# Patient Record
Sex: FEMALE
[De-identification: names, ages, dates, MRNs, and addresses within clinical notes are randomized per-mention and may not be internally consistent; named-entity substitution may affect disease eponyms.]

---

## 2023-06-23 ENCOUNTER — NON-APPOINTMENT (OUTPATIENT)
Age: 58
End: 2023-06-23

## 2023-06-24 PROBLEM — Z00.00 ENCOUNTER FOR PREVENTIVE HEALTH EXAMINATION: Status: ACTIVE | Noted: 2023-06-24

## 2023-06-26 ENCOUNTER — APPOINTMENT (OUTPATIENT)
Dept: ORTHOPEDIC SURGERY | Facility: CLINIC | Age: 58
End: 2023-06-26

## 2023-07-25 ENCOUNTER — APPOINTMENT (OUTPATIENT)
Dept: ORTHOPEDIC SURGERY | Facility: CLINIC | Age: 58
End: 2023-07-25
Payer: COMMERCIAL

## 2023-07-25 VITALS
DIASTOLIC BLOOD PRESSURE: 88 MMHG | HEART RATE: 111 BPM | BODY MASS INDEX: 26.5 KG/M2 | HEIGHT: 60 IN | SYSTOLIC BLOOD PRESSURE: 176 MMHG | WEIGHT: 135 LBS

## 2023-07-25 DIAGNOSIS — Z72.3 LACK OF PHYSICAL EXERCISE: ICD-10-CM

## 2023-07-25 DIAGNOSIS — M47.816 SPONDYLOSIS W/OUT MYELOPATHY OR RADICULOPATHY, LUMBAR REGION: ICD-10-CM

## 2023-07-25 DIAGNOSIS — Z78.9 OTHER SPECIFIED HEALTH STATUS: ICD-10-CM

## 2023-07-25 DIAGNOSIS — M67.952 UNSPECIFIED DISORDER OF SYNOVIUM AND TENDON, LEFT THIGH: ICD-10-CM

## 2023-07-25 DIAGNOSIS — Z86.39 PERSONAL HISTORY OF OTHER ENDOCRINE, NUTRITIONAL AND METABOLIC DISEASE: ICD-10-CM

## 2023-07-25 PROCEDURE — 99203 OFFICE O/P NEW LOW 30 MIN: CPT

## 2023-07-25 RX ORDER — NAPROXEN 500 MG/1
500 TABLET ORAL
Qty: 60 | Refills: 1 | Status: ACTIVE | COMMUNITY
Start: 2023-07-25 | End: 1900-01-01

## 2023-07-25 RX ORDER — LEVOTHYROXINE SODIUM 25 UG/1
25 TABLET ORAL
Refills: 0 | Status: ACTIVE | COMMUNITY

## 2023-07-25 RX ORDER — TIZANIDINE 4 MG/1
4 TABLET ORAL EVERY 8 HOURS
Qty: 20 | Refills: 0 | Status: ACTIVE | COMMUNITY
Start: 2023-07-25 | End: 1900-01-01

## 2023-07-25 NOTE — HISTORY OF PRESENT ILLNESS
[de-identified] : Very pleasant woman referred here for left gluteal pain.  Patient's been suffering pain for 1 month she had rib dysfunction the therapist is now working on her low back/left gluteal region states it is aggravated with direct palpation I think can be consistent with gluteal tendinopathy no radicular signs and symptoms pain is reproducible with direct palpation and touch.  Aggravating factor may be gardening moving back a topsoil approximately 1 month ago [Stable] : stable [5] : a current pain level of 5/10 [10] : a maximum pain level of 10/10 [Intermit.] : ~He/She~ states the symptoms seem to be intermittent [Bending] : worsened by bending [Lifting] : worsened by lifting [Exercise Regimen] : relieved by exercise regimen [Ataxia] : no ataxia [Incontinence] : no incontinence [Loss of Dexterity] : good dexterity [Urinary Ret.] : no urinary retention [de-identified] : Direct palpation [de-identified] : Physical activity helps

## 2023-07-25 NOTE — PHYSICAL EXAM
[de-identified] : CONSTITUTIONAL: The patient is a very pleasant individual who is well-nourished and who appears stated age.\par PSYCHIATRIC: The patient is alert and oriented X 3 and in no apparent distress, and participates with orthopedic evaluation well.\par HEAD: Atraumatic and is nonsyndromic in appearance.\par EENT: No visible thyromegaly, EOMI.\par RESPIRATORY: Respiratory rate is regular, not dyspneic on examination.\par LYMPHATICS: There is no inguinal lymphadenopathy\par INTEGUMENTARY: Skin is clean, dry, and intact about the bilateral lower extremities and lumbar spine.\par VASCULAR: There is brisk capillary refill about the bilateral lower extremities.\par NEUROLOGIC: There are no pathologic reflexes. There is no decrease in sensation of the bilateral lower extremities on Wartenberg pinwheel examination. Deep tendon reflexes are well maintained at 2+/4 of the bilateral lower extremities and are symmetric..\par MUSCULOSKELETAL: There is no visible muscular atrophy. Manual motor strength is well maintained in the bilateral lower extremities. Range of motion of lumbar spine is well maintained. The patient ambulates in a non-myelopathic manner. Negative tension sign and straight leg raise bilaterally. Quad extension, ankle dorsiflexion, EHL, plantar flexion, and ankle eversion are well preserved. Normal secondary orthopaedic exam of bilateral hips, greater trochanteric area, knees and ankles, left superior gluteal tendinopathy [de-identified] : X-rays been reviewed of the lumbar spine demonstrates overall well-maintained dosis.  Disc bases are maintained apparent no acute compression fractures are noted 6 views of the lumbar spine reviewed from [facility].

## 2023-07-25 NOTE — DISCUSSION/SUMMARY
[de-identified] : Very pleasant woman who has been suffering from left superior gluteal pain gluteal tendinopathy for approximately 1 month.  She has been in physical therapy but she states it is aggravating because of direct palpation is irritating which I believe is consistent with my physical exam of left gluteal tendinopathy we have discussed trigger point injections which she wishes to hold off on continued stretching aerobic conditioning light walking anti-inflammatories topical modalities patient will follow-up in 1 month or sooner if left gluteal tendinopathy is still persistent we will consider trigger point injection versus additional imaging.  Patient was prescribed higher dose anti-inflammatory.  As well as tizanidine

## 2024-11-02 ENCOUNTER — INPATIENT (INPATIENT)
Facility: HOSPITAL | Age: 59
LOS: 8 days | Discharge: HOME HEALTH | DRG: 293 | End: 2024-11-11
Attending: HOSPITALIST | Admitting: STUDENT IN AN ORGANIZED HEALTH CARE EDUCATION/TRAINING PROGRAM
Payer: COMMERCIAL

## 2024-11-02 ENCOUNTER — RESULT REVIEW (OUTPATIENT)
Age: 59
End: 2024-11-02

## 2024-11-02 VITALS
OXYGEN SATURATION: 98 % | DIASTOLIC BLOOD PRESSURE: 75 MMHG | SYSTOLIC BLOOD PRESSURE: 166 MMHG | WEIGHT: 160.06 LBS | HEART RATE: 110 BPM | RESPIRATION RATE: 18 BRPM | HEIGHT: 65 IN | TEMPERATURE: 98 F

## 2024-11-02 DIAGNOSIS — I50.9 HEART FAILURE, UNSPECIFIED: ICD-10-CM

## 2024-11-02 LAB
ALBUMIN SERPL ELPH-MCNC: 2.8 G/DL — LOW (ref 3.3–5.2)
ALP SERPL-CCNC: 104 U/L — SIGNIFICANT CHANGE UP (ref 40–120)
ALT FLD-CCNC: 7 U/L — SIGNIFICANT CHANGE UP
ANION GAP SERPL CALC-SCNC: 14 MMOL/L — SIGNIFICANT CHANGE UP (ref 5–17)
APPEARANCE UR: CLEAR — SIGNIFICANT CHANGE UP
AST SERPL-CCNC: 11 U/L — SIGNIFICANT CHANGE UP
BACTERIA # UR AUTO: NEGATIVE /HPF — SIGNIFICANT CHANGE UP
BASOPHILS # BLD AUTO: 0.02 K/UL — SIGNIFICANT CHANGE UP (ref 0–0.2)
BASOPHILS NFR BLD AUTO: 0.3 % — SIGNIFICANT CHANGE UP (ref 0–2)
BILIRUB SERPL-MCNC: 0.3 MG/DL — LOW (ref 0.4–2)
BILIRUB UR-MCNC: NEGATIVE — SIGNIFICANT CHANGE UP
BUN SERPL-MCNC: 25.9 MG/DL — HIGH (ref 8–20)
CALCIUM SERPL-MCNC: 8.3 MG/DL — LOW (ref 8.4–10.5)
CAST: 4 /LPF — SIGNIFICANT CHANGE UP (ref 0–4)
CHLORIDE SERPL-SCNC: 98 MMOL/L — SIGNIFICANT CHANGE UP (ref 96–108)
CO2 SERPL-SCNC: 22 MMOL/L — SIGNIFICANT CHANGE UP (ref 22–29)
COLOR SPEC: YELLOW — SIGNIFICANT CHANGE UP
CREAT SERPL-MCNC: 2.16 MG/DL — HIGH (ref 0.5–1.3)
DIFF PNL FLD: ABNORMAL
EGFR: 26 ML/MIN/1.73M2 — LOW
EOSINOPHIL # BLD AUTO: 0.13 K/UL — SIGNIFICANT CHANGE UP (ref 0–0.5)
EOSINOPHIL NFR BLD AUTO: 1.8 % — SIGNIFICANT CHANGE UP (ref 0–6)
GLUCOSE SERPL-MCNC: 332 MG/DL — HIGH (ref 70–99)
GLUCOSE UR QL: 100 MG/DL
HCT VFR BLD CALC: 37.5 % — SIGNIFICANT CHANGE UP (ref 34.5–45)
HGB BLD-MCNC: 12.5 G/DL — SIGNIFICANT CHANGE UP (ref 11.5–15.5)
IMM GRANULOCYTES NFR BLD AUTO: 0.3 % — SIGNIFICANT CHANGE UP (ref 0–0.9)
KETONES UR-MCNC: NEGATIVE MG/DL — SIGNIFICANT CHANGE UP
LEUKOCYTE ESTERASE UR-ACNC: NEGATIVE — SIGNIFICANT CHANGE UP
LYMPHOCYTES # BLD AUTO: 1.2 K/UL — SIGNIFICANT CHANGE UP (ref 1–3.3)
LYMPHOCYTES # BLD AUTO: 16.9 % — SIGNIFICANT CHANGE UP (ref 13–44)
MAGNESIUM SERPL-MCNC: 1.8 MG/DL — SIGNIFICANT CHANGE UP (ref 1.8–2.6)
MCHC RBC-ENTMCNC: 26.6 PG — LOW (ref 27–34)
MCHC RBC-ENTMCNC: 33.3 G/DL — SIGNIFICANT CHANGE UP (ref 32–36)
MCV RBC AUTO: 79.8 FL — LOW (ref 80–100)
MONOCYTES # BLD AUTO: 0.39 K/UL — SIGNIFICANT CHANGE UP (ref 0–0.9)
MONOCYTES NFR BLD AUTO: 5.5 % — SIGNIFICANT CHANGE UP (ref 2–14)
NEUTROPHILS # BLD AUTO: 5.33 K/UL — SIGNIFICANT CHANGE UP (ref 1.8–7.4)
NEUTROPHILS NFR BLD AUTO: 75.2 % — SIGNIFICANT CHANGE UP (ref 43–77)
NITRITE UR-MCNC: NEGATIVE — SIGNIFICANT CHANGE UP
NT-PROBNP SERPL-SCNC: 2503 PG/ML — HIGH (ref 0–300)
PH UR: 6.5 — SIGNIFICANT CHANGE UP (ref 5–8)
PHOSPHATE SERPL-MCNC: 4 MG/DL — SIGNIFICANT CHANGE UP (ref 2.4–4.7)
PLATELET # BLD AUTO: 321 K/UL — SIGNIFICANT CHANGE UP (ref 150–400)
POTASSIUM SERPL-MCNC: 4.4 MMOL/L — SIGNIFICANT CHANGE UP (ref 3.5–5.3)
POTASSIUM SERPL-SCNC: 4.4 MMOL/L — SIGNIFICANT CHANGE UP (ref 3.5–5.3)
PROT SERPL-MCNC: 6.3 G/DL — LOW (ref 6.6–8.7)
PROT UR-MCNC: 100 MG/DL
RBC # BLD: 4.7 M/UL — SIGNIFICANT CHANGE UP (ref 3.8–5.2)
RBC # FLD: 12.4 % — SIGNIFICANT CHANGE UP (ref 10.3–14.5)
RBC CASTS # UR COMP ASSIST: 148 /HPF — HIGH (ref 0–4)
SODIUM SERPL-SCNC: 134 MMOL/L — LOW (ref 135–145)
SP GR SPEC: 1.01 — SIGNIFICANT CHANGE UP (ref 1–1.03)
SQUAMOUS # UR AUTO: 1 /HPF — SIGNIFICANT CHANGE UP (ref 0–5)
TROPONIN T, HIGH SENSITIVITY RESULT: 258 NG/L — HIGH (ref 0–51)
TROPONIN T, HIGH SENSITIVITY RESULT: 272 NG/L — HIGH (ref 0–51)
UROBILINOGEN FLD QL: 0.2 MG/DL — SIGNIFICANT CHANGE UP (ref 0.2–1)
WBC # BLD: 7.09 K/UL — SIGNIFICANT CHANGE UP (ref 3.8–10.5)
WBC # FLD AUTO: 7.09 K/UL — SIGNIFICANT CHANGE UP (ref 3.8–10.5)
WBC UR QL: 10 /HPF — HIGH (ref 0–5)

## 2024-11-02 PROCEDURE — 99223 1ST HOSP IP/OBS HIGH 75: CPT

## 2024-11-02 PROCEDURE — 99285 EMERGENCY DEPT VISIT HI MDM: CPT

## 2024-11-02 PROCEDURE — 93010 ELECTROCARDIOGRAM REPORT: CPT

## 2024-11-02 PROCEDURE — 93306 TTE W/DOPPLER COMPLETE: CPT | Mod: 26

## 2024-11-02 PROCEDURE — 71045 X-RAY EXAM CHEST 1 VIEW: CPT | Mod: 26

## 2024-11-02 PROCEDURE — 76775 US EXAM ABDO BACK WALL LIM: CPT | Mod: 26

## 2024-11-02 RX ORDER — ACETAMINOPHEN 500 MG
650 TABLET ORAL EVERY 6 HOURS
Refills: 0 | Status: DISCONTINUED | OUTPATIENT
Start: 2024-11-02 | End: 2024-11-11

## 2024-11-02 RX ORDER — AMLODIPINE BESYLATE 10 MG
5 TABLET ORAL DAILY
Refills: 0 | Status: DISCONTINUED | OUTPATIENT
Start: 2024-11-02 | End: 2024-11-11

## 2024-11-02 RX ORDER — FUROSEMIDE 40 MG
40 TABLET ORAL
Refills: 0 | Status: DISCONTINUED | OUTPATIENT
Start: 2024-11-02 | End: 2024-11-03

## 2024-11-02 RX ORDER — AMLODIPINE BESYLATE 10 MG
1 TABLET ORAL
Refills: 0 | DISCHARGE

## 2024-11-02 RX ORDER — GABAPENTIN 300 MG/1
300 CAPSULE ORAL
Refills: 0 | Status: DISCONTINUED | OUTPATIENT
Start: 2024-11-02 | End: 2024-11-11

## 2024-11-02 RX ORDER — FUROSEMIDE 40 MG
1 TABLET ORAL
Refills: 0 | DISCHARGE

## 2024-11-02 RX ORDER — AMLODIPINE BESYLATE 10 MG
10 TABLET ORAL DAILY
Refills: 0 | Status: DISCONTINUED | OUTPATIENT
Start: 2024-11-02 | End: 2024-11-02

## 2024-11-02 RX ORDER — FUROSEMIDE 40 MG
60 TABLET ORAL ONCE
Refills: 0 | Status: COMPLETED | OUTPATIENT
Start: 2024-11-02 | End: 2024-11-02

## 2024-11-02 RX ORDER — LEVOTHYROXINE SODIUM 88 MCG
125 TABLET ORAL DAILY
Refills: 0 | Status: DISCONTINUED | OUTPATIENT
Start: 2024-11-02 | End: 2024-11-04

## 2024-11-02 RX ORDER — INSULIN LISPRO 100/ML
VIAL (ML) SUBCUTANEOUS
Refills: 0 | Status: DISCONTINUED | OUTPATIENT
Start: 2024-11-02 | End: 2024-11-02

## 2024-11-02 RX ORDER — ONDANSETRON HYDROCHLORIDE 2 MG/ML
4 INJECTION, SOLUTION INTRAMUSCULAR; INTRAVENOUS EVERY 8 HOURS
Refills: 0 | Status: DISCONTINUED | OUTPATIENT
Start: 2024-11-02 | End: 2024-11-11

## 2024-11-02 RX ORDER — MELATONIN 5 MG
3 TABLET ORAL AT BEDTIME
Refills: 0 | Status: DISCONTINUED | OUTPATIENT
Start: 2024-11-02 | End: 2024-11-11

## 2024-11-02 RX ORDER — GABAPENTIN 300 MG/1
1 CAPSULE ORAL
Refills: 0 | DISCHARGE

## 2024-11-02 RX ORDER — GLUCAGON INJECTION, SOLUTION 1 MG/.2ML
1 INJECTION, SOLUTION SUBCUTANEOUS ONCE
Refills: 0 | Status: DISCONTINUED | OUTPATIENT
Start: 2024-11-02 | End: 2024-11-11

## 2024-11-02 RX ORDER — MAGNESIUM, ALUMINUM HYDROXIDE 200-200 MG
30 TABLET,CHEWABLE ORAL EVERY 4 HOURS
Refills: 0 | Status: DISCONTINUED | OUTPATIENT
Start: 2024-11-02 | End: 2024-11-11

## 2024-11-02 RX ORDER — HEPARIN SODIUM 10000 [USP'U]/ML
5000 INJECTION INTRAVENOUS; SUBCUTANEOUS EVERY 8 HOURS
Refills: 0 | Status: DISCONTINUED | OUTPATIENT
Start: 2024-11-02 | End: 2024-11-11

## 2024-11-02 RX ADMIN — Medication 60 MILLIGRAM(S): at 14:46

## 2024-11-02 RX ADMIN — Medication 40 MILLIGRAM(S): at 22:03

## 2024-11-02 RX ADMIN — HEPARIN SODIUM 5000 UNIT(S): 10000 INJECTION INTRAVENOUS; SUBCUTANEOUS at 23:08

## 2024-11-02 NOTE — H&P ADULT - HISTORY OF PRESENT ILLNESS
57 y/o F w/ PMH HTN, IDDM type I (has medtronic insulin pump), hypothyroid, neuropathies presented to ED c/o 2 weeks of NIXON w/ associated worsening LE edema, nonproductive cough and orthopnea.  Pt also reports a L-chest burning pain that occurs only when she lays on her left side but does not radiate and is not associated w/ palpitations, diaphoresis abd pain, N/V.    Pt was started on lasix a few days ago by outpt doctor w/ minimal improvement and recommended she come to hospital for IV diuresis.  She was also sent for CXR and CTA chest PE study yesterday outpt at [facility] that were only significant for pulm vascular congestion and b/l pleural effusions but negative PE.  CT chest also showed multiple lymphnodes, some new and some old seen on CT chest from 2023 and some were somewhat larger than prior image but pt states she was never told she had an abnormal CT chest last year and never had any work up for lymphnodes.  Pt also has blood work with her from 09/2024 and 10/31/24.  In september pt was at baseline Cr of 1.09 and 10/31 was 1.93.  Pt denies fevers, chills, sick contacts, recent travel, diarrhea.

## 2024-11-02 NOTE — ED ADULT NURSE REASSESSMENT NOTE - NS ED NURSE REASSESS COMMENT FT1
Assumed care of patient at 1915, report received from off-going RN. CM in Sinus tachycardia and  at 91-92% on RA , patient currently denies chest pain and shortness of breath.  Resting in ED stretcher, NAD. Wheels locked, bed in lowest position, Pending bed change due to admission.

## 2024-11-02 NOTE — ED ADULT TRIAGE NOTE - NS ED TRIAGE AVPU SCALE
Beny Nieto)  Orthopaedic Sports Medicine; Orthopaedic Surgery  [address] [place] [address]  Phone: [phone]  Fax: [phone]  Follow Up Time:    Alert-The patient is alert, awake and responds to voice. The patient is oriented to time, place, and person. The triage nurse is able to obtain subjective information.

## 2024-11-02 NOTE — ED ADULT NURSE NOTE - NSFALLUNIVINTERV_ED_ALL_ED
Bed/Stretcher in lowest position, wheels locked, appropriate side rails in place/Call bell, personal items and telephone in reach/Instruct patient to call for assistance before getting out of bed/chair/stretcher/Non-slip footwear applied when patient is off stretcher/[place] to call system/Physically safe environment - no spills, clutter or unnecessary equipment/Purposeful proactive rounding/Room/bathroom lighting operational, light cord in reach

## 2024-11-02 NOTE — ED ADULT NURSE NOTE - ED STAT RN HANDOFF DETAILS
Report given to CDU RN. Report given to CDU RN. Pt moved to CDU ________ . Pt placed on telebox. Patient awake and alert, respirations even and unlabored, in no apparent distress.  Plan, abnormal labs, history of present illness, pending labs/tests explained, opportunity to answer questions provided.

## 2024-11-02 NOTE — ED ADULT NURSE NOTE - OBJECTIVE STATEMENT
Assumed care of pt at 1439. Pt A&Ox4 c/o CP/SOB, pt on cardiac monitor in sinus tach, pt resting comfortably showing no signs of respiratory distress or pain, pt is calm and cooperative

## 2024-11-02 NOTE — ED PROVIDER NOTE - NS ED ROS FT
Const: Denies fever, chills  HEENT: Denies blurry vision, sore throat  Neck: Denies neck pain/stiffness  Resp: + coughing, + SOB  Cardiovascular: + CP, + LE edema. Denies CP, palpitations  GI: + nausea. Denies vomiting, abdominal pain, diarrhea, constipation, blood in stool  : Denies urinary frequency/urgency/dysuria, hematuria  MSK: Denies back pain  Neuro: Denies HA, dizziness, numbness, weakness  Skin: Denies rashes.

## 2024-11-02 NOTE — H&P ADULT - ASSESSMENT
59 y/o F w/ PMH HTN, IDDM type I (has medtronic insulin pump), hypothyroid, neuropathies presented to ED c/o 2 weeks of NIXON w/ associated worsening LE edema, nonproductive cough and orthopnea and occasional L-chest pain that is like a burning sensation and only occurs when she lays on her left side.  VS notable for sinus tachy in low 100's, tachypnea and O2 sats 92% ORA while in ED.  Clinically hypervolemic w/ scattered rales and b/l LE pretibial and pedal pitting edema. Initial w/u significant for trop 272-->258, BNP 2503, Cr 2.16. CXR w/ pulm vascular congestion and small b/l pleural effusions.  s/p 60mg IV lasix.  Admit for CHF exacerbation.       New acute decompensated HFpEF  - Clinically hypervolemic   - BNP 2503, Trops elevated but down trending   - CXR w/ pulm vascular congestion and small b/l pleural effusions appreciated  - CTA PE study 11/01 significant for pulm vascular congestion and b/l pleural effusions but negative PE   - TTE 11/02 w/ LVEF 55-60% no WMA reported   - Will order TSH, lipid panel and A1c  - Will start on IV diuresis   - Daily weight, strict I/O's and fluid restrict   - Maintain K>4 and Mg>2  - Monitor on telemetry   - Cardiology consulted ([place])      Type II MI, likely demand 2/2 above vs poor renal clearance  - Trops down trending (272-->258)  - Will order EKG to assess for ischemic changes   - TTE 11/02 w/ LVEF 55-60% no WMA   - Will check A1c and lipid panel   - Monitor on telemetry   - Cardiology consulted       SHARA suspect multifactorial 2/2 cardiorenal, prerenal azotemia and ALBERT   - Baseline Cr 1.09 in 09/2024 but 10/31 showed worsening Cr of 1.93 which was likely cardiorenal from acute CHF and newly started on lasix causing prerenal azotemia  - Today Cr 2.16 likely related to contrast pt received yesterday for CTA chest   - Anticipate degree of azotemia w/ initial IV diuresis   - Renal US shows non obstructing b/l calculi, no hydronephrosis and b/l increased renal echotexture compatible with medical renal disease  - Will order UA and bladder scan   - Monitor I/O's, renal function and lytes   - Avoid nephrotic meds or renally dose if needed  - Nephrology consulted       Incidental TTE finding   - TTE 11/02 reporting Echogenic mass 1.8 cm x 2.0 cm within the lateral wall of the RA  - CTA chest 11/01 report from [facility] w/ no mention of mass  - Cardiology consulted and suspect pt will need KAROLINA      Incidental CTA chest findings   - Reported to have multiple enlarged lymph nodes, some new and some seen on CT chest from 2023 and are only slight larger in size at this time  - Explained to daughter and pt that she should have a repeat CT chest in 1-3 months to reassess lymph nodes and should f/u with heme/onc outpt       IDDM Type I w/ neuropathies  - f/u A1c  - Has medtronic insulin pump and advised pt to continue using as usual while inpt   - Hypoglycemic protocol in place  - c/w gabapentin   - Will consult endocrinology for pump management       HTN  - c/w amlodipine   - On IV lasix but transition to po once euvolemic      Hypothyroid  - c/w synthroid  - f/u TSH       VTE ppx: heparin sq    Dispo: acute.  Anticipate d/c home w/ no needs once medically stable.

## 2024-11-02 NOTE — H&P ADULT - NSHPPHYSICALEXAM_GEN_ALL_CORE
GENERAL: pt examined bedside, laying comfortably in bed in NAD  HEENT: NC/AT, moist oral mucosa, clear conjunctiva, sclera nonicteric  RESPIRATORY: Normal respiratory effort; CTA b/l, no wheezing, rhonchi, rales  CARDIOVASCULAR: RRR, normal S1 and S2, no murmur/rub/gallop  ABDOMEN: soft, NT/ND, normoactive bowel sounds, no rebound/guarding  MSK: No joint deformities, edema, erythema  EXTREMITIES: No cynaosis, no clubbing, no lower extremity edema; Peripheral pulses are 2+ bilaterally  PSYCH: affect appropriate and cooperative  NEUROLOGY: A+O to person, place, and time, no focal neurologic deficits appreciated  SKIN: No rashes or no palpable lesions GENERAL: pt examined bedside, laying comfortably in bed in NAD  HEENT: NC/AT, moist oral mucosa, clear conjunctiva, sclera nonicteric  RESPIRATORY: Normal respiratory effort, scattered rales, no wheezing or rhonchi  CARDIOVASCULAR: RRR, normal S1 and S2, no murmur/rub/gallop  ABDOMEN: soft, NT/ND, +bowel sounds, no rebound/guarding  MSK: No joint deformities, edema, erythema  EXTREMITIES: No cynaosis, no clubbing, pretibial and pedal 1+pitting edema   PSYCH: affect appropriate and cooperative  NEUROLOGY: A+O to person, place, and time, no focal neurologic deficits appreciated  SKIN: No rashes or no palpable lesions

## 2024-11-02 NOTE — H&P ADULT - NSICDXFAMILYHX_GEN_ALL_CORE_FT
FAMILY HISTORY:  Mother  Still living? Unknown  FH: non-Hodgkin's lymphoma, Age at diagnosis: Age Unknown

## 2024-11-02 NOTE — ED PROVIDER NOTE - CARE PLAN
Principal Discharge DX:	New onset of congestive heart failure  Secondary Diagnosis:	Acute renal failure   1

## 2024-11-02 NOTE — H&P ADULT - NSHPLABSRESULTS_GEN_ALL_CORE
Case discussed in care rounds; Rn states that Psych offered outpatient resources.   12.5   7.09  )-----------( 321      ( 02 Nov 2024 14:24 )             37.5         11-02    134[L]  |  98  |  25.9[H]  ----------------------------<  332[H]  4.4   |  22.0  |  2.16[H]    Ca    8.3[L]      02 Nov 2024 14:24  Phos  4.0     11-02  Mg     1.8     11-02    TPro  6.3[L]  /  Alb  2.8[L]  /  TBili  0.3[L]  /  DBili  x   /  AST  11  /  ALT  7   /  AlkPhos  104  11-02        Pro-Brain Natriuretic Peptide (11.02.24 @ 14:24)    Pro-Brain Natriuretic Peptide: 2503 pg/mL        Troponin T, High Sensitivity (11.02.24 @ 14:24)    Troponin T, High Sensitivity Result: 272: *      Troponin T, High Sensitivity (11.02.24 @ 16:36)    Troponin T, High Sensitivity Result: 258: *      < from: TTE W or WO Ultrasound Enhancing Agent (11.02.24 @ 15:09) >    CONCLUSIONS:      1. Technically difficult image quality.   2. Left ventricular systolic function is normal with an ejection fraction of 58 % by Maria's method of disks with an ejection fraction visually estimated at 55 to 60 %.   3. The leftventricular diastolic function is indeterminate.   4. Normal right ventricular cavity size and normal right ventricular systolic function.   5. Pulmonary artery systolic pressure could not be estimated.   6. Echogenic mass 1.8 cm x 2.0 cm noted within the lateral wall of the right atrium visualized on the subcostal view, recommend further imaging correlation with cardiac or CT chest study or KAROLINA.   7. No pericardial effusion seen.   8. Small right and moderate left pleural effusion noted.   9. Noprior echocardiogram is available for comparison.    < end of copied text >        < from: US Renal (11.02.24 @ 17:43) >    IMPRESSION:  Bilateral nonobstructing intrarenal calculi.    Slight fullness of right intrarenal collecting system without overt   hydronephrosis. No left hydronephrosis.    Bilateral increased renal echotexture compatible with medical renal   disease.    < end of copied text > 12.5   7.09  )-----------( 321      ( 02 Nov 2024 14:24 )             37.5         11-02    134[L]  |  98  |  25.9[H]  ----------------------------<  332[H]  4.4   |  22.0  |  2.16[H]    Ca    8.3[L]      02 Nov 2024 14:24  Phos  4.0     11-02  Mg     1.8     11-02    TPro  6.3[L]  /  Alb  2.8[L]  /  TBili  0.3[L]  /  DBili  x   /  AST  11  /  ALT  7   /  AlkPhos  104  11-02        Pro-Brain Natriuretic Peptide (11.02.24 @ 14:24)    Pro-Brain Natriuretic Peptide: 2503 pg/mL        Troponin T, High Sensitivity (11.02.24 @ 14:24)    Troponin T, High Sensitivity Result: 272: *      Troponin T, High Sensitivity (11.02.24 @ 16:36)    Troponin T, High Sensitivity Result: 258: *       CTA PE study 11/01/24 that was significant for pulm vascular congestion and b/l pleural effusions but negative PE.  CT chest also showed multiple enlarged lymph nodes, some new and some seen on CT chest from 2023 and are only slight larger in size at this time      < from: TTE W or WO Ultrasound Enhancing Agent (11.02.24 @ 15:09) >    CONCLUSIONS:      1. Technically difficult image quality.   2. Left ventricular systolic function is normal with an ejection fraction of 58 % by Maria's method of disks with an ejection fraction visually estimated at 55 to 60 %.   3. The leftventricular diastolic function is indeterminate.   4. Normal right ventricular cavity size and normal right ventricular systolic function.   5. Pulmonary artery systolic pressure could not be estimated.   6. Echogenic mass 1.8 cm x 2.0 cm noted within the lateral wall of the right atrium visualized on the subcostal view, recommend further imaging correlation with cardiac or CT chest study or KAROLINA.   7. No pericardial effusion seen.   8. Small right and moderate left pleural effusion noted.   9. Noprior echocardiogram is available for comparison.    < end of copied text >        < from: US Renal (11.02.24 @ 17:43) >    IMPRESSION:  Bilateral nonobstructing intrarenal calculi.    Slight fullness of right intrarenal collecting system without overt   hydronephrosis. No left hydronephrosis.    Bilateral increased renal echotexture compatible with medical renal   disease.    < end of copied text >

## 2024-11-02 NOTE — ED PROVIDER NOTE - PHYSICAL EXAMINATION
Const: Awake, alert and oriented. In no acute distress. Well appearing.  HEENT: NC/AT. Moist mucous membranes.  Eyes: No scleral icterus. EOMI.  Neck:. Soft and supple. Full ROM without pain.  Cardiac: Tachycardic rate and regular rhythm. +S1/S2. No murmurs. Peripheral pulses 2+ and symmetric. 2+ b/l LE edema.  Resp: Speaking in full sentences. No evidence of respiratory distress. Diminished breath sounds bilateral bases, diffuse rales.  Abd: Soft, non-tender, non-distended. Normal bowel sounds in all 4 quadrants. No guarding or rebound.  Back: Spine midline and non-tender. No CVAT.  Skin: No rashes, abrasions or lacerations.  Neuro: Awake, alert & oriented x 3. Moves all extremities symmetrically.

## 2024-11-02 NOTE — ED PROVIDER NOTE - OBJECTIVE STATEMENT
57 y/o F with PMH HTN, DM presents for 2 weeks of LE edema, NIXON, shortness of breath and coughing. She denies chest pain with exertion, but notes left sided chest pain when lying on her left side. She was started on lasix 3 days ago without improvement of symptoms. She had an outpatient CXR and CTA PE study yesterday which showed pulmonary vascular congestion with bilateral pleural effusions, no PE, but multiple enlarged lymph nodes that are only mildly more enlarged than in 2023. She notes nausea without vomiting. She had blood work on 10/31 which showed worsening BUN/cre (baseline cre 1.09 from Sept, now 1.93). She has an appointment with [facility] next week, but has not yet seen them. She denies smoking or EtOH use.

## 2024-11-02 NOTE — ED PROVIDER NOTE - CLINICAL SUMMARY MEDICAL DECISION MAKING FREE TEXT BOX
58-year-old female with past medical history hypertension, diabetes presents sent in by her primary care doctor for new onset heart failure and renal failure.  Patient describes 2 weeks of dyspnea on exertion, bilateral lower extremity edema, and cough that is not productive of sputum.  She denies fevers.  She had outpatient blood work last week which showed rising creatinine, and outpatient chest x-ray that showed pulmonary vascular congestion with bilateral pleural effusions, and a CTA PE study yesterday which showed no PE.  Patient is tachycardic, diffuse Rales, satting 93% on room air, no respiratory distress.  She does have 2+ pitting edema bilateral lower extremities.  Will check labs, start IV diuresis, cardiology and nephrology consulted.  I discussed with patient and daughter at bedside plan for admission.

## 2024-11-03 LAB
A1C WITH ESTIMATED AVERAGE GLUCOSE RESULT: 10.6 % — HIGH (ref 4–5.6)
ALBUMIN SERPL ELPH-MCNC: 2.7 G/DL — LOW (ref 3.3–5.2)
ALP SERPL-CCNC: 99 U/L — SIGNIFICANT CHANGE UP (ref 40–120)
ALT FLD-CCNC: 5 U/L — SIGNIFICANT CHANGE UP
ANION GAP SERPL CALC-SCNC: 13 MMOL/L — SIGNIFICANT CHANGE UP (ref 5–17)
AST SERPL-CCNC: 10 U/L — SIGNIFICANT CHANGE UP
BASOPHILS # BLD AUTO: 0.02 K/UL — SIGNIFICANT CHANGE UP (ref 0–0.2)
BASOPHILS NFR BLD AUTO: 0.3 % — SIGNIFICANT CHANGE UP (ref 0–2)
BILIRUB SERPL-MCNC: 0.3 MG/DL — LOW (ref 0.4–2)
BUN SERPL-MCNC: 31.7 MG/DL — HIGH (ref 8–20)
CALCIUM SERPL-MCNC: 8.1 MG/DL — LOW (ref 8.4–10.5)
CHLORIDE SERPL-SCNC: 99 MMOL/L — SIGNIFICANT CHANGE UP (ref 96–108)
CHOLEST SERPL-MCNC: 148 MG/DL — SIGNIFICANT CHANGE UP
CO2 SERPL-SCNC: 23 MMOL/L — SIGNIFICANT CHANGE UP (ref 22–29)
CREAT SERPL-MCNC: 2.82 MG/DL — HIGH (ref 0.5–1.3)
EGFR: 19 ML/MIN/1.73M2 — LOW
EOSINOPHIL # BLD AUTO: 0.16 K/UL — SIGNIFICANT CHANGE UP (ref 0–0.5)
EOSINOPHIL NFR BLD AUTO: 2.6 % — SIGNIFICANT CHANGE UP (ref 0–6)
ESTIMATED AVERAGE GLUCOSE: 258 MG/DL — HIGH (ref 68–114)
GLUCOSE BLDC GLUCOMTR-MCNC: 116 MG/DL — HIGH (ref 70–99)
GLUCOSE BLDC GLUCOMTR-MCNC: 226 MG/DL — HIGH (ref 70–99)
GLUCOSE BLDC GLUCOMTR-MCNC: 261 MG/DL — HIGH (ref 70–99)
GLUCOSE BLDC GLUCOMTR-MCNC: 266 MG/DL — HIGH (ref 70–99)
GLUCOSE SERPL-MCNC: 82 MG/DL — SIGNIFICANT CHANGE UP (ref 70–99)
HCT VFR BLD CALC: 38.8 % — SIGNIFICANT CHANGE UP (ref 34.5–45)
HDLC SERPL-MCNC: 39 MG/DL — LOW
HGB BLD-MCNC: 13 G/DL — SIGNIFICANT CHANGE UP (ref 11.5–15.5)
IMM GRANULOCYTES NFR BLD AUTO: 0.5 % — SIGNIFICANT CHANGE UP (ref 0–0.9)
LIPID PNL WITH DIRECT LDL SERPL: 84 MG/DL — SIGNIFICANT CHANGE UP
LYMPHOCYTES # BLD AUTO: 1.06 K/UL — SIGNIFICANT CHANGE UP (ref 1–3.3)
LYMPHOCYTES # BLD AUTO: 17.3 % — SIGNIFICANT CHANGE UP (ref 13–44)
MAGNESIUM SERPL-MCNC: 1.8 MG/DL — SIGNIFICANT CHANGE UP (ref 1.6–2.6)
MCHC RBC-ENTMCNC: 26.7 PG — LOW (ref 27–34)
MCHC RBC-ENTMCNC: 33.5 G/DL — SIGNIFICANT CHANGE UP (ref 32–36)
MCV RBC AUTO: 79.7 FL — LOW (ref 80–100)
MONOCYTES # BLD AUTO: 0.49 K/UL — SIGNIFICANT CHANGE UP (ref 0–0.9)
MONOCYTES NFR BLD AUTO: 8 % — SIGNIFICANT CHANGE UP (ref 2–14)
NEUTROPHILS # BLD AUTO: 4.36 K/UL — SIGNIFICANT CHANGE UP (ref 1.8–7.4)
NEUTROPHILS NFR BLD AUTO: 71.3 % — SIGNIFICANT CHANGE UP (ref 43–77)
NON HDL CHOLESTEROL: 109 MG/DL — SIGNIFICANT CHANGE UP
PHOSPHATE SERPL-MCNC: 4.2 MG/DL — SIGNIFICANT CHANGE UP (ref 2.4–4.7)
PLATELET # BLD AUTO: 338 K/UL — SIGNIFICANT CHANGE UP (ref 150–400)
POTASSIUM SERPL-MCNC: 3.9 MMOL/L — SIGNIFICANT CHANGE UP (ref 3.5–5.3)
POTASSIUM SERPL-SCNC: 3.9 MMOL/L — SIGNIFICANT CHANGE UP (ref 3.5–5.3)
PROT SERPL-MCNC: 6 G/DL — LOW (ref 6.6–8.7)
RBC # BLD: 4.87 M/UL — SIGNIFICANT CHANGE UP (ref 3.8–5.2)
RBC # FLD: 12.6 % — SIGNIFICANT CHANGE UP (ref 10.3–14.5)
SODIUM SERPL-SCNC: 135 MMOL/L — SIGNIFICANT CHANGE UP (ref 135–145)
TRIGL SERPL-MCNC: 125 MG/DL — SIGNIFICANT CHANGE UP
TSH SERPL-MCNC: 7.12 UIU/ML — HIGH (ref 0.27–4.2)
WBC # BLD: 6.12 K/UL — SIGNIFICANT CHANGE UP (ref 3.8–10.5)
WBC # FLD AUTO: 6.12 K/UL — SIGNIFICANT CHANGE UP (ref 3.8–10.5)

## 2024-11-03 PROCEDURE — 99223 1ST HOSP IP/OBS HIGH 75: CPT

## 2024-11-03 PROCEDURE — 99233 SBSQ HOSP IP/OBS HIGH 50: CPT

## 2024-11-03 RX ORDER — INSULIN LISPRO 100/ML
VIAL (ML) SUBCUTANEOUS AT BEDTIME
Refills: 0 | Status: DISCONTINUED | OUTPATIENT
Start: 2024-11-03 | End: 2024-11-11

## 2024-11-03 RX ORDER — INSULIN LISPRO 100/ML
VIAL (ML) SUBCUTANEOUS
Refills: 0 | Status: DISCONTINUED | OUTPATIENT
Start: 2024-11-03 | End: 2024-11-05

## 2024-11-03 RX ORDER — FUROSEMIDE 40 MG
60 TABLET ORAL
Refills: 0 | Status: DISCONTINUED | OUTPATIENT
Start: 2024-11-03 | End: 2024-11-04

## 2024-11-03 RX ORDER — INSULIN GLARGINE,HUM.REC.ANLOG 100/ML
16 VIAL (ML) SUBCUTANEOUS ONCE
Refills: 0 | Status: COMPLETED | OUTPATIENT
Start: 2024-11-03 | End: 2024-11-03

## 2024-11-03 RX ORDER — TRAMADOL HYDROCHLORIDE 50 MG/1
25 TABLET, COATED ORAL ONCE
Refills: 0 | Status: DISCONTINUED | OUTPATIENT
Start: 2024-11-03 | End: 2024-11-03

## 2024-11-03 RX ORDER — INFLUENZ VIR VAC TV P-SURF2003 15MCG/.5ML
0.5 SYRINGE (ML) INTRAMUSCULAR ONCE
Refills: 0 | Status: DISCONTINUED | OUTPATIENT
Start: 2024-11-03 | End: 2024-11-11

## 2024-11-03 RX ADMIN — Medication 60 MILLIGRAM(S): at 14:10

## 2024-11-03 RX ADMIN — Medication 650 MILLIGRAM(S): at 15:59

## 2024-11-03 RX ADMIN — GABAPENTIN 300 MILLIGRAM(S): 300 CAPSULE ORAL at 06:15

## 2024-11-03 RX ADMIN — TRAMADOL HYDROCHLORIDE 25 MILLIGRAM(S): 50 TABLET, COATED ORAL at 18:53

## 2024-11-03 RX ADMIN — Medication 5 MILLIGRAM(S): at 06:14

## 2024-11-03 RX ADMIN — Medication 3: at 18:12

## 2024-11-03 RX ADMIN — GABAPENTIN 300 MILLIGRAM(S): 300 CAPSULE ORAL at 18:10

## 2024-11-03 RX ADMIN — Medication 16 UNIT(S): at 18:09

## 2024-11-03 RX ADMIN — HEPARIN SODIUM 5000 UNIT(S): 10000 INJECTION INTRAVENOUS; SUBCUTANEOUS at 22:07

## 2024-11-03 RX ADMIN — Medication 125 MICROGRAM(S): at 06:14

## 2024-11-03 RX ADMIN — HEPARIN SODIUM 5000 UNIT(S): 10000 INJECTION INTRAVENOUS; SUBCUTANEOUS at 06:17

## 2024-11-03 RX ADMIN — Medication 650 MILLIGRAM(S): at 14:16

## 2024-11-03 RX ADMIN — Medication 40 MILLIGRAM(S): at 06:20

## 2024-11-03 RX ADMIN — Medication 1: at 22:08

## 2024-11-03 RX ADMIN — HEPARIN SODIUM 5000 UNIT(S): 10000 INJECTION INTRAVENOUS; SUBCUTANEOUS at 14:09

## 2024-11-03 NOTE — PROGRESS NOTE ADULT - SUBJECTIVE AND OBJECTIVE BOX
[facility] Division of Hospital Medicine    Chief Complaint:      SUBJECTIVE / OVERNIGHT EVENTS:    Patient seen and examined at bedside, admitted overnight with new onset HFpEF, cardiology eval pending, patient on IV diuresis with noted SHARA as well, nephrology eval pending. Patient also has insulin pump for which Endocrine was consulted this AM, form provided to patient to self fill out as able for insulin management, Endocrine to assist. Patient continues to feel some SOB, and swelling in her legs but feels she is improving.     MEDICATIONS  (STANDING):  amLODIPine   Tablet 5 milliGRAM(s) Oral daily  dextrose 5%. 1000 milliLiter(s) (100 mL/Hr) IV Continuous <Continuous>  dextrose 5%. 1000 milliLiter(s) (50 mL/Hr) IV Continuous <Continuous>  dextrose 50% Injectable 25 Gram(s) IV Push once  dextrose 50% Injectable 12.5 Gram(s) IV Push once  dextrose 50% Injectable 25 Gram(s) IV Push once  furosemide   Injectable 40 milliGRAM(s) IV Push two times a day  gabapentin 300 milliGRAM(s) Oral two times a day  glucagon  Injectable 1 milliGRAM(s) IntraMuscular once  heparin   Injectable 5000 Unit(s) SubCutaneous every 8 hours  influenza   Vaccine 0.5 milliLiter(s) IntraMuscular once  levothyroxine 125 MICROGram(s) Oral daily    MEDICATIONS  (PRN):  acetaminophen     Tablet .. 650 milliGRAM(s) Oral every 6 hours PRN Temp greater or equal to 38C (100.4F), Mild Pain (1 - 3)  aluminum hydroxide/magnesium hydroxide/simethicone Suspension 30 milliLiter(s) Oral every 4 hours PRN Dyspepsia  dextrose Oral Gel 15 Gram(s) Oral once PRN Blood Glucose LESS THAN 70 milliGRAM(s)/deciliter  melatonin 3 milliGRAM(s) Oral at bedtime PRN Insomnia  ondansetron Injectable 4 milliGRAM(s) IV Push every 8 hours PRN Nausea and/or Vomiting        I&O's Summary      PHYSICAL EXAM:  Vital Signs Last 24 Hrs  T(C): 36.9 (03 Nov 2024 07:52), Max: 36.9 (02 Nov 2024 22:28)  T(F): 98.4 (03 Nov 2024 07:52), Max: 98.5 (03 Nov 2024 05:48)  HR: 100 (03 Nov 2024 07:52) (100 - 113)  BP: 148/80 (03 Nov 2024 07:52) (148/80 - 172/81)  BP(mean): 99 (02 Nov 2024 14:40) (99 - 99)  RR: 18 (03 Nov 2024 07:52) (18 - 21)  SpO2: 92% (03 Nov 2024 07:52) (92% - 98%)    Parameters below as of 03 Nov 2024 07:52  Patient On (Oxygen Delivery Method): room air      GENERAL: pt examined bedside, laying comfortably in bed in NAD  HEENT: NC/AT, moist oral mucosa, clear conjunctiva, sclera nonicteric  RESPIRATORY: decreased breath sounds in B/L bases, otherwise minimal crackles noted   CARDIOVASCULAR: RRR, normal S1 and S2, no murmur/rub/gallop  ABDOMEN: soft, NT/ND, +bowel sounds, no rebound/guarding  MSK: No joint deformities, edema, erythema  EXTREMITIES: No cynaosis, no clubbing, pretibial and pedal 1+pitting edema   PSYCH: affect appropriate and cooperative  NEUROLOGY: A+O to person, place, and time, no focal neurologic deficits appreciated  SKIN: No rashes or no palpable lesions    LABS:                        13.0   6.12  )-----------( 338      ( 03 Nov 2024 07:21 )             38.8     11-03    135  |  99  |  31.7[H]  ----------------------------<  82  3.9   |  23.0  |  2.82[H]    Ca    8.1[L]      03 Nov 2024 07:21  Phos  4.2     11-03  Mg     1.8     11-03    TPro  6.0[L]  /  Alb  2.7[L]  /  TBili  0.3[L]  /  DBili  x   /  AST  10  /  ALT  5   /  AlkPhos  99  11-03          Urinalysis Basic - ( 03 Nov 2024 07:21 )    Color: x / Appearance: x / SG: x / pH: x  Gluc: 82 mg/dL / Ketone: x  / Bili: x / Urobili: x   Blood: x / Protein: x / Nitrite: x   Leuk Esterase: x / RBC: x / WBC x   Sq Epi: x / Non Sq Epi: x / Bacteria: x        CAPILLARY BLOOD GLUCOSE      POCT Blood Glucose.: 116 mg/dL (03 Nov 2024 08:15)        RADIOLOGY & ADDITIONAL TESTS:  Results Reviewed:   Imaging Personally Reviewed:  Electrocardiogram Personally Reviewed:

## 2024-11-03 NOTE — CONSULT NOTE ADULT - SUBJECTIVE AND OBJECTIVE BOX
HPI:  57 y/o F w/ PMH HTN, IDDM type I (has medtronic insulin pump), hypothyroid, neuropathies presented to ED c/o 2 weeks of NIXON w/ associated worsening LE edema, nonproductive cough and orthopnea.  Pt also reports a L-chest burning pain that occurs only when she lays on her left side but does not radiate and is not associated w/ palpitations, diaphoresis abd pain, N/V.    Pt was started on lasix a few days ago by outpt doctor w/ minimal improvement and recommended she come to hospital for IV diuresis.  She was also sent for CXR and CTA chest PE study yesterday outpt at [facility] that were only significant for pulm vascular congestion and b/l pleural effusions but negative PE.  CT chest also showed multiple lymphnodes, some new and some old seen on CT chest from 2023 and some were somewhat larger than prior image but pt states she was never told she had an abnormal CT chest last year and never had any work up for lymphnodes.  Pt also has blood work with her from 09/2024 and 10/31/24.  In september pt was at baseline Cr of 1.09 and 10/31 was 1.93.  Pt denies fevers, chills, sick contacts, recent travel, diarrhea.  (02 Nov 2024 20:01)    Endocrine consulted for T1DM on Medtronic insulin pump   T1DM since 1993, outpt endo: Dr Rondon  - she does not manage her pump settings, stated that setting have not been changes in a while and glucoses in control. she does not use a sensor  complications - peripheral neuropathy (on outpt gabapentin), no recent eye exam, denies kidney issues  - ?adherence with diabetic diet, she drinks juice daily    Hypothyroidism on Synthroid 125 mcg daily, dose increased 2 months ago and is adherent with daily dosing but takes with juice    PAST MEDICAL & SURGICAL HISTORY:  Diabetes  Hypertension  No significant past surgical history    FAMILY HISTORY:  FH: non-Hodgkin's lymphoma (Mother)    SOCIAL HISTORY: denies tobacco, illicit drugs or EtOh use    MEDICATIONS  (STANDING):  amLODIPine   Tablet 5 milliGRAM(s) Oral daily  dextrose 5%. 1000 milliLiter(s) (100 mL/Hr) IV Continuous <Continuous>  dextrose 5%. 1000 milliLiter(s) (50 mL/Hr) IV Continuous <Continuous>  dextrose 50% Injectable 25 Gram(s) IV Push once  dextrose 50% Injectable 12.5 Gram(s) IV Push once  dextrose 50% Injectable 25 Gram(s) IV Push once  furosemide   Injectable 60 milliGRAM(s) IV Push two times a day  gabapentin 300 milliGRAM(s) Oral two times a day  glucagon  Injectable 1 milliGRAM(s) IntraMuscular once  heparin   Injectable 5000 Unit(s) SubCutaneous every 8 hours  influenza   Vaccine 0.5 milliLiter(s) IntraMuscular once  levothyroxine 125 MICROGram(s) Oral daily    MEDICATIONS  (PRN):  acetaminophen     Tablet .. 650 milliGRAM(s) Oral every 6 hours PRN Temp greater or equal to 38C (100.4F), Mild Pain (1 - 3)  aluminum hydroxide/magnesium hydroxide/simethicone Suspension 30 milliLiter(s) Oral every 4 hours PRN Dyspepsia  dextrose Oral Gel 15 Gram(s) Oral once PRN Blood Glucose LESS THAN 70 milliGRAM(s)/deciliter  melatonin 3 milliGRAM(s) Oral at bedtime PRN Insomnia  ondansetron Injectable 4 milliGRAM(s) IV Push every 8 hours PRN Nausea and/or Vomiting    ALLERGIES: No Known Allergies      Vital Signs Last 24 Hrs  T(C): 36.8 (03 Nov 2024 15:49), Max: 36.9 (02 Nov 2024 22:28)  T(F): 98.3 (03 Nov 2024 15:49), Max: 98.5 (03 Nov 2024 05:48)  HR: 97 (03 Nov 2024 15:49) (97 - 113)  BP: 147/83 (03 Nov 2024 15:49) (143/85 - 172/81)  BP(mean): --  RR: 18 (03 Nov 2024 15:49) (18 - 21)  SpO2: 93% (03 Nov 2024 15:49) (92% - 98%)    Parameters below as of 03 Nov 2024 11:10  Patient On (Oxygen Delivery Method): nasal cannula    Physical Exam:  General appearance: NAD  Eyes: EOMI  Neck: No palpable thyroid nodules  Lungs: Normal respiratory excursion. Lungs clear no w/r/r  CV: Normal S1S2, regular. No m/r/g.  Pedal pulses intact.  Abdomen: Soft, nontender, nondistended, (+) BS  Musculoskeletal: No cyanosis, clubbing, or edema.  Skin: Warm and moist. No acanthosis. Feet - no ulcers  Neuro: Cranial nerves intact.  Psych: Normal affect, good judgement/insight      LABS:                        13.0   6.12  )-----------( 338      ( 03 Nov 2024 07:21 )             38.8     11-03    135  |  99  |  31.7[H]  ----------------------------<  82  3.9   |  23.0  |  2.82[H]    Ca    8.1[L]      03 Nov 2024 07:21  Phos  4.2     11-03  Mg     1.8     11-03    TPro  6.0[L]  /  Alb  2.7[L]  /  TBili  0.3[L]  /  DBili  x   /  AST  10  /  ALT  5   /  AlkPhos  99  11-03    LIVER FUNCTIONS - ( 03 Nov 2024 07:21 )  Alb: 2.7 g/dL / Pro: 6.0 g/dL / ALK PHOS: 99 U/L / ALT: 5 U/L / AST: 10 U/L / GGT: x             A1C with Estimated Average Glucose Result: 10.6 % (11-03-24 @ 07:21)      CAPILLARY BLOOD GLUCOSE  POCT Blood Glucose.: 226 mg/dL (03 Nov 2024 11:31)  POCT Blood Glucose.: 116 mg/dL (03 Nov 2024 08:15)      Thyroid Stimulating Hormone, Serum: 7.12 uIU/mL [0.27 - 4.20] (11-03-24)

## 2024-11-03 NOTE — CONSULT NOTE ADULT - SUBJECTIVE AND OBJECTIVE BOX
[facility]                                   [address] [place] [address]                                                      PHONE: [phone]                                                         FAX: [phone]  [email or web address]  ---------------------------------------------------------------------------------------------------------------------------------    Reason for Consult: SOB    HPI:  GILMAR KING is an 58y Female with DM1 on insulin pump, HTN, hypothyroid presented with two weeks of worsening SOB, leg swelling, and cough. She denies recent illness, fevers, or chills. She denies chest pain or palpitations.     PAST MEDICAL & SURGICAL HISTORY:  Diabetes      Hypertension      No significant past surgical history          No Known Allergies      MEDICATIONS  (STANDING):  amLODIPine   Tablet 5 milliGRAM(s) Oral daily  dextrose 5%. 1000 milliLiter(s) (100 mL/Hr) IV Continuous <Continuous>  dextrose 5%. 1000 milliLiter(s) (50 mL/Hr) IV Continuous <Continuous>  dextrose 50% Injectable 25 Gram(s) IV Push once  dextrose 50% Injectable 12.5 Gram(s) IV Push once  dextrose 50% Injectable 25 Gram(s) IV Push once  furosemide   Injectable 40 milliGRAM(s) IV Push two times a day  gabapentin 300 milliGRAM(s) Oral two times a day  glucagon  Injectable 1 milliGRAM(s) IntraMuscular once  heparin   Injectable 5000 Unit(s) SubCutaneous every 8 hours  influenza   Vaccine 0.5 milliLiter(s) IntraMuscular once  levothyroxine 125 MICROGram(s) Oral daily    MEDICATIONS  (PRN):  acetaminophen     Tablet .. 650 milliGRAM(s) Oral every 6 hours PRN Temp greater or equal to 38C (100.4F), Mild Pain (1 - 3)  aluminum hydroxide/magnesium hydroxide/simethicone Suspension 30 milliLiter(s) Oral every 4 hours PRN Dyspepsia  dextrose Oral Gel 15 Gram(s) Oral once PRN Blood Glucose LESS THAN 70 milliGRAM(s)/deciliter  melatonin 3 milliGRAM(s) Oral at bedtime PRN Insomnia  ondansetron Injectable 4 milliGRAM(s) IV Push every 8 hours PRN Nausea and/or Vomiting      Social History:  Cigarettes:  none    Family History:  denies CAD, MI, CVA, or sudden death.    ROS: Negative other than as mentioned in HPI.    Vital Signs Last 24 Hrs  T(C): 36.9 (03 Nov 2024 11:10), Max: 36.9 (02 Nov 2024 22:28)  T(F): 98.4 (03 Nov 2024 11:10), Max: 98.5 (03 Nov 2024 05:48)  HR: 102 (03 Nov 2024 11:10) (100 - 113)  BP: 143/85 (03 Nov 2024 11:10) (143/85 - 172/81)  BP(mean): 99 (02 Nov 2024 14:40) (99 - 99)  RR: 18 (03 Nov 2024 11:10) (18 - 21)  SpO2: 94% (03 Nov 2024 11:10) (92% - 98%)    Parameters below as of 03 Nov 2024 11:10  Patient On (Oxygen Delivery Method): nasal cannula      ICU Vital Signs Last 24 Hrs  GILMAR KING  I&O's Detail    I&O's Summary    Drug Dosing Weight  GILMAR COSTA      PHYSICAL EXAM:  General: NAD  HEENT: Head; normocephalic, atraumatic.  Eyes: Pupils reactive, cornea wnl.  Neck: Supple, no nodes adenopathy, no NVD or carotid bruit or thyromegaly.  CARDIOVASCULAR: Normal S1 and S2, No murmur, rub, gallop or lift.   LUNGS: reduced breath sounds b/l base  ABDOMEN: Soft, nontender without mass or organomegaly. bowel sounds normoactive.  EXTREMITIES: 2+ edema both legs  SKIN: warm and dry with normal turgor.  NEURO: Alert/oriented x 3  PSYCH: normal affect.        LABS:                        13.0   6.12  )-----------( 338      ( 03 Nov 2024 07:21 )             38.8     11-03    135  |  99  |  31.7[H]  ----------------------------<  82  3.9   |  23.0  |  2.82[H]    Ca    8.1[L]      03 Nov 2024 07:21  Phos  4.2     11-03  Mg     1.8     11-03    TPro  6.0[L]  /  Alb  2.7[L]  /  TBili  0.3[L]  /  DBili  x   /  AST  10  /  ALT  5   /  AlkPhos  99  11-03    GILMAR COSTA        Urinalysis Basic - ( 03 Nov 2024 07:21 )    Color: x / Appearance: x / SG: x / pH: x  Gluc: 82 mg/dL / Ketone: x  / Bili: x / Urobili: x   Blood: x / Protein: x / Nitrite: x   Leuk Esterase: x / RBC: x / WBC x   Sq Epi: x / Non Sq Epi: x / Bacteria: x        CXR  Small bilateral pleural effusions with adjacent atelectasis.  Right lower lung patchy opacity may represent combination of pulmonary   edema and/or atelectasis. Pneumonia is not excluded.    INTERPRETATION OF TELEMETRY (personally reviewed): sinus rhythm     ECHO 11-2-24   1. Technically difficult image quality.   2. Left ventricular systolic function is normal with an ejection fraction of 58 % by Maria's method of disks with an ejection fraction visually estimated at 55 to 60 %.   3. The left ventricular diastolic function is indeterminate.   4. Normal right ventricular cavity size and normal right ventricular systolic function.   5. Pulmonary artery systolic pressure could not be estimated.   6. Echogenic mass 1.8 cm x 2.0 cm noted within the lateral wall of the right atrium visualized on the subcostal view, recommend further imaging correlation with cardiac or CT chest study or KAROLINA.   7. No pericardial effusion seen.   8. Small right and moderate left pleural effusion noted.      Assessment and Plan:  In summary, GILMAR KING is an 58y Female with past medical history significant for DM1 on insulin pump, HTN, hypothyroid presented with two weeks of worsening SOB, leg swelling, and cough. She denies recent illness, fevers, or chills. She denies chest pain or palpitations.     - patient presents with hypoxia, leg edema, and vascular congestion on CXR. Labs pertinent for SHARA (unknown baseline). TTE with normal LVEF and possible right atrial mass.  - increase Lasix to 60 mg IVP bid  - daily BMP to monitor renal function while on IV diuretics  - nephrology follow up  - oxygen supplementation for hypoxia  - will plan for eventual KAROLINA to further evaluate the right atrial mass seen on TTE    Will follow closely with you.

## 2024-11-03 NOTE — CHART NOTE - NSCHARTNOTEFT_GEN_A_CORE
Pt has been on O2  Order needed  Family at bedside requesting to Continue O2 via NC  NAD  R 18 PO 93%  order entered  Continue to monitor

## 2024-11-03 NOTE — CONSULT NOTE ADULT - ASSESSMENT
Sravan on CKD  CHFpEF exacerbation  HTN  DM       Sravan on CKD  CHFpEF exacerbation  HTN  DM      Baseline SCr ~ 1.1 in Sept 2024--> 1.9 on most recent outpt labs on 10/31  SCr 2.1 on presentation- worsened today to 2.8  pt sp IV contrast on administration- Sravan initially from renal venous congestion now worsened in setting of ALBERT/ potentiated by diuresis  UA with 100 protein, large blood, 148 rbcs and 10 wbcs  Renal US shows non obstructing b/l calculi, no hydronephrosis and b/l increased renal echotexture compatible with medical renal disease  repeat UA, obtain TP/cr ratio    Pt with hypoxia, leg edema, and vascular congestion on CXR.  TTE with  55% EF, echogenic mass 1.8 by 2 cm RA.  CT chest done as outpat showed mod sized bilat plerual effusion, enlarged mediastional, hilar, axillary and subpectoral lymph nodes (11/1/24)  Continue diuresis- will allow for permissive azotemia at this time   plan for eventual KAROLINA for characterization of Rt atrial mass    BP stable  Continue amlodipine      will follow

## 2024-11-03 NOTE — CONSULT NOTE ADULT - SUBJECTIVE AND OBJECTIVE BOX
[facility] DIVISION OF KIDNEY DISEASES AND HYPERTENSION -- INITIAL CONSULT NOTE  --------------------------------------------------------------------------------  HPI:  57 y/o F w/ PMH HTN, IDDM type I (has medtronic insulin pump), hypothyroid, neuropathies presented to ED c/o 2 weeks of NIXON w/ associated worsening LE edema, nonproductive cough and orthopnea.  Pt also reports a L-chest burning pain that occurs only when she lays on her left side but does not radiate and is not associated w/ palpitations, diaphoresis abd pain, N/V.    Pt was started on lasix a few days ago by outpt doctor w/ minimal improvement and recommended she come to hospital for IV diuresis.  She was also sent for CXR and CTA chest PE study yesterday outpt at [facility] that were only significant for pulm vascular congestion and b/l pleural effusions but negative PE.  CT chest also showed multiple lymphnodes, some new and some old seen on CT chest from 2023 and some were somewhat larger than prior image but pt states she was never told she had an abnormal CT chest last year and never had any work up for lymphnodes.  Pt also has blood work with her from 09/2024 and 10/31/24.  In september pt was at baseline Cr of 1.09 and 10/31 was 1.93.  Pt denies fevers, chills, sick contacts, recent travel, diarrhea.   Above appreciated.  Pt was admitted for CHD exacerbation  nephrology consulted for Sravan on CKD.            PAST HISTORY  --------------------------------------------------------------------------------  PAST MEDICAL & SURGICAL HISTORY:  Diabetes      Hypertension      No significant past surgical history        FAMILY HISTORY:  FH: non-Hodgkin's lymphoma (Mother)      PAST SOCIAL HISTORY:  lives at home  ALLERGIES & MEDICATIONS  --------------------------------------------------------------------------------  Allergies    No Known Allergies    Intolerances      Standing Inpatient Medications  amLODIPine   Tablet 5 milliGRAM(s) Oral daily  dextrose 5%. 1000 milliLiter(s) IV Continuous <Continuous>  dextrose 5%. 1000 milliLiter(s) IV Continuous <Continuous>  dextrose 50% Injectable 25 Gram(s) IV Push once  dextrose 50% Injectable 12.5 Gram(s) IV Push once  dextrose 50% Injectable 25 Gram(s) IV Push once  furosemide   Injectable 60 milliGRAM(s) IV Push two times a day  gabapentin 300 milliGRAM(s) Oral two times a day  glucagon  Injectable 1 milliGRAM(s) IntraMuscular once  heparin   Injectable 5000 Unit(s) SubCutaneous every 8 hours  influenza   Vaccine 0.5 milliLiter(s) IntraMuscular once  levothyroxine 125 MICROGram(s) Oral daily    PRN Inpatient Medications  acetaminophen     Tablet .. 650 milliGRAM(s) Oral every 6 hours PRN  aluminum hydroxide/magnesium hydroxide/simethicone Suspension 30 milliLiter(s) Oral every 4 hours PRN  dextrose Oral Gel 15 Gram(s) Oral once PRN  melatonin 3 milliGRAM(s) Oral at bedtime PRN  ondansetron Injectable 4 milliGRAM(s) IV Push every 8 hours PRN      REVIEW OF SYSTEMS  --------------------------------------------------------------------------------  Gen: No weight changes, fatigue, fevers/chills, weakness  Skin: No rashes  Head/Eyes/Ears/Mouth: No headache; Normal hearing; Normal vision w/o blurriness; No sinus pain/discomfort, sore throat  Respiratory: No dyspnea, cough, wheezing, hemoptysis  CV: No chest pain, PND, orthopnea  GI: No abdominal pain, diarrhea, constipation, nausea, vomiting, melena, hematochezia  : No increased frequency, dysuria, hematuria, nocturia  MSK: No joint pain/swelling; no back pain; no edema  Neuro: No dizziness/lightheadedness, weakness, seizures, numbness, tingling  Heme: No easy bruising or bleeding  Endo: No heat/cold intolerance  Psych: No significant nervousness, anxiety, stress, depression    All other systems were reviewed and are negative, except as noted.    VITALS/PHYSICAL EXAM  --------------------------------------------------------------------------------  T(C): 36.9 (11-03-24 @ 11:10), Max: 36.9 (11-02-24 @ 22:28)  HR: 102 (11-03-24 @ 11:10) (100 - 113)  BP: 143/85 (11-03-24 @ 11:10) (143/85 - 172/81)  RR: 18 (11-03-24 @ 11:10) (18 - 21)  SpO2: 94% (11-03-24 @ 11:10) (92% - 98%)  Wt(kg): --  Height (cm): 165.1 (11-02-24 @ 13:05)  Weight (kg): 72.6 (11-02-24 @ 13:05)  BMI (kg/m2): 26.6 (11-02-24 @ 13:05)  BSA (m2): 1.8 (11-02-24 @ 13:05)      Physical Exam:  	Gen: NAD, well-appearing  	HEENT: PERRL, supple neck, clear oropharynx  	Pulm: CTA B/L  	CV: RRR, S1S2; no rub  	Back: No spinal or CVA tenderness; no sacral edema  	Abd: +BS, soft, nontender/nondistended  	: No suprapubic tenderness  	UE: Warm, FROM, no clubbing, intact strength; no edema; no asterixis  	LE: Warm, FROM, no clubbing, intact strength; no edema  	Neuro: No focal deficits, intact gait  	Psych: Normal affect and mood  	Skin: Warm, without rashes  	Vascular access:    LABS/STUDIES  --------------------------------------------------------------------------------              13.0   6.12  >-----------<  338      [11-03-24 @ 07:21]              38.8     135  |  99  |  31.7  ----------------------------<  82      [11-03-24 @ 07:21]  3.9   |  23.0  |  2.82        Ca     8.1     [11-03-24 @ 07:21]      Mg     1.8     [11-03-24 @ 07:21]      Phos  4.2     [11-03-24 @ 07:21]    TPro  6.0  /  Alb  2.7  /  TBili  0.3  /  DBili  x   /  AST  10  /  ALT  5   /  AlkPhos  99  [11-03-24 @ 07:21]          Creatinine Trend:  SCr 2.82 [11-03 @ 07:21]  SCr 2.16 [11-02 @ 14:24]    Urinalysis - [11-03-24 @ 07:21]      Color  / Appearance  / SG  / pH       Gluc 82 / Ketone   / Bili  / Urobili        Blood  / Protein  / Leuk Est  / Nitrite       RBC  / WBC  / Hyaline  / Gran  / Sq Epi  / Non Sq Epi  / Bacteria       TSH 7.12      [11-03-24 @ 07:21]  Lipid: chol 148, , HDL 39, LDL --      [11-03-24 @ 07:21]       [facility] DIVISION OF KIDNEY DISEASES AND HYPERTENSION -- INITIAL CONSULT NOTE  --------------------------------------------------------------------------------  HPI:  57 y/o F w/ PMH HTN, IDDM type I (has medtronic insulin pump), hypothyroid, neuropathies presented to ED c/o 2 weeks of NIXON w/ associated worsening LE edema, nonproductive cough and orthopnea.  Pt also reports a L-chest burning pain that occurs only when she lays on her left side but does not radiate and is not associated w/ palpitations, diaphoresis abd pain, N/V.    Pt was started on lasix a few days ago by outpt doctor w/ minimal improvement and recommended she come to hospital for IV diuresis.  She was also sent for CXR and CTA chest PE study yesterday outpt at [facility] that were only significant for pulm vascular congestion and b/l pleural effusions but negative PE.  CT chest also showed multiple lymphnodes, some new and some old seen on CT chest from 2023 and some were somewhat larger than prior image but pt states she was never told she had an abnormal CT chest last year and never had any work up for lymph nodes.  Pt also has blood work with her from 09/2024 and 10/31/24.  In september pt was at baseline Cr of 1.09 and 10/31 was 1.93.  Pt denies fevers, chills, sick contacts, recent travel, diarrhea.   Above appreciated.  Pt was admitted for CHD exacerbation  nephrology consulted for Sravan on CKD.  Pt seen and examined; daughter and niece at bedside.  pt reports she was diagnosed with DM 30 years ago- sugars are uncontrolled. endorses neuropathy.  reports HTN was diagnosed 2 years ago. reports Bp is well controlled on amlodipine monotherapy.  denies hematuria, foamy urine.  endorses leg edema+          PAST HISTORY  --------------------------------------------------------------------------------  PAST MEDICAL & SURGICAL HISTORY:  Diabetes      Hypertension      No significant past surgical history        FAMILY HISTORY:  FH: non-Hodgkin's lymphoma (Mother)  no FH of kidney disease      PAST SOCIAL HISTORY:  lives at home  ALLERGIES & MEDICATIONS  --------------------------------------------------------------------------------  Allergies    No Known Allergies      Standing Inpatient Medications  amLODIPine   Tablet 5 milliGRAM(s) Oral daily  dextrose 5%. 1000 milliLiter(s) IV Continuous <Continuous>  dextrose 5%. 1000 milliLiter(s) IV Continuous <Continuous>  dextrose 50% Injectable 25 Gram(s) IV Push once  dextrose 50% Injectable 12.5 Gram(s) IV Push once  dextrose 50% Injectable 25 Gram(s) IV Push once  furosemide   Injectable 60 milliGRAM(s) IV Push two times a day  gabapentin 300 milliGRAM(s) Oral two times a day  glucagon  Injectable 1 milliGRAM(s) IntraMuscular once  heparin   Injectable 5000 Unit(s) SubCutaneous every 8 hours  influenza   Vaccine 0.5 milliLiter(s) IntraMuscular once  levothyroxine 125 MICROGram(s) Oral daily    PRN Inpatient Medications  acetaminophen     Tablet .. 650 milliGRAM(s) Oral every 6 hours PRN  aluminum hydroxide/magnesium hydroxide/simethicone Suspension 30 milliLiter(s) Oral every 4 hours PRN  dextrose Oral Gel 15 Gram(s) Oral once PRN  melatonin 3 milliGRAM(s) Oral at bedtime PRN  ondansetron Injectable 4 milliGRAM(s) IV Push every 8 hours PRN      REVIEW OF SYSTEMS  --------------------------------------------------------------------------------  Gen: No weight changes, fatigue, fevers/chills, weakness  Skin: No rashes  Head/Eyes/Ears/Mouth: No headache; Normal hearing; Normal vision w/o blurriness; No sinus pain/discomfort, sore throat  Respiratory: No dyspnea, cough, wheezing, hemoptysis  CV: No chest pain, PND, orthopnea  GI: No abdominal pain, diarrhea, constipation, nausea, vomiting, melena, hematochezia  : No increased frequency, dysuria, hematuria, nocturia  MSK: No joint pain/swelling; no back pain; leg edema+  Neuro: No dizziness/lightheadedness, weakness, seizures, numbness, tingling  Heme: No easy bruising or bleeding  Endo: No heat/cold intolerance  Psych: No significant nervousness, anxiety, stress, depression    All other systems were reviewed and are negative, except as noted.    VITALS/PHYSICAL EXAM  --------------------------------------------------------------------------------  T(C): 36.9 (11-03-24 @ 11:10), Max: 36.9 (11-02-24 @ 22:28)  HR: 102 (11-03-24 @ 11:10) (100 - 113)  BP: 143/85 (11-03-24 @ 11:10) (143/85 - 172/81)  RR: 18 (11-03-24 @ 11:10) (18 - 21)  SpO2: 94% (11-03-24 @ 11:10) (92% - 98%)  Wt(kg): --  Height (cm): 165.1 (11-02-24 @ 13:05)  Weight (kg): 72.6 (11-02-24 @ 13:05)  BMI (kg/m2): 26.6 (11-02-24 @ 13:05)  BSA (m2): 1.8 (11-02-24 @ 13:05)      Physical Exam:  	Gen: NAD, well-appearing  	HEENT: supple neck, clear oropharynx  	Pulm: CTA B/L  	CV: RRR, S1S2; no rub  	Back: No spinal or CVA tenderness; no sacral edema  	Abd: +BS, soft, nontender/nondistended  	: No suprapubic tenderness  	UE: Warm, no edema;  	LE: Warm,  edema  	Neuro: No focal deficit  	Psych: Normal affect and mood  	Skin: Warm    LABS/STUDIES  --------------------------------------------------------------------------------              13.0   6.12  >-----------<  338      [11-03-24 @ 07:21]              38.8     135  |  99  |  31.7  ----------------------------<  82      [11-03-24 @ 07:21]  3.9   |  23.0  |  2.82        Ca     8.1     [11-03-24 @ 07:21]      Mg     1.8     [11-03-24 @ 07:21]      Phos  4.2     [11-03-24 @ 07:21]    TPro  6.0  /  Alb  2.7  /  TBili  0.3  /  DBili  x   /  AST  10  /  ALT  5   /  AlkPhos  99  [11-03-24 @ 07:21]          Creatinine Trend:  SCr 2.82 [11-03 @ 07:21]  SCr 2.16 [11-02 @ 14:24]    Urinalysis - [11-03-24 @ 07:21]      Color  / Appearance  / SG  / pH       Gluc 82 / Ketone   / Bili  / Urobili        Blood  / Protein  / Leuk Est  / Nitrite       RBC  / WBC  / Hyaline  / Gran  / Sq Epi  / Non Sq Epi  / Bacteria       TSH 7.12      [11-03-24 @ 07:21]  Lipid: chol 148, , HDL 39, LDL --      [11-03-24 @ 07:21]

## 2024-11-03 NOTE — PROGRESS NOTE ADULT - ASSESSMENT
59 y/o F w/ PMH HTN, IDDM type I (has medtronic insulin pump), hypothyroid, neuropathies presented to ED c/o 2 weeks of NIXON w/ associated worsening LE edema, nonproductive cough and orthopnea and occasional L-chest pain that is like a burning sensation and only occurs when she lays on her left side.  Clinically hypervolemic w/ scattered rales and b/l LE pretibial and pedal pitting edema. Initial w/u significant for trop 272-->258, BNP 2503, Cr 2.16. CXR w/ pulm vascular congestion and small b/l pleural effusions. Admitted for CHF exacerbation, continues on IV diuresis, ECHO with notable mass, may need further evaluation.      #New acute decompensated HFpEF  #Abnormal TTE finding  - Clinically hypervolemic   - BNP 2503, Trops elevated but down trending   - CXR w/ pulm vascular congestion and small b/l pleural effusions appreciated  - CTA PE study 11/01 significant for pulm vascular congestion and b/l pleural effusions but negative PE   - TTE 11/02 w/ LVEF 55-60% no WMA reported   Echogenic mass 1.8 cm x 2.0 cm within the lateral wall of the RA  potential need for KAROLINA to further characterize mass  - TSH 7.12, A1c 10.6, LDL 84   - continue on IV diuresis with Lasix 40 mg BID   - Daily weight, strict I/O's and fluid restrict   - Maintain K>4 and Mg>2  - Monitor on telemetry   - Cardiology consulted ([place])    #Type II MI, likely demand 2/2 above vs poor renal clearance  - Trops down trending (272-->258)  - continue on telemetry   - TTE 11/02 w/ LVEF 55-60% no WMA, indicental findings as above   - Cardiology consulted     #SHARA suspect multifactorial 2/2 cardiorenal, prerenal azotemia and ALBERT   - Baseline Cr 1.09 in 09/2024 but 10/31 showed worsening Cr of 1.93 which was likely cardiorenal from acute CHF and newly started on lasix causing prerenal azotemia  - Today Cr 2.82, worsening likely in setting of continued aggressive diuresis / possibly complicated by contrast used for CT chest    - Anticipate degree of azotemia w/ IV diuresis   - Renal US shows non obstructing b/l calculi, no hydronephrosis and b/l increased renal echotexture compatible with medical renal disease  - UA reviewed   - Monitor I/O's, renal function and lytes   - Avoid nephrotic meds or renally dose if needed  - Nephrology consulted     #Incidental CTA chest findings   - Reported to have multiple enlarged lymph nodes, some new and some seen on CT chest from 2023 and are only slight larger in size at this time  - Explained to daughter and pt that she should have a repeat CT chest in 1-3 months to reassess lymph nodes and should f/u with heme/onc outpt     #IDDM Type I w/ neuropathies  - A1c elevated 10.6   - Has medtronic insulin pump and advised pt to continue using as usual while inpt   Endocrine consulted, insulin pump form provided, endocrine team to assist with pump management   - Hypoglycemic protocol in place  - c/w gabapentin     HTN  - c/w amlodipine   - On IV lasix but transition to po once euvolemic    Hypothyroid  - c/w synthroid  - TSH 7.12     VTE ppx: heparin sq    Dispo: acute.  Anticipate d/c home w/ no needs once medically stable, likely 2-3 days

## 2024-11-03 NOTE — CONSULT NOTE ADULT - ASSESSMENT
Imp:  57 yo female with DM, HTN admitted with hypoxia, leg edema, treated by Cardiology.  Found to have RA mass requiring addnl characterization.  Has CKD  Has Adenopathy documented on out patient CT scan-asked to address this by Attending MD  Rec:  Will eventually need CT Abd/pelvis and tissue bx  At pt request, can work this up as outpt; for now, would check LDH and Ig's

## 2024-11-03 NOTE — CONSULT NOTE ADULT - SUBJECTIVE AND OBJECTIVE BOX
HPI: Patient is a 58y Female seen on consultation for the evaluation and management of Adenopathy.  Dr. Thurston called me yesterday afternoon to see this patient while admitted for CHF exacerbation.  Pt has hx of DM, on insulin pump, HTN, Hypothyroidism; admitted with worsening dyspnea, leg swelling, cough.  Found to have hypoxia, leg edema, vasc congestion.  Seen by Cardiology.  Echo shows 55% EF, echogenic mass 1.8 by 2 cm RA.  CT chest done as outpat showed mod sized bilat plerual effusion, enlarged mediastional, hilar, axillary and subpectoral lymph nodes (11/1/24)  Pt feels less dyspneic after treatmen.  Reports poor appetite.  Denies chest pain or palpitations fevers, chills or sweats,  abd pain or vomiting.      PAST MEDICAL & SURGICAL HISTORY:  Diabetes      Hypertension      No significant past surgical history          REVIEW OF SYSTEMS  Reports dyspnea, legs swelling, cough  Denies headaches or dizziness  Reports nausea, no vomiting  Denies rectal bleeding or melena, hematemesis or hemoptysis dysuria or hematruia      MEDICATIONS  (STANDING):  amLODIPine   Tablet 5 milliGRAM(s) Oral daily  dextrose 5%. 1000 milliLiter(s) (100 mL/Hr) IV Continuous <Continuous>  dextrose 5%. 1000 milliLiter(s) (50 mL/Hr) IV Continuous <Continuous>  dextrose 50% Injectable 25 Gram(s) IV Push once  dextrose 50% Injectable 12.5 Gram(s) IV Push once  dextrose 50% Injectable 25 Gram(s) IV Push once  furosemide   Injectable 60 milliGRAM(s) IV Push two times a day  gabapentin 300 milliGRAM(s) Oral two times a day  glucagon  Injectable 1 milliGRAM(s) IntraMuscular once  heparin   Injectable 5000 Unit(s) SubCutaneous every 8 hours  influenza   Vaccine 0.5 milliLiter(s) IntraMuscular once  levothyroxine 125 MICROGram(s) Oral daily    MEDICATIONS  (PRN):  acetaminophen     Tablet .. 650 milliGRAM(s) Oral every 6 hours PRN Temp greater or equal to 38C (100.4F), Mild Pain (1 - 3)  aluminum hydroxide/magnesium hydroxide/simethicone Suspension 30 milliLiter(s) Oral every 4 hours PRN Dyspepsia  dextrose Oral Gel 15 Gram(s) Oral once PRN Blood Glucose LESS THAN 70 milliGRAM(s)/deciliter  melatonin 3 milliGRAM(s) Oral at bedtime PRN Insomnia  ondansetron Injectable 4 milliGRAM(s) IV Push every 8 hours PRN Nausea and/or Vomiting      Allergies    No Known Allergies    Intolerances        SOCIAL HISTORY:    Smoking Status:Denied  Alcohol:Denied  Marital Status:Divorded  Occupation: [employer]    FAMILY HISTORY:  FH: non-Hodgkin's lymphoma (Mother)              	      Vital Signs Last 24 Hrs  T(C): 36.9 (03 Nov 2024 11:10), Max: 36.9 (02 Nov 2024 22:28)  T(F): 98.4 (03 Nov 2024 11:10), Max: 98.5 (03 Nov 2024 05:48)  HR: 102 (03 Nov 2024 11:10) (100 - 113)  BP: 143/85 (03 Nov 2024 11:10) (143/85 - 172/81)  BP(mean): --  RR: 18 (03 Nov 2024 11:10) (18 - 21)  SpO2: 94% (03 Nov 2024 11:10) (92% - 98%)    Parameters below as of 03 Nov 2024 11:10  Patient On (Oxygen Delivery Method): nasal cannula        PHYSICAL EXAM:      Constitutional:WD over weight, NAD  No adenopathy cervical or axillary appreciated  Anicteric  Lungs with decreased BS at bases  Cor RRR normal S1S2  Abd:  Soft, N-T normoactive BS  Extrem with trace bilateral edema              LABS:                        13.0   6.12  )-----------( 338      ( 03 Nov 2024 07:21 )             38.8     11-03    135  |  99  |  31.7[H]  ----------------------------<  82  3.9   |  23.0  |  2.82[H]    Ca    8.1[L]      03 Nov 2024 07:21  Phos  4.2     11-03  Mg     1.8     11-03    TPro  6.0[L]  /  Alb  2.7[L]  /  TBili  0.3[L]  /  DBili  x   /  AST  10  /  ALT  5   /  AlkPhos  99  11-03      Urinalysis Basic - ( 03 Nov 2024 07:21 )    Color: x / Appearance: x / SG: x / pH: x  Gluc: 82 mg/dL / Ketone: x  / Bili: x / Urobili: x   Blood: x / Protein: x / Nitrite: x   Leuk Esterase: x / RBC: x / WBC x   Sq Epi: x / Non Sq Epi: x / Bacteria: x        RADIOLOGY & ADDITIONAL STUDIES:

## 2024-11-04 ENCOUNTER — TRANSCRIPTION ENCOUNTER (OUTPATIENT)
Age: 59
End: 2024-11-04

## 2024-11-04 LAB
ANION GAP SERPL CALC-SCNC: 16 MMOL/L — SIGNIFICANT CHANGE UP (ref 5–17)
APPEARANCE UR: CLEAR — SIGNIFICANT CHANGE UP
BACTERIA # UR AUTO: NEGATIVE /HPF — SIGNIFICANT CHANGE UP
BASOPHILS # BLD AUTO: 0.03 K/UL — SIGNIFICANT CHANGE UP (ref 0–0.2)
BASOPHILS NFR BLD AUTO: 0.5 % — SIGNIFICANT CHANGE UP (ref 0–2)
BILIRUB UR-MCNC: NEGATIVE — SIGNIFICANT CHANGE UP
BUN SERPL-MCNC: 39.9 MG/DL — HIGH (ref 8–20)
CALCIUM SERPL-MCNC: 8 MG/DL — LOW (ref 8.4–10.5)
CAST: 4 /LPF — SIGNIFICANT CHANGE UP (ref 0–4)
CHLORIDE SERPL-SCNC: 100 MMOL/L — SIGNIFICANT CHANGE UP (ref 96–108)
CO2 SERPL-SCNC: 20 MMOL/L — LOW (ref 22–29)
COLOR SPEC: YELLOW — SIGNIFICANT CHANGE UP
CREAT ?TM UR-MCNC: 67 MG/DL — SIGNIFICANT CHANGE UP
CREAT SERPL-MCNC: 3.27 MG/DL — HIGH (ref 0.5–1.3)
DIFF PNL FLD: ABNORMAL
EGFR: 16 ML/MIN/1.73M2 — LOW
EOSINOPHIL # BLD AUTO: 0.14 K/UL — SIGNIFICANT CHANGE UP (ref 0–0.5)
EOSINOPHIL NFR BLD AUTO: 2.5 % — SIGNIFICANT CHANGE UP (ref 0–6)
GLUCOSE BLDC GLUCOMTR-MCNC: 189 MG/DL — HIGH (ref 70–99)
GLUCOSE BLDC GLUCOMTR-MCNC: 245 MG/DL — HIGH (ref 70–99)
GLUCOSE BLDC GLUCOMTR-MCNC: 296 MG/DL — HIGH (ref 70–99)
GLUCOSE BLDC GLUCOMTR-MCNC: 335 MG/DL — HIGH (ref 70–99)
GLUCOSE BLDC GLUCOMTR-MCNC: 364 MG/DL — HIGH (ref 70–99)
GLUCOSE BLDC GLUCOMTR-MCNC: 398 MG/DL — HIGH (ref 70–99)
GLUCOSE SERPL-MCNC: 192 MG/DL — HIGH (ref 70–99)
GLUCOSE UR QL: NEGATIVE MG/DL — SIGNIFICANT CHANGE UP
HCT VFR BLD CALC: 37.2 % — SIGNIFICANT CHANGE UP (ref 34.5–45)
HGB BLD-MCNC: 12.1 G/DL — SIGNIFICANT CHANGE UP (ref 11.5–15.5)
IGA FLD-MCNC: 397 MG/DL — SIGNIFICANT CHANGE UP (ref 84–499)
IGG FLD-MCNC: 1190 MG/DL — SIGNIFICANT CHANGE UP (ref 610–1660)
IGM SERPL-MCNC: 154 MG/DL — SIGNIFICANT CHANGE UP (ref 35–242)
IMM GRANULOCYTES NFR BLD AUTO: 0.4 % — SIGNIFICANT CHANGE UP (ref 0–0.9)
KETONES UR-MCNC: NEGATIVE MG/DL — SIGNIFICANT CHANGE UP
LDH SERPL L TO P-CCNC: 184 U/L — SIGNIFICANT CHANGE UP (ref 98–192)
LEUKOCYTE ESTERASE UR-ACNC: ABNORMAL
LYMPHOCYTES # BLD AUTO: 1.05 K/UL — SIGNIFICANT CHANGE UP (ref 1–3.3)
LYMPHOCYTES # BLD AUTO: 18.8 % — SIGNIFICANT CHANGE UP (ref 13–44)
MCHC RBC-ENTMCNC: 26.4 PG — LOW (ref 27–34)
MCHC RBC-ENTMCNC: 32.5 G/DL — SIGNIFICANT CHANGE UP (ref 32–36)
MCV RBC AUTO: 81.2 FL — SIGNIFICANT CHANGE UP (ref 80–100)
MONOCYTES # BLD AUTO: 0.42 K/UL — SIGNIFICANT CHANGE UP (ref 0–0.9)
MONOCYTES NFR BLD AUTO: 7.5 % — SIGNIFICANT CHANGE UP (ref 2–14)
NEUTROPHILS # BLD AUTO: 3.93 K/UL — SIGNIFICANT CHANGE UP (ref 1.8–7.4)
NEUTROPHILS NFR BLD AUTO: 70.3 % — SIGNIFICANT CHANGE UP (ref 43–77)
NITRITE UR-MCNC: NEGATIVE — SIGNIFICANT CHANGE UP
PH UR: 6.5 — SIGNIFICANT CHANGE UP (ref 5–8)
PLATELET # BLD AUTO: 309 K/UL — SIGNIFICANT CHANGE UP (ref 150–400)
POTASSIUM SERPL-MCNC: 4.5 MMOL/L — SIGNIFICANT CHANGE UP (ref 3.5–5.3)
POTASSIUM SERPL-SCNC: 4.5 MMOL/L — SIGNIFICANT CHANGE UP (ref 3.5–5.3)
PROT ?TM UR-MCNC: 169 MG/DL — HIGH (ref 0–12)
PROT UR-MCNC: 300 MG/DL
PROT/CREAT UR-RTO: 2.5 RATIO — HIGH
RBC # BLD: 4.58 M/UL — SIGNIFICANT CHANGE UP (ref 3.8–5.2)
RBC # FLD: 12.6 % — SIGNIFICANT CHANGE UP (ref 10.3–14.5)
RBC CASTS # UR COMP ASSIST: 190 /HPF — HIGH (ref 0–4)
SODIUM SERPL-SCNC: 136 MMOL/L — SIGNIFICANT CHANGE UP (ref 135–145)
SP GR SPEC: 1.01 — SIGNIFICANT CHANGE UP (ref 1–1.03)
SQUAMOUS # UR AUTO: 3 /HPF — SIGNIFICANT CHANGE UP (ref 0–5)
UROBILINOGEN FLD QL: 1 MG/DL — SIGNIFICANT CHANGE UP (ref 0.2–1)
WBC # BLD: 5.59 K/UL — SIGNIFICANT CHANGE UP (ref 3.8–10.5)
WBC # FLD AUTO: 5.59 K/UL — SIGNIFICANT CHANGE UP (ref 3.8–10.5)
WBC UR QL: 19 /HPF — HIGH (ref 0–5)

## 2024-11-04 PROCEDURE — 99233 SBSQ HOSP IP/OBS HIGH 50: CPT

## 2024-11-04 PROCEDURE — 93970 EXTREMITY STUDY: CPT | Mod: 26

## 2024-11-04 PROCEDURE — 99232 SBSQ HOSP IP/OBS MODERATE 35: CPT

## 2024-11-04 RX ORDER — INSULIN GLARGINE,HUM.REC.ANLOG 100/ML
16 VIAL (ML) SUBCUTANEOUS AT BEDTIME
Refills: 0 | Status: DISCONTINUED | OUTPATIENT
Start: 2024-11-04 | End: 2024-11-06

## 2024-11-04 RX ORDER — FUROSEMIDE 40 MG
40 TABLET ORAL DAILY
Refills: 0 | Status: DISCONTINUED | OUTPATIENT
Start: 2024-11-04 | End: 2024-11-08

## 2024-11-04 RX ORDER — INSULIN LISPRO 100/ML
4 VIAL (ML) SUBCUTANEOUS
Refills: 0 | Status: DISCONTINUED | OUTPATIENT
Start: 2024-11-04 | End: 2024-11-05

## 2024-11-04 RX ORDER — INSULIN GLARGINE,HUM.REC.ANLOG 100/ML
16 VIAL (ML) SUBCUTANEOUS EVERY MORNING
Refills: 0 | Status: DISCONTINUED | OUTPATIENT
Start: 2024-11-04 | End: 2024-11-04

## 2024-11-04 RX ORDER — LEVOTHYROXINE SODIUM 88 MCG
137 TABLET ORAL DAILY
Refills: 0 | Status: DISCONTINUED | OUTPATIENT
Start: 2024-11-05 | End: 2024-11-11

## 2024-11-04 RX ADMIN — Medication 60 MILLIGRAM(S): at 13:44

## 2024-11-04 RX ADMIN — HEPARIN SODIUM 5000 UNIT(S): 10000 INJECTION INTRAVENOUS; SUBCUTANEOUS at 05:58

## 2024-11-04 RX ADMIN — Medication 125 MICROGRAM(S): at 05:58

## 2024-11-04 RX ADMIN — GABAPENTIN 300 MILLIGRAM(S): 300 CAPSULE ORAL at 05:58

## 2024-11-04 RX ADMIN — Medication 650 MILLIGRAM(S): at 15:21

## 2024-11-04 RX ADMIN — Medication 16 UNIT(S): at 21:59

## 2024-11-04 RX ADMIN — Medication 4 UNIT(S): at 18:19

## 2024-11-04 RX ADMIN — GABAPENTIN 300 MILLIGRAM(S): 300 CAPSULE ORAL at 18:12

## 2024-11-04 RX ADMIN — Medication 60 MILLIGRAM(S): at 05:58

## 2024-11-04 RX ADMIN — Medication 650 MILLIGRAM(S): at 16:30

## 2024-11-04 RX ADMIN — Medication 1: at 09:29

## 2024-11-04 RX ADMIN — Medication 5 MILLIGRAM(S): at 05:58

## 2024-11-04 RX ADMIN — HEPARIN SODIUM 5000 UNIT(S): 10000 INJECTION INTRAVENOUS; SUBCUTANEOUS at 13:10

## 2024-11-04 RX ADMIN — Medication 5: at 19:14

## 2024-11-04 RX ADMIN — Medication 3: at 11:35

## 2024-11-04 RX ADMIN — HEPARIN SODIUM 5000 UNIT(S): 10000 INJECTION INTRAVENOUS; SUBCUTANEOUS at 21:59

## 2024-11-04 NOTE — PROGRESS NOTE ADULT - SUBJECTIVE AND OBJECTIVE BOX
INTERVAL EVENTS:  Follow up diabetes management. Post prandial hyperglycemia over last 24 hrs. Pt denies acute complaints at time of exam.     MEDICATIONS  (STANDING):  amLODIPine   Tablet 5 milliGRAM(s) Oral daily  dextrose 5%. 1000 milliLiter(s) (50 mL/Hr) IV Continuous <Continuous>  dextrose 5%. 1000 milliLiter(s) (100 mL/Hr) IV Continuous <Continuous>  dextrose 50% Injectable 25 Gram(s) IV Push once  dextrose 50% Injectable 12.5 Gram(s) IV Push once  dextrose 50% Injectable 25 Gram(s) IV Push once  furosemide   Injectable 60 milliGRAM(s) IV Push two times a day  gabapentin 300 milliGRAM(s) Oral two times a day  glucagon  Injectable 1 milliGRAM(s) IntraMuscular once  heparin   Injectable 5000 Unit(s) SubCutaneous every 8 hours  influenza   Vaccine 0.5 milliLiter(s) IntraMuscular once  insulin glargine Injectable (LANTUS) 16 Unit(s) SubCutaneous at bedtime  insulin lispro (ADMELOG) corrective regimen sliding scale   SubCutaneous three times a day before meals  insulin lispro (ADMELOG) corrective regimen sliding scale   SubCutaneous at bedtime  levothyroxine 137 MICROGram(s) Oral daily    MEDICATIONS  (PRN):  acetaminophen     Tablet .. 650 milliGRAM(s) Oral every 6 hours PRN Temp greater or equal to 38C (100.4F), Mild Pain (1 - 3)  aluminum hydroxide/magnesium hydroxide/simethicone Suspension 30 milliLiter(s) Oral every 4 hours PRN Dyspepsia  dextrose Oral Gel 15 Gram(s) Oral once PRN Blood Glucose LESS THAN 70 milliGRAM(s)/deciliter  melatonin 3 milliGRAM(s) Oral at bedtime PRN Insomnia  ondansetron Injectable 4 milliGRAM(s) IV Push every 8 hours PRN Nausea and/or Vomiting    Allergies  No Known Allergies    Vital Signs Last 24 Hrs  T(C): 36.9 (04 Nov 2024 10:59), Max: 36.9 (04 Nov 2024 00:42)  T(F): 98.5 (04 Nov 2024 10:59), Max: 98.5 (04 Nov 2024 10:59)  HR: 101 (04 Nov 2024 10:59) (89 - 101)  BP: 127/76 (04 Nov 2024 10:59) (117/73 - 147/83)  BP(mean): --  RR: 18 (04 Nov 2024 10:59) (18 - 18)  SpO2: 96% (04 Nov 2024 10:59) (93% - 96%)    Parameters below as of 04 Nov 2024 10:59  Patient On (Oxygen Delivery Method): room air    PHYSICAL EXAM:  General: No apparent distre  Respiratory: Lungs clear bilaterally  Cardiac: +S1, S2, no m/r/g  GI: +BS, soft, non tender, non distended  Extremities: No peripheral edema  Neuro: A+O X3    LABS:                        12.1   5.59  )-----------( 309      ( 04 Nov 2024 05:49 )             37.2     11-04    136  |  100  |  39.9[H]  ----------------------------<  192[H]  4.5   |  20.0[L]  |  3.27[H]    Ca    8.0[L]      04 Nov 2024 05:49  Phos  4.2     11-03  Mg     1.8     11-03    TPro  6.0[L]  /  Alb  2.7[L]  /  TBili  0.3[L]  /  DBili  x   /  AST  10  /  ALT  5   /  AlkPhos  99  11-03    Urinalysis Basic - ( 04 Nov 2024 05:49 )    Color: x / Appearance: x / SG: x / pH: x  Gluc: 192 mg/dL / Ketone: x  / Bili: x / Urobili: x   Blood: x / Protein: x / Nitrite: x   Leuk Esterase: x / RBC: x / WBC x   Sq Epi: x / Non Sq Epi: x / Bacteria: x    POCT Blood Glucose.: 296 mg/dL (11-04-24 @ 11:30)  POCT Blood Glucose.: 189 mg/dL (11-04-24 @ 09:07)  POCT Blood Glucose.: 261 mg/dL (11-03-24 @ 21:35)  POCT Blood Glucose.: 266 mg/dL (11-03-24 @ 17:47)    Thyroid Stimulating Hormone, Serum: 7.12 uIU/mL (11-03-24 @ 07:21)

## 2024-11-04 NOTE — DIETITIAN INITIAL EVALUATION ADULT - DIET TYPE
DASH/TLC (sodium and cholesterol restricted diet)/1500ml/consistent carbohydrate renal with evening snacks

## 2024-11-04 NOTE — PROGRESS NOTE ADULT - SUBJECTIVE AND OBJECTIVE BOX
[facility] - [facility]                                   [address] [place] [address]                                                      PHONE: [phone]                                                         FAX: [phone]  [email or web address]  ---------------------------------------------------------------------------------------------------------------------------------    Overnight events/patient complaints:      NAD feeling better today     No Known Allergies    MEDICATIONS  (STANDING):  amLODIPine   Tablet 5 milliGRAM(s) Oral daily  dextrose 5%. 1000 milliLiter(s) (50 mL/Hr) IV Continuous <Continuous>  dextrose 5%. 1000 milliLiter(s) (100 mL/Hr) IV Continuous <Continuous>  dextrose 50% Injectable 25 Gram(s) IV Push once  dextrose 50% Injectable 12.5 Gram(s) IV Push once  dextrose 50% Injectable 25 Gram(s) IV Push once  furosemide   Injectable 60 milliGRAM(s) IV Push two times a day  gabapentin 300 milliGRAM(s) Oral two times a day  glucagon  Injectable 1 milliGRAM(s) IntraMuscular once  heparin   Injectable 5000 Unit(s) SubCutaneous every 8 hours  influenza   Vaccine 0.5 milliLiter(s) IntraMuscular once  insulin glargine Injectable (LANTUS) 16 Unit(s) SubCutaneous at bedtime  insulin lispro (ADMELOG) corrective regimen sliding scale   SubCutaneous three times a day before meals  insulin lispro (ADMELOG) corrective regimen sliding scale   SubCutaneous at bedtime  levothyroxine 137 MICROGram(s) Oral daily    MEDICATIONS  (PRN):  acetaminophen     Tablet .. 650 milliGRAM(s) Oral every 6 hours PRN Temp greater or equal to 38C (100.4F), Mild Pain (1 - 3)  aluminum hydroxide/magnesium hydroxide/simethicone Suspension 30 milliLiter(s) Oral every 4 hours PRN Dyspepsia  dextrose Oral Gel 15 Gram(s) Oral once PRN Blood Glucose LESS THAN 70 milliGRAM(s)/deciliter  melatonin 3 milliGRAM(s) Oral at bedtime PRN Insomnia  ondansetron Injectable 4 milliGRAM(s) IV Push every 8 hours PRN Nausea and/or Vomiting      Vital Signs Last 24 Hrs  T(C): 36.7 (04 Nov 2024 07:24), Max: 36.9 (03 Nov 2024 11:10)  T(F): 98 (04 Nov 2024 07:24), Max: 98.4 (03 Nov 2024 11:10)  HR: 97 (04 Nov 2024 07:24) (89 - 102)  BP: 119/76 (04 Nov 2024 07:24) (117/73 - 147/83)  BP(mean): --  RR: 18 (04 Nov 2024 07:24) (18 - 18)  SpO2: 96% (04 Nov 2024 07:24) (93% - 96%)    Parameters below as of 04 Nov 2024 07:24  Patient On (Oxygen Delivery Method): nasal cannula      ICU Vital Signs Last 24 Hrs  GILMAR IKNG  I&O's Detail    03 Nov 2024 07:01  -  04 Nov 2024 07:00  --------------------------------------------------------  IN:  Total IN: 0 mL    OUT:    Voided (mL): 200 mL  Total OUT: 200 mL    Total NET: -200 mL        I&O's Summary    03 Nov 2024 07:01  -  04 Nov 2024 07:00  --------------------------------------------------------  IN: 0 mL / OUT: 200 mL / NET: -200 mL      Drug Dosing Weight  GILMAR COSTA      PHYSICAL EXAM:  General: Appears well developed, alert and cooperative.  HEENT: Head; normocephalic, atraumatic.  Eyes: Pupils reactive, cornea wnl.  Neck: Supple, no nodes adenopathy, no NVD or carotid bruit or thyromegaly.  CARDIOVASCULAR: Normal S1 and S2, 1/6 murmur, rub, gallop or lift.   LUNGS: Decrease BS at the lower bases   ABDOMEN: Soft, nontender without mass or organomegaly. bowel sounds normoactive.  EXTREMITIES: No clubbing, cyanosis or edema. Distal pulses wnl.   SKIN: warm and dry with normal turgor.  NEURO: Alert/oriented x 3/normal motor exam. No pathologic reflexes.    PSYCH: normal affect.        LABS:                        12.1   5.59  )-----------( 309      ( 04 Nov 2024 05:49 )             37.2     11-04    136  |  100  |  39.9[H]  ----------------------------<  192[H]  4.5   |  20.0[L]  |  3.27[H]    Ca    8.0[L]      04 Nov 2024 05:49  Phos  4.2     11-03  Mg     1.8     11-03    TPro  6.0[L]  /  Alb  2.7[L]  /  TBili  0.3[L]  /  DBili  x   /  AST  10  /  ALT  5   /  AlkPhos  99  11-03    GILMAR COSTA        Urinalysis Basic - ( 04 Nov 2024 05:49 )    Color: x / Appearance: x / SG: x / pH: x  Gluc: 192 mg/dL / Ketone: x  / Bili: x / Urobili: x   Blood: x / Protein: x / Nitrite: x   Leuk Esterase: x / RBC: x / WBC x   Sq Epi: x / Non Sq Epi: x / Bacteria: x        RADIOLOGY & ADDITIONAL STUDIES:    INTERPRETATION OF TELEMETRY (personally reviewed):    ECG:    < from: 12 Lead ECG (11.02.24 @ 13:01) >  Diagnosis Line Sinus tachycardia  Septal infarct , age undetermined  Abnormal ECG  No previous ECGs available  Confirmed by ISAIAH COX ([id number]) on 11/3/2024 7:43:54 PM    < end of copied text >      ECHO:  < from: TTE W or WO Ultrasound Enhancing Agent (11.02.24 @ 15:09) >  CONCLUSIONS:      1. Technically difficult image quality.   2. Left ventricular systolic function is normal with an ejection fraction of 58 % by Maria's method of disks with an ejection fraction visually estimated at 55 to 60 %.   3. The leftventricular diastolic function is indeterminate.   4. Normal right ventricular cavity size and normal right ventricular systolic function.   5. Pulmonary artery systolic pressure could not be estimated.   6. Echogenic mass 1.8 cm x 2.0 cm noted within the lateral wall of the right atrium visualized on the subcostal view, recommend further imaging correlation with cardiac or CT chest study or KAROLINA.   7. No pericardial effusion seen.   8. Small right and moderate left pleural effusion noted.   9. Noprior echocardiogram is available for comparison.    < end of copied text >      Assessment and Plan:  In summary, GILMAR KING is an 58y Female with past medical history significant for DM1 on insulin pump, HTN, hypothyroid presented with two weeks of worsening SOB, leg swelling, and cough. She denies recent illness, fevers, or chills. She denies chest pain or palpitations.     + trop due to renal clearness and not C/W with ACS     - Patient presents with hypoxia, leg edema, and vascular congestion on CXR. Labs pertinent for SHARA ? ALBERT normal Cr 9/9/2024 recent CTA negative for PE but B/L effusion. TTE with normal LVEF and possible right atrial mass see above   - IV diuretics as per Renal   - Nephrology follow up  - oxygen supplementation for hypoxia  - will plan for eventual KAROLINA to further evaluate the right atrial mass seen on TTE once stable

## 2024-11-04 NOTE — DIETITIAN INITIAL EVALUATION ADULT - REASON FOR ADMISSION
"    Caller: Santi Souza Sr. \"Alonso Ellis\"    Relationship to patient: Self    Best call back number: [phone]    Patient is needing: PATIENT CONCERNING OF HIS RT SHOULDER WONDERING IF POSSIBLE TO SEE DR. GARCIA AT [place] OFFICE PRIOR TO 08/15 CURRENT SCHD APPT. PLEASE ADVISE   " Heart failure

## 2024-11-04 NOTE — DIETITIAN INITIAL EVALUATION ADULT - PERTINENT LABORATORY DATA
11-04    136  |  100  |  39.9[H]  ----------------------------<  192[H]  4.5   |  20.0[L]  |  3.27[H]    Ca    8.0[L]      04 Nov 2024 05:49  Phos  4.2     11-03  Mg     1.8     11-03    TPro  6.0[L]  /  Alb  2.7[L]  /  TBili  0.3[L]  /  DBili  x   /  AST  10  /  ALT  5   /  AlkPhos  99  11-03  POCT Blood Glucose.: 296 mg/dL (11-04-24 @ 11:30)  A1C with Estimated Average Glucose Result: 10.6 % (11-03-24 @ 07:21)

## 2024-11-04 NOTE — PROGRESS NOTE ADULT - ASSESSMENT
59 y/o F w/ PMH HTN, IDDM type I (has medtronic insulin pump), hypothyroid, neuropathies presented to ED c/o 2 weeks of NIXON w/ associated worsening LE edema, nonproductive cough and orthopnea and occasional L-chest pain that is like a burning sensation and only occurs when she lays on her left side.  Clinically hypervolemic w/ scattered rales and b/l LE pretibial and pedal pitting edema. Initial w/u significant for trop 272-->258, BNP 2503, Cr 2.16. CXR w/ pulm vascular congestion and small b/l pleural effusions. Admitted for CHF exacerbation, continues on IV diuresis, ECHO with notable mass, may need further evaluation.      #New acute decompensated HFpEF  #Abnormal TTE finding  - Clinically hypervolemic   - BNP 2503, Trops elevated but down trending   - CXR w/ pulm vascular congestion and small b/l pleural effusions appreciated  - CTA PE study 11/01 significant for pulm vascular congestion and b/l pleural effusions but negative PE   - TTE 11/02 w/ LVEF 55-60% no WMA reported; Echogenic mass 1.8 cm x 2.0 cm within the lateral wall of the RA potential need for KAROLINA to further characterize mass  - TSH 7.12, A1c 10.6, LDL 84   - continue on IV diuresis with Lasix 60 mg BID   - Daily weight, strict I/O's and fluid restrict   - Maintain K>4 and Mg>2  - Monitor on telemetry   - will plan for eventual KAROLINA to further evaluate the right atrial mass seen on TTE once stable     #Type II MI, likely demand 2/2 above vs poor renal clearance  - Trops down trending (272-->258)  - continue on telemetry   - TTE 11/02 w/ LVEF 55-60% no WMA, incidental findings as above   - Cardiology following    #SHARA suspect multifactorial 2/2 cardiorenal, prerenal azotemia and ALBERT   - Baseline Cr 1.09 in 09/2024 but 10/31 showed worsening Cr of 1.93 which was likely cardiorenal from acute CHF and newly started on lasix causing prerenal azotemia  - Today Cr 3.27, worsening likely in setting of continued aggressive diuresis / possibly complicated by contrast used for CT chest    - Anticipate degree of azotemia w/ IV diuresis   - Renal US shows non obstructing b/l calculi, no hydronephrosis and b/l increased renal echotexture compatible with medical renal disease  - UA reviewed   - Monitor I/O's, renal function and lytes   - Avoid nephrotic meds or renally dose if needed  - Nephrology following    #Incidental CTA chest findings   - Reported to have multiple enlarged lymph nodes, some new and some seen on CT chest from 2023 and are only slight larger in size at this time  - Explained to daughter and pt that she should have a repeat CT chest in 1-3 months to reassess lymph nodes and should f/u with heme/onc outpt     #IDDM Type I w/ neuropathies  - A1c elevaed 10.6   - Has medtronic insulin pump and advised pt to continue using as usual while inpt   - Endocrine following, insulin pump form provided, endocrine team to assist with pump management   - Hypoglycemic protocol in place  - c/w gabapentin     HTN  - c/w amlodipine   - On IV lasix but transition to po once euvolemic    Hypothyroid  - c/w synthroid  - TSH 7.12       VTE ppx: heparin sq  Dispo: acute.  Anticipate d/c home w/ no needs once medically stable, likely 2-3 days

## 2024-11-04 NOTE — PROGRESS NOTE ADULT - ASSESSMENT
58F with PMH HTN, IDDM type I (has medtronic insulin pump), hypothyroid, neuropathies presented to ED c/o 2 weeks of NIXON w/ associated worsening LE edema, nonproductive cough and orthopnea and occasional L-chest pain that is like a burning sensation and only occurs when she lays on her left side.  Clinically hypervolemic w/ scattered rales and b/l LE pretibial and pedal pitting edema. Initial w/u significant for trop 272-->258, BNP 2503, Cr 2.16. CXR w/ pulm vascular congestion and small b/l pleural effusions. Admitted for CHF exacerbation, continues on IV diuresis, ECHO with notable mass.    1. Poorly controlled DM1, a1c 10.6%  - Could not demonstrate insulin pump competency and proficiency, pump removed  - Post prandial hyperglycemia, start admelog 4 units TID  - Continue lantus 16 units qhs    2. Hypothyroid  - TSH elevated 7.12  - LT4 increased to 137 mcg daily     58F with PMH HTN, IDDM type I (has medtronic insulin pump), hypothyroid, neuropathies presented to ED c/o 2 weeks of NIXON w/ associated worsening LE edema, nonproductive cough and orthopnea and occasional L-chest pain that is like a burning sensation and only occurs when she lays on her left side.  Clinically hypervolemic w/ scattered rales and b/l LE pretibial and pedal pitting edema. Initial w/u significant for trop 272-->258, BNP 2503, Cr 2.16. CXR w/ pulm vascular congestion and small b/l pleural effusions. Admitted for CHF exacerbation, continues on IV diuresis, ECHO with notable mass.    Consulted for diabetes management  Home regimen: medtronic pump (basal dosing 19.6 units daily, ICR 1:15 and ISF 1:65-75)  Current a1c: 10.6%  Outpatient Endocrinologist: Dr Rivera    1. Poorly controlled DM1  - Could not demonstrate insulin pump competency and proficiency, pump removed  - Post prandial hyperglycemia, start admelog 4 units TID  - Continue lantus 16 units qhs  - Monitor FS AC&HS    2. Hypothyroid  - TSH elevated 7.12  - LT4 increased to 137 mcg daily

## 2024-11-04 NOTE — PROGRESS NOTE ADULT - SUBJECTIVE AND OBJECTIVE BOX
Reason for visit: SHARA    Subjective: Patient reports feeling okay, she denied chest pain, shortness of breath, urinary complaints.  Reports having good urinary output.  She reported improvement in swelling of her legs.  Review of systems: All systems were reviewed in detail, pertinent positive and negative have been mentioned above, otherwise negative.    Physical Exam:  Gen: no acute distress  MS: alert, conversing normally  Eyes: EOMI, no icterus  HENT: NCAT, MMM  CV: rhythm reg reg, rate normal, no m/g/r, 1+ LE edema  Chest: CTAB, no w/r/r,  Abd: soft, NT, ND  Skin: dry, warm, no rash or jaundice    Vital Signs Last 24 Hrs  T(C): 37.1 (04 Nov 2024 15:12), Max: 37.1 (04 Nov 2024 15:12)  T(F): 98.8 (04 Nov 2024 15:12), Max: 98.8 (04 Nov 2024 15:12)  HR: 103 (04 Nov 2024 15:12) (89 - 103)  BP: 139/79 (04 Nov 2024 15:12) (117/73 - 139/80)  BP(mean): --  RR: 18 (04 Nov 2024 15:12) (18 - 18)  SpO2: 95% (04 Nov 2024 15:12) (93% - 96%)    Parameters below as of 04 Nov 2024 15:12  Patient On (Oxygen Delivery Method): room air      I&O's Summary    03 Nov 2024 07:01  -  04 Nov 2024 07:00  --------------------------------------------------------  IN: 0 mL / OUT: 200 mL / NET: -200 mL      Current Antibiotics:    Other medications:  amLODIPine   Tablet 5 milliGRAM(s) Oral daily  dextrose 5%. 1000 milliLiter(s) IV Continuous <Continuous>  dextrose 5%. 1000 milliLiter(s) IV Continuous <Continuous>  dextrose 50% Injectable 25 Gram(s) IV Push once  dextrose 50% Injectable 12.5 Gram(s) IV Push once  dextrose 50% Injectable 25 Gram(s) IV Push once  furosemide   Injectable 40 milliGRAM(s) IV Push daily  gabapentin 300 milliGRAM(s) Oral two times a day  glucagon  Injectable 1 milliGRAM(s) IntraMuscular once  heparin   Injectable 5000 Unit(s) SubCutaneous every 8 hours  influenza   Vaccine 0.5 milliLiter(s) IntraMuscular once  insulin glargine Injectable (LANTUS) 16 Unit(s) SubCutaneous at bedtime  insulin lispro (ADMELOG) corrective regimen sliding scale   SubCutaneous three times a day before meals  insulin lispro (ADMELOG) corrective regimen sliding scale   SubCutaneous at bedtime  insulin lispro Injectable (ADMELOG) 4 Unit(s) SubCutaneous three times a day before meals  levothyroxine 137 MICROGram(s) Oral daily    11-04    136  |  100  |  39.9[H]  ----------------------------<  192[H]  4.5   |  20.0[L]  |  3.27[H]    Ca    8.0[L]      04 Nov 2024 05:49  Phos  4.2     11-03  Mg     1.8     11-03    TPro  6.0[L]  /  Alb  2.7[L]  /  TBili  0.3[L]  /  DBili  x   /  AST  10  /  ALT  5   /  AlkPhos  99  11-03    Creatinine: 3.27 mg/dL (11-04-24 @ 05:49)  Creatinine: 2.82 mg/dL (11-03-24 @ 07:21)  Creatinine: 2.16 mg/dL (11-02-24 @ 14:24)

## 2024-11-04 NOTE — DIETITIAN INITIAL EVALUATION ADULT - PERTINENT MEDS FT
MEDICATIONS  (STANDING):  dextrose 5%. 1000 milliLiter(s) (100 mL/Hr) IV Continuous <Continuous>  furosemide   Injectable 60 milliGRAM(s) IV Push two times a day  insulin lispro (ADMELOG) corrective regimen sliding scale   SubCutaneous three times a day before meals  levothyroxine 137 MICROGram(s) Oral daily

## 2024-11-04 NOTE — DIETITIAN INITIAL EVALUATION ADULT - ETIOLOGY
Problem: Occupational Therapy Goal  Goal: Occupational Therapy Goal  Goals to be met by: 5/10/2019     Patient will increase functional independence with ADLs by performing:    UE Dressing with Supervision.  LE Dressing with Supervision.  Grooming while standing at sink with Supervision.  Toileting from toilet with Supervision for hygiene and clothing management.   Toilet transfer to toilet with Supervision.    Outcome: Ongoing (interventions implemented as appropriate)  OT eval complete. Pt tolerated session well. Pt's functional outcomes established today based on his presenting functional level.    Comments: Cont OT POC   related to lack of diet compliance for management of diabetes and hypertension

## 2024-11-04 NOTE — PROGRESS NOTE ADULT - SUBJECTIVE AND OBJECTIVE BOX
[facility] Division of Hospital Medicine    SUBJECTIVE / OVERNIGHT EVENTS:  Patient seen and examined at bedside. She is in no acute distress and denies any acute symptoms such as chest pain, shortness of breath, nausea/vomiting, or abdominal pain. She continues to be diuresed with IV Lasix with both Cardiology and Nephrology on board and following the case. Patient is now on subcutaneous Lantus/Lispro as per Endocrinology recommendations and her insulin is off. She states that the swelling in her legs has improved.    MEDICATIONS  (STANDING):  amLODIPine   Tablet 5 milliGRAM(s) Oral daily  dextrose 5%. 1000 milliLiter(s) (100 mL/Hr) IV Continuous <Continuous>  dextrose 5%. 1000 milliLiter(s) (50 mL/Hr) IV Continuous <Continuous>  dextrose 50% Injectable 25 Gram(s) IV Push once  dextrose 50% Injectable 12.5 Gram(s) IV Push once  dextrose 50% Injectable 25 Gram(s) IV Push once  furosemide   Injectable 40 milliGRAM(s) IV Push two times a day  gabapentin 300 milliGRAM(s) Oral two times a day  glucagon  Injectable 1 milliGRAM(s) IntraMuscular once  heparin   Injectable 5000 Unit(s) SubCutaneous every 8 hours  influenza   Vaccine 0.5 milliLiter(s) IntraMuscular once  levothyroxine 125 MICROGram(s) Oral daily    MEDICATIONS  (PRN):  acetaminophen     Tablet .. 650 milliGRAM(s) Oral every 6 hours PRN Temp greater or equal to 38C (100.4F), Mild Pain (1 - 3)  aluminum hydroxide/magnesium hydroxide/simethicone Suspension 30 milliLiter(s) Oral every 4 hours PRN Dyspepsia  dextrose Oral Gel 15 Gram(s) Oral once PRN Blood Glucose LESS THAN 70 milliGRAM(s)/deciliter  melatonin 3 milliGRAM(s) Oral at bedtime PRN Insomnia  ondansetron Injectable 4 milliGRAM(s) IV Push every 8 hours PRN Nausea and/or Vomiting        I&O's Summary      PHYSICAL EXAM:  Vital Signs Last 24 Hrs  T(C): 36.9 (03 Nov 2024 07:52), Max: 36.9 (02 Nov 2024 22:28)  T(F): 98.4 (03 Nov 2024 07:52), Max: 98.5 (03 Nov 2024 05:48)  HR: 100 (03 Nov 2024 07:52) (100 - 113)  BP: 148/80 (03 Nov 2024 07:52) (148/80 - 172/81)  BP(mean): 99 (02 Nov 2024 14:40) (99 - 99)  RR: 18 (03 Nov 2024 07:52) (18 - 21)  SpO2: 92% (03 Nov 2024 07:52) (92% - 98%)    Parameters below as of 03 Nov 2024 07:52  Patient On (Oxygen Delivery Method): room air      GENERAL: pt examined bedside, laying comfortably in bed in NAD  HEENT: NC/AT, moist oral mucosa, clear conjunctiva, sclera nonicteric  RESPIRATORY: decreased breath sounds in B/L bases, otherwise minimal crackles noted   CARDIOVASCULAR: RRR, normal S1 and S2, no murmur/rub/gallop  ABDOMEN: soft, NT/ND, +bowel sounds, no rebound/guarding  MSK: No joint deformities, edema, erythema  EXTREMITIES: No cynaosis, no clubbing, pretibial and pedal 1+pitting edema   PSYCH: affect appropriate and cooperative  NEUROLOGY: A+O to person, place, and time, no focal neurologic deficits appreciated  SKIN: No rashes or no palpable lesions    LABS:                        13.0   6.12  )-----------( 338      ( 03 Nov 2024 07:21 )             38.8     11-03    135  |  99  |  31.7[H]  ----------------------------<  82  3.9   |  23.0  |  2.82[H]    Ca    8.1[L]      03 Nov 2024 07:21  Phos  4.2     11-03  Mg     1.8     11-03    TPro  6.0[L]  /  Alb  2.7[L]  /  TBili  0.3[L]  /  DBili  x   /  AST  10  /  ALT  5   /  AlkPhos  99  11-03          Urinalysis Basic - ( 03 Nov 2024 07:21 )    Color: x / Appearance: x / SG: x / pH: x  Gluc: 82 mg/dL / Ketone: x  / Bili: x / Urobili: x   Blood: x / Protein: x / Nitrite: x   Leuk Esterase: x / RBC: x / WBC x   Sq Epi: x / Non Sq Epi: x / Bacteria: x        CAPILLARY BLOOD GLUCOSE      POCT Blood Glucose.: 116 mg/dL (03 Nov 2024 08:15)        RADIOLOGY & ADDITIONAL TESTS:  Results Reviewed:   Imaging Personally Reviewed:  Electrocardiogram Personally Reviewed:

## 2024-11-04 NOTE — PROGRESS NOTE ADULT - ASSESSMENT
57 yo F seen for the evaluation and management of Adenopathy.  Dr. Thurston called me yesterday afternoon to see this patient while admitted for CHF exacerbation.  Pt has hx of DM, on insulin pump, HTN, Hypothyroidism; admitted with worsening dyspnea, leg swelling, cough.  Found to have hypoxia, leg edema, vasc congestion.  Seen by Cardiology.  Echo shows 55% EF, echogenic mass 1.8 by 2 cm RA.  CT chest done as outpat showed mod sized bilat plerual effusion, enlarged mediastional, hilar, axillary and subpectoral lymph nodes (11/1/24)  Pt feels less dyspneic after treatmen.  Reports poor appetite.        CHF  - Admitted with hypoxia, leg edema  -Cardiology following  - Echogenic mass 1.8 cm x 2.0 cm noted within the lateral wall of the right atrium visualized on the subcostal view, recommend further imaging correlation with cardiac or CT chest study or KAROLINA.  - plan for eventual KAROLINA to further evaluate the right atrial mass seen on TTE once stable     Adenopathy  - documented on out patient CT scan-asked to address this by Attending MD  - Will eventually need CT Abd/pelvis and tissue bx  -   - At pt request, can work this up as outpt; for now,     Will follow

## 2024-11-04 NOTE — DIETITIAN INITIAL EVALUATION ADULT - OTHER INFO
57 y/o F w/ PMH HTN, IDDM type I (has medtronic insulin pump), hypothyroid, CXR w/ pulm vascular congestion and small b/l pleural effusions. Admitted for CHF exacerbation  # poorly controlled T1DM complicated by neuropathy (A1c 10.6 and now with SHARA)  # New acute decompensated HFpEF

## 2024-11-04 NOTE — DIETITIAN INITIAL EVALUATION ADULT - ORAL INTAKE PTA/DIET HISTORY
nutrition consult completed. spoke with pt and daughter at bedside. Reported fair po intake in house, good po intake PTA. UBW: 135#. no known recent weight change reported. no BM documented. a1c noted 10.6%. Pt with hx of diabetes. follows low sugar at home for management of diabetes. diet/nutrition education provided: printed from nutrition care manual- . Will add chocolate pudding to meals- pt does not like glucerna oral supplement. NKFA. RD to remain available.  nutrition consult completed. spoke with pt and daughter at bedside. Reported fair po intake in house, good po intake PTA. UBW: 135#. no known recent weight change reported. no BM documented. a1c noted 10.6%. Pt with hx of diabetes. tries to avoid sugar for management of diabetes. diet/nutrition education provided: printed from nutrition care manual- Heart-Healthy Consistent Carbohydrate Nutrition Therapy, Fluid-Restricted Nutrition Therapy. Will add chocolate pudding to meals- pt does not like glucerna oral supplement. NKFA. RD to remain available.

## 2024-11-04 NOTE — DIETITIAN INITIAL EVALUATION ADULT - NS FNS DIET ORDER
Diet, Consistent Carbohydrate Renal w/Evening Snack:   1500mL Fluid Restriction ([id number]) (11-02-24 @ 19:50)

## 2024-11-04 NOTE — DIETITIAN INITIAL EVALUATION ADULT - ADD RECOMMEND
1. Monitor weights daily for trend/accuracy  2. Continue diet as tolerated.  3. Rx MVI daily, vit C 500mg

## 2024-11-04 NOTE — PROGRESS NOTE ADULT - ASSESSMENT
58 YOF HTN, DM presented with gradually progressive NIXON and LE edema.  Nephrology consulted for acute kidney injury.  Chest x-ray with pulmonary vascular congestion and small bilateral pleural effusion.    Acute kidney injury on chronic kidney disease:   ·	Baseline likely 1.1 (per records in September 24)  ·	Patient is s/p CT with contrast to rule out PE 1 day prior to admission  ·	Serum creatinine on a rise 1.9> 2.1> 2.8> 3.27  ·	UA with 100 protein, large blood, pyuria.  UPCR 2.5  ·	Renal ultrasound showing nonobstructive bilateral calculi, no hydronephrosis  ·	Acute kidney injury possibly from contrast-induced nephropathy, cannot rule out AGN though less likely  ·	For now we will trend serum creatinine and avoid further nephrotoxins    ?Acute CHFpEF: Echo with EF 55-60%, possible right atrial mass.  Awaiting KAROLINA.  No DVT on bilateral lower extremity Doppler.  Continue with Lasix but decrease rate to 40 mg IV daily (patient reporting improvement in leg swelling and NIXON)    Diabetes mellitus, uncontrolled: A1c 10.6%.

## 2024-11-05 LAB
ALBUMIN SERPL ELPH-MCNC: 2.7 G/DL — LOW (ref 3.3–5.2)
ALP SERPL-CCNC: 94 U/L — SIGNIFICANT CHANGE UP (ref 40–120)
ALT FLD-CCNC: 6 U/L — SIGNIFICANT CHANGE UP
ANION GAP SERPL CALC-SCNC: 15 MMOL/L — SIGNIFICANT CHANGE UP (ref 5–17)
AST SERPL-CCNC: 11 U/L — SIGNIFICANT CHANGE UP
BASOPHILS # BLD AUTO: 0.03 K/UL — SIGNIFICANT CHANGE UP (ref 0–0.2)
BASOPHILS NFR BLD AUTO: 0.6 % — SIGNIFICANT CHANGE UP (ref 0–2)
BILIRUB SERPL-MCNC: 0.3 MG/DL — LOW (ref 0.4–2)
BUN SERPL-MCNC: 52.1 MG/DL — HIGH (ref 8–20)
CALCIUM SERPL-MCNC: 8.6 MG/DL — SIGNIFICANT CHANGE UP (ref 8.4–10.5)
CHLORIDE SERPL-SCNC: 97 MMOL/L — SIGNIFICANT CHANGE UP (ref 96–108)
CO2 SERPL-SCNC: 23 MMOL/L — SIGNIFICANT CHANGE UP (ref 22–29)
CREAT SERPL-MCNC: 3.28 MG/DL — HIGH (ref 0.5–1.3)
EGFR: 16 ML/MIN/1.73M2 — LOW
EOSINOPHIL # BLD AUTO: 0.13 K/UL — SIGNIFICANT CHANGE UP (ref 0–0.5)
EOSINOPHIL NFR BLD AUTO: 2.5 % — SIGNIFICANT CHANGE UP (ref 0–6)
GLUCOSE BLDC GLUCOMTR-MCNC: 100 MG/DL — HIGH (ref 70–99)
GLUCOSE BLDC GLUCOMTR-MCNC: 135 MG/DL — HIGH (ref 70–99)
GLUCOSE BLDC GLUCOMTR-MCNC: 287 MG/DL — HIGH (ref 70–99)
GLUCOSE BLDC GLUCOMTR-MCNC: 361 MG/DL — HIGH (ref 70–99)
GLUCOSE SERPL-MCNC: 145 MG/DL — HIGH (ref 70–99)
HCT VFR BLD CALC: 35.5 % — SIGNIFICANT CHANGE UP (ref 34.5–45)
HGB BLD-MCNC: 11.8 G/DL — SIGNIFICANT CHANGE UP (ref 11.5–15.5)
IMM GRANULOCYTES NFR BLD AUTO: 0.2 % — SIGNIFICANT CHANGE UP (ref 0–0.9)
LYMPHOCYTES # BLD AUTO: 1.24 K/UL — SIGNIFICANT CHANGE UP (ref 1–3.3)
LYMPHOCYTES # BLD AUTO: 24.2 % — SIGNIFICANT CHANGE UP (ref 13–44)
MAGNESIUM SERPL-MCNC: 2.1 MG/DL — SIGNIFICANT CHANGE UP (ref 1.8–2.6)
MCHC RBC-ENTMCNC: 26.5 PG — LOW (ref 27–34)
MCHC RBC-ENTMCNC: 33.2 G/DL — SIGNIFICANT CHANGE UP (ref 32–36)
MCV RBC AUTO: 79.6 FL — LOW (ref 80–100)
MONOCYTES # BLD AUTO: 0.37 K/UL — SIGNIFICANT CHANGE UP (ref 0–0.9)
MONOCYTES NFR BLD AUTO: 7.2 % — SIGNIFICANT CHANGE UP (ref 2–14)
NEUTROPHILS # BLD AUTO: 3.35 K/UL — SIGNIFICANT CHANGE UP (ref 1.8–7.4)
NEUTROPHILS NFR BLD AUTO: 65.3 % — SIGNIFICANT CHANGE UP (ref 43–77)
PHOSPHATE SERPL-MCNC: 5.1 MG/DL — HIGH (ref 2.4–4.7)
PLATELET # BLD AUTO: 360 K/UL — SIGNIFICANT CHANGE UP (ref 150–400)
POTASSIUM SERPL-MCNC: 4.7 MMOL/L — SIGNIFICANT CHANGE UP (ref 3.5–5.3)
POTASSIUM SERPL-SCNC: 4.7 MMOL/L — SIGNIFICANT CHANGE UP (ref 3.5–5.3)
PROT SERPL-MCNC: 6.1 G/DL — LOW (ref 6.6–8.7)
RBC # BLD: 4.46 M/UL — SIGNIFICANT CHANGE UP (ref 3.8–5.2)
RBC # FLD: 12.4 % — SIGNIFICANT CHANGE UP (ref 10.3–14.5)
SODIUM SERPL-SCNC: 135 MMOL/L — SIGNIFICANT CHANGE UP (ref 135–145)
URATE SERPL-MCNC: 10.5 MG/DL — HIGH (ref 2.4–5.7)
WBC # BLD: 5.13 K/UL — SIGNIFICANT CHANGE UP (ref 3.8–10.5)
WBC # FLD AUTO: 5.13 K/UL — SIGNIFICANT CHANGE UP (ref 3.8–10.5)

## 2024-11-05 PROCEDURE — 99233 SBSQ HOSP IP/OBS HIGH 50: CPT

## 2024-11-05 PROCEDURE — 99232 SBSQ HOSP IP/OBS MODERATE 35: CPT

## 2024-11-05 PROCEDURE — 99231 SBSQ HOSP IP/OBS SF/LOW 25: CPT

## 2024-11-05 RX ORDER — POLYETHYLENE GLYCOL 3350 17 G/17G
17 POWDER, FOR SOLUTION ORAL ONCE
Refills: 0 | Status: COMPLETED | OUTPATIENT
Start: 2024-11-05 | End: 2024-11-05

## 2024-11-05 RX ORDER — INSULIN LISPRO 100/ML
6 VIAL (ML) SUBCUTANEOUS
Refills: 0 | Status: DISCONTINUED | OUTPATIENT
Start: 2024-11-05 | End: 2024-11-06

## 2024-11-05 RX ORDER — INSULIN LISPRO 100/ML
VIAL (ML) SUBCUTANEOUS
Refills: 0 | Status: DISCONTINUED | OUTPATIENT
Start: 2024-11-05 | End: 2024-11-11

## 2024-11-05 RX ADMIN — Medication 650 MILLIGRAM(S): at 12:21

## 2024-11-05 RX ADMIN — Medication 16 UNIT(S): at 21:45

## 2024-11-05 RX ADMIN — Medication 5 MILLIGRAM(S): at 05:13

## 2024-11-05 RX ADMIN — HEPARIN SODIUM 5000 UNIT(S): 10000 INJECTION INTRAVENOUS; SUBCUTANEOUS at 21:45

## 2024-11-05 RX ADMIN — GABAPENTIN 300 MILLIGRAM(S): 300 CAPSULE ORAL at 17:20

## 2024-11-05 RX ADMIN — Medication 3: at 12:19

## 2024-11-05 RX ADMIN — HEPARIN SODIUM 5000 UNIT(S): 10000 INJECTION INTRAVENOUS; SUBCUTANEOUS at 15:37

## 2024-11-05 RX ADMIN — Medication 6 UNIT(S): at 17:20

## 2024-11-05 RX ADMIN — Medication 137 MICROGRAM(S): at 05:13

## 2024-11-05 RX ADMIN — GABAPENTIN 300 MILLIGRAM(S): 300 CAPSULE ORAL at 05:13

## 2024-11-05 RX ADMIN — Medication 40 MILLIGRAM(S): at 05:13

## 2024-11-05 RX ADMIN — HEPARIN SODIUM 5000 UNIT(S): 10000 INJECTION INTRAVENOUS; SUBCUTANEOUS at 05:13

## 2024-11-05 RX ADMIN — Medication 650 MILLIGRAM(S): at 22:20

## 2024-11-05 RX ADMIN — Medication 4 UNIT(S): at 08:57

## 2024-11-05 RX ADMIN — Medication 650 MILLIGRAM(S): at 13:43

## 2024-11-05 RX ADMIN — Medication 4 UNIT(S): at 12:19

## 2024-11-05 RX ADMIN — Medication 10: at 17:20

## 2024-11-05 RX ADMIN — Medication 650 MILLIGRAM(S): at 21:45

## 2024-11-05 NOTE — PROGRESS NOTE ADULT - SUBJECTIVE AND OBJECTIVE BOX
Patient is a 58y Female seen on consultation for the evaluation and management of Adenopathy.  Dr. Thurston called me yesterday afternoon to see this patient while admitted for CHF exacerbation.  Pt has hx of DM, on insulin pump, HTN, Hypothyroidism; admitted with worsening dyspnea, leg swelling, cough.  Found to have hypoxia, leg edema, vasc congestion.  Seen by Cardiology.  Echo shows 55% EF, echogenic mass 1.8 by 2 cm RA.  CT chest done as outpat showed mod sized bilat plerual effusion, enlarged mediastional, hilar, axillary and subpectoral lymph nodes (11/1/24)  Pt feels less dyspneic after treatmen.  Reports poor appetite.  Denies chest pain or palpitations fevers, chills or sweats,  abd pain or vomiting.      PAST MEDICAL & SURGICAL HISTORY:  Diabetes  Hypertension  No significant past surgical history    Allergies  No Known Allergies    REVIEW OF SYSTEMS  Reports dyspnea, legs swelling, cough  Denies headaches or dizziness  Reports nausea, no vomiting  Denies rectal bleeding or melena, hematemesis or hemoptysis dysuria or hematruia      MEDICATIONS  (STANDING):  amLODIPine   Tablet 5 milliGRAM(s) Oral daily  dextrose 5%. 1000 milliLiter(s) (100 mL/Hr) IV Continuous <Continuous>  dextrose 5%. 1000 milliLiter(s) (50 mL/Hr) IV Continuous <Continuous>  dextrose 50% Injectable 25 Gram(s) IV Push once  dextrose 50% Injectable 12.5 Gram(s) IV Push once  dextrose 50% Injectable 25 Gram(s) IV Push once  furosemide   Injectable 60 milliGRAM(s) IV Push two times a day  gabapentin 300 milliGRAM(s) Oral two times a day  glucagon  Injectable 1 milliGRAM(s) IntraMuscular once  heparin   Injectable 5000 Unit(s) SubCutaneous every 8 hours  influenza   Vaccine 0.5 milliLiter(s) IntraMuscular once  levothyroxine 125 MICROGram(s) Oral daily    MEDICATIONS  (PRN):  acetaminophen     Tablet .. 650 milliGRAM(s) Oral every 6 hours PRN Temp greater or equal to 38C (100.4F), Mild Pain (1 - 3)  aluminum hydroxide/magnesium hydroxide/simethicone Suspension 30 milliLiter(s) Oral every 4 hours PRN Dyspepsia  dextrose Oral Gel 15 Gram(s) Oral once PRN Blood Glucose LESS THAN 70 milliGRAM(s)/deciliter  melatonin 3 milliGRAM(s) Oral at bedtime PRN Insomnia  ondansetron Injectable 4 milliGRAM(s) IV Push every 8 hours PRN Nausea and/or Vomiting    Vital Signs Last 24 Hrs  T(C): 36.9 (05 Nov 2024 09:24), Max: 37.1 (04 Nov 2024 15:12)  T(F): 98.4 (05 Nov 2024 09:24), Max: 98.8 (04 Nov 2024 15:12)  HR: 100 (05 Nov 2024 09:24) (92 - 103)  BP: 142/82 (05 Nov 2024 09:24) (127/76 - 150/83)  BP(mean): 93 (05 Nov 2024 05:19) (93 - 105)  RR: 16 (05 Nov 2024 09:24) (16 - 18)  SpO2: 98% (05 Nov 2024 09:24) (95% - 98%)    Parameters below as of 05 Nov 2024 09:24  Patient On (Oxygen Delivery Method): room air      PHYSICAL EXAM:  Constitutional:WD over weight, NAD  No adenopathy cervical or axillary appreciated  Anicteric  Lungs with decreased BS at bases  Cor RRR normal S1S2  Abd:  Soft, N-T normoactive BS  Extrem with trace bilateral edema        CBC                          11.8   5.13  )-----------( 360      ( 05 Nov 2024 05:20 )             35.5                             12.1   5.59  )-----------( 309      ( 04 Nov 2024 05:49 )             37.2                           13.0   6.12  )-----------( 338      ( 03 Nov 2024 07:21 )             38.8     CHEM    11-05    135  |  97  |  52.1[H]  ----------------------------<  145[H]  4.7   |  23.0  |  3.28[H]    Ca    8.6      05 Nov 2024 05:20  Phos  5.1     11-05  Mg     2.1     11-05    TPro  6.1[L]  /  Alb  2.7[L]  /  TBili  0.3[L]  /  DBili  x   /  AST  11  /  ALT  6   /  AlkPhos  94  11-05 11-04    136  |  100  |  39.9[H]  ----------------------------<  192[H]  4.5   |  20.0[L]  |  3.27[H]    Ca    8.0[L]      04 Nov 2024 05:49  Phos  4.2     11-03  Mg     1.8     11-03    TPro  6.0[L]  /  Alb  2.7[L]  /  TBili  0.3[L]  /  DBili  x   /  AST  10  /  ALT  5   /  AlkPhos  99  11-03 11-03    135  |  99  |  31.7[H]  ----------------------------<  82  3.9   |  23.0  |  2.82[H]    Ca    8.1[L]      03 Nov 2024 07:21  Phos  4.2     11-03  Mg     1.8     11-03    TPro  6.0[L]  /  Alb  2.7[L]  /  TBili  0.3[L]  /  DBili  x   /  AST  10  /  ALT  5   /  AlkPhos  99  11-03      Urinalysis Basic - ( 03 Nov 2024 07:21 )    Color: x / Appearance: x / SG: x / pH: x  Gluc: 82 mg/dL / Ketone: x  / Bili: x / Urobili: x   Blood: x / Protein: x / Nitrite: x   Leuk Esterase: x / RBC: x / WBC x   Sq Epi: x / Non Sq Epi: x / Bacteria: x        RADIOLOGY & ADDITIONAL STUDIES:

## 2024-11-05 NOTE — PROGRESS NOTE ADULT - SUBJECTIVE AND OBJECTIVE BOX
[facility], [facility]                              [address] [place] [address]                                                 PHONE: [phone]                                                 FAX: [phone]  -----------------------------------------------------------------------------------------------  Pt seen and examined. FU for  shortness of breath      Overnight events/Complaints: Pt without complains. Sitting up in bed. Tolerating po diet.    RELEVANT PHYSICAL EXAM:  Neck: No obvious JVD  Cardiovascular: regular S1, S2  Respiratory: Lungs clear to auscultation; no crepitations, no wheeze  Musculoskeletal: Trace edema    Vital Signs Last 24 Hrs  T(C): 36.9 (05 Nov 2024 09:24), Max: 37.1 (04 Nov 2024 15:12)  T(F): 98.4 (05 Nov 2024 09:24), Max: 98.8 (04 Nov 2024 15:12)  HR: 100 (05 Nov 2024 09:24) (92 - 103)  BP: 142/82 (05 Nov 2024 09:24) (127/76 - 150/83)  BP(mean): 93 (05 Nov 2024 05:19) (93 - 105)  RR: 16 (05 Nov 2024 09:24) (16 - 18)  SpO2: 98% (05 Nov 2024 09:24) (95% - 98%)    Parameters below as of 05 Nov 2024 09:24  Patient On (Oxygen Delivery Method): room air      I&O's Summary          LABS:                        11.8   5.13  )-----------( 360      ( 05 Nov 2024 05:20 )             35.5     11-05    135  |  97  |  52.1[H]  ----------------------------<  145[H]  4.7   |  23.0  |  3.28[H]    Ca    8.6      05 Nov 2024 05:20  Phos  5.1     11-05  Mg     2.1     11-05    TPro  6.1[L]  /  Alb  2.7[L]  /  TBili  0.3[L]  /  DBili  x   /  AST  11  /  ALT  6   /  AlkPhos  94  11-05      RADIOLOGY & ADDITIONAL STUDIES: (reviewed)  CXR was independently visualized/reviewed  and demonstrated: Small bilateral pleural effusions with adjacent atelectasis.  Right lower lung patchy opacity may represent combination of pulmonary   edema and/or atelectasis. Pneumonia is not excluded.    CARDIOLOGY TESTING:(reviewed)     TELEMETRY independently visualized/reviewed and demonstrated : NSR    12 lead EKG independently visualized/reviewed  and demonstrated Sinus tachycardia  Septal infarct , age undetermined  Abnormal ECG  No previous ECGs available    ECHO:  CONCLUSIONS:      1. Technically difficult image quality.   2. Left ventricular systolic function is normal with an ejection fraction of 58 % by Maria's method of disks with an ejection fraction visually estimated at 55 to 60 %.   3. The leftventricular diastolic function is indeterminate.   4. Normal right ventricular cavity size and normal right ventricular systolic function.   5. Pulmonary artery systolic pressure could not be estimated.   6. Echogenic mass 1.8 cm x 2.0 cm noted within the lateral wall of the right atrium visualized on the subcostal view, recommend further imaging correlation with cardiac or CT chest study or KAROLINA.   7. No pericardial effusion seen.   8. Small right and moderate left pleural effusion noted.   9. Noprior echocardiogram is available for comparison.    MEDICATIONS:(reviewed)  MEDICATIONS  (STANDING):  amLODIPine   Tablet 5 milliGRAM(s) Oral daily  dextrose 5%. 1000 milliLiter(s) (100 mL/Hr) IV Continuous <Continuous>  dextrose 5%. 1000 milliLiter(s) (50 mL/Hr) IV Continuous <Continuous>  dextrose 50% Injectable 25 Gram(s) IV Push once  dextrose 50% Injectable 12.5 Gram(s) IV Push once  dextrose 50% Injectable 25 Gram(s) IV Push once  furosemide   Injectable 40 milliGRAM(s) IV Push daily  gabapentin 300 milliGRAM(s) Oral two times a day  glucagon  Injectable 1 milliGRAM(s) IntraMuscular once  heparin   Injectable 5000 Unit(s) SubCutaneous every 8 hours  influenza   Vaccine 0.5 milliLiter(s) IntraMuscular once  insulin glargine Injectable (LANTUS) 16 Unit(s) SubCutaneous at bedtime  insulin lispro (ADMELOG) corrective regimen sliding scale   SubCutaneous three times a day before meals  insulin lispro (ADMELOG) corrective regimen sliding scale   SubCutaneous at bedtime  insulin lispro Injectable (ADMELOG) 4 Unit(s) SubCutaneous three times a day before meals  levothyroxine 137 MICROGram(s) Oral daily      ASSESSMENT AND PLAN:    58y Female with past medical history significant for DM1 on insulin pump, HTN, hypothyroid presented with two weeks of worsening SOB, leg swelling, and cough. She denies recent illness, fevers, or chills. She denies chest pain or palpitations.     + trop due to renal clearness and not C/W with ACS     - Patient presents with hypoxia, leg edema, and vascular congestion on CXR. Labs pertinent for SHARA ? ALBERT normal Cr 9/9/2024 recent CTA negative for PE but B/L effusion. TTE with normal LVEF and possible right atrial mass see above   - IV diuretics as per Renal - now decreased to 40 mg iv daily  - Nephrology follow up  - oxygen supplementation for hypoxia  - will plan for eventual KAROLINA to further evaluate the right atrial mass seen on TTE once stable -possibly on Thursday pending improvement in renal function    Additional history obtained from family [independent historian] and plan of care discussed in detail

## 2024-11-05 NOTE — PROGRESS NOTE ADULT - SUBJECTIVE AND OBJECTIVE BOX
Reason for visit: SHARA    Subjective: Patient reports feeling okay, she denied chest pain, shortness of breath, urinary complaints.  Reports having good urinary output.  No acute overnight event.  Review of systems: All systems were reviewed in detail, pertinent positive and negative have been mentioned above, otherwise negative.    Physical Exam:  Gen: no acute distress  MS: alert, conversing normally  Eyes: EOMI, no icterus  HENT: NCAT, MMM  CV: rhythm reg reg, rate normal, no m/g/r, 1+ LE edema (improving)  Chest: CTAB, no w/r/r,  Abd: soft, NT, ND  Skin: dry, warm, no rash or jaundice      Vital Signs Last 24 Hrs  T(C): 36.7 (05 Nov 2024 05:19), Max: 37.1 (04 Nov 2024 15:12)  T(F): 98.1 (05 Nov 2024 05:19), Max: 98.8 (04 Nov 2024 15:12)  HR: 92 (05 Nov 2024 05:19) (92 - 103)  BP: 133/74 (05 Nov 2024 05:19) (127/76 - 150/83)  BP(mean): 93 (05 Nov 2024 05:19) (93 - 105)  RR: 17 (05 Nov 2024 05:19) (16 - 18)  SpO2: 95% (05 Nov 2024 05:19) (95% - 96%)    Parameters below as of 05 Nov 2024 05:19  Patient On (Oxygen Delivery Method): nasal cannula  O2 Flow (L/min): 2    I&O's Summary    Current Antibiotics:    Other medications:  amLODIPine   Tablet 5 milliGRAM(s) Oral daily  dextrose 5%. 1000 milliLiter(s) IV Continuous <Continuous>  dextrose 5%. 1000 milliLiter(s) IV Continuous <Continuous>  dextrose 50% Injectable 25 Gram(s) IV Push once  dextrose 50% Injectable 12.5 Gram(s) IV Push once  dextrose 50% Injectable 25 Gram(s) IV Push once  furosemide   Injectable 40 milliGRAM(s) IV Push daily  gabapentin 300 milliGRAM(s) Oral two times a day  glucagon  Injectable 1 milliGRAM(s) IntraMuscular once  heparin   Injectable 5000 Unit(s) SubCutaneous every 8 hours  influenza   Vaccine 0.5 milliLiter(s) IntraMuscular once  insulin glargine Injectable (LANTUS) 16 Unit(s) SubCutaneous at bedtime  insulin lispro (ADMELOG) corrective regimen sliding scale   SubCutaneous three times a day before meals  insulin lispro (ADMELOG) corrective regimen sliding scale   SubCutaneous at bedtime  insulin lispro Injectable (ADMELOG) 4 Unit(s) SubCutaneous three times a day before meals  levothyroxine 137 MICROGram(s) Oral daily    11-05    135  |  97  |  52.1[H]  ----------------------------<  145[H]  4.7   |  23.0  |  3.28[H]    Ca    8.6      05 Nov 2024 05:20  Phos  5.1     11-05  Mg     2.1     11-05    TPro  6.1[L]  /  Alb  2.7[L]  /  TBili  0.3[L]  /  DBili  x   /  AST  11  /  ALT  6   /  AlkPhos  94  11-05    Creatinine: 3.28 mg/dL (11-05-24 @ 05:20)  Creatinine: 3.27 mg/dL (11-04-24 @ 05:49)  Creatinine: 2.82 mg/dL (11-03-24 @ 07:21)  Creatinine: 2.16 mg/dL (11-02-24 @ 14:24)

## 2024-11-05 NOTE — PROGRESS NOTE ADULT - ASSESSMENT
58 YOF HTN, DM presented with gradually progressive NIXON and LE edema.  Nephrology consulted for acute kidney injury.  Chest x-ray with pulmonary vascular congestion and small bilateral pleural effusion.    Acute kidney injury on chronic kidney disease:   ·	Baseline likely 1.1 (per records in September 24)  ·	Patient is s/p CT with contrast to rule out PE 1 day prior to admission  ·	Serum creatinine was on a rise 1.9> 2.1> 2.8> 3.27; showed first signs of plateauing today 3.28 mg/dl  ·	UA with 100 protein, large blood, pyuria.  UPCR 2.5  ·	Renal ultrasound showing nonobstructive bilateral calculi, no hydronephrosis  ·	Acute kidney injury possibly from contrast-induced nephropathy, cannot rule out AGN though less likely  ·	For now we will trend serum creatinine and avoid further nephrotoxins    ?Acute CHFpEF vs fluid retention in setting of SHARA: Echo with EF 55-60%, possible right atrial mass.  Awaiting KAROLINA.  No DVT on bilateral lower extremity Doppler. Continue with Lasix but decreased rate to 40 mg IV daily from today (patient reporting improvement in leg swelling and NIXON). Will titrate further as needed.    Diabetes mellitus, uncontrolled: A1c 10.6%.

## 2024-11-05 NOTE — PROGRESS NOTE ADULT - ASSESSMENT
57 yo F seen for the evaluation and management of Adenopathy.  Dr. Thurston called me yesterday afternoon to see this patient while admitted for CHF exacerbation.  Pt has hx of DM, on insulin pump, HTN, Hypothyroidism; admitted with worsening dyspnea, leg swelling, cough.  Found to have hypoxia, leg edema, vasc congestion.  Seen by Cardiology.  Echo shows 55% EF, echogenic mass 1.8 by 2 cm RA.  CT chest done as outpat showed mod sized bilat plerual effusion, enlarged mediastional, hilar, axillary and subpectoral lymph nodes (11/1/24)  Pt feels less dyspneic after treatmen.  Reports poor appetite.        CHF  - Admitted with hypoxia, leg edema  -Cardiology following  - Echogenic mass 1.8 cm x 2.0 cm noted within the lateral wall of the right atrium visualized on the subcostal view, recommend further imaging correlation with cardiac or CT chest study or KAROLINA.  -- plan for eventual KAROLINA to further evaluate the right atrial mass seen on TTE once stable -possibly on Thursday pending improvement in renal function    Adenopathy  - documented on out patient CT scan-asked to address this by Attending MD  - Will eventually need CT Abd/pelvis and tissue bx  -   - At pt request, can work this up as outpt; for now,     Will follow

## 2024-11-05 NOTE — CHART NOTE - NSCHARTNOTEFT_GEN_A_CORE
Nutrition Note: Aware nutrition consult ordered for diabetes management. RD previously provided education on consistent carbohydrate guidelines on 11/4. Will continue to monitor and follow up as needed. RD remains available.

## 2024-11-05 NOTE — PROGRESS NOTE ADULT - ASSESSMENT
59 y/o F w/ PMH HTN, IDDM type I (has medtronic insulin pump), hypothyroid, neuropathies presented to ED c/o 2 weeks of NIXON w/ associated worsening LE edema, nonproductive cough and orthopnea and occasional L-chest pain that is like a burning sensation and only occurs when she lays on her left side.  Clinically hypervolemic w/ scattered rales and b/l LE pretibial and pedal pitting edema. Initial w/u significant for trop 272-->258, BNP 2503, Cr 2.16. CXR w/ pulm vascular congestion and small b/l pleural effusions. Admitted for CHF exacerbation, continues on IV diuresis, ECHO with notable mass, may need further evaluation.      #New acute decompensated HFpEF  #Abnormal TTE finding  - Clinically hypervolemic   - BNP 2503, Trops elevated but down trending   - CXR w/ pulm vascular congestion and small b/l pleural effusions appreciated  - CTA PE study 11/01 significant for pulm vascular congestion and b/l pleural effusions but negative PE   - TTE 11/02 w/ LVEF 55-60% no WMA reported; Echogenic mass 1.8 cm x 2.0 cm within the lateral wall of the RA potential need for KAROLINA to further characterize mass  - TSH 7.12, A1c 10.6, LDL 84   - continue on IV diuresis with Lasix 40 mg daily  - Daily weight, strict I/O's and fluid restrict   - Maintain K>4 and Mg>2  - Monitor on telemetry   - Tentative plan for KAROLINA on Thursday pending renal function improvement    #Type II MI, likely demand 2/2 above vs poor renal clearance  - Trops down trending (272-->258)  - continue on telemetry   - TTE 11/02 w/ LVEF 55-60% no WMA, incidental findings as above   - Cardiology following    #SHARA suspect multifactorial 2/2 cardiorenal, prerenal azotemia and ALBERT   - Baseline Cr 1.09 in 09/2024 but 10/31 showed worsening Cr of 1.93 which was likely cardiorenal from acute CHF and newly started on lasix causing prerenal azotemia  - Today Cr is stable compared to yesterday's chemistry  - Renal US shows non obstructing b/l calculi, no hydronephrosis and b/l increased renal echotexture compatible with medical renal disease  - Monitor I/O's, renal function and lytes   - Avoid nephrotic meds or renally dose if needed  - Nephrology following    #Incidental CTA chest findings   - Reported to have multiple enlarged lymph nodes, some new and some seen on CT chest from 2023 and are only slight larger in size at this time  - Explained to daughter and pt that she should have a repeat CT chest in 1-3 months to reassess lymph nodes and should f/u with heme/onc outpt     #IDDM Type I w/ neuropathies  - A1c elevaed 10.6   - Has medtronic insulin pump and advised pt to continue using as usual while inpt   - Endocrine following, insulin pump form provided, endocrine team to assist with pump management   - Hypoglycemic protocol in place  - c/w gabapentin     HTN  - c/w amlodipine   - On IV lasix but transition to po once euvolemic    Hypothyroid  - c/w synthroid  - TSH 7.12       VTE ppx: heparin sq  Dispo: acute.  Anticipate d/c home w/ no needs once medically stable, likely 2-3 days

## 2024-11-05 NOTE — PROGRESS NOTE ADULT - SUBJECTIVE AND OBJECTIVE BOX
[facility] Division of Hospital Medicine    SUBJECTIVE / OVERNIGHT EVENTS:  Patient seen and examined at bedside. She is in no acute distress and denies any acute symptoms such as chest pain, shortness of breath, nausea/vomiting, or abdominal pain. She continues to be diuresed but dose of Lasix was decreased to 40mg daily. Her renal function has stabilized today and patient was very happy to hear that. She says that her leg swelling has improved as well. She is being planned for KAROLINA on Thursday pending improvement of her renal function.    MEDICATIONS  (STANDING):  amLODIPine   Tablet 5 milliGRAM(s) Oral daily  dextrose 5%. 1000 milliLiter(s) (100 mL/Hr) IV Continuous <Continuous>  dextrose 5%. 1000 milliLiter(s) (50 mL/Hr) IV Continuous <Continuous>  dextrose 50% Injectable 25 Gram(s) IV Push once  dextrose 50% Injectable 12.5 Gram(s) IV Push once  dextrose 50% Injectable 25 Gram(s) IV Push once  furosemide   Injectable 40 milliGRAM(s) IV Push two times a day  gabapentin 300 milliGRAM(s) Oral two times a day  glucagon  Injectable 1 milliGRAM(s) IntraMuscular once  heparin   Injectable 5000 Unit(s) SubCutaneous every 8 hours  influenza   Vaccine 0.5 milliLiter(s) IntraMuscular once  levothyroxine 125 MICROGram(s) Oral daily    MEDICATIONS  (PRN):  acetaminophen     Tablet .. 650 milliGRAM(s) Oral every 6 hours PRN Temp greater or equal to 38C (100.4F), Mild Pain (1 - 3)  aluminum hydroxide/magnesium hydroxide/simethicone Suspension 30 milliLiter(s) Oral every 4 hours PRN Dyspepsia  dextrose Oral Gel 15 Gram(s) Oral once PRN Blood Glucose LESS THAN 70 milliGRAM(s)/deciliter  melatonin 3 milliGRAM(s) Oral at bedtime PRN Insomnia  ondansetron Injectable 4 milliGRAM(s) IV Push every 8 hours PRN Nausea and/or Vomiting        I&O's Summary      PHYSICAL EXAM:  Vital Signs Last 24 Hrs  T(C): 36.9 (03 Nov 2024 07:52), Max: 36.9 (02 Nov 2024 22:28)  T(F): 98.4 (03 Nov 2024 07:52), Max: 98.5 (03 Nov 2024 05:48)  HR: 100 (03 Nov 2024 07:52) (100 - 113)  BP: 148/80 (03 Nov 2024 07:52) (148/80 - 172/81)  BP(mean): 99 (02 Nov 2024 14:40) (99 - 99)  RR: 18 (03 Nov 2024 07:52) (18 - 21)  SpO2: 92% (03 Nov 2024 07:52) (92% - 98%)    Parameters below as of 03 Nov 2024 07:52  Patient On (Oxygen Delivery Method): room air      GENERAL: pt examined bedside, laying comfortably in bed in NAD  HEENT: NC/AT, moist oral mucosa, clear conjunctiva, sclera nonicteric  RESPIRATORY: decreased breath sounds in B/L bases, otherwise minimal crackles noted   CARDIOVASCULAR: RRR, normal S1 and S2, no murmur/rub/gallop  ABDOMEN: soft, NT/ND, +bowel sounds, no rebound/guarding  MSK: No joint deformities, edema, erythema  EXTREMITIES: No cynaosis, no clubbing, less swelling today  PSYCH: affect appropriate and cooperative  NEUROLOGY: A+O to person, place, and time, no focal neurologic deficits appreciated  SKIN: No rashes or no palpable lesions    LABS:                        13.0   6.12  )-----------( 338      ( 03 Nov 2024 07:21 )             38.8     11-03    135  |  99  |  31.7[H]  ----------------------------<  82  3.9   |  23.0  |  2.82[H]    Ca    8.1[L]      03 Nov 2024 07:21  Phos  4.2     11-03  Mg     1.8     11-03    TPro  6.0[L]  /  Alb  2.7[L]  /  TBili  0.3[L]  /  DBili  x   /  AST  10  /  ALT  5   /  AlkPhos  99  11-03          Urinalysis Basic - ( 03 Nov 2024 07:21 )    Color: x / Appearance: x / SG: x / pH: x  Gluc: 82 mg/dL / Ketone: x  / Bili: x / Urobili: x   Blood: x / Protein: x / Nitrite: x   Leuk Esterase: x / RBC: x / WBC x   Sq Epi: x / Non Sq Epi: x / Bacteria: x        CAPILLARY BLOOD GLUCOSE      POCT Blood Glucose.: 116 mg/dL (03 Nov 2024 08:15)        RADIOLOGY & ADDITIONAL TESTS:  Results Reviewed:   Imaging Personally Reviewed:  Electrocardiogram Personally Reviewed:

## 2024-11-05 NOTE — PROGRESS NOTE ADULT - ASSESSMENT
58F with PMH HTN, IDDM type I (has medtronic insulin pump), hypothyroid, neuropathies presented to ED c/o 2 weeks of NIXON w/ associated worsening LE edema, nonproductive cough and orthopnea and occasional L-chest pain that is like a burning sensation and only occurs when she lays on her left side.  Clinically hypervolemic w/ scattered rales and b/l LE pretibial and pedal pitting edema. Initial w/u significant for trop 272-->258, BNP 2503, Cr 2.16. CXR w/ pulm vascular congestion and small b/l pleural effusions. Admitted for CHF exacerbation, continues on IV diuresis, ECHO with notable mass.    Consulted for diabetes management  Home regimen: medtronic pump (basal dosing 19.6 units daily, ICR 1:15 and ISF 1:65-75)  Current a1c: 10.6%  Outpatient Endocrinologist: Dr Rivera    1. Poorly controlled DM1  - Could not demonstrate insulin pump competency and proficiency, pump removed  - Continue admelog 4 units TID (if hyperglycemic at lunch, will increase dose)  - Continue lantus 16 units qhs  - Monitor FS AC&HS    2. Hypothyroid  - TSH elevated 7.12  - LT4 increased to 137 mcg daily    3. SHARA/CKD  - Nephrology following  - Trend serum creatinine and avoid nephrotoxins   58F with PMH HTN, IDDM type I (has medtronic insulin pump), hypothyroid, neuropathies presented to ED c/o 2 weeks of NIXON w/ associated worsening LE edema, nonproductive cough and orthopnea and occasional L-chest pain that is like a burning sensation and only occurs when she lays on her left side.  Clinically hypervolemic w/ scattered rales and b/l LE pretibial and pedal pitting edema. Initial w/u significant for trop 272-->258, BNP 2503, Cr 2.16. CXR w/ pulm vascular congestion and small b/l pleural effusions. Admitted for CHF exacerbation, continues on IV diuresis, ECHO with notable mass.    Consulted for diabetes management  Home regimen: medtronic pump (basal dosing 19.6 units daily, ICR 1:15 and ISF 1:65-75)  Current a1c: 10.6%  Outpatient Endocrinologist: Dr Rivera    1. Poorly controlled DM1  - Could not demonstrate insulin pump competency and proficiency, pump removed  - Post prandial hyperglycemia, increase admelog to 6 units TID  - Increase correction scale to moderate coverage  - Continue lantus 16 units qhs  - Monitor FS AC&HS    2. Hypothyroid  - TSH elevated 7.12  - LT4 increased to 137 mcg daily    3. SHARA/CKD  - Nephrology following  - Trend serum creatinine and avoid nephrotoxins

## 2024-11-05 NOTE — PROGRESS NOTE ADULT - SUBJECTIVE AND OBJECTIVE BOX
INTERVAL EVENTS:  Follow up diabetes management. Prandial insulin started yesterday with dinner, glucose improved this AM. Pt reports good appetite, but does not like food here. Eating a buchanan egg and cheese sandwich.     MEDICATIONS  (STANDING):  amLODIPine   Tablet 5 milliGRAM(s) Oral daily  dextrose 5%. 1000 milliLiter(s) (100 mL/Hr) IV Continuous <Continuous>  dextrose 5%. 1000 milliLiter(s) (50 mL/Hr) IV Continuous <Continuous>  dextrose 50% Injectable 25 Gram(s) IV Push once  dextrose 50% Injectable 12.5 Gram(s) IV Push once  dextrose 50% Injectable 25 Gram(s) IV Push once  furosemide   Injectable 40 milliGRAM(s) IV Push daily  gabapentin 300 milliGRAM(s) Oral two times a day  glucagon  Injectable 1 milliGRAM(s) IntraMuscular once  heparin   Injectable 5000 Unit(s) SubCutaneous every 8 hours  influenza   Vaccine 0.5 milliLiter(s) IntraMuscular once  insulin glargine Injectable (LANTUS) 16 Unit(s) SubCutaneous at bedtime  insulin lispro (ADMELOG) corrective regimen sliding scale   SubCutaneous three times a day before meals  insulin lispro (ADMELOG) corrective regimen sliding scale   SubCutaneous at bedtime  insulin lispro Injectable (ADMELOG) 4 Unit(s) SubCutaneous three times a day before meals  levothyroxine 137 MICROGram(s) Oral daily    MEDICATIONS  (PRN):  acetaminophen     Tablet .. 650 milliGRAM(s) Oral every 6 hours PRN Temp greater or equal to 38C (100.4F), Mild Pain (1 - 3)  aluminum hydroxide/magnesium hydroxide/simethicone Suspension 30 milliLiter(s) Oral every 4 hours PRN Dyspepsia  dextrose Oral Gel 15 Gram(s) Oral once PRN Blood Glucose LESS THAN 70 milliGRAM(s)/deciliter  melatonin 3 milliGRAM(s) Oral at bedtime PRN Insomnia  ondansetron Injectable 4 milliGRAM(s) IV Push every 8 hours PRN Nausea and/or Vomiting    Allergies  No Known Allergies    Vital Signs Last 24 Hrs  T(C): 36.9 (05 Nov 2024 09:24), Max: 37.1 (04 Nov 2024 15:12)  T(F): 98.4 (05 Nov 2024 09:24), Max: 98.8 (04 Nov 2024 15:12)  HR: 100 (05 Nov 2024 09:24) (92 - 103)  BP: 142/82 (05 Nov 2024 09:24) (133/74 - 150/83)  BP(mean): 93 (05 Nov 2024 05:19) (93 - 105)  RR: 16 (05 Nov 2024 09:24) (16 - 18)  SpO2: 98% (05 Nov 2024 09:24) (95% - 98%)    Parameters below as of 05 Nov 2024 09:24  Patient On (Oxygen Delivery Method): room air    PHYSICAL EXAM:  General: No apparent distress  Neck: Supple, trachea midline, no thyromegaly  Respiratory: Lungs clear bilaterally, normal rate, effort  Cardiac: +S1, S2, no m/r/g  GI: +BS, soft, non tender, non distended  Extremities: No peripheral edema, no pedal lesions  Neuro: A+O X3    LABS:                        11.8   5.13  )-----------( 360      ( 05 Nov 2024 05:20 )             35.5     11-05    135  |  97  |  52.1[H]  ----------------------------<  145[H]  4.7   |  23.0  |  3.28[H]    Ca    8.6      05 Nov 2024 05:20  Phos  5.1     11-05  Mg     2.1     11-05    TPro  6.1[L]  /  Alb  2.7[L]  /  TBili  0.3[L]  /  DBili  x   /  AST  11  /  ALT  6   /  AlkPhos  94  11-05    Urinalysis Basic - ( 05 Nov 2024 05:20 )    Color: x / Appearance: x / SG: x / pH: x  Gluc: 145 mg/dL / Ketone: x  / Bili: x / Urobili: x   Blood: x / Protein: x / Nitrite: x   Leuk Esterase: x / RBC: x / WBC x   Sq Epi: x / Non Sq Epi: x / Bacteria: x    POCT Blood Glucose.: 135 mg/dL (11-05-24 @ 08:40)  POCT Blood Glucose.: 245 mg/dL (11-04-24 @ 21:47)  POCT Blood Glucose.: 398 mg/dL (11-04-24 @ 19:07)  POCT Blood Glucose.: 364 mg/dL (11-04-24 @ 18:06)  POCT Blood Glucose.: 335 mg/dL (11-04-24 @ 16:57)  POCT Blood Glucose.: 296 mg/dL (11-04-24 @ 11:30)    Thyroid Stimulating Hormone, Serum: 7.12 uIU/mL (11-03-24 @ 07:21)

## 2024-11-06 LAB
ALBUMIN SERPL ELPH-MCNC: 3 G/DL — LOW (ref 3.3–5.2)
ALP SERPL-CCNC: 132 U/L — HIGH (ref 40–120)
ALT FLD-CCNC: 25 U/L — SIGNIFICANT CHANGE UP
ANION GAP SERPL CALC-SCNC: 17 MMOL/L — SIGNIFICANT CHANGE UP (ref 5–17)
AST SERPL-CCNC: 54 U/L — HIGH
BILIRUB SERPL-MCNC: 0.2 MG/DL — LOW (ref 0.4–2)
BUN SERPL-MCNC: 56.3 MG/DL — HIGH (ref 8–20)
CALCIUM SERPL-MCNC: 8.5 MG/DL — SIGNIFICANT CHANGE UP (ref 8.4–10.5)
CHLORIDE SERPL-SCNC: 95 MMOL/L — LOW (ref 96–108)
CO2 SERPL-SCNC: 24 MMOL/L — SIGNIFICANT CHANGE UP (ref 22–29)
CREAT SERPL-MCNC: 2.86 MG/DL — HIGH (ref 0.5–1.3)
EGFR: 18 ML/MIN/1.73M2 — LOW
GLUCOSE BLDC GLUCOMTR-MCNC: 285 MG/DL — HIGH (ref 70–99)
GLUCOSE BLDC GLUCOMTR-MCNC: 352 MG/DL — HIGH (ref 70–99)
GLUCOSE BLDC GLUCOMTR-MCNC: 386 MG/DL — HIGH (ref 70–99)
GLUCOSE BLDC GLUCOMTR-MCNC: 73 MG/DL — SIGNIFICANT CHANGE UP (ref 70–99)
GLUCOSE BLDC GLUCOMTR-MCNC: 86 MG/DL — SIGNIFICANT CHANGE UP (ref 70–99)
GLUCOSE SERPL-MCNC: 216 MG/DL — HIGH (ref 70–99)
HCT VFR BLD CALC: 39.9 % — SIGNIFICANT CHANGE UP (ref 34.5–45)
HGB BLD-MCNC: 13.5 G/DL — SIGNIFICANT CHANGE UP (ref 11.5–15.5)
MAGNESIUM SERPL-MCNC: 2.1 MG/DL — SIGNIFICANT CHANGE UP (ref 1.6–2.6)
MCHC RBC-ENTMCNC: 26.6 PG — LOW (ref 27–34)
MCHC RBC-ENTMCNC: 33.8 G/DL — SIGNIFICANT CHANGE UP (ref 32–36)
MCV RBC AUTO: 78.7 FL — LOW (ref 80–100)
PHOSPHATE SERPL-MCNC: 4.2 MG/DL — SIGNIFICANT CHANGE UP (ref 2.4–4.7)
PLATELET # BLD AUTO: 413 K/UL — HIGH (ref 150–400)
POTASSIUM SERPL-MCNC: 4.4 MMOL/L — SIGNIFICANT CHANGE UP (ref 3.5–5.3)
POTASSIUM SERPL-SCNC: 4.4 MMOL/L — SIGNIFICANT CHANGE UP (ref 3.5–5.3)
PROT SERPL-MCNC: 6.7 G/DL — SIGNIFICANT CHANGE UP (ref 6.6–8.7)
RBC # BLD: 5.07 M/UL — SIGNIFICANT CHANGE UP (ref 3.8–5.2)
RBC # FLD: 12.6 % — SIGNIFICANT CHANGE UP (ref 10.3–14.5)
SODIUM SERPL-SCNC: 136 MMOL/L — SIGNIFICANT CHANGE UP (ref 135–145)
URATE SERPL-MCNC: 10.5 MG/DL — HIGH (ref 2.4–5.7)
WBC # BLD: 7.2 K/UL — SIGNIFICANT CHANGE UP (ref 3.8–10.5)
WBC # FLD AUTO: 7.2 K/UL — SIGNIFICANT CHANGE UP (ref 3.8–10.5)

## 2024-11-06 PROCEDURE — 99233 SBSQ HOSP IP/OBS HIGH 50: CPT

## 2024-11-06 PROCEDURE — 99231 SBSQ HOSP IP/OBS SF/LOW 25: CPT

## 2024-11-06 PROCEDURE — 99232 SBSQ HOSP IP/OBS MODERATE 35: CPT

## 2024-11-06 RX ORDER — SENNA 187 MG
2 TABLET ORAL AT BEDTIME
Refills: 0 | Status: DISCONTINUED | OUTPATIENT
Start: 2024-11-06 | End: 2024-11-11

## 2024-11-06 RX ORDER — INSULIN LISPRO 100/ML
8 VIAL (ML) SUBCUTANEOUS
Refills: 0 | Status: DISCONTINUED | OUTPATIENT
Start: 2024-11-06 | End: 2024-11-08

## 2024-11-06 RX ORDER — INSULIN GLARGINE,HUM.REC.ANLOG 100/ML
14 VIAL (ML) SUBCUTANEOUS AT BEDTIME
Refills: 0 | Status: DISCONTINUED | OUTPATIENT
Start: 2024-11-06 | End: 2024-11-11

## 2024-11-06 RX ORDER — ALLOPURINOL 100 MG
100 TABLET ORAL DAILY
Refills: 0 | Status: DISCONTINUED | OUTPATIENT
Start: 2024-11-06 | End: 2024-11-11

## 2024-11-06 RX ORDER — POLYETHYLENE GLYCOL 3350 17 G/17G
17 POWDER, FOR SOLUTION ORAL ONCE
Refills: 0 | Status: COMPLETED | OUTPATIENT
Start: 2024-11-06 | End: 2024-11-06

## 2024-11-06 RX ADMIN — Medication 650 MILLIGRAM(S): at 19:32

## 2024-11-06 RX ADMIN — GABAPENTIN 300 MILLIGRAM(S): 300 CAPSULE ORAL at 17:21

## 2024-11-06 RX ADMIN — Medication 100 MILLIGRAM(S): at 17:21

## 2024-11-06 RX ADMIN — Medication 40 MILLIGRAM(S): at 05:39

## 2024-11-06 RX ADMIN — Medication 6 UNIT(S): at 09:52

## 2024-11-06 RX ADMIN — GABAPENTIN 300 MILLIGRAM(S): 300 CAPSULE ORAL at 05:39

## 2024-11-06 RX ADMIN — HEPARIN SODIUM 5000 UNIT(S): 10000 INJECTION INTRAVENOUS; SUBCUTANEOUS at 13:10

## 2024-11-06 RX ADMIN — HEPARIN SODIUM 5000 UNIT(S): 10000 INJECTION INTRAVENOUS; SUBCUTANEOUS at 05:39

## 2024-11-06 RX ADMIN — Medication 6: at 12:29

## 2024-11-06 RX ADMIN — Medication 8 UNIT(S): at 12:30

## 2024-11-06 RX ADMIN — Medication 2 TABLET(S): at 22:49

## 2024-11-06 RX ADMIN — Medication 14 UNIT(S): at 23:20

## 2024-11-06 RX ADMIN — Medication 3: at 23:22

## 2024-11-06 RX ADMIN — Medication 137 MICROGRAM(S): at 07:16

## 2024-11-06 RX ADMIN — HEPARIN SODIUM 5000 UNIT(S): 10000 INJECTION INTRAVENOUS; SUBCUTANEOUS at 23:20

## 2024-11-06 RX ADMIN — Medication 5 MILLIGRAM(S): at 05:39

## 2024-11-06 NOTE — PROGRESS NOTE ADULT - SUBJECTIVE AND OBJECTIVE BOX
INTERVAL EVENTS:  Follow up diabetes management. Glucoses ranging from  over last 24 hrs.     MEDICATIONS  (STANDING):  amLODIPine   Tablet 5 milliGRAM(s) Oral daily  dextrose 5%. 1000 milliLiter(s) (100 mL/Hr) IV Continuous <Continuous>  dextrose 5%. 1000 milliLiter(s) (50 mL/Hr) IV Continuous <Continuous>  dextrose 50% Injectable 25 Gram(s) IV Push once  dextrose 50% Injectable 12.5 Gram(s) IV Push once  dextrose 50% Injectable 25 Gram(s) IV Push once  furosemide   Injectable 40 milliGRAM(s) IV Push daily  gabapentin 300 milliGRAM(s) Oral two times a day  glucagon  Injectable 1 milliGRAM(s) IntraMuscular once  heparin   Injectable 5000 Unit(s) SubCutaneous every 8 hours  influenza   Vaccine 0.5 milliLiter(s) IntraMuscular once  insulin glargine Injectable (LANTUS) 14 Unit(s) SubCutaneous at bedtime  insulin lispro (ADMELOG) corrective regimen sliding scale   SubCutaneous three times a day before meals  insulin lispro (ADMELOG) corrective regimen sliding scale   SubCutaneous at bedtime  insulin lispro Injectable (ADMELOG) 8 Unit(s) SubCutaneous three times a day before meals  levothyroxine 137 MICROGram(s) Oral daily    MEDICATIONS  (PRN):  acetaminophen     Tablet .. 650 milliGRAM(s) Oral every 6 hours PRN Temp greater or equal to 38C (100.4F), Mild Pain (1 - 3)  aluminum hydroxide/magnesium hydroxide/simethicone Suspension 30 milliLiter(s) Oral every 4 hours PRN Dyspepsia  dextrose Oral Gel 15 Gram(s) Oral once PRN Blood Glucose LESS THAN 70 milliGRAM(s)/deciliter  melatonin 3 milliGRAM(s) Oral at bedtime PRN Insomnia  ondansetron Injectable 4 milliGRAM(s) IV Push every 8 hours PRN Nausea and/or Vomiting    Allergies  No Known Allergies    Vital Signs Last 24 Hrs  T(C): 36.5 (06 Nov 2024 07:34), Max: 36.9 (05 Nov 2024 15:30)  T(F): 97.7 (06 Nov 2024 07:34), Max: 98.5 (05 Nov 2024 15:30)  HR: 90 (06 Nov 2024 07:34) (78 - 93)  BP: 132/73 (06 Nov 2024 07:34) (124/72 - 146/82)  BP(mean): --  RR: 18 (06 Nov 2024 07:34) (17 - 18)  SpO2: 94% (06 Nov 2024 07:34) (94% - 95%)    Parameters below as of 06 Nov 2024 07:34  Patient On (Oxygen Delivery Method): nasal cannula  O2 Flow (L/min): 2    PHYSICAL EXAM:  General: No apparent distress  Respiratory: Lungs clear bilaterally  Cardiac: +S1, S2, no m/r/g  GI: +BS, soft, non tender, non distended  Extremities: No peripheral edema  Neuro: A+O X3    LABS:                        13.5   7.20  )-----------( 413      ( 06 Nov 2024 10:35 )             39.9     11-06    136  |  95[L]  |  56.3[H]  ----------------------------<  216[H]  4.4   |  24.0  |  2.86[H]    Ca    8.5      06 Nov 2024 10:35  Phos  4.2     11-06  Mg     2.1     11-06    TPro  6.7  /  Alb  3.0[L]  /  TBili  0.2[L]  /  DBili  x   /  AST  54[H]  /  ALT  25  /  AlkPhos  132[H]  11-06    Urinalysis Basic - ( 06 Nov 2024 10:35 )    Color: x / Appearance: x / SG: x / pH: x  Gluc: 216 mg/dL / Ketone: x  / Bili: x / Urobili: x   Blood: x / Protein: x / Nitrite: x   Leuk Esterase: x / RBC: x / WBC x   Sq Epi: x / Non Sq Epi: x / Bacteria: x    POCT Blood Glucose.: 285 mg/dL (11-06-24 @ 12:07)  POCT Blood Glucose.: 73 mg/dL (11-06-24 @ 08:31)  POCT Blood Glucose.: 100 mg/dL (11-05-24 @ 21:35)  POCT Blood Glucose.: 361 mg/dL (11-05-24 @ 17:03)    Thyroid Stimulating Hormone, Serum: 7.12 uIU/mL (11-03-24 @ 07:21)

## 2024-11-06 NOTE — PROGRESS NOTE ADULT - SUBJECTIVE AND OBJECTIVE BOX
Patient is a 58y Female seen on consultation for the evaluation and management of Adenopathy.  Dr. Thurston called me yesterday afternoon to see this patient while admitted for CHF exacerbation.  Pt has hx of DM, on insulin pump, HTN, Hypothyroidism; admitted with worsening dyspnea, leg swelling, cough.  Found to have hypoxia, leg edema, vasc congestion.  Seen by Cardiology.  Echo shows 55% EF, echogenic mass 1.8 by 2 cm RA.  CT chest done as outpat showed mod sized bilat plerual effusion, enlarged mediastional, hilar, axillary and subpectoral lymph nodes (11/1/24)  Pt feels less dyspneic after treatmen.  Reports poor appetite.  Denies chest pain or palpitations fevers, chills or sweats,  abd pain or vomiting.      PAST MEDICAL & SURGICAL HISTORY:  Diabetes  Hypertension  No significant past surgical history    Allergies  No Known Allergies    REVIEW OF SYSTEMS  Reports dyspnea, legs swelling, cough  Denies headaches or dizziness  Reports nausea, no vomiting  Denies rectal bleeding or melena, hematemesis or hemoptysis dysuria or hematruia      MEDICATIONS  (STANDING):  amLODIPine   Tablet 5 milliGRAM(s) Oral daily  dextrose 5%. 1000 milliLiter(s) (100 mL/Hr) IV Continuous <Continuous>  dextrose 5%. 1000 milliLiter(s) (50 mL/Hr) IV Continuous <Continuous>  dextrose 50% Injectable 25 Gram(s) IV Push once  dextrose 50% Injectable 12.5 Gram(s) IV Push once  dextrose 50% Injectable 25 Gram(s) IV Push once  furosemide   Injectable 60 milliGRAM(s) IV Push two times a day  gabapentin 300 milliGRAM(s) Oral two times a day  glucagon  Injectable 1 milliGRAM(s) IntraMuscular once  heparin   Injectable 5000 Unit(s) SubCutaneous every 8 hours  influenza   Vaccine 0.5 milliLiter(s) IntraMuscular once  levothyroxine 125 MICROGram(s) Oral daily    MEDICATIONS  (PRN):  acetaminophen     Tablet .. 650 milliGRAM(s) Oral every 6 hours PRN Temp greater or equal to 38C (100.4F), Mild Pain (1 - 3)  aluminum hydroxide/magnesium hydroxide/simethicone Suspension 30 milliLiter(s) Oral every 4 hours PRN Dyspepsia  dextrose Oral Gel 15 Gram(s) Oral once PRN Blood Glucose LESS THAN 70 milliGRAM(s)/deciliter  melatonin 3 milliGRAM(s) Oral at bedtime PRN Insomnia  ondansetron Injectable 4 milliGRAM(s) IV Push every 8 hours PRN Nausea and/or Vomiting    Vital Signs Last 24 Hrs  T(C): 36.5 (06 Nov 2024 07:34), Max: 36.9 (05 Nov 2024 15:30)  T(F): 97.7 (06 Nov 2024 07:34), Max: 98.5 (05 Nov 2024 15:30)  HR: 90 (06 Nov 2024 07:34) (78 - 93)  BP: 132/73 (06 Nov 2024 07:34) (124/72 - 146/82)  BP(mean): --  RR: 18 (06 Nov 2024 07:34) (17 - 18)  SpO2: 94% (06 Nov 2024 07:34) (94% - 95%)    Parameters below as of 06 Nov 2024 07:34  Patient On (Oxygen Delivery Method): nasal cannula  O2 Flow (L/min): 2    PHYSICAL EXAM:  Constitutional:WD over weight, NAD  No adenopathy cervical or axillary appreciated  Anicteric  Lungs with decreased BS at bases  Cor RRR normal S1S2  Abd:  Soft, N-T normoactive BS  Extrem with trace bilateral edema        CBC    No new labs                          11.8   5.13  )-----------( 360      ( 05 Nov 2024 05:20 )             35.5                             12.1   5.59  )-----------( 309      ( 04 Nov 2024 05:49 )             37.2                           13.0   6.12  )-----------( 338      ( 03 Nov 2024 07:21 )             38.8     CHEM      11-05    135  |  97  |  52.1[H]  ----------------------------<  145[H]  4.7   |  23.0  |  3.28[H]    Ca    8.6      05 Nov 2024 05:20  Phos  5.1     11-05  Mg     2.1     11-05    TPro  6.1[L]  /  Alb  2.7[L]  /  TBili  0.3[L]  /  DBili  x   /  AST  11  /  ALT  6   /  AlkPhos  94  11-05 11-04    136  |  100  |  39.9[H]  ----------------------------<  192[H]  4.5   |  20.0[L]  |  3.27[H]    Ca    8.0[L]      04 Nov 2024 05:49  Phos  4.2     11-03  Mg     1.8     11-03    TPro  6.0[L]  /  Alb  2.7[L]  /  TBili  0.3[L]  /  DBili  x   /  AST  10  /  ALT  5   /  AlkPhos  99  11-03 11-03    135  |  99  |  31.7[H]  ----------------------------<  82  3.9   |  23.0  |  2.82[H]    Ca    8.1[L]      03 Nov 2024 07:21  Phos  4.2     11-03  Mg     1.8     11-03    TPro  6.0[L]  /  Alb  2.7[L]  /  TBili  0.3[L]  /  DBili  x   /  AST  10  /  ALT  5   /  AlkPhos  99  11-03      Urinalysis Basic - ( 03 Nov 2024 07:21 )    Color: x / Appearance: x / SG: x / pH: x  Gluc: 82 mg/dL / Ketone: x  / Bili: x / Urobili: x   Blood: x / Protein: x / Nitrite: x   Leuk Esterase: x / RBC: x / WBC x   Sq Epi: x / Non Sq Epi: x / Bacteria: x        RADIOLOGY & ADDITIONAL STUDIES:

## 2024-11-06 NOTE — PROGRESS NOTE ADULT - SUBJECTIVE AND OBJECTIVE BOX
[place] [facility]                                   [address] [place] [address]                                                      PHONE: [phone]                                                         FAX: [phone]  [email or web address]  ---------------------------------------------------------------------------------------------------------------------------------    Overnight events/patient complaints: patient seen at bedside. She is feeling better today.       No Known Allergies    MEDICATIONS  (STANDING):  amLODIPine   Tablet 5 milliGRAM(s) Oral daily  dextrose 5%. 1000 milliLiter(s) (100 mL/Hr) IV Continuous <Continuous>  dextrose 5%. 1000 milliLiter(s) (50 mL/Hr) IV Continuous <Continuous>  dextrose 50% Injectable 25 Gram(s) IV Push once  dextrose 50% Injectable 12.5 Gram(s) IV Push once  dextrose 50% Injectable 25 Gram(s) IV Push once  furosemide   Injectable 40 milliGRAM(s) IV Push daily  gabapentin 300 milliGRAM(s) Oral two times a day  glucagon  Injectable 1 milliGRAM(s) IntraMuscular once  heparin   Injectable 5000 Unit(s) SubCutaneous every 8 hours  influenza   Vaccine 0.5 milliLiter(s) IntraMuscular once  insulin glargine Injectable (LANTUS) 16 Unit(s) SubCutaneous at bedtime  insulin lispro (ADMELOG) corrective regimen sliding scale   SubCutaneous at bedtime  insulin lispro (ADMELOG) corrective regimen sliding scale   SubCutaneous three times a day before meals  insulin lispro Injectable (ADMELOG) 6 Unit(s) SubCutaneous three times a day before meals  levothyroxine 137 MICROGram(s) Oral daily    MEDICATIONS  (PRN):  acetaminophen     Tablet .. 650 milliGRAM(s) Oral every 6 hours PRN Temp greater or equal to 38C (100.4F), Mild Pain (1 - 3)  aluminum hydroxide/magnesium hydroxide/simethicone Suspension 30 milliLiter(s) Oral every 4 hours PRN Dyspepsia  dextrose Oral Gel 15 Gram(s) Oral once PRN Blood Glucose LESS THAN 70 milliGRAM(s)/deciliter  melatonin 3 milliGRAM(s) Oral at bedtime PRN Insomnia  ondansetron Injectable 4 milliGRAM(s) IV Push every 8 hours PRN Nausea and/or Vomiting      Vital Signs Last 24 Hrs  T(C): 36.5 (06 Nov 2024 07:34), Max: 36.9 (05 Nov 2024 15:30)  T(F): 97.7 (06 Nov 2024 07:34), Max: 98.5 (05 Nov 2024 15:30)  HR: 90 (06 Nov 2024 07:34) (78 - 93)  BP: 132/73 (06 Nov 2024 07:34) (124/72 - 146/82)  BP(mean): --  RR: 18 (06 Nov 2024 07:34) (17 - 18)  SpO2: 94% (06 Nov 2024 07:34) (94% - 95%)    Parameters below as of 06 Nov 2024 07:34  Patient On (Oxygen Delivery Method): nasal cannula  O2 Flow (L/min): 2    ICU Vital Signs Last 24 Hrs  GILMAR KING  I&O's Detail    05 Nov 2024 07:01  -  06 Nov 2024 07:00  --------------------------------------------------------  IN:    Oral Fluid: 600 mL  Total IN: 600 mL    OUT:    Voided (mL): 800 mL  Total OUT: 800 mL    Total NET: -200 mL        Drug Dosing Weight  GILMAR COSTA      PHYSICAL EXAM:  General: NAD  HEENT: Head; normocephalic, atraumatic.  Eyes: Pupils reactive, cornea wnl.  Neck: Supple, no nodes adenopathy, no NVD or carotid bruit or thyromegaly.  CARDIOVASCULAR: Normal S1 and S2, No murmur, rub, gallop or lift.   LUNGS: No rales, rhonchi or wheeze. Normal breath sounds bilaterally.  ABDOMEN: Soft, nontender without mass or organomegaly. bowel sounds normoactive.  EXTREMITIES: No clubbing, cyanosis or edema. Distal pulses wnl.   SKIN: warm and dry with normal turgor.  NEURO: Alert/oriented x 3  PSYCH: normal affect.        LABS:                        11.8   5.13  )-----------( 360      ( 05 Nov 2024 05:20 )             35.5     11-05    135  |  97  |  52.1[H]  ----------------------------<  145[H]  4.7   |  23.0  |  3.28[H]    Ca    8.6      05 Nov 2024 05:20  Phos  5.1     11-05  Mg     2.1     11-05    TPro  6.1[L]  /  Alb  2.7[L]  /  TBili  0.3[L]  /  DBili  x   /  AST  11  /  ALT  6   /  AlkPhos  94  11-05    GILMAR COSTA        Urinalysis Basic - ( 05 Nov 2024 05:20 )    Color: x / Appearance: x / SG: x / pH: x  Gluc: 145 mg/dL / Ketone: x  / Bili: x / Urobili: x   Blood: x / Protein: x / Nitrite: x   Leuk Esterase: x / RBC: x / WBC x   Sq Epi: x / Non Sq Epi: x / Bacteria: x        RADIOLOGY & ADDITIONAL STUDIES:    INTERPRETATION OF TELEMETRY (personally reviewed): sinus rhythm       ASSESSMENT AND PLAN:  58y Female with past medical history significant for DM1 on insulin pump, HTN, hypothyroid presented with two weeks of worsening SOB, leg swelling, and cough. She denies recent illness, fevers, or chills. She denies chest pain or palpitations.     - clinically improved with IV diuresis -- currently on Lasix 40 mg IVP daily per nephrology recommendations  - daily BMP to monitor renal function  - plan for KAROLINA tomorrow to evaluate the right atrial mass seen on TTE -- NPO after midnight    Will follow closely with you.

## 2024-11-06 NOTE — PROGRESS NOTE ADULT - ASSESSMENT
57 yo F seen for the evaluation and management of Adenopathy.  Dr. Thurston called me yesterday afternoon to see this patient while admitted for CHF exacerbation.  Pt has hx of DM, on insulin pump, HTN, Hypothyroidism; admitted with worsening dyspnea, leg swelling, cough.  Found to have hypoxia, leg edema, vasc congestion.  Seen by Cardiology.  Echo shows 55% EF, echogenic mass 1.8 by 2 cm RA.  CT chest done as outpat showed mod sized bilat plerual effusion, enlarged mediastional, hilar, axillary and subpectoral lymph nodes (11/1/24)  Pt feels less dyspneic after treatment  Reports poor appetite.        CHF  - Admitted with hypoxia, leg edema  -Cardiology following  - Echogenic mass 1.8 cm x 2.0 cm noted within the lateral wall of the right atrium visualized on the subcostal view, recommend further imaging correlation with cardiac or CT chest study or KAROLINA.  - plan for eventual KAROLINA to further evaluate the right atrial mass seen on TTE once stable    -possibly on Thursday pending improvement in renal function    Adenopathy  - documented on out patient CT scan-asked to address this by Attending MD  - Will eventually need CT Abd/pelvis and tissue bx  -   - At pt request, can work this up as outpt     Will follow

## 2024-11-06 NOTE — PROGRESS NOTE ADULT - SUBJECTIVE AND OBJECTIVE BOX
Reason for visit: SHARA    Subjective: Patient reports feeling better, she denied chest pain, shortness of breath, urinary complaints.  Reports having good urinary output.  No acute overnight event. going for KAROLINA tmrw.  Review of systems: All systems were reviewed in detail, pertinent positive and negative have been mentioned above, otherwise negative.    Physical Exam:  Gen: no acute distress  MS: alert, conversing normally  Eyes: EOMI, no icterus  HENT: NCAT, MMM  CV: rhythm reg reg, rate normal, no m/g/r, trace LE edema (improving)  Chest: CTAB, no w/r/r,  Abd: soft, NT, ND  Skin: dry, warm, no rash or jaundice    Vital Signs Last 24 Hrs  T(C): 36.5 (06 Nov 2024 07:34), Max: 36.5 (06 Nov 2024 07:34)  T(F): 97.7 (06 Nov 2024 07:34), Max: 97.7 (06 Nov 2024 07:34)  HR: 90 (06 Nov 2024 07:34) (78 - 90)  BP: 132/73 (06 Nov 2024 07:34) (132/73 - 146/82)  BP(mean): --  RR: 18 (06 Nov 2024 07:34) (17 - 18)  SpO2: 94% (06 Nov 2024 07:34) (94% - 95%)    Parameters below as of 06 Nov 2024 07:34  Patient On (Oxygen Delivery Method): nasal cannula  O2 Flow (L/min): 2    I&O's Summary    05 Nov 2024 07:01  -  06 Nov 2024 07:00  --------------------------------------------------------  IN: 600 mL / OUT: 800 mL / NET: -200 mL    06 Nov 2024 07:01  -  06 Nov 2024 16:11  --------------------------------------------------------  IN: 250 mL / OUT: 0 mL / NET: 250 mL      Current Antibiotics:    Other medications:  allopurinol 100 milliGRAM(s) Oral daily  amLODIPine   Tablet 5 milliGRAM(s) Oral daily  dextrose 5%. 1000 milliLiter(s) IV Continuous <Continuous>  dextrose 5%. 1000 milliLiter(s) IV Continuous <Continuous>  dextrose 50% Injectable 25 Gram(s) IV Push once  dextrose 50% Injectable 12.5 Gram(s) IV Push once  dextrose 50% Injectable 25 Gram(s) IV Push once  furosemide   Injectable 40 milliGRAM(s) IV Push daily  gabapentin 300 milliGRAM(s) Oral two times a day  glucagon  Injectable 1 milliGRAM(s) IntraMuscular once  heparin   Injectable 5000 Unit(s) SubCutaneous every 8 hours  influenza   Vaccine 0.5 milliLiter(s) IntraMuscular once  insulin glargine Injectable (LANTUS) 14 Unit(s) SubCutaneous at bedtime  insulin lispro (ADMELOG) corrective regimen sliding scale   SubCutaneous at bedtime  insulin lispro (ADMELOG) corrective regimen sliding scale   SubCutaneous three times a day before meals  insulin lispro Injectable (ADMELOG) 8 Unit(s) SubCutaneous three times a day before meals  levothyroxine 137 MICROGram(s) Oral daily    11-06    136  |  95[L]  |  56.3[H]  ----------------------------<  216[H]  4.4   |  24.0  |  2.86[H]    Ca    8.5      06 Nov 2024 10:35  Phos  4.2     11-06  Mg     2.1     11-06    TPro  6.7  /  Alb  3.0[L]  /  TBili  0.2[L]  /  DBili  x   /  AST  54[H]  /  ALT  25  /  AlkPhos  132[H]  11-06    Creatinine: 2.86 mg/dL (11-06-24 @ 10:35)  Creatinine: 3.28 mg/dL (11-05-24 @ 05:20)  Creatinine: 3.27 mg/dL (11-04-24 @ 05:49)  Creatinine: 2.82 mg/dL (11-03-24 @ 07:21)  Creatinine: 2.16 mg/dL (11-02-24 @ 14:24)

## 2024-11-06 NOTE — PROGRESS NOTE ADULT - ASSESSMENT
58 YOF HTN, DM presented with gradually progressive NIXON and LE edema.  Nephrology consulted for acute kidney injury.  Chest x-ray with pulmonary vascular congestion and small bilateral pleural effusion.    Acute kidney injury on chronic kidney disease:   ·	Baseline likely 1.1 (per records in September 24)  ·	Patient is s/p CT with contrast to rule out PE 1 day prior to admission  ·	Serum creatinine improving after hitting a plateau down today to 2.86 mg/dl  ·	UA with 100 protein, large blood, pyuria.  UPCR 2.5  ·	Renal ultrasound showing nonobstructive bilateral calculi, no hydronephrosis  ·	Acute kidney injury possibly from contrast-induced nephropathy  ·	expecting continued improvement, for now we will trend serum creatinine and avoid further nephrotoxins    ?Acute CHFpEF vs fluid retention in setting of SHARA: Echo with EF 55-60%, possible right atrial mass.  Awaiting KAROLINA.  No DVT on bilateral lower extremity Doppler. Continue with Lasix but decreased rate to 40 mg PO daily from tomorrow after the procedure.    Diabetes mellitus, uncontrolled: A1c 10.6%.

## 2024-11-06 NOTE — PROGRESS NOTE ADULT - ASSESSMENT
58F with PMH HTN, IDDM type I (has medtronic insulin pump), hypothyroid, neuropathies presented to ED c/o 2 weeks of NIXON w/ associated worsening LE edema, nonproductive cough and orthopnea and occasional L-chest pain that is like a burning sensation and only occurs when she lays on her left side.  Clinically hypervolemic w/ scattered rales and b/l LE pretibial and pedal pitting edema. Initial w/u significant for trop 272-->258, BNP 2503, Cr 2.16. CXR w/ pulm vascular congestion and small b/l pleural effusions. Admitted for CHF exacerbation, continues on IV diuresis, ECHO with notable mass.    Consulted for diabetes management  Home regimen: medtronic pump (basal dosing 19.6 units daily, ICR 1:15 and ISF 1:65-75)  Current a1c: 10.6%  Outpatient Endocrinologist: Dr Rivera    1. Poorly controlled DM1  - Could not demonstrate insulin pump competency and proficiency, pump removed  - Post prandial hyperglycemia, increase admelog to 8  units TID  - FS 73 this AM, Decrease lantus to 14 units qhs  - Monitor FS AC&HS    2. Hypothyroid  - TSH elevated 7.12  - LT4 increased to 137 mcg daily    3. SHARA/CKD  - Nephrology following  - Trend serum creatinine and avoid nephrotoxins

## 2024-11-06 NOTE — PROGRESS NOTE ADULT - SUBJECTIVE AND OBJECTIVE BOX
[facility] Division of Hospital Medicine    SUBJECTIVE / OVERNIGHT EVENTS:  Patient seen and examined at bedside. She is in no acute distress and denies any acute symptoms such as chest pain, shortness of breath, nausea/vomiting, or abdominal pain. She continues to be diuresed with Lasix 40mg daily. Her renal function has stabilized today and creatinine is downtrending and patient was very happy to hear that. She says that her leg swelling has improved as well. She is being planned for KAROLINA tomorrow.     MEDICATIONS  (STANDING):  amLODIPine   Tablet 5 milliGRAM(s) Oral daily  dextrose 5%. 1000 milliLiter(s) (100 mL/Hr) IV Continuous <Continuous>  dextrose 5%. 1000 milliLiter(s) (50 mL/Hr) IV Continuous <Continuous>  dextrose 50% Injectable 25 Gram(s) IV Push once  dextrose 50% Injectable 12.5 Gram(s) IV Push once  dextrose 50% Injectable 25 Gram(s) IV Push once  furosemide   Injectable 40 milliGRAM(s) IV Push two times a day  gabapentin 300 milliGRAM(s) Oral two times a day  glucagon  Injectable 1 milliGRAM(s) IntraMuscular once  heparin   Injectable 5000 Unit(s) SubCutaneous every 8 hours  influenza   Vaccine 0.5 milliLiter(s) IntraMuscular once  levothyroxine 125 MICROGram(s) Oral daily    MEDICATIONS  (PRN):  acetaminophen     Tablet .. 650 milliGRAM(s) Oral every 6 hours PRN Temp greater or equal to 38C (100.4F), Mild Pain (1 - 3)  aluminum hydroxide/magnesium hydroxide/simethicone Suspension 30 milliLiter(s) Oral every 4 hours PRN Dyspepsia  dextrose Oral Gel 15 Gram(s) Oral once PRN Blood Glucose LESS THAN 70 milliGRAM(s)/deciliter  melatonin 3 milliGRAM(s) Oral at bedtime PRN Insomnia  ondansetron Injectable 4 milliGRAM(s) IV Push every 8 hours PRN Nausea and/or Vomiting        I&O's Summary      PHYSICAL EXAM:  Vital Signs Last 24 Hrs  T(C): 36.9 (03 Nov 2024 07:52), Max: 36.9 (02 Nov 2024 22:28)  T(F): 98.4 (03 Nov 2024 07:52), Max: 98.5 (03 Nov 2024 05:48)  HR: 100 (03 Nov 2024 07:52) (100 - 113)  BP: 148/80 (03 Nov 2024 07:52) (148/80 - 172/81)  BP(mean): 99 (02 Nov 2024 14:40) (99 - 99)  RR: 18 (03 Nov 2024 07:52) (18 - 21)  SpO2: 92% (03 Nov 2024 07:52) (92% - 98%)    Parameters below as of 03 Nov 2024 07:52  Patient On (Oxygen Delivery Method): room air      GENERAL: pt examined bedside, laying comfortably in bed in NAD  HEENT: NC/AT, moist oral mucosa, clear conjunctiva, sclera nonicteric  RESPIRATORY: decreased breath sounds in B/L bases, otherwise minimal crackles noted   CARDIOVASCULAR: RRR, normal S1 and S2, no murmur/rub/gallop  ABDOMEN: soft, NT/ND, +bowel sounds, no rebound/guarding  MSK: No joint deformities, edema, erythema  EXTREMITIES: No cynaosis, no clubbing, minimal swelling today  PSYCH: affect appropriate and cooperative  NEUROLOGY: A+O to person, place, and time, no focal neurologic deficits appreciated  SKIN: No rashes or no palpable lesions    LABS:                        13.0   6.12  )-----------( 338      ( 03 Nov 2024 07:21 )             38.8     11-03    135  |  99  |  31.7[H]  ----------------------------<  82  3.9   |  23.0  |  2.82[H]    Ca    8.1[L]      03 Nov 2024 07:21  Phos  4.2     11-03  Mg     1.8     11-03    TPro  6.0[L]  /  Alb  2.7[L]  /  TBili  0.3[L]  /  DBili  x   /  AST  10  /  ALT  5   /  AlkPhos  99  11-03          Urinalysis Basic - ( 03 Nov 2024 07:21 )    Color: x / Appearance: x / SG: x / pH: x  Gluc: 82 mg/dL / Ketone: x  / Bili: x / Urobili: x   Blood: x / Protein: x / Nitrite: x   Leuk Esterase: x / RBC: x / WBC x   Sq Epi: x / Non Sq Epi: x / Bacteria: x        CAPILLARY BLOOD GLUCOSE      POCT Blood Glucose.: 116 mg/dL (03 Nov 2024 08:15)        RADIOLOGY & ADDITIONAL TESTS:  Results Reviewed:   Imaging Personally Reviewed:  Electrocardiogram Personally Reviewed:

## 2024-11-06 NOTE — PROGRESS NOTE ADULT - ASSESSMENT
59 y/o F w/ PMH HTN, IDDM type I (has medtronic insulin pump), hypothyroid, neuropathies presented to ED c/o 2 weeks of NIXON w/ associated worsening LE edema, nonproductive cough and orthopnea and occasional L-chest pain that is like a burning sensation and only occurs when she lays on her left side.  Clinically hypervolemic w/ scattered rales and b/l LE pretibial and pedal pitting edema. Initial w/u significant for trop 272-->258, BNP 2503, Cr 2.16. CXR w/ pulm vascular congestion and small b/l pleural effusions. Admitted for CHF exacerbation, continues on IV diuresis, ECHO with notable mass, may need further evaluation.      #New acute decompensated HFpEF  #Abnormal TTE finding  - Clinically hypervolemic   - BNP 2503, Trops elevated but down trending   - CXR w/ pulm vascular congestion and small b/l pleural effusions appreciated  - CTA PE study 11/01 significant for pulm vascular congestion and b/l pleural effusions but negative PE   - TTE 11/02 w/ LVEF 55-60% no WMA reported; Echogenic mass 1.8 cm x 2.0 cm within the lateral wall of the RA potential need for KAROLINA to further characterize mass  - TSH 7.12, A1c 10.6, LDL 84   - continue on IV diuresis with Lasix 40 mg daily  - Daily weight, strict I/O's and fluid restrict   - Maintain K>4 and Mg>2  - Monitor on telemetry   - Tentative plan for KAROLINA on Thursday (11/7) pending renal function improvement  - NPO order placed for tonight    #Type II MI, likely demand 2/2 above vs poor renal clearance  - Trops down trending (272-->258)  - continue on telemetry   - TTE 11/02 w/ LVEF 55-60% no WMA, incidental findings as above   - Cardiology following    #SHARA suspect multifactorial 2/2 cardiorenal, prerenal azotemia and ALBERT   - Baseline Cr 1.09 in 09/2024 but 10/31 showed worsening Cr of 1.93 which was likely cardiorenal from acute CHF and newly started on lasix causing prerenal azotemia  - Today Cr is DOWNTRENDING  - Renal US shows non obstructing b/l calculi, no hydronephrosis and b/l increased renal echotexture compatible with medical renal disease  - Monitor I/O's, renal function and lytes   - Avoid nephrotic meds or renally dose if needed  - Nephrology following    #Incidental CTA chest findings   - Reported to have multiple enlarged lymph nodes, some new and some seen on CT chest from 2023 and are only slight larger in size at this time  - Explained to daughter and pt that she should have a repeat CT chest in 1-3 months to reassess lymph nodes and should f/u with heme/onc outpt     #IDDM Type I w/ neuropathies  - A1c elevaed 10.6   - Has medtronic insulin pump and advised pt to continue using as usual while inpt   - Endocrine following, insulin pump form provided, endocrine team to assist with pump management   - Hypoglycemic protocol in place  - c/w gabapentin   - Switched to Lantus 14u and lispro 8u TID as per Endo    HTN  - c/w amlodipine   - On IV lasix but transition to po once euvolemic    Hypothyroid  - c/w synthroid  - TSH 7.12       VTE ppx: heparin sq  Dispo: acute.  Anticipate d/c home w/ no needs once medically stable, likely 1-2 days

## 2024-11-07 ENCOUNTER — RESULT REVIEW (OUTPATIENT)
Age: 59
End: 2024-11-07

## 2024-11-07 LAB
ANION GAP SERPL CALC-SCNC: 13 MMOL/L — SIGNIFICANT CHANGE UP (ref 5–17)
BUN SERPL-MCNC: 60.7 MG/DL — HIGH (ref 8–20)
CALCIUM SERPL-MCNC: 8.3 MG/DL — LOW (ref 8.4–10.5)
CHLORIDE SERPL-SCNC: 97 MMOL/L — SIGNIFICANT CHANGE UP (ref 96–108)
CO2 SERPL-SCNC: 22 MMOL/L — SIGNIFICANT CHANGE UP (ref 22–29)
CREAT SERPL-MCNC: 2.71 MG/DL — HIGH (ref 0.5–1.3)
EGFR: 20 ML/MIN/1.73M2 — LOW
GLUCOSE BLDC GLUCOMTR-MCNC: 190 MG/DL — HIGH (ref 70–99)
GLUCOSE BLDC GLUCOMTR-MCNC: 209 MG/DL — HIGH (ref 70–99)
GLUCOSE BLDC GLUCOMTR-MCNC: 236 MG/DL — HIGH (ref 70–99)
GLUCOSE BLDC GLUCOMTR-MCNC: 273 MG/DL — HIGH (ref 70–99)
GLUCOSE BLDC GLUCOMTR-MCNC: 341 MG/DL — HIGH (ref 70–99)
GLUCOSE SERPL-MCNC: 238 MG/DL — HIGH (ref 70–99)
HCT VFR BLD CALC: 33.8 % — LOW (ref 34.5–45)
HGB BLD-MCNC: 11.2 G/DL — LOW (ref 11.5–15.5)
MAGNESIUM SERPL-MCNC: 2.1 MG/DL — SIGNIFICANT CHANGE UP (ref 1.6–2.6)
MCHC RBC-ENTMCNC: 26.4 PG — LOW (ref 27–34)
MCHC RBC-ENTMCNC: 33.1 G/DL — SIGNIFICANT CHANGE UP (ref 32–36)
MCV RBC AUTO: 79.5 FL — LOW (ref 80–100)
PHOSPHATE SERPL-MCNC: 4 MG/DL — SIGNIFICANT CHANGE UP (ref 2.4–4.7)
PLATELET # BLD AUTO: 346 K/UL — SIGNIFICANT CHANGE UP (ref 150–400)
POTASSIUM SERPL-MCNC: 4.4 MMOL/L — SIGNIFICANT CHANGE UP (ref 3.5–5.3)
POTASSIUM SERPL-SCNC: 4.4 MMOL/L — SIGNIFICANT CHANGE UP (ref 3.5–5.3)
RBC # BLD: 4.25 M/UL — SIGNIFICANT CHANGE UP (ref 3.8–5.2)
RBC # FLD: 12.5 % — SIGNIFICANT CHANGE UP (ref 10.3–14.5)
SODIUM SERPL-SCNC: 132 MMOL/L — LOW (ref 135–145)
WBC # BLD: 4.85 K/UL — SIGNIFICANT CHANGE UP (ref 3.8–10.5)
WBC # FLD AUTO: 4.85 K/UL — SIGNIFICANT CHANGE UP (ref 3.8–10.5)

## 2024-11-07 PROCEDURE — 99232 SBSQ HOSP IP/OBS MODERATE 35: CPT

## 2024-11-07 PROCEDURE — 99233 SBSQ HOSP IP/OBS HIGH 50: CPT

## 2024-11-07 RX ADMIN — Medication 1: at 21:43

## 2024-11-07 RX ADMIN — HEPARIN SODIUM 5000 UNIT(S): 10000 INJECTION INTRAVENOUS; SUBCUTANEOUS at 05:18

## 2024-11-07 RX ADMIN — Medication 650 MILLIGRAM(S): at 14:15

## 2024-11-07 RX ADMIN — HEPARIN SODIUM 5000 UNIT(S): 10000 INJECTION INTRAVENOUS; SUBCUTANEOUS at 21:44

## 2024-11-07 RX ADMIN — GABAPENTIN 300 MILLIGRAM(S): 300 CAPSULE ORAL at 05:18

## 2024-11-07 RX ADMIN — Medication 40 MILLIGRAM(S): at 05:18

## 2024-11-07 RX ADMIN — Medication 8: at 18:23

## 2024-11-07 RX ADMIN — Medication 5 MILLIGRAM(S): at 05:18

## 2024-11-07 RX ADMIN — Medication 14 UNIT(S): at 21:44

## 2024-11-07 RX ADMIN — Medication 650 MILLIGRAM(S): at 13:46

## 2024-11-07 RX ADMIN — Medication 8 UNIT(S): at 18:24

## 2024-11-07 RX ADMIN — Medication 100 MILLIGRAM(S): at 13:46

## 2024-11-07 RX ADMIN — Medication 137 MICROGRAM(S): at 05:19

## 2024-11-07 RX ADMIN — Medication 650 MILLIGRAM(S): at 21:45

## 2024-11-07 RX ADMIN — GABAPENTIN 300 MILLIGRAM(S): 300 CAPSULE ORAL at 18:17

## 2024-11-07 NOTE — CHART NOTE - NSCHARTNOTEFT_GEN_A_CORE
Post-KAROLINA Note:    Preliminary KAROLINA Results: EF normal, no mass visualized    -Post KAROLINA orders  -preliminary verbal report; centricity pending  -discharge to floor once criteria met with becky score>8  -follow up with [facility]  -follow a soft, bland diet for the next 4 hours, avoiding hot, spicy, crunchy foods Post-KAROLINA Note:    Preliminary KAROLINA Results: EF normal, no mass visualized    Of note , patient became acutely hypoxic during procedure, but recovered and came out of procedure on room air, SpO2 97%.    -Post KAROLINA orders  -preliminary verbal report; centricity pending  -discharge to floor once criteria met with becky score>8  -follow up with [facility]  -follow a soft, bland diet for the next 4 hours, avoiding hot, spicy, crunchy foods

## 2024-11-07 NOTE — PROGRESS NOTE ADULT - SUBJECTIVE AND OBJECTIVE BOX
[facility] Division of Hospital Medicine    SUBJECTIVE / OVERNIGHT EVENTS:  Patient seen and examined at bedside. She is in no acute distress and denies any acute symptoms such as chest pain, shortness of breath, nausea/vomiting, or abdominal pain. She continues to be diuresed with Lasix 40mg daily. Her creatinine is downtrending. KAROLINA was done today and no mass was visualized. Nephrology ordered additional bloodwork to rule out vasculitic disease.    MEDICATIONS  (STANDING):  amLODIPine   Tablet 5 milliGRAM(s) Oral daily  dextrose 5%. 1000 milliLiter(s) (100 mL/Hr) IV Continuous <Continuous>  dextrose 5%. 1000 milliLiter(s) (50 mL/Hr) IV Continuous <Continuous>  dextrose 50% Injectable 25 Gram(s) IV Push once  dextrose 50% Injectable 12.5 Gram(s) IV Push once  dextrose 50% Injectable 25 Gram(s) IV Push once  furosemide   Injectable 40 milliGRAM(s) IV Push two times a day  gabapentin 300 milliGRAM(s) Oral two times a day  glucagon  Injectable 1 milliGRAM(s) IntraMuscular once  heparin   Injectable 5000 Unit(s) SubCutaneous every 8 hours  influenza   Vaccine 0.5 milliLiter(s) IntraMuscular once  levothyroxine 125 MICROGram(s) Oral daily    MEDICATIONS  (PRN):  acetaminophen     Tablet .. 650 milliGRAM(s) Oral every 6 hours PRN Temp greater or equal to 38C (100.4F), Mild Pain (1 - 3)  aluminum hydroxide/magnesium hydroxide/simethicone Suspension 30 milliLiter(s) Oral every 4 hours PRN Dyspepsia  dextrose Oral Gel 15 Gram(s) Oral once PRN Blood Glucose LESS THAN 70 milliGRAM(s)/deciliter  melatonin 3 milliGRAM(s) Oral at bedtime PRN Insomnia  ondansetron Injectable 4 milliGRAM(s) IV Push every 8 hours PRN Nausea and/or Vomiting        I&O's Summary      PHYSICAL EXAM:  Vital Signs Last 24 Hrs  T(C): 36.9 (03 Nov 2024 07:52), Max: 36.9 (02 Nov 2024 22:28)  T(F): 98.4 (03 Nov 2024 07:52), Max: 98.5 (03 Nov 2024 05:48)  HR: 100 (03 Nov 2024 07:52) (100 - 113)  BP: 148/80 (03 Nov 2024 07:52) (148/80 - 172/81)  BP(mean): 99 (02 Nov 2024 14:40) (99 - 99)  RR: 18 (03 Nov 2024 07:52) (18 - 21)  SpO2: 92% (03 Nov 2024 07:52) (92% - 98%)    Parameters below as of 03 Nov 2024 07:52  Patient On (Oxygen Delivery Method): room air      GENERAL: pt examined bedside, laying comfortably in bed in NAD  HEENT: NC/AT, moist oral mucosa, clear conjunctiva, sclera nonicteric  RESPIRATORY: decreased breath sounds in B/L bases, otherwise minimal crackles noted   CARDIOVASCULAR: RRR, normal S1 and S2, no murmur/rub/gallop  ABDOMEN: soft, NT/ND, +bowel sounds, no rebound/guarding  MSK: No joint deformities, edema, erythema  EXTREMITIES: No cynaosis, no clubbing, minimal swelling today  PSYCH: affect appropriate and cooperative  NEUROLOGY: A+O to person, place, and time, no focal neurologic deficits appreciated  SKIN: No rashes or no palpable lesions    LABS:                        13.0   6.12  )-----------( 338      ( 03 Nov 2024 07:21 )             38.8     11-03    135  |  99  |  31.7[H]  ----------------------------<  82  3.9   |  23.0  |  2.82[H]    Ca    8.1[L]      03 Nov 2024 07:21  Phos  4.2     11-03  Mg     1.8     11-03    TPro  6.0[L]  /  Alb  2.7[L]  /  TBili  0.3[L]  /  DBili  x   /  AST  10  /  ALT  5   /  AlkPhos  99  11-03          Urinalysis Basic - ( 03 Nov 2024 07:21 )    Color: x / Appearance: x / SG: x / pH: x  Gluc: 82 mg/dL / Ketone: x  / Bili: x / Urobili: x   Blood: x / Protein: x / Nitrite: x   Leuk Esterase: x / RBC: x / WBC x   Sq Epi: x / Non Sq Epi: x / Bacteria: x        CAPILLARY BLOOD GLUCOSE      POCT Blood Glucose.: 116 mg/dL (03 Nov 2024 08:15)        RADIOLOGY & ADDITIONAL TESTS:  Results Reviewed:   Imaging Personally Reviewed:  Electrocardiogram Personally Reviewed:

## 2024-11-07 NOTE — PROGRESS NOTE ADULT - SUBJECTIVE AND OBJECTIVE BOX
KAROLINA Report     1. Left ventricular cavity is normal in size. Left ventricular systolic function is normal with an ejection fraction visually estimated at 60 to 65 %.   2. Normal right ventricular systolic function.   3. Normal left and right atrial size. No right atrial mass visualized. There is thickening of the right atrial wall, likely anatomic variant.   4. No evidence of a patent foramen ovale by color flow Doppler.   5. No aortic valve stenosis.   6. No evidence of aortic regurgitation.   7. Structurally normal pulmonic valve with normal leaflet excursion.   8. No pericardial effusion seen.   9. Small right pleural effusion noted.  10. No evidence of left atrial or left atrial appendage thrombus.  11. Structurally normal tricuspid valve with normal leaflet excursion. Mild tricuspid regurgitation.  12. Trace mitral regurgitation.  13. Structurally normal mitral valve with normal leaflet excursion.  14. Mild atheroma in the visualized portions of the descending aorta.

## 2024-11-07 NOTE — PROGRESS NOTE ADULT - SUBJECTIVE AND OBJECTIVE BOX
Department of Cardiology                                                                  [facility]/[place] [address] [place] [address]                                                            Telephone: [phone]. Fax:[phone]                                                                                     Pre-KAROLINA Note    Narrative:  58y Female with past medical history significant for DM1 on insulin pump, HTN, hypothyroid presented with two weeks of worsening SOB, leg swelling, and cough. She denies recent illness, fevers, or chills. She denies chest pain or palpitations. Symptoms clinically improved with IV diuresis -- currently on Lasix 40 mg IVP daily per nephrology recommendations.  TTE demonstrated echogenic mass 1.8 cm x 2.0 cm noted within the lateral wall of the right atrium visualized on the subcostal view. Presents for KAROLINA evaluate the right atrial mass.    ASA and Mallampati: Per Anesthesia    MEDICATIONS:  amLODIPine   Tablet 5 milliGRAM(s) Oral daily  furosemide   Injectable 40 milliGRAM(s) IV Push daily  acetaminophen     Tablet .. 650 milliGRAM(s) Oral every 6 hours PRN  gabapentin 300 milliGRAM(s) Oral two times a day  melatonin 3 milliGRAM(s) Oral at bedtime PRN  ondansetron Injectable 4 milliGRAM(s) IV Push every 8 hours PRN  aluminum hydroxide/magnesium hydroxide/simethicone Suspension 30 milliLiter(s) Oral every 4 hours PRN  senna 2 Tablet(s) Oral at bedtime PRN  allopurinol 100 milliGRAM(s) Oral daily  dextrose 50% Injectable 25 Gram(s) IV Push once  dextrose 50% Injectable 12.5 Gram(s) IV Push once  dextrose 50% Injectable 25 Gram(s) IV Push once  dextrose Oral Gel 15 Gram(s) Oral once PRN  glucagon  Injectable 1 milliGRAM(s) IntraMuscular once  insulin glargine Injectable (LANTUS) 14 Unit(s) SubCutaneous at bedtime  insulin lispro (ADMELOG) corrective regimen sliding scale   SubCutaneous at bedtime  insulin lispro (ADMELOG) corrective regimen sliding scale   SubCutaneous three times a day before meals  insulin lispro Injectable (ADMELOG) 8 Unit(s) SubCutaneous three times a day before meals  levothyroxine 137 MICROGram(s) Oral daily  dextrose 5%. 1000 milliLiter(s) IV Continuous <Continuous>  dextrose 5%. 1000 milliLiter(s) IV Continuous <Continuous>  heparin   Injectable 5000 Unit(s) SubCutaneous every 8 hours  influenza   Vaccine 0.5 milliLiter(s) IntraMuscular once    VITAL SIGNS:  T(C): 36.6 (24 @ 05:24), Max: 37.2 (24 @ 22:00)  HR: 94 (24 @ 22:00) (94 - 104)  BP: 127/67 (24 @ 05:24) (125/63 - 146/70)  RR: 18 (24 @ 05:24) (18 - 18)  SpO2: 94% (24 @ 05:24) (93% - 97%)  Wt(kg): --    PHYSICAL EXAM:  Constitutional: A & O x 3  HEENT:   Normal oral mucosa, PERRL, EOMI	  Cardiovascular: Normal S1 S2, No JVD, No murmurs, No edema  Respiratory: Lungs clear to auscultation	  Gastrointestinal:  Soft, Non-tender, + BS	  Skin: No rashes, No ecchymoses, No cyanosis  Neurologic: Non-focal  Extremities: Normal range of motion, No clubbing, cyanosis or edema  Vascular: Peripheral pulses palpable 2+ bilaterally    I&O's Summary    2024 07:01  -  2024 07:00  --------------------------------------------------------  IN: 500 mL / OUT: 0 mL / NET: 500 mL    Daily     Daily Weight in k.4 (2024 06:24)    TELEMETRY: SR 96bpm	    ECG:  	  RADIOLOGY:     DIAGNOSTIC TESTING:  [ ] Echocardiogram:   TTE CONCLUSIONS:   1. Technically difficult image quality.   2. Left ventricular systolic function is normal with an ejection fraction of 58 % by Maria's method of disks with an ejection fraction visually estimated at 55 to 60 %.   3. The left ventricular diastolic function is indeterminate.   4. Normal right ventricular cavity size and normal right ventricular systolic function.   5. Pulmonary artery systolic pressure could not be estimated.   6. Echogenic mass 1.8 cm x 2.0 cm noted within the lateral wall of the right atrium visualized on the subcostal view, recommend further imaging correlation with cardiac or CT chest study or KAROLINA.   7. No pericardial effusion seen.   8. Small right and moderate left pleural effusion noted.   9. No prior echocardiogram is available for comparison.    [ ]  Catheterization:  [ ] Stress Test:      LABS:	 	                        11.2   4.85  )-----------( 346      ( 2024 05:00 )             33.8     132[L]  |  97  |  60.7[H]  ----------------------------<  238[H]  4.4   |  22.0  |  2.71[H]    Ca    8.3[L]      2024 05:00  Phos  4.0     11  Mg     2.1         TPro  6.7  /  Alb  3.0[L]  /  TBili  0.2[L]  /  DBili  x   /  AST  54[H]  /  ALT  25  /  AlkPhos  132[H]      ASSESSMENT:  -KAROLINA as ordered  -Procedure discussed with patient; risks and benefits explained; questions answered  -Labs and Tele reviewed

## 2024-11-07 NOTE — PROGRESS NOTE ADULT - SUBJECTIVE AND OBJECTIVE BOX
INTERVAL EVENTS:  Follow up diabetes management. Glucoses variable over last 24 hrs, admelog held yesterday resulting in post prandial hyperglycemia at bedtime. Was NPO this morning for procedure.     MEDICATIONS  (STANDING):  allopurinol 100 milliGRAM(s) Oral daily  amLODIPine   Tablet 5 milliGRAM(s) Oral daily  dextrose 5%. 1000 milliLiter(s) (100 mL/Hr) IV Continuous <Continuous>  dextrose 5%. 1000 milliLiter(s) (50 mL/Hr) IV Continuous <Continuous>  dextrose 50% Injectable 25 Gram(s) IV Push once  dextrose 50% Injectable 12.5 Gram(s) IV Push once  dextrose 50% Injectable 25 Gram(s) IV Push once  furosemide   Injectable 40 milliGRAM(s) IV Push daily  gabapentin 300 milliGRAM(s) Oral two times a day  glucagon  Injectable 1 milliGRAM(s) IntraMuscular once  heparin   Injectable 5000 Unit(s) SubCutaneous every 8 hours  influenza   Vaccine 0.5 milliLiter(s) IntraMuscular once  insulin glargine Injectable (LANTUS) 14 Unit(s) SubCutaneous at bedtime  insulin lispro (ADMELOG) corrective regimen sliding scale   SubCutaneous at bedtime  insulin lispro (ADMELOG) corrective regimen sliding scale   SubCutaneous three times a day before meals  insulin lispro Injectable (ADMELOG) 8 Unit(s) SubCutaneous three times a day before meals  levothyroxine 137 MICROGram(s) Oral daily    MEDICATIONS  (PRN):  acetaminophen     Tablet .. 650 milliGRAM(s) Oral every 6 hours PRN Temp greater or equal to 38C (100.4F), Mild Pain (1 - 3)  aluminum hydroxide/magnesium hydroxide/simethicone Suspension 30 milliLiter(s) Oral every 4 hours PRN Dyspepsia  dextrose Oral Gel 15 Gram(s) Oral once PRN Blood Glucose LESS THAN 70 milliGRAM(s)/deciliter  melatonin 3 milliGRAM(s) Oral at bedtime PRN Insomnia  ondansetron Injectable 4 milliGRAM(s) IV Push every 8 hours PRN Nausea and/or Vomiting  senna 2 Tablet(s) Oral at bedtime PRN Constipation    Allergies  No Known Allergies    Vital Signs Last 24 Hrs  T(C): 36.8 (07 Nov 2024 11:31), Max: 37.2 (06 Nov 2024 22:00)  T(F): 98.2 (07 Nov 2024 11:31), Max: 98.9 (06 Nov 2024 22:00)  HR: 87 (07 Nov 2024 11:31) (86 - 104)  BP: 132/86 (07 Nov 2024 11:31) (117/62 - 155/69)  BP(mean): 87 (06 Nov 2024 17:33) (87 - 87)  RR: 16 (07 Nov 2024 11:31) (15 - 19)  SpO2: 98% (07 Nov 2024 11:31) (92% - 98%)    Parameters below as of 07 Nov 2024 11:31  Patient On (Oxygen Delivery Method): room air    PHYSICAL EXAM:  General: No apparent distress  Respiratory: Lungs clear bilaterally  Cardiac: +S1, S2, no m/r/g  GI: +BS, soft, non tender, non distended  Extremities: No peripheral edema  Neuro: A+O X3    LABS:                        11.2   4.85  )-----------( 346      ( 07 Nov 2024 05:00 )             33.8     11-07    132[L]  |  97  |  60.7[H]  ----------------------------<  238[H]  4.4   |  22.0  |  2.71[H]    Ca    8.3[L]      07 Nov 2024 05:00  Phos  4.0     11-07  Mg     2.1     11-07    TPro  6.7  /  Alb  3.0[L]  /  TBili  0.2[L]  /  DBili  x   /  AST  54[H]  /  ALT  25  /  AlkPhos  132[H]  11-06    Urinalysis Basic - ( 07 Nov 2024 05:00 )    Color: x / Appearance: x / SG: x / pH: x  Gluc: 238 mg/dL / Ketone: x  / Bili: x / Urobili: x   Blood: x / Protein: x / Nitrite: x   Leuk Esterase: x / RBC: x / WBC x   Sq Epi: x / Non Sq Epi: x / Bacteria: x    POCT Blood Glucose.: 190 mg/dL (11-07-24 @ 12:38)  POCT Blood Glucose.: 209 mg/dL (11-07-24 @ 10:34)  POCT Blood Glucose.: 236 mg/dL (11-07-24 @ 07:15)  POCT Blood Glucose.: 386 mg/dL (11-06-24 @ 22:47)  POCT Blood Glucose.: 352 mg/dL (11-06-24 @ 21:46)  POCT Blood Glucose.: 86 mg/dL (11-06-24 @ 17:15)    Thyroid Stimulating Hormone, Serum: 7.12 uIU/mL (11-03-24 @ 07:21)

## 2024-11-07 NOTE — PROGRESS NOTE ADULT - ASSESSMENT
59 yo F seen for the evaluation and management of Adenopathy.  Dr. Thurston called me yesterday afternoon to see this patient while admitted for CHF exacerbation.  Pt has hx of DM, on insulin pump, HTN, Hypothyroidism; admitted with worsening dyspnea, leg swelling, cough.  Found to have hypoxia, leg edema, vasc congestion.  Seen by Cardiology.  Echo shows 55% EF, echogenic mass 1.8 by 2 cm RA.  CT chest done as outpat showed mod sized bilat plerual effusion, enlarged mediastional, hilar, axillary and subpectoral lymph nodes (11/1/24)  Pt feels less dyspneic after treatment  Reports poor appetite.        CHF  - Admitted with hypoxia, leg edema  -Cardiology following  - Echogenic mass 1.8 cm x 2.0 cm noted within the lateral wall of the right atrium visualized on the subcostal view, recommend further imaging correlation with cardiac or CT chest study or KAROLINA.   - Preliminary KAROLINA Results: EF normal, no mass visualized  Of note , patient became acutely hypoxic during procedure, but recovered and came out of procedure on room air, SpO2 97%.    Adenopathy  - documented on out patient CT scan-asked to address this by Attending MD  - Will eventually need CT Abd/pelvis and tissue bx  -   - At pt request, can work this up as outpt     Will follow

## 2024-11-07 NOTE — PROGRESS NOTE ADULT - ASSESSMENT
58F with PMH HTN, IDDM type I (has medtronic insulin pump), hypothyroid, neuropathies presented to ED c/o 2 weeks of NIXON w/ associated worsening LE edema, nonproductive cough and orthopnea and occasional L-chest pain that is like a burning sensation and only occurs when she lays on her left side.  Clinically hypervolemic w/ scattered rales and b/l LE pretibial and pedal pitting edema. Initial w/u significant for trop 272-->258, BNP 2503, Cr 2.16. CXR w/ pulm vascular congestion and small b/l pleural effusions. Admitted for CHF exacerbation, continues on IV diuresis, ECHO with notable mass.    Consulted for diabetes management  Home regimen: medtronic pump (basal dosing 19.6 units daily, ICR 1:15 and ISF 1:65-75)  Current a1c: 10.6%  Outpatient Endocrinologist: Dr Rivera    1. Poorly controlled DM1  - Could not demonstrate insulin pump competency and proficiency, pump removed  - Admelog held with dinner yesterday, post prandial hyperglycemia at bedtime  - Was NPO this morning, diet now resumed  - Continue admelog 8 units TID  - Continue lantus 14 units qhs  - Monitor FS AC&HS    2. Hypothyroid  - TSH elevated 7.12  - LT4 increased to 137 mcg daily    3. SHARA/CKD  - Nephrology following  - Trend serum creatinine and avoid nephrotoxins

## 2024-11-07 NOTE — PROGRESS NOTE ADULT - SUBJECTIVE AND OBJECTIVE BOX
Patient is a 58y Female seen on consultation for the evaluation and management of Adenopathy.  Dr. Thurston called me yesterday afternoon to see this patient while admitted for CHF exacerbation.  Pt has hx of DM, on insulin pump, HTN, Hypothyroidism; admitted with worsening dyspnea, leg swelling, cough.  Found to have hypoxia, leg edema, vasc congestion.  Seen by Cardiology.  Echo shows 55% EF, echogenic mass 1.8 by 2 cm RA.  CT chest done as outpat showed mod sized bilat plerual effusion, enlarged mediastional, hilar, axillary and subpectoral lymph nodes (11/1/24)  Pt feels less dyspneic after treatmen.  Reports poor appetite.  Denies chest pain or palpitations fevers, chills or sweats,  abd pain or vomiting.      PAST MEDICAL & SURGICAL HISTORY:  Diabetes  Hypertension  No significant past surgical history    Allergies  No Known Allergies    REVIEW OF SYSTEMS  Reports dyspnea, legs swelling, cough  Denies headaches or dizziness  Reports nausea, no vomiting  Denies rectal bleeding or melena, hematemesis or hemoptysis dysuria or hematruia      MEDICATIONS  (STANDING):  amLODIPine   Tablet 5 milliGRAM(s) Oral daily  dextrose 5%. 1000 milliLiter(s) (100 mL/Hr) IV Continuous <Continuous>  dextrose 5%. 1000 milliLiter(s) (50 mL/Hr) IV Continuous <Continuous>  dextrose 50% Injectable 25 Gram(s) IV Push once  dextrose 50% Injectable 12.5 Gram(s) IV Push once  dextrose 50% Injectable 25 Gram(s) IV Push once  furosemide   Injectable 60 milliGRAM(s) IV Push two times a day  gabapentin 300 milliGRAM(s) Oral two times a day  glucagon  Injectable 1 milliGRAM(s) IntraMuscular once  heparin   Injectable 5000 Unit(s) SubCutaneous every 8 hours  influenza   Vaccine 0.5 milliLiter(s) IntraMuscular once  levothyroxine 125 MICROGram(s) Oral daily    MEDICATIONS  (PRN):  acetaminophen     Tablet .. 650 milliGRAM(s) Oral every 6 hours PRN Temp greater or equal to 38C (100.4F), Mild Pain (1 - 3)  aluminum hydroxide/magnesium hydroxide/simethicone Suspension 30 milliLiter(s) Oral every 4 hours PRN Dyspepsia  dextrose Oral Gel 15 Gram(s) Oral once PRN Blood Glucose LESS THAN 70 milliGRAM(s)/deciliter  melatonin 3 milliGRAM(s) Oral at bedtime PRN Insomnia  ondansetron Injectable 4 milliGRAM(s) IV Push every 8 hours PRN Nausea and/or Vomiting    Vital Signs Last 24 Hrs  T(C): 36.7 (07 Nov 2024 07:56), Max: 37.2 (06 Nov 2024 22:00)  T(F): 98.1 (07 Nov 2024 07:56), Max: 98.9 (06 Nov 2024 22:00)  HR: 86 (07 Nov 2024 10:00) (86 - 104)  BP: 127/69 (07 Nov 2024 10:00) (117/62 - 155/69)  BP(mean): 87 (06 Nov 2024 17:33) (87 - 87)  RR: 18 (07 Nov 2024 10:00) (15 - 19)  SpO2: 96% (07 Nov 2024 10:00) (92% - 98%)    Parameters below as of 07 Nov 2024 10:00  Patient On (Oxygen Delivery Method): room air    PHYSICAL EXAM:  Constitutional:WD over weight, NAD  No adenopathy cervical or axillary appreciated  Anicteric  Lungs with decreased BS at bases  Cor RRR normal S1S2  Abd:  Soft, N-T normoactive BS  Extrem with trace bilateral edema        CBC                          11.2   4.85  )-----------( 346      ( 07 Nov 2024 05:00 )             33.8                           11.8   5.13  )-----------( 360      ( 05 Nov 2024 05:20 )             35.5                             12.1   5.59  )-----------( 309      ( 04 Nov 2024 05:49 )             37.2                           13.0   6.12  )-----------( 338      ( 03 Nov 2024 07:21 )             38.8     CHEM    11-07    132[L]  |  97  |  60.7[H]  ----------------------------<  238[H]  4.4   |  22.0  |  2.71[H]    Ca    8.3[L]      07 Nov 2024 05:00  Phos  4.0     11-07  Mg     2.1     11-07    TPro  6.7  /  Alb  3.0[L]  /  TBili  0.2[L]  /  DBili  x   /  AST  54[H]  /  ALT  25  /  AlkPhos  132[H]  11-06 11-05    135  |  97  |  52.1[H]  ----------------------------<  145[H]  4.7   |  23.0  |  3.28[H]    Ca    8.6      05 Nov 2024 05:20  Phos  5.1     11-05  Mg     2.1     11-05    TPro  6.1[L]  /  Alb  2.7[L]  /  TBili  0.3[L]  /  DBili  x   /  AST  11  /  ALT  6   /  AlkPhos  94  11-05 11-04    136  |  100  |  39.9[H]  ----------------------------<  192[H]  4.5   |  20.0[L]  |  3.27[H]    Ca    8.0[L]      04 Nov 2024 05:49  Phos  4.2     11-03  Mg     1.8     11-03    TPro  6.0[L]  /  Alb  2.7[L]  /  TBili  0.3[L]  /  DBili  x   /  AST  10  /  ALT  5   /  AlkPhos  99  11-03 11-03    135  |  99  |  31.7[H]  ----------------------------<  82  3.9   |  23.0  |  2.82[H]    Ca    8.1[L]      03 Nov 2024 07:21  Phos  4.2     11-03  Mg     1.8     11-03    TPro  6.0[L]  /  Alb  2.7[L]  /  TBili  0.3[L]  /  DBili  x   /  AST  10  /  ALT  5   /  AlkPhos  99  11-03      Urinalysis Basic - ( 03 Nov 2024 07:21 )    Color: x / Appearance: x / SG: x / pH: x  Gluc: 82 mg/dL / Ketone: x  / Bili: x / Urobili: x   Blood: x / Protein: x / Nitrite: x   Leuk Esterase: x / RBC: x / WBC x   Sq Epi: x / Non Sq Epi: x / Bacteria: x        RADIOLOGY & ADDITIONAL STUDIES:

## 2024-11-07 NOTE — PROGRESS NOTE ADULT - ASSESSMENT
57 y/o F w/ PMH HTN, IDDM type I (has medtronic insulin pump), hypothyroid, neuropathies presented to ED c/o 2 weeks of NIXON w/ associated worsening LE edema, nonproductive cough and orthopnea and occasional L-chest pain that is like a burning sensation and only occurs when she lays on her left side.  Clinically hypervolemic w/ scattered rales and b/l LE pretibial and pedal pitting edema. Initial w/u significant for trop 272-->258, BNP 2503, Cr 2.16. CXR w/ pulm vascular congestion and small b/l pleural effusions. Admitted for CHF exacerbation, continues on IV diuresis, ECHO with notable mass, may need further evaluation.      #New acute decompensated HFpEF  #Abnormal TTE finding  - Clinically hypervolemic   - BNP 2503, Trops elevated but down trending   - CXR w/ pulm vascular congestion and small b/l pleural effusions appreciated  - CTA PE study 11/01 significant for pulm vascular congestion and b/l pleural effusions but negative PE   - TTE 11/02 w/ LVEF 55-60% no WMA reported; Echogenic mass 1.8 cm x 2.0 cm within the lateral wall of the RA potential need for KAROLINA to further characterize mass  - TSH 7.12, A1c 10.6, LDL 84   - KAROLINA done today and no mass visualized   - continue on IV diuresis with Lasix 40 mg daily  - Daily weight, strict I/O's and fluid restrict   - Maintain K>4 and Mg>2  - Monitor on telemetry   - Follow up Cardio recs    #Type II MI, likely demand 2/2 above vs poor renal clearance  - Trops down trending (272-->258)  - continue on telemetry   - TTE 11/02 w/ LVEF 55-60% no WMA, incidental findings as above   - Cardiology following    #SHARA suspect multifactorial 2/2 cardiorenal, prerenal azotemia and ALBERT   - Baseline Cr 1.09 in 09/2024 but 10/31 showed worsening Cr of 1.93 which was likely cardiorenal from acute CHF and newly started on lasix causing prerenal azotemia  - Today Cr is DOWNTRENDING  - Renal US shows non obstructing b/l calculi, no hydronephrosis and b/l increased renal echotexture compatible with medical renal disease  - Monitor I/O's, renal function and lytes   - Avoid nephrotic meds or renally dose if needed  - Follow up work up for vasculitic disease ordered by Nephro  - Nephrology following    #Incidental CTA chest findings   - Reported to have multiple enlarged lymph nodes, some new and some seen on CT chest from 2023 and are only slight larger in size at this time  - Explained to daughter and pt that she should have a repeat CT chest in 1-3 months to reassess lymph nodes and should f/u with heme/onc outpt     #IDDM Type I w/ neuropathies  - A1c elevaed 10.6   - Has medtronic insulin pump and advised pt to continue using as usual while inpt   - Endocrine following, insulin pump form provided, endocrine team to assist with pump management   - Hypoglycemic protocol in place  - c/w gabapentin   - Switched to Lantus 14u and lispro 8u TID as per Endo    HTN  - c/w amlodipine   - On IV lasix but transition to po once euvolemic    Hypothyroid  - c/w synthroid  - TSH 7.12       VTE ppx: heparin sq  Dispo: acute.  Anticipate d/c home w/ no needs once medically stable, likely 1-2 days

## 2024-11-08 ENCOUNTER — TRANSCRIPTION ENCOUNTER (OUTPATIENT)
Age: 59
End: 2024-11-08

## 2024-11-08 LAB
ANION GAP SERPL CALC-SCNC: 11 MMOL/L — SIGNIFICANT CHANGE UP (ref 5–17)
BUN SERPL-MCNC: 63.5 MG/DL — HIGH (ref 8–20)
C3 SERPL-MCNC: 52 MG/DL — LOW (ref 81–157)
C4 SERPL-MCNC: 4 MG/DL — LOW (ref 13–39)
CALCIUM SERPL-MCNC: 9 MG/DL — SIGNIFICANT CHANGE UP (ref 8.4–10.5)
CHLORIDE SERPL-SCNC: 101 MMOL/L — SIGNIFICANT CHANGE UP (ref 96–108)
CO2 SERPL-SCNC: 25 MMOL/L — SIGNIFICANT CHANGE UP (ref 22–29)
CREAT SERPL-MCNC: 2.67 MG/DL — HIGH (ref 0.5–1.3)
EGFR: 20 ML/MIN/1.73M2 — LOW
GLUCOSE BLDC GLUCOMTR-MCNC: 126 MG/DL — HIGH (ref 70–99)
GLUCOSE BLDC GLUCOMTR-MCNC: 169 MG/DL — HIGH (ref 70–99)
GLUCOSE BLDC GLUCOMTR-MCNC: 179 MG/DL — HIGH (ref 70–99)
GLUCOSE BLDC GLUCOMTR-MCNC: 232 MG/DL — HIGH (ref 70–99)
GLUCOSE SERPL-MCNC: 83 MG/DL — SIGNIFICANT CHANGE UP (ref 70–99)
HCT VFR BLD CALC: 36.4 % — SIGNIFICANT CHANGE UP (ref 34.5–45)
HGB BLD-MCNC: 12 G/DL — SIGNIFICANT CHANGE UP (ref 11.5–15.5)
KAPPA LC SER QL IFE: 14.71 MG/DL — HIGH (ref 0.33–1.94)
KAPPA/LAMBDA FREE LIGHT CHAIN RATIO, SERUM: 1.95 RATIO — HIGH (ref 0.26–1.65)
LAMBDA LC SER QL IFE: 7.55 MG/DL — HIGH (ref 0.57–2.63)
MAGNESIUM SERPL-MCNC: 2.3 MG/DL — SIGNIFICANT CHANGE UP (ref 1.6–2.6)
MCHC RBC-ENTMCNC: 26.5 PG — LOW (ref 27–34)
MCHC RBC-ENTMCNC: 33 G/DL — SIGNIFICANT CHANGE UP (ref 32–36)
MCV RBC AUTO: 80.4 FL — SIGNIFICANT CHANGE UP (ref 80–100)
PHOSPHATE SERPL-MCNC: 4.4 MG/DL — SIGNIFICANT CHANGE UP (ref 2.4–4.7)
PLATELET # BLD AUTO: 393 K/UL — SIGNIFICANT CHANGE UP (ref 150–400)
POTASSIUM SERPL-MCNC: 4.6 MMOL/L — SIGNIFICANT CHANGE UP (ref 3.5–5.3)
POTASSIUM SERPL-SCNC: 4.6 MMOL/L — SIGNIFICANT CHANGE UP (ref 3.5–5.3)
PROT SERPL-MCNC: 6 G/DL — SIGNIFICANT CHANGE UP (ref 6–8.3)
RBC # BLD: 4.53 M/UL — SIGNIFICANT CHANGE UP (ref 3.8–5.2)
RBC # FLD: 12.6 % — SIGNIFICANT CHANGE UP (ref 10.3–14.5)
SODIUM SERPL-SCNC: 137 MMOL/L — SIGNIFICANT CHANGE UP (ref 135–145)
WBC # BLD: 5.12 K/UL — SIGNIFICANT CHANGE UP (ref 3.8–10.5)
WBC # FLD AUTO: 5.12 K/UL — SIGNIFICANT CHANGE UP (ref 3.8–10.5)

## 2024-11-08 PROCEDURE — 99233 SBSQ HOSP IP/OBS HIGH 50: CPT

## 2024-11-08 PROCEDURE — 99232 SBSQ HOSP IP/OBS MODERATE 35: CPT

## 2024-11-08 RX ORDER — FUROSEMIDE 40 MG
40 TABLET ORAL DAILY
Refills: 0 | Status: DISCONTINUED | OUTPATIENT
Start: 2024-11-08 | End: 2024-11-11

## 2024-11-08 RX ORDER — INSULIN LISPRO 100/ML
10 VIAL (ML) SUBCUTANEOUS
Refills: 0 | Status: DISCONTINUED | OUTPATIENT
Start: 2024-11-08 | End: 2024-11-11

## 2024-11-08 RX ADMIN — Medication 8 UNIT(S): at 13:00

## 2024-11-08 RX ADMIN — Medication 14 UNIT(S): at 22:24

## 2024-11-08 RX ADMIN — Medication 40 MILLIGRAM(S): at 05:43

## 2024-11-08 RX ADMIN — Medication 650 MILLIGRAM(S): at 14:00

## 2024-11-08 RX ADMIN — Medication 10 UNIT(S): at 17:32

## 2024-11-08 RX ADMIN — GABAPENTIN 300 MILLIGRAM(S): 300 CAPSULE ORAL at 17:33

## 2024-11-08 RX ADMIN — Medication 5 MILLIGRAM(S): at 05:43

## 2024-11-08 RX ADMIN — HEPARIN SODIUM 5000 UNIT(S): 10000 INJECTION INTRAVENOUS; SUBCUTANEOUS at 22:23

## 2024-11-08 RX ADMIN — Medication 2: at 17:32

## 2024-11-08 RX ADMIN — HEPARIN SODIUM 5000 UNIT(S): 10000 INJECTION INTRAVENOUS; SUBCUTANEOUS at 05:43

## 2024-11-08 RX ADMIN — Medication 137 MICROGRAM(S): at 05:45

## 2024-11-08 RX ADMIN — Medication 650 MILLIGRAM(S): at 13:17

## 2024-11-08 RX ADMIN — GABAPENTIN 300 MILLIGRAM(S): 300 CAPSULE ORAL at 05:43

## 2024-11-08 RX ADMIN — Medication 4: at 13:00

## 2024-11-08 RX ADMIN — HEPARIN SODIUM 5000 UNIT(S): 10000 INJECTION INTRAVENOUS; SUBCUTANEOUS at 13:01

## 2024-11-08 RX ADMIN — Medication 100 MILLIGRAM(S): at 13:01

## 2024-11-08 RX ADMIN — Medication 8 UNIT(S): at 09:00

## 2024-11-08 NOTE — DISCHARGE NOTE NURSING/CASE MANAGEMENT/SOCIAL WORK - FINANCIAL ASSISTANCE
[facility] provides services at a reduced cost to those who are determined to be eligible through [facility]’s financial assistance program. Information regarding [facility]’s financial assistance program can be found by going to [email or web address] or by calling [phone].

## 2024-11-08 NOTE — PROGRESS NOTE ADULT - ASSESSMENT
59 yo F seen for the evaluation and management of Adenopathy.  Dr. Thurston called me yesterday afternoon to see this patient while admitted for CHF exacerbation.  Pt has hx of DM, on insulin pump, HTN, Hypothyroidism; admitted with worsening dyspnea, leg swelling, cough.  Found to have hypoxia, leg edema, vasc congestion.  Seen by Cardiology.  Echo shows 55% EF, echogenic mass 1.8 by 2 cm RA.  CT chest done as outpat showed mod sized bilat plerual effusion, enlarged mediastional, hilar, axillary and subpectoral lymph nodes (11/1/24)  Pt feels less dyspneic after treatment  Reports poor appetite.        CHF  - Admitted with hypoxia, leg edema  -Cardiology following  - Echogenic mass 1.8 cm x 2.0 cm noted within the lateral wall of the right atrium visualized on the subcostal view, recommend further imaging correlation with cardiac or CT chest study or KAROLINA.   - Preliminary KAROLINA Results: EF normal, no mass visualized  Of note , patient became acutely hypoxic during procedure, but recovered and came out of procedure on room air, SpO2 97%.    Adenopathy  - documented on out patient CT scan-asked to address this by Attending MD  - CRISTAL 184  - At pt request, can work this up as outpt   - She will follow up outpt after discharge      59 yo F seen for the evaluation and management of Adenopathy.  Dr. Thurston called me yesterday afternoon to see this patient while admitted for CHF exacerbation.  Pt has hx of DM, on insulin pump, HTN, Hypothyroidism; admitted with worsening dyspnea, leg swelling, cough.  Found to have hypoxia, leg edema, vasc congestion.  Seen by Cardiology.  Echo shows 55% EF, echogenic mass 1.8 by 2 cm RA.  CT chest done as outpat showed mod sized bilat plerual effusion, enlarged mediastional, hilar, axillary and subpectoral lymph nodes (11/1/24)  Pt feels less dyspneic after treatment  Reports poor appetite.        CHF  - Admitted with hypoxia, leg edema  -Cardiology following  - Echogenic mass 1.8 cm x 2.0 cm noted within the lateral wall of the right atrium visualized on the subcostal view, recommend further imaging correlation with cardiac or CT chest study or KAROLINA.   - Preliminary KAROLINA Results: EF normal, no mass visualized  Of note , patient became acutely hypoxic during procedure, but recovered and came out of procedure on room air, SpO2 97%.    Adenopathy  - documented on out patient CT scan-asked to address this by Attending MD  -   - At pt request, can work this up as outpt   - Scr 2.67- Reports kidney damage from IV contrast and will not have any further scans with contrast   - Agreeable to a PET/CT outpt   - She will follow up in the after discharge

## 2024-11-08 NOTE — PROGRESS NOTE ADULT - SUBJECTIVE AND OBJECTIVE BOX
[facility], [facility]                                   [address] [place] [address]                                                      PHONE: [phone]                                                         FAX: [phone]  [email or web address]  ---------------------------------------------------------------------------------------------------------------------------------    Overnight events/patient complaints: patient seen at bedside. She is feeling much better.       No Known Allergies    MEDICATIONS  (STANDING):  allopurinol 100 milliGRAM(s) Oral daily  amLODIPine   Tablet 5 milliGRAM(s) Oral daily  dextrose 5%. 1000 milliLiter(s) (50 mL/Hr) IV Continuous <Continuous>  dextrose 5%. 1000 milliLiter(s) (100 mL/Hr) IV Continuous <Continuous>  dextrose 50% Injectable 25 Gram(s) IV Push once  dextrose 50% Injectable 12.5 Gram(s) IV Push once  dextrose 50% Injectable 25 Gram(s) IV Push once  furosemide   Injectable 40 milliGRAM(s) IV Push daily  gabapentin 300 milliGRAM(s) Oral two times a day  glucagon  Injectable 1 milliGRAM(s) IntraMuscular once  heparin   Injectable 5000 Unit(s) SubCutaneous every 8 hours  influenza   Vaccine 0.5 milliLiter(s) IntraMuscular once  insulin glargine Injectable (LANTUS) 14 Unit(s) SubCutaneous at bedtime  insulin lispro (ADMELOG) corrective regimen sliding scale   SubCutaneous three times a day before meals  insulin lispro (ADMELOG) corrective regimen sliding scale   SubCutaneous at bedtime  insulin lispro Injectable (ADMELOG) 8 Unit(s) SubCutaneous three times a day before meals  levothyroxine 137 MICROGram(s) Oral daily    MEDICATIONS  (PRN):  acetaminophen     Tablet .. 650 milliGRAM(s) Oral every 6 hours PRN Temp greater or equal to 38C (100.4F), Mild Pain (1 - 3)  aluminum hydroxide/magnesium hydroxide/simethicone Suspension 30 milliLiter(s) Oral every 4 hours PRN Dyspepsia  dextrose Oral Gel 15 Gram(s) Oral once PRN Blood Glucose LESS THAN 70 milliGRAM(s)/deciliter  melatonin 3 milliGRAM(s) Oral at bedtime PRN Insomnia  ondansetron Injectable 4 milliGRAM(s) IV Push every 8 hours PRN Nausea and/or Vomiting  senna 2 Tablet(s) Oral at bedtime PRN Constipation      Vital Signs Last 24 Hrs  T(C): 36.7 (08 Nov 2024 07:30), Max: 36.9 (07 Nov 2024 20:00)  T(F): 98 (08 Nov 2024 07:30), Max: 98.4 (07 Nov 2024 20:00)  HR: 88 (08 Nov 2024 07:30) (79 - 90)  BP: 125/72 (08 Nov 2024 07:30) (114/61 - 134/88)  BP(mean): --  RR: 18 (08 Nov 2024 07:30) (16 - 18)  SpO2: 96% (08 Nov 2024 07:30) (94% - 98%)    Parameters below as of 08 Nov 2024 07:30  Patient On (Oxygen Delivery Method): room air      ICU Vital Signs Last 24 Hrs  GILMAR KING  I&O's Detail    Drug Dosing Weight  GILMAR COSTA      PHYSICAL EXAM:  General: NAD  HEENT: Head; normocephalic, atraumatic.  Eyes: Pupils reactive, cornea wnl.  Neck: Supple, no nodes adenopathy, no NVD or carotid bruit or thyromegaly.  CARDIOVASCULAR: Normal S1 and S2, No murmur, rub, gallop or lift.   LUNGS: No rales, rhonchi or wheeze. Normal breath sounds bilaterally.  ABDOMEN: Soft, nontender without mass or organomegaly. bowel sounds normoactive.  EXTREMITIES: No clubbing, cyanosis or edema. Distal pulses wnl.   SKIN: warm and dry with normal turgor.  NEURO: Alert/oriented x 3  PSYCH: normal affect.        LABS:                        12.0   5.12  )-----------( 393      ( 08 Nov 2024 06:20 )             36.4     11-08    137  |  101  |  63.5[H]  ----------------------------<  83  4.6   |  25.0  |  2.67[H]    Ca    9.0      08 Nov 2024 06:20  Phos  4.4     11-08  Mg     2.3     11-08    TPro  6.0  /  Alb  x   /  TBili  x   /  DBili  x   /  AST  x   /  ALT  x   /  AlkPhos  x   11-07    GILMAR COSTA        Urinalysis Basic - ( 08 Nov 2024 06:20 )    Color: x / Appearance: x / SG: x / pH: x  Gluc: 83 mg/dL / Ketone: x  / Bili: x / Urobili: x   Blood: x / Protein: x / Nitrite: x   Leuk Esterase: x / RBC: x / WBC x   Sq Epi: x / Non Sq Epi: x / Bacteria: x        RADIOLOGY & ADDITIONAL STUDIES:    INTERPRETATION OF TELEMETRY (personally reviewed): sinus rhythm       ASSESSMENT AND PLAN:  58y Female with past medical history significant for DM1 on insulin pump, HTN, hypothyroid presented with two weeks of worsening SOB, leg swelling, and cough. She denies recent illness, fevers, or chills. She denies chest pain or palpitations.     - clinically improved with IV diuresis -- currently on Lasix 40 mg IVP daily per nephrology recommendations  - KAROLINA yesterday without right atrial mass    Stable from a cardiac perspective. Will sign off now. Please call back if needed.

## 2024-11-08 NOTE — PROGRESS NOTE ADULT - SUBJECTIVE AND OBJECTIVE BOX
INTERVAL EVENTS:  Follow up diabetes management. Glucoses elevated, post prandial hyperglycemia.     MEDICATIONS  (STANDING):  allopurinol 100 milliGRAM(s) Oral daily  amLODIPine   Tablet 5 milliGRAM(s) Oral daily  dextrose 5%. 1000 milliLiter(s) (100 mL/Hr) IV Continuous <Continuous>  dextrose 5%. 1000 milliLiter(s) (50 mL/Hr) IV Continuous <Continuous>  dextrose 50% Injectable 25 Gram(s) IV Push once  dextrose 50% Injectable 12.5 Gram(s) IV Push once  dextrose 50% Injectable 25 Gram(s) IV Push once  furosemide    Tablet 40 milliGRAM(s) Oral daily  gabapentin 300 milliGRAM(s) Oral two times a day  glucagon  Injectable 1 milliGRAM(s) IntraMuscular once  heparin   Injectable 5000 Unit(s) SubCutaneous every 8 hours  influenza   Vaccine 0.5 milliLiter(s) IntraMuscular once  insulin glargine Injectable (LANTUS) 14 Unit(s) SubCutaneous at bedtime  insulin lispro (ADMELOG) corrective regimen sliding scale   SubCutaneous three times a day before meals  insulin lispro (ADMELOG) corrective regimen sliding scale   SubCutaneous at bedtime  insulin lispro Injectable (ADMELOG) 10 Unit(s) SubCutaneous three times a day before meals  levothyroxine 137 MICROGram(s) Oral daily    MEDICATIONS  (PRN):  acetaminophen     Tablet .. 650 milliGRAM(s) Oral every 6 hours PRN Temp greater or equal to 38C (100.4F), Mild Pain (1 - 3)  aluminum hydroxide/magnesium hydroxide/simethicone Suspension 30 milliLiter(s) Oral every 4 hours PRN Dyspepsia  dextrose Oral Gel 15 Gram(s) Oral once PRN Blood Glucose LESS THAN 70 milliGRAM(s)/deciliter  melatonin 3 milliGRAM(s) Oral at bedtime PRN Insomnia  ondansetron Injectable 4 milliGRAM(s) IV Push every 8 hours PRN Nausea and/or Vomiting  senna 2 Tablet(s) Oral at bedtime PRN Constipation    Allergies  No Known Allergies    Vital Signs Last 24 Hrs  T(C): 36.7 (08 Nov 2024 07:30), Max: 36.9 (07 Nov 2024 20:00)  T(F): 98 (08 Nov 2024 07:30), Max: 98.4 (07 Nov 2024 20:00)  HR: 88 (08 Nov 2024 07:30) (79 - 90)  BP: 125/72 (08 Nov 2024 07:30) (114/61 - 134/88)  BP(mean): --  RR: 18 (08 Nov 2024 07:30) (16 - 18)  SpO2: 96% (08 Nov 2024 07:30) (94% - 98%)    Parameters below as of 08 Nov 2024 07:30  Patient On (Oxygen Delivery Method): room air    PHYSICAL EXAM:  General: No apparent distress  Respiratory: Lungs clear bilaterally  Cardiac: +S1, S2, no m/r/g  GI: +BS, soft, non tender, non distended  Extremities: No peripheral edema  Neuro: A+O X3    LABS:                        12.0   5.12  )-----------( 393      ( 08 Nov 2024 06:20 )             36.4     11-08    137  |  101  |  63.5[H]  ----------------------------<  83  4.6   |  25.0  |  2.67[H]    Ca    9.0      08 Nov 2024 06:20  Phos  4.4     11-08  Mg     2.3     11-08    TPro  6.0  /  Alb  x   /  TBili  x   /  DBili  x   /  AST  x   /  ALT  x   /  AlkPhos  x   11-07    Urinalysis Basic - ( 08 Nov 2024 06:20 )    Color: x / Appearance: x / SG: x / pH: x  Gluc: 83 mg/dL / Ketone: x  / Bili: x / Urobili: x   Blood: x / Protein: x / Nitrite: x   Leuk Esterase: x / RBC: x / WBC x   Sq Epi: x / Non Sq Epi: x / Bacteria: x    POCT Blood Glucose.: 232 mg/dL (11-08-24 @ 12:01)  POCT Blood Glucose.: 126 mg/dL (11-08-24 @ 08:26)  POCT Blood Glucose.: 273 mg/dL (11-07-24 @ 21:30)  POCT Blood Glucose.: 341 mg/dL (11-07-24 @ 18:22)  POCT Blood Glucose.: 190 mg/dL (11-07-24 @ 12:38)    Thyroid Stimulating Hormone, Serum: 7.12 uIU/mL (11-03-24 @ 07:21)

## 2024-11-08 NOTE — PROGRESS NOTE ADULT - TIME BILLING
Time spent reviewing patient's chart, labwork, orders, documenting care, coordinating care with consultants, and discussing patient's care with family members.
Chart, labs, orders, vitals, and imaging reviewed and plan of care discussed with consultants and IDR team in detail. Plan of care discussed with patient at bedside.

## 2024-11-08 NOTE — DISCHARGE NOTE NURSING/CASE MANAGEMENT/SOCIAL WORK - NSDCFUADDAPPT_GEN_ALL_CORE_FT
Cardio-- - Appointment made with  Dr. Chavez on  11/8 at 2:15 [facility]  .  If you are unable to attend your pre-scheduled appointment, please contact the office directly at [phone] to reschedule.        Pcp- Appointment made with Dr. Zamora on 11/4 at 10:00, [address] [facility]. If you are unable to attend your pre-scheduled appointment, please contact the office directly at [phone] to reschedule.     PT AGREEABLE TO [facility], and F/U APPTS.  PT DECLINED MEDS to BED  STAR YELLOW FOLDER PROVIDED

## 2024-11-08 NOTE — PROGRESS NOTE ADULT - SUBJECTIVE AND OBJECTIVE BOX
[facility] Division of Hospital Medicine    SUBJECTIVE / OVERNIGHT EVENTS:  Patient seen and examined at bedside. She is in no acute distress and denies any acute symptoms such as chest pain, shortness of breath, nausea/vomiting, or abdominal pain. She continues to be diuresed with Lasix 40mg daily. Her creatinine is downtrending slightly but not resolving. Nephrology ordered additional bloodwork to rule out vasculitic disease. Case discussed with Nephrology today and they will follow up regarding non-resolving rise in creatinine.    MEDICATIONS  (STANDING):  amLODIPine   Tablet 5 milliGRAM(s) Oral daily  dextrose 5%. 1000 milliLiter(s) (100 mL/Hr) IV Continuous <Continuous>  dextrose 5%. 1000 milliLiter(s) (50 mL/Hr) IV Continuous <Continuous>  dextrose 50% Injectable 25 Gram(s) IV Push once  dextrose 50% Injectable 12.5 Gram(s) IV Push once  dextrose 50% Injectable 25 Gram(s) IV Push once  furosemide   Injectable 40 milliGRAM(s) IV Push two times a day  gabapentin 300 milliGRAM(s) Oral two times a day  glucagon  Injectable 1 milliGRAM(s) IntraMuscular once  heparin   Injectable 5000 Unit(s) SubCutaneous every 8 hours  influenza   Vaccine 0.5 milliLiter(s) IntraMuscular once  levothyroxine 125 MICROGram(s) Oral daily    MEDICATIONS  (PRN):  acetaminophen     Tablet .. 650 milliGRAM(s) Oral every 6 hours PRN Temp greater or equal to 38C (100.4F), Mild Pain (1 - 3)  aluminum hydroxide/magnesium hydroxide/simethicone Suspension 30 milliLiter(s) Oral every 4 hours PRN Dyspepsia  dextrose Oral Gel 15 Gram(s) Oral once PRN Blood Glucose LESS THAN 70 milliGRAM(s)/deciliter  melatonin 3 milliGRAM(s) Oral at bedtime PRN Insomnia  ondansetron Injectable 4 milliGRAM(s) IV Push every 8 hours PRN Nausea and/or Vomiting        I&O's Summary      PHYSICAL EXAM:  Vital Signs Last 24 Hrs  T(C): 36.9 (03 Nov 2024 07:52), Max: 36.9 (02 Nov 2024 22:28)  T(F): 98.4 (03 Nov 2024 07:52), Max: 98.5 (03 Nov 2024 05:48)  HR: 100 (03 Nov 2024 07:52) (100 - 113)  BP: 148/80 (03 Nov 2024 07:52) (148/80 - 172/81)  BP(mean): 99 (02 Nov 2024 14:40) (99 - 99)  RR: 18 (03 Nov 2024 07:52) (18 - 21)  SpO2: 92% (03 Nov 2024 07:52) (92% - 98%)    Parameters below as of 03 Nov 2024 07:52  Patient On (Oxygen Delivery Method): room air      GENERAL: pt examined bedside, laying comfortably in bed in NAD  HEENT: NC/AT, moist oral mucosa, clear conjunctiva, sclera nonicteric  RESPIRATORY: decreased breath sounds in B/L bases, otherwise minimal crackles noted   CARDIOVASCULAR: RRR, normal S1 and S2, no murmur/rub/gallop  ABDOMEN: soft, NT/ND, +bowel sounds, no rebound/guarding  MSK: No joint deformities, edema, erythema  EXTREMITIES: No cynaosis, no clubbing, minimal swelling today  PSYCH: affect appropriate and cooperative  NEUROLOGY: A+O to person, place, and time, no focal neurologic deficits appreciated  SKIN: No rashes or no palpable lesions    LABS:                        13.0   6.12  )-----------( 338      ( 03 Nov 2024 07:21 )             38.8     11-03    135  |  99  |  31.7[H]  ----------------------------<  82  3.9   |  23.0  |  2.82[H]    Ca    8.1[L]      03 Nov 2024 07:21  Phos  4.2     11-03  Mg     1.8     11-03    TPro  6.0[L]  /  Alb  2.7[L]  /  TBili  0.3[L]  /  DBili  x   /  AST  10  /  ALT  5   /  AlkPhos  99  11-03          Urinalysis Basic - ( 03 Nov 2024 07:21 )    Color: x / Appearance: x / SG: x / pH: x  Gluc: 82 mg/dL / Ketone: x  / Bili: x / Urobili: x   Blood: x / Protein: x / Nitrite: x   Leuk Esterase: x / RBC: x / WBC x   Sq Epi: x / Non Sq Epi: x / Bacteria: x        CAPILLARY BLOOD GLUCOSE      POCT Blood Glucose.: 116 mg/dL (03 Nov 2024 08:15)        RADIOLOGY & ADDITIONAL TESTS:  Results Reviewed:   Imaging Personally Reviewed:  Electrocardiogram Personally Reviewed:         [facility] Division of Hospital Medicine    SUBJECTIVE / OVERNIGHT EVENTS:  Patient seen and examined at bedside. She is in no acute distress and denies any acute symptoms such as chest pain, shortness of breath, nausea/vomiting, or abdominal pain. She continues to be diuresed with Lasix 40mg daily. Her creatinine is downtrending slightly but not resolving. Nephrology ordered additional bloodwork to rule out vasculitic disease. Case discussed with Nephrology today and they will follow up regarding non-resolving rise in creatinine.     MEDICATIONS  (STANDING):  amLODIPine   Tablet 5 milliGRAM(s) Oral daily  dextrose 5%. 1000 milliLiter(s) (100 mL/Hr) IV Continuous <Continuous>  dextrose 5%. 1000 milliLiter(s) (50 mL/Hr) IV Continuous <Continuous>  dextrose 50% Injectable 25 Gram(s) IV Push once  dextrose 50% Injectable 12.5 Gram(s) IV Push once  dextrose 50% Injectable 25 Gram(s) IV Push once  furosemide   Injectable 40 milliGRAM(s) IV Push two times a day  gabapentin 300 milliGRAM(s) Oral two times a day  glucagon  Injectable 1 milliGRAM(s) IntraMuscular once  heparin   Injectable 5000 Unit(s) SubCutaneous every 8 hours  influenza   Vaccine 0.5 milliLiter(s) IntraMuscular once  levothyroxine 125 MICROGram(s) Oral daily    MEDICATIONS  (PRN):  acetaminophen     Tablet .. 650 milliGRAM(s) Oral every 6 hours PRN Temp greater or equal to 38C (100.4F), Mild Pain (1 - 3)  aluminum hydroxide/magnesium hydroxide/simethicone Suspension 30 milliLiter(s) Oral every 4 hours PRN Dyspepsia  dextrose Oral Gel 15 Gram(s) Oral once PRN Blood Glucose LESS THAN 70 milliGRAM(s)/deciliter  melatonin 3 milliGRAM(s) Oral at bedtime PRN Insomnia  ondansetron Injectable 4 milliGRAM(s) IV Push every 8 hours PRN Nausea and/or Vomiting        I&O's Summary      PHYSICAL EXAM:  Vital Signs Last 24 Hrs  T(C): 36.9 (03 Nov 2024 07:52), Max: 36.9 (02 Nov 2024 22:28)  T(F): 98.4 (03 Nov 2024 07:52), Max: 98.5 (03 Nov 2024 05:48)  HR: 100 (03 Nov 2024 07:52) (100 - 113)  BP: 148/80 (03 Nov 2024 07:52) (148/80 - 172/81)  BP(mean): 99 (02 Nov 2024 14:40) (99 - 99)  RR: 18 (03 Nov 2024 07:52) (18 - 21)  SpO2: 92% (03 Nov 2024 07:52) (92% - 98%)    Parameters below as of 03 Nov 2024 07:52  Patient On (Oxygen Delivery Method): room air      GENERAL: pt examined bedside, laying comfortably in bed in NAD  HEENT: NC/AT, moist oral mucosa, clear conjunctiva, sclera nonicteric  RESPIRATORY: decreased breath sounds in B/L bases, otherwise minimal crackles noted   CARDIOVASCULAR: RRR, normal S1 and S2, no murmur/rub/gallop  ABDOMEN: soft, NT/ND, +bowel sounds, no rebound/guarding  MSK: No joint deformities, edema, erythema  EXTREMITIES: No cynaosis, no clubbing, minimal swelling today  PSYCH: affect appropriate and cooperative  NEUROLOGY: A+O to person, place, and time, no focal neurologic deficits appreciated  SKIN: No rashes or no palpable lesions    LABS:                        13.0   6.12  )-----------( 338      ( 03 Nov 2024 07:21 )             38.8     11-03    135  |  99  |  31.7[H]  ----------------------------<  82  3.9   |  23.0  |  2.82[H]    Ca    8.1[L]      03 Nov 2024 07:21  Phos  4.2     11-03  Mg     1.8     11-03    TPro  6.0[L]  /  Alb  2.7[L]  /  TBili  0.3[L]  /  DBili  x   /  AST  10  /  ALT  5   /  AlkPhos  99  11-03          Urinalysis Basic - ( 03 Nov 2024 07:21 )    Color: x / Appearance: x / SG: x / pH: x  Gluc: 82 mg/dL / Ketone: x  / Bili: x / Urobili: x   Blood: x / Protein: x / Nitrite: x   Leuk Esterase: x / RBC: x / WBC x   Sq Epi: x / Non Sq Epi: x / Bacteria: x        CAPILLARY BLOOD GLUCOSE      POCT Blood Glucose.: 116 mg/dL (03 Nov 2024 08:15)        RADIOLOGY & ADDITIONAL TESTS:  Results Reviewed:   Imaging Personally Reviewed:  Electrocardiogram Personally Reviewed:

## 2024-11-08 NOTE — PROGRESS NOTE ADULT - ASSESSMENT
57 y/o F with history of HTN, IDDM type I (has medtronic insulin pump) with peripheral neuropathy, hypothyroidism was admitted for fluid overload.  She had CTA chest PE study done as outpatient on 11/1/2024 that was negative for PE, but showed pulmonary vascular congestion and b/l pleural effusions and LAD.   Patient has been taking ibuprofen daily for her leg pain for one week prior to presentation.   Pt also has blood work with her from 09/2024 and 10/31/24.  In september pt was at baseline Cr of 1.09 and 10/31 was 1.93.  Pt denies fevers, chills, sick contacts, recent travel, diarrhea.       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   Lymphadenopathy, Hem/onc is following and outpatient workup is planned   DM1 for many years   HTN BP is controlled   Gout   Bilateral nephrolithiasis  Serum kappa/lamda ratio slightly elevated, expected in CKD     Plan   Will follow MARGI, and ANCA testing  Check Anti-Ds Antibodies, and PLA2R given proteinuria and low albumin   Check viral Hepatitis panel   Might consider renal biopsy if renal function without significant improvement given low C3 and C4   Continue furosemide 40 mg daily  Once renal function improves she will need RAASi initiation   Poor candidate for SGLT2i given type 1 DM   She will need outpatient Urology follow up given 1.4-1.5 cm renal stones bilaterally   Continue allopurinol   Patient was advised to avoid further use of NSAIDs  Daily labs   Renally dosed medications   Will follow   Discussed with the patient and her sister at the bedside   Discussed with primary team

## 2024-11-08 NOTE — PROGRESS NOTE ADULT - ASSESSMENT
57 y/o F w/ PMH HTN, IDDM type I (has medtronic insulin pump), hypothyroid, neuropathies presented to ED c/o 2 weeks of NIXON w/ associated worsening LE edema, nonproductive cough and orthopnea and occasional L-chest pain that is like a burning sensation and only occurs when she lays on her left side.  Clinically hypervolemic w/ scattered rales and b/l LE pretibial and pedal pitting edema. Initial w/u significant for trop 272-->258, BNP 2503, Cr 2.16. CXR w/ pulm vascular congestion and small b/l pleural effusions. Admitted for CHF exacerbation, continues on IV diuresis, ECHO with notable mass, may need further evaluation.      #New acute decompensated HFpEF  #Abnormal TTE finding  - Clinically hypervolemic   - BNP 2503, Trops elevated but down trending   - CXR w/ pulm vascular congestion and small b/l pleural effusions appreciated  - CTA PE study 11/01 significant for pulm vascular congestion and b/l pleural effusions but negative PE   - TTE 11/02 w/ LVEF 55-60% no WMA reported; Echogenic mass 1.8 cm x 2.0 cm within the lateral wall of the RA potential need for KAROLINA to further characterize mass  - TSH 7.12, A1c 10.6, LDL 84   - KAROLINA done today and no mass visualized   - continue lasix 40mg daily  - Daily weight, strict I/O's and fluid restrict   - Maintain K>4 and Mg>2    #Type II MI, likely demand 2/2 above vs poor renal clearance  - Trops down trending (272-->258)  - continue on telemetry   - TTE 11/02 w/ LVEF 55-60% no WMA, incidental findings as above   - Cardiology following    #SHARA suspect multifactorial 2/2 cardiorenal, prerenal azotemia and ALBERT   - Baseline Cr 1.09 in 09/2024 but 10/31 showed worsening Cr of 1.93 which was likely cardiorenal from acute CHF and newly started on lasix causing prerenal azotemia  - Today Cr is DOWNTRENDING  - Renal US shows non obstructing b/l calculi, no hydronephrosis and b/l increased renal echotexture compatible with medical renal disease  - Monitor I/O's, renal function and lytes   - Avoid nephrotic meds or renally dose if needed  - Follow up work up for vasculitic disease ordered by Nephro  - Nephrology following    #Incidental CTA chest findings   - Reported to have multiple enlarged lymph nodes, some new and some seen on CT chest from 2023 and are only slight larger in size at this time  - Explained to daughter and pt that she should have a repeat CT chest in 1-3 months to reassess lymph nodes and should f/u with heme/onc outpt     #IDDM Type I w/ neuropathies  - A1c elevaed 10.6   - Has medtronic insulin pump and advised pt to continue using as usual while inpt   - Endocrine following, insulin pump form provided, endocrine team to assist with pump management   - Hypoglycemic protocol in place  - c/w gabapentin   - Switched to Lantus 14u and lispro 8u TID as per Endo  - Will need to transition back to insulin pump upon discharge    HTN  - c/w amlodipine     Hypothyroid  - c/w synthroid  - TSH 7.12       VTE ppx: heparin sq  Dispo: acute.  Anticipate d/c home w/ no needs once medically stable, likely 1-2 days  57 y/o F w/ PMH HTN, IDDM type I (has medtronic insulin pump), hypothyroid, neuropathies presented to ED c/o 2 weeks of NIXON w/ associated worsening LE edema, nonproductive cough and orthopnea and occasional L-chest pain that is like a burning sensation and only occurs when she lays on her left side.  Clinically hypervolemic w/ scattered rales and b/l LE pretibial and pedal pitting edema. Initial w/u significant for trop 272-->258, BNP 2503, Cr 2.16. CXR w/ pulm vascular congestion and small b/l pleural effusions. Admitted for CHF exacerbation, continues on IV diuresis, ECHO with notable mass, may need further evaluation.      #New acute decompensated HFpEF  #Abnormal TTE finding  - Clinically hypervolemic   - BNP 2503, Trops elevated but down trending   - CXR w/ pulm vascular congestion and small b/l pleural effusions appreciated  - CTA PE study 11/01 significant for pulm vascular congestion and b/l pleural effusions but negative PE   - TTE 11/02 w/ LVEF 55-60% no WMA reported; Echogenic mass 1.8 cm x 2.0 cm within the lateral wall of the RA potential need for KAROLINA to further characterize mass  - TSH 7.12, A1c 10.6, LDL 84   - KAROLINA done today and no mass visualized   - continue lasix 40mg daily PO now (changed from IV)  - Daily weight, strict I/O's and fluid restrict   - Maintain K>4 and Mg>2    #Type II MI, likely demand 2/2 above vs poor renal clearance  - Trops down trending (272-->258)  - continue on telemetry   - TTE 11/02 w/ LVEF 55-60% no WMA, incidental findings as above   - Cardiology following    #SHARA suspect multifactorial 2/2 cardiorenal, prerenal azotemia and ALBERT   - Baseline Cr 1.09 in 09/2024 but 10/31 showed worsening Cr of 1.93 which was likely cardiorenal from acute CHF and newly started on lasix causing prerenal azotemia  - Today Cr is DOWNTRENDING  - Renal US shows non obstructing b/l calculi, no hydronephrosis and b/l increased renal echotexture compatible with medical renal disease  - Monitor I/O's, renal function and lytes   - Avoid nephrotic meds or renally dose if needed  - Follow up work up for vasculitic disease ordered by Nephro  - Nephrology following    #Incidental CTA chest findings   - Reported to have multiple enlarged lymph nodes, some new and some seen on CT chest from 2023 and are only slight larger in size at this time  - Explained to daughter and pt that she should have a repeat CT chest in 1-3 months to reassess lymph nodes and should f/u with heme/onc outpt     #IDDM Type I w/ neuropathies  - A1c elevaed 10.6   - Has medtronic insulin pump and advised pt to continue using as usual while inpt   - Endocrine following, insulin pump form provided, endocrine team to assist with pump management   - Hypoglycemic protocol in place  - c/w gabapentin   - Switched to Lantus 14u and lispro 8u TID as per Endo  - Will need to transition back to insulin pump upon discharge    HTN  - c/w amlodipine     Hypothyroid  - c/w synthroid  - TSH 7.12       VTE ppx: heparin sq  Dispo: acute.  Anticipate d/c home w/ no needs once medically stable, likely 1-2 days

## 2024-11-08 NOTE — PROGRESS NOTE ADULT - SUBJECTIVE AND OBJECTIVE BOX
[facility] DIVISION OF KIDNEY DISEASES AND HYPERTENSION -- PROGRESS NOTE    24 hour events/subjective: Seen today   Doing well   Urinating   Renal function is improving slightly     MEDICATIONS    Standing Inpatient Medications  allopurinol 100 milliGRAM(s) Oral daily  amLODIPine   Tablet 5 milliGRAM(s) Oral daily  dextrose 5%. 1000 milliLiter(s) IV Continuous <Continuous>  dextrose 5%. 1000 milliLiter(s) IV Continuous <Continuous>  dextrose 50% Injectable 25 Gram(s) IV Push once  dextrose 50% Injectable 12.5 Gram(s) IV Push once  dextrose 50% Injectable 25 Gram(s) IV Push once  furosemide    Tablet 40 milliGRAM(s) Oral daily  gabapentin 300 milliGRAM(s) Oral two times a day  glucagon  Injectable 1 milliGRAM(s) IntraMuscular once  heparin   Injectable 5000 Unit(s) SubCutaneous every 8 hours  influenza   Vaccine 0.5 milliLiter(s) IntraMuscular once  insulin glargine Injectable (LANTUS) 14 Unit(s) SubCutaneous at bedtime  insulin lispro (ADMELOG) corrective regimen sliding scale   SubCutaneous at bedtime  insulin lispro (ADMELOG) corrective regimen sliding scale   SubCutaneous three times a day before meals  insulin lispro Injectable (ADMELOG) 10 Unit(s) SubCutaneous three times a day before meals  levothyroxine 137 MICROGram(s) Oral daily    PRN Inpatient Medications  acetaminophen     Tablet .. 650 milliGRAM(s) Oral every 6 hours PRN  aluminum hydroxide/magnesium hydroxide/simethicone Suspension 30 milliLiter(s) Oral every 4 hours PRN  dextrose Oral Gel 15 Gram(s) Oral once PRN  melatonin 3 milliGRAM(s) Oral at bedtime PRN  ondansetron Injectable 4 milliGRAM(s) IV Push every 8 hours PRN  senna 2 Tablet(s) Oral at bedtime PRN      VITALS/PHYSICAL EXAM  --------------------------------------------------------------------------------  T(C): 36.7 (11-08-24 @ 07:30), Max: 36.9 (11-07-24 @ 20:00)  HR: 88 (11-08-24 @ 07:30) (79 - 90)  BP: 125/72 (11-08-24 @ 07:30) (114/61 - 134/88)  RR: 18 (11-08-24 @ 07:30) (16 - 18)  SpO2: 96% (11-08-24 @ 07:30) (94% - 98%)  Wt(kg): --        11-08-24 @ 07:01  -  11-08-24 @ 16:46  --------------------------------------------------------  IN: 230 mL / OUT: 0 mL / NET: 230 mL      Physical Exam:  Gen: NAD, well-appearing  HEENT: normal  Pulm: CTA B/L  CV: normal S1S2; no rub, no edema  Abd: soft, non-tender  MSK no deformities   Neuro: Alert and oriented x3     LABS/STUDIES  --------------------------------------------------------------------------------  137  |  101  |  63.5  ----------------------------<  83      [11-08-24 @ 06:20]  4.6   |  25.0  |  2.67        Ca     9.0     [11-08-24 @ 06:20]      Mg     2.3     [11-08-24 @ 06:20]      Phos  4.4     [11-08-24 @ 06:20]    TPro  6.0  /  Alb  x   /  TBili  x   /  DBili  x   /  AST  x   /  ALT  x   /  AlkPhos  x   [11-07-24 @ 16:02]          Creatinine Trend:  SCr 2.67 [11-08 @ 06:20]  SCr 2.71 [11-07 @ 05:00]  SCr 2.86 [11-06 @ 10:35]  SCr 3.28 [11-05 @ 05:20]  SCr 3.27 [11-04 @ 05:49]

## 2024-11-08 NOTE — DISCHARGE NOTE NURSING/CASE MANAGEMENT/SOCIAL WORK - PATIENT PORTAL LINK FT
You can access the FollowMyHealth Patient Portal offered by [facility] by registering at the following website: [email or web address]. By joining [employer]’s FollowMyHealth portal, you will also be able to view your health information using other applications (apps) compatible with our system.

## 2024-11-08 NOTE — PROGRESS NOTE ADULT - ASSESSMENT
58F with PMH HTN, IDDM type I (has medtronic insulin pump), hypothyroid, neuropathies presented to ED c/o 2 weeks of NIXON w/ associated worsening LE edema, nonproductive cough and orthopnea and occasional L-chest pain that is like a burning sensation and only occurs when she lays on her left side.  Clinically hypervolemic w/ scattered rales and b/l LE pretibial and pedal pitting edema. Initial w/u significant for trop 272-->258, BNP 2503, Cr 2.16. CXR w/ pulm vascular congestion and small b/l pleural effusions. Admitted for CHF exacerbation, continues on IV diuresis, ECHO with notable mass.    Consulted for diabetes management  Home regimen: medtronic pump (basal dosing 19.6 units daily, ICR 1:15 and ISF 1:65-75)  Current a1c: 10.6%  Outpatient Endocrinologist: Dr Rivera    1. Poorly controlled DM1  - Could not demonstrate insulin pump competency and proficiency, pump removed  - Post prandial hyperglycemia, increase admelog 10 units TID   - Continue lantus 14 units qhs  - Monitor FS AC&HS    2. Hypothyroid  - TSH elevated 7.12  - LT4 increased to 137 mcg daily    3. SHARA/CKD  - Nephrology following  - Trend serum creatinine and avoid nephrotoxins

## 2024-11-08 NOTE — PROGRESS NOTE ADULT - SUBJECTIVE AND OBJECTIVE BOX
Patient is a 58y Female seen on consultation for the evaluation and management of Adenopathy.  Dr. Thurston called me yesterday afternoon to see this patient while admitted for CHF exacerbation.  Pt has hx of DM, on insulin pump, HTN, Hypothyroidism; admitted with worsening dyspnea, leg swelling, cough.  Found to have hypoxia, leg edema, vasc congestion.  Seen by Cardiology.  Echo shows 55% EF, echogenic mass 1.8 by 2 cm RA.  CT chest done as outpat showed mod sized bilat plerual effusion, enlarged mediastional, hilar, axillary and subpectoral lymph nodes (11/1/24)  Pt feels less dyspneic after treatmen.  Reports poor appetite.  Denies chest pain or palpitations fevers, chills or sweats,  abd pain or vomiting.      PAST MEDICAL & SURGICAL HISTORY:  Diabetes  Hypertension  No significant past surgical history    Allergies  No Known Allergies    REVIEW OF SYSTEMS  Reports dyspnea, legs swelling, cough  Denies headaches or dizziness  Reports nausea, no vomiting  Denies rectal bleeding or melena, hematemesis or hemoptysis dysuria or hematruia      MEDICATIONS  (STANDING):  amLODIPine   Tablet 5 milliGRAM(s) Oral daily  dextrose 5%. 1000 milliLiter(s) (100 mL/Hr) IV Continuous <Continuous>  dextrose 5%. 1000 milliLiter(s) (50 mL/Hr) IV Continuous <Continuous>  dextrose 50% Injectable 25 Gram(s) IV Push once  dextrose 50% Injectable 12.5 Gram(s) IV Push once  dextrose 50% Injectable 25 Gram(s) IV Push once  furosemide   Injectable 60 milliGRAM(s) IV Push two times a day  gabapentin 300 milliGRAM(s) Oral two times a day  glucagon  Injectable 1 milliGRAM(s) IntraMuscular once  heparin   Injectable 5000 Unit(s) SubCutaneous every 8 hours  influenza   Vaccine 0.5 milliLiter(s) IntraMuscular once  levothyroxine 125 MICROGram(s) Oral daily    MEDICATIONS  (PRN):  acetaminophen     Tablet .. 650 milliGRAM(s) Oral every 6 hours PRN Temp greater or equal to 38C (100.4F), Mild Pain (1 - 3)  aluminum hydroxide/magnesium hydroxide/simethicone Suspension 30 milliLiter(s) Oral every 4 hours PRN Dyspepsia  dextrose Oral Gel 15 Gram(s) Oral once PRN Blood Glucose LESS THAN 70 milliGRAM(s)/deciliter  melatonin 3 milliGRAM(s) Oral at bedtime PRN Insomnia  ondansetron Injectable 4 milliGRAM(s) IV Push every 8 hours PRN Nausea and/or Vomiting    Vital Signs Last 24 Hrs  T(C): 36.7 (08 Nov 2024 07:30), Max: 36.9 (07 Nov 2024 20:00)  T(F): 98 (08 Nov 2024 07:30), Max: 98.4 (07 Nov 2024 20:00)  HR: 88 (08 Nov 2024 07:30) (79 - 90)  BP: 125/72 (08 Nov 2024 07:30) (114/61 - 134/88)  BP(mean): --  RR: 18 (08 Nov 2024 07:30) (16 - 18)  SpO2: 96% (08 Nov 2024 07:30) (94% - 98%)    Parameters below as of 08 Nov 2024 07:30  Patient On (Oxygen Delivery Method): room air        PHYSICAL EXAM:  Constitutional:WD over weight, NAD  No adenopathy cervical or axillary appreciated  Anicteric  Lungs with decreased BS at bases  Cor RRR normal S1S2  Abd:  Soft, N-T normoactive BS  Extrem with trace bilateral edema      CBC                        12.0   5.12  )-----------( 393      ( 08 Nov 2024 06:20 )             36.4                             11.2   4.85  )-----------( 346      ( 07 Nov 2024 05:00 )             33.8                           11.8   5.13  )-----------( 360      ( 05 Nov 2024 05:20 )             35.5                             12.1   5.59  )-----------( 309      ( 04 Nov 2024 05:49 )             37.2                           13.0   6.12  )-----------( 338      ( 03 Nov 2024 07:21 )             38.8     CHEM  11-08    137  |  101  |  63.5[H]  ----------------------------<  83  4.6   |  25.0  |  2.67[H]    Ca    9.0      08 Nov 2024 06:20  Phos  4.4     11-08  Mg     2.3     11-08    TPro  6.0  /  Alb  x   /  TBili  x   /  DBili  x   /  AST  x   /  ALT  x   /  AlkPhos  x   11-07 11-07    132[L]  |  97  |  60.7[H]  ----------------------------<  238[H]  4.4   |  22.0  |  2.71[H]    Ca    8.3[L]      07 Nov 2024 05:00  Phos  4.0     11-07  Mg     2.1     11-07    TPro  6.7  /  Alb  3.0[L]  /  TBili  0.2[L]  /  DBili  x   /  AST  54[H]  /  ALT  25  /  AlkPhos  132[H]  11-06 11-05    135  |  97  |  52.1[H]  ----------------------------<  145[H]  4.7   |  23.0  |  3.28[H]    Ca    8.6      05 Nov 2024 05:20  Phos  5.1     11-05  Mg     2.1     11-05    TPro  6.1[L]  /  Alb  2.7[L]  /  TBili  0.3[L]  /  DBili  x   /  AST  11  /  ALT  6   /  AlkPhos  94  11-05 11-04    136  |  100  |  39.9[H]  ----------------------------<  192[H]  4.5   |  20.0[L]  |  3.27[H]    Ca    8.0[L]      04 Nov 2024 05:49  Phos  4.2     11-03  Mg     1.8     11-03    TPro  6.0[L]  /  Alb  2.7[L]  /  TBili  0.3[L]  /  DBili  x   /  AST  10  /  ALT  5   /  AlkPhos  99  11-03 11-03    135  |  99  |  31.7[H]  ----------------------------<  82  3.9   |  23.0  |  2.82[H]    Ca    8.1[L]      03 Nov 2024 07:21  Phos  4.2     11-03  Mg     1.8     11-03    TPro  6.0[L]  /  Alb  2.7[L]  /  TBili  0.3[L]  /  DBili  x   /  AST  10  /  ALT  5   /  AlkPhos  99  11-03      Urinalysis Basic - ( 03 Nov 2024 07:21 )    Color: x / Appearance: x / SG: x / pH: x  Gluc: 82 mg/dL / Ketone: x  / Bili: x / Urobili: x   Blood: x / Protein: x / Nitrite: x   Leuk Esterase: x / RBC: x / WBC x   Sq Epi: x / Non Sq Epi: x / Bacteria: x        RADIOLOGY & ADDITIONAL STUDIES:

## 2024-11-09 LAB
ALBUMIN SERPL ELPH-MCNC: 2.5 G/DL — LOW (ref 3.3–5.2)
ALP SERPL-CCNC: 165 U/L — HIGH (ref 40–120)
ALT FLD-CCNC: 44 U/L — HIGH
ANION GAP SERPL CALC-SCNC: 11 MMOL/L — SIGNIFICANT CHANGE UP (ref 5–17)
AST SERPL-CCNC: 51 U/L — HIGH
BASOPHILS # BLD AUTO: 0.03 K/UL — SIGNIFICANT CHANGE UP (ref 0–0.2)
BASOPHILS NFR BLD AUTO: 0.6 % — SIGNIFICANT CHANGE UP (ref 0–2)
BILIRUB SERPL-MCNC: <0.2 MG/DL — LOW (ref 0.4–2)
BUN SERPL-MCNC: 66.3 MG/DL — HIGH (ref 8–20)
CALCIUM SERPL-MCNC: 8.9 MG/DL — SIGNIFICANT CHANGE UP (ref 8.4–10.5)
CHLORIDE SERPL-SCNC: 101 MMOL/L — SIGNIFICANT CHANGE UP (ref 96–108)
CO2 SERPL-SCNC: 22 MMOL/L — SIGNIFICANT CHANGE UP (ref 22–29)
CREAT SERPL-MCNC: 2.63 MG/DL — HIGH (ref 0.5–1.3)
EGFR: 20 ML/MIN/1.73M2 — LOW
EOSINOPHIL # BLD AUTO: 0.18 K/UL — SIGNIFICANT CHANGE UP (ref 0–0.5)
EOSINOPHIL NFR BLD AUTO: 3.6 % — SIGNIFICANT CHANGE UP (ref 0–6)
GLUCOSE BLDC GLUCOMTR-MCNC: 162 MG/DL — HIGH (ref 70–99)
GLUCOSE BLDC GLUCOMTR-MCNC: 252 MG/DL — HIGH (ref 70–99)
GLUCOSE BLDC GLUCOMTR-MCNC: 270 MG/DL — HIGH (ref 70–99)
GLUCOSE BLDC GLUCOMTR-MCNC: 484 MG/DL — CRITICAL HIGH (ref 70–99)
GLUCOSE BLDC GLUCOMTR-MCNC: 518 MG/DL — CRITICAL HIGH (ref 70–99)
GLUCOSE BLDC GLUCOMTR-MCNC: 58 MG/DL — LOW (ref 70–99)
GLUCOSE BLDC GLUCOMTR-MCNC: 67 MG/DL — LOW (ref 70–99)
GLUCOSE BLDC GLUCOMTR-MCNC: 84 MG/DL — SIGNIFICANT CHANGE UP (ref 70–99)
GLUCOSE SERPL-MCNC: 230 MG/DL — HIGH (ref 70–99)
HBV CORE AB SER-ACNC: SIGNIFICANT CHANGE UP
HBV SURFACE AB SER-ACNC: ABNORMAL
HBV SURFACE AG SER-ACNC: SIGNIFICANT CHANGE UP
HCT VFR BLD CALC: 33.6 % — LOW (ref 34.5–45)
HCV AB S/CO SERPL IA: 0.12 S/CO — SIGNIFICANT CHANGE UP (ref 0–0.99)
HCV AB SERPL-IMP: SIGNIFICANT CHANGE UP
HGB BLD-MCNC: 11 G/DL — LOW (ref 11.5–15.5)
IMM GRANULOCYTES NFR BLD AUTO: 0.4 % — SIGNIFICANT CHANGE UP (ref 0–0.9)
LYMPHOCYTES # BLD AUTO: 1.21 K/UL — SIGNIFICANT CHANGE UP (ref 1–3.3)
LYMPHOCYTES # BLD AUTO: 24.1 % — SIGNIFICANT CHANGE UP (ref 13–44)
MAGNESIUM SERPL-MCNC: 2.2 MG/DL — SIGNIFICANT CHANGE UP (ref 1.6–2.6)
MCHC RBC-ENTMCNC: 26.3 PG — LOW (ref 27–34)
MCHC RBC-ENTMCNC: 32.7 G/DL — SIGNIFICANT CHANGE UP (ref 32–36)
MCV RBC AUTO: 80.4 FL — SIGNIFICANT CHANGE UP (ref 80–100)
MONOCYTES # BLD AUTO: 0.32 K/UL — SIGNIFICANT CHANGE UP (ref 0–0.9)
MONOCYTES NFR BLD AUTO: 6.4 % — SIGNIFICANT CHANGE UP (ref 2–14)
NEUTROPHILS # BLD AUTO: 3.27 K/UL — SIGNIFICANT CHANGE UP (ref 1.8–7.4)
NEUTROPHILS NFR BLD AUTO: 64.9 % — SIGNIFICANT CHANGE UP (ref 43–77)
PHOSPHATE SERPL-MCNC: 3.9 MG/DL — SIGNIFICANT CHANGE UP (ref 2.4–4.7)
PLATELET # BLD AUTO: 358 K/UL — SIGNIFICANT CHANGE UP (ref 150–400)
POTASSIUM SERPL-MCNC: 4.3 MMOL/L — SIGNIFICANT CHANGE UP (ref 3.5–5.3)
POTASSIUM SERPL-SCNC: 4.3 MMOL/L — SIGNIFICANT CHANGE UP (ref 3.5–5.3)
PROT SERPL-MCNC: 5.7 G/DL — LOW (ref 6.6–8.7)
RBC # BLD: 4.18 M/UL — SIGNIFICANT CHANGE UP (ref 3.8–5.2)
RBC # FLD: 12.7 % — SIGNIFICANT CHANGE UP (ref 10.3–14.5)
SODIUM SERPL-SCNC: 134 MMOL/L — LOW (ref 135–145)
WBC # BLD: 5.03 K/UL — SIGNIFICANT CHANGE UP (ref 3.8–10.5)
WBC # FLD AUTO: 5.03 K/UL — SIGNIFICANT CHANGE UP (ref 3.8–10.5)

## 2024-11-09 PROCEDURE — 99233 SBSQ HOSP IP/OBS HIGH 50: CPT

## 2024-11-09 PROCEDURE — 99232 SBSQ HOSP IP/OBS MODERATE 35: CPT

## 2024-11-09 RX ORDER — INSULIN LISPRO 100/ML
8 VIAL (ML) SUBCUTANEOUS ONCE
Refills: 0 | Status: COMPLETED | OUTPATIENT
Start: 2024-11-09 | End: 2024-11-09

## 2024-11-09 RX ORDER — INSULIN LISPRO 100/ML
6 VIAL (ML) SUBCUTANEOUS ONCE
Refills: 0 | Status: COMPLETED | OUTPATIENT
Start: 2024-11-09 | End: 2024-11-09

## 2024-11-09 RX ADMIN — Medication 6: at 12:29

## 2024-11-09 RX ADMIN — Medication 5 MILLIGRAM(S): at 05:57

## 2024-11-09 RX ADMIN — Medication 6: at 09:13

## 2024-11-09 RX ADMIN — HEPARIN SODIUM 5000 UNIT(S): 10000 INJECTION INTRAVENOUS; SUBCUTANEOUS at 05:57

## 2024-11-09 RX ADMIN — Medication 650 MILLIGRAM(S): at 23:58

## 2024-11-09 RX ADMIN — GABAPENTIN 300 MILLIGRAM(S): 300 CAPSULE ORAL at 16:43

## 2024-11-09 RX ADMIN — GABAPENTIN 300 MILLIGRAM(S): 300 CAPSULE ORAL at 05:57

## 2024-11-09 RX ADMIN — HEPARIN SODIUM 5000 UNIT(S): 10000 INJECTION INTRAVENOUS; SUBCUTANEOUS at 14:33

## 2024-11-09 RX ADMIN — HEPARIN SODIUM 5000 UNIT(S): 10000 INJECTION INTRAVENOUS; SUBCUTANEOUS at 21:51

## 2024-11-09 RX ADMIN — Medication 137 MICROGRAM(S): at 05:57

## 2024-11-09 RX ADMIN — Medication 10 UNIT(S): at 09:13

## 2024-11-09 RX ADMIN — Medication 650 MILLIGRAM(S): at 21:53

## 2024-11-09 RX ADMIN — Medication 10 UNIT(S): at 12:30

## 2024-11-09 RX ADMIN — Medication 40 MILLIGRAM(S): at 05:57

## 2024-11-09 RX ADMIN — Medication 2 TABLET(S): at 22:27

## 2024-11-09 RX ADMIN — Medication 6 UNIT(S): at 21:52

## 2024-11-09 RX ADMIN — Medication 100 MILLIGRAM(S): at 07:51

## 2024-11-09 RX ADMIN — Medication 14 UNIT(S): at 21:51

## 2024-11-09 NOTE — PROVIDER CONTACT NOTE (HYPOGLYCEMIA EVENT) - NS PROVIDER CONTACT BACKGROUND-HYPO
Age: 58y    Gender: Female    POCT Blood Glucose:  67 mg/dL (11-09-24 @ 16:10)  58 mg/dL (11-09-24 @ 16:07)  252 mg/dL (11-09-24 @ 11:43)  270 mg/dL (11-09-24 @ 08:49)  169 mg/dL (11-08-24 @ 21:41)  179 mg/dL (11-08-24 @ 17:20)      eMAR:allopurinol   100 milliGRAM(s) Oral (11-09-24 @ 07:51)    insulin glargine Injectable (LANTUS)   14 Unit(s) SubCutaneous (11-08-24 @ 22:24)    insulin lispro (ADMELOG) corrective regimen sliding scale   6 Unit(s) SubCutaneous (11-09-24 @ 12:29)   6 Unit(s) SubCutaneous (11-09-24 @ 09:13)   2 Unit(s) SubCutaneous (11-08-24 @ 17:32)    insulin lispro Injectable (ADMELOG)   10 Unit(s) SubCutaneous (11-09-24 @ 12:30)   10 Unit(s) SubCutaneous (11-09-24 @ 09:13)   10 Unit(s) SubCutaneous (11-08-24 @ 17:32)    levothyroxine   137 MICROGram(s) Oral (11-09-24 @ 05:57)

## 2024-11-09 NOTE — CHART NOTE - NSCHARTNOTEFT_GEN_A_CORE
Nutrition Note:     Source: Patient [x ]  Family [ ]   other [x ] chart/family at bedside     Current Diet:   Diet, Regular (11-09-24 @ 11:49)    Patient reports [ ] nausea  [ ] vomiting [ ] diarrhea [ ] constipation  [ ]chewing problems [ ] swallowing issues  [ ] other:  denies at this time     PO intake:  < 50% [ ]   50-75%  [x ]   %  [x ]  other :    Source for PO intake [x ] Patient [ ] family [ ] chart [ ] staff [ ] other    Current Weight:   (11/8) 172#   (11/4) 135#     % Weight Change: Unclear accuracy of weight 2/2 inconsistency     Pertinent Medications: MEDICATIONS  (STANDING):  dextrose 5%. 1000 milliLiter(s) (50 mL/Hr) IV Continuous <Continuous>  furosemide    Tablet 40 milliGRAM(s) Oral daily  insulin glargine Injectable (LANTUS) 14 Unit(s) SubCutaneous at bedtime  levothyroxine 137 MICROGram(s) Oral daily    Pertinent Labs: CBC Full  -  ( 09 Nov 2024 05:35 )  WBC Count : 5.03 K/uL  RBC Count : 4.18 M/uL  Hemoglobin : 11.0 g/dL  Hematocrit : 33.6 %  Platelet Count - Automated : 358 K/uL  Mean Cell Volume : 80.4 fl  Mean Cell Hemoglobin : 26.3 pg    Skin: no edema documented     Nutrition focused physical exam conducted - found signs of malnutrition [ ]absent [ ]present    Subcutaneous fat loss: [ ] Orbital fat pads region, [ ]Buccal fat region, [ ]Triceps region,  [ ]Ribs region    Muscle wasting: [ ]Temples region, [ ]Clavicle region, [ ]Shoulder region, [ ]Scapula region, [ ]Interosseous region,  [ ]thigh region, [ ]Calf region    Estimated Needs:   [ x] no change since previous assessment  [ ] recalculated:     Current Nutrition Diagnosis: Limited adherence to nutrition-related recommendations related to lack of diet compliance for management of diabetes and hypertension as evidenced by a1c 10.6%, new onset of CHF    59 y/o F w/ PMH HTN, IDDM type I (has medtronic insulin pump), hypothyroid, CXR w/ pulm vascular congestion and small b/l pleural effusions. Admitted for CHF exacerbation. #SHARA suspect multifactorial 2/2 cardiorenal, prerenal azotemia and ALBERT. Baseline Cr 1.09 in 09/2024, 10/31 showed worsening Cr of 1.93 which was likely cardiorenal from acute CHF; cr mildly improved, renal following.    Spoke with pt at bedside with sister at bedside. Fair po intake in house. Pt previously educated by RD on 11/4, however requested diet reinforcement, nutrition education. Printed from nutrition care manual- Heart-Healthy Consistent Carbohydrate Nutrition Therapy, Fluid-Restricted Nutrition Therapy, Acute Kidney Injury Nutrition Therapy. Last BM documented 11/6. RD to remain available.     Recommendations:   1. Monitor weights daily for trend/accuracy  2. Continue diet as tolerated.  3. Rx MVI daily, vit C 500mg  4. Diet reinforcement as needed on follow up     Monitoring and Evaluation:   [ x] PO intake [x ] Tolerance to diet prescription [X] Weights  [X] Follow up per protocol [X] Labs: BUN, creat, eGFR, K , BGM Nutrition Note: Nutrition consult received for education.     Source: Patient [x ]  Family [ ]   other [x ] chart/family at bedside     Current Diet:   Diet, Regular (11-09-24 @ 11:49)    Patient reports [ ] nausea  [ ] vomiting [ ] diarrhea [ ] constipation  [ ]chewing problems [ ] swallowing issues  [ ] other:  denies at this time     PO intake:  < 50% [ ]   50-75%  [x ]   %  [x ]  other :    Source for PO intake [x ] Patient [ ] family [ ] chart [ ] staff [ ] other    Current Weight:   (11/8) 172#   (11/4) 135#     % Weight Change: Unclear accuracy of weight 2/2 inconsistency     Pertinent Medications: MEDICATIONS  (STANDING):  dextrose 5%. 1000 milliLiter(s) (50 mL/Hr) IV Continuous <Continuous>  furosemide    Tablet 40 milliGRAM(s) Oral daily  insulin glargine Injectable (LANTUS) 14 Unit(s) SubCutaneous at bedtime  levothyroxine 137 MICROGram(s) Oral daily    Pertinent Labs: CBC Full  -  ( 09 Nov 2024 05:35 )  WBC Count : 5.03 K/uL  RBC Count : 4.18 M/uL  Hemoglobin : 11.0 g/dL  Hematocrit : 33.6 %  Platelet Count - Automated : 358 K/uL  Mean Cell Volume : 80.4 fl  Mean Cell Hemoglobin : 26.3 pg    Skin: no edema documented     Nutrition focused physical exam conducted - found signs of malnutrition [ ]absent [ ]present    Subcutaneous fat loss: [ ] Orbital fat pads region, [ ]Buccal fat region, [ ]Triceps region,  [ ]Ribs region    Muscle wasting: [ ]Temples region, [ ]Clavicle region, [ ]Shoulder region, [ ]Scapula region, [ ]Interosseous region,  [ ]thigh region, [ ]Calf region    Estimated Needs:   [ x] no change since previous assessment  [ ] recalculated:     Current Nutrition Diagnosis: Limited adherence to nutrition-related recommendations related to lack of diet compliance for management of diabetes and hypertension as evidenced by a1c 10.6%, new onset of CHF    59 y/o F w/ PMH HTN, IDDM type I (has medtronic insulin pump), hypothyroid, CXR w/ pulm vascular congestion and small b/l pleural effusions. Admitted for CHF exacerbation. #SHARA suspect multifactorial 2/2 cardiorenal, prerenal azotemia and ALBERT. Baseline Cr 1.09 in 09/2024, 10/31 showed worsening Cr of 1.93 which was likely cardiorenal from acute CHF; cr mildly improved, renal following.    Spoke with pt at bedside with sister at bedside. Fair po intake in house. Pt previously educated by RD on 11/4, however requested diet reinforcement, nutrition education. Printed from nutrition care manual- Heart-Healthy Consistent Carbohydrate Nutrition Therapy, Fluid-Restricted Nutrition Therapy, Acute Kidney Injury Nutrition Therapy. Last BM documented 11/6. RD to remain available.     Recommendations:   1. Monitor weights daily for trend/accuracy  2. Continue diet as tolerated.  3. Rx MVI daily, vit C 500mg  4. Diet reinforcement as needed on follow up     Monitoring and Evaluation:   [ x] PO intake [x ] Tolerance to diet prescription [X] Weights  [X] Follow up per protocol [X] Labs: BUN, creat, eGFR, K , BGM

## 2024-11-09 NOTE — PROGRESS NOTE ADULT - ASSESSMENT
58 YOF HTN, DM presented with gradually progressive NIXON and LE edema.  Nephrology consulted for acute kidney injury.  Chest x-ray with pulmonary vascular congestion and small bilateral pleural effusion.    Acute kidney injury on chronic kidney disease:   ·	Baseline likely 1.1 (per EMR in September 24, i do not have acces to those records, will recheck)  ·	Patient is s/p CT with contrast to rule out PE 1 day prior to admission, also took aleve for 4-6 days prior to admission   ·	Serum creatinine improving after hitting a plateau down today to 2.63 mg/dl  ·	UA with 100 protein, large blood, pyuria.  UPCR 2.5  ·	Renal ultrasound showing nonobstructive bilateral calculi, no hydronephrosis  ·	Acute kidney injury possibly from contrast-induced nephropathy, ? AIN from NSAID's, AGN (low complements)  ·	possible underlying CKD w/ proteinuria from DM  ·	Low complements both C3/C4, pending MARGI, pending ANCA   ·	serum creatinine is slowly improving taking a protracted course.  Discussed with patient and family at bedside that we can continue to observe conservatively with follow-up labs 5 to 7 days postdischarge versus aggressive with the kidney biopsy.  Risk versus benefit for both the approaches were discussed in detail.  Patient to process the information and we will continue to build upon tomorrow.  Even if biopsy is choosen that can also happen as an outpatient.    Edema improved, continue with current dose of Lasix 40 mg p.o. daily  Nephrolithiasis: Bilateral, outpatient urology follow-up.  Diabetes mellitus, uncontrolled: A1c 10.6%.

## 2024-11-09 NOTE — PROGRESS NOTE ADULT - ASSESSMENT
59 y/o F w/ PMH HTN, IDDM type I (has medtronic insulin pump), hypothyroid, neuropathies presented to ED c/o 2 weeks of NIXON w/ associated worsening LE edema, nonproductive cough and orthopnea and occasional L-chest pain that is like a burning sensation and only occurs when she lays on her left side.  Clinically hypervolemic w/ scattered rales and b/l LE pretibial and pedal pitting edema. Initial w/u significant for trop 272-->258, BNP 2503, Cr 2.16. CXR w/ pulm vascular congestion and small b/l pleural effusions. Admitted for CHF exacerbation and is now resolved    #New acute decompensated HFpEF  #Abnormal TTE finding  - CTA PE study 11/01 significant for pulm vascular congestion and b/l pleural effusions but negative PE   - TTE 11/02 w/ LVEF 55-60% no WMA reported; Echogenic mass 1.8 cm x 2.0 cm within the lateral wall of the RA potential need for KAROLINA to further characterize mass  - TSH 7.12, A1c 10.6, LDL 84   - KAROLINA done and no mass visualized   - continue lasix 40mg daily PO     #Type II MI, likely demand 2/2 above vs poor renal clearance  - Trops down trending (272-->258)  - continue on telemetry   - TTE 11/02 w/ LVEF 55-60% no WMA, incidental findings as above   - Cardiology following    #SHARA suspect multifactorial 2/2 cardiorenal, prerenal azotemia and ALBERT   - Baseline Cr 1.09 in 09/2024 but 10/31 showed worsening Cr of 1.93 which was likely cardiorenal from acute CHF  - cr mildly improved  - renal following   - Renal US shows non obstructing b/l calculi, no hydronephrosis and b/l increased renal echotexture compatible with medical renal disease  - may need eventual renal bx as per renal     #Incidental CTA chest findings   - Reported to have multiple enlarged lymph nodes, some new and some seen on CT chest from 2023 and are only slight larger in size at this time  - Explained to daughter and pt that she should have a repeat CT chest in 1-3 months to reassess lymph nodes and should f/u with heme/onc outpt     #IDDM Type I w/ neuropathies  - A1c elevaed 10.6   - Has medtronic insulin pump  - Endocrine following, insulin pump form provided, endocrine team to assist with pump management   - Hypoglycemic protocol in place  - c/w gabapentin   -  Lantus 14u and lispro 8u TID as per Endo  - Will need to transition back to insulin pump upon discharge    #HTN  - c/w amlodipine     #Hypothyroid  - c/w synthroid increased to 137mcg  - TSH 7.12       VTE ppx: heparin sq  Dispo: acute.  Anticipate d/c home in 1-2 days

## 2024-11-09 NOTE — PROGRESS NOTE ADULT - SUBJECTIVE AND OBJECTIVE BOX
Reason for visit: SHARA    Subjective: Patient reports feeling better, she denied chest pain, shortness of breath, urinary complaints.  Reports having good urinary output.  No acute overnight event. Swelling improved.  Review of systems: All systems were reviewed in detail, pertinent positive and negative have been mentioned above, otherwise negative.    Physical Exam:  Gen: no acute distress  MS: alert, conversing normally  HENT: NCAT, dryMM  CV: S1-S2 normal, no LE edema  Chest: CTAB, no w/r/r,  Abd: soft, NT, ND  Skin: dry, warm, no rash or jaundice    Vital Signs Last 24 Hrs  T(C): 36.8 (09 Nov 2024 08:00), Max: 37 (09 Nov 2024 05:53)  T(F): 98.2 (09 Nov 2024 08:00), Max: 98.6 (09 Nov 2024 05:53)  HR: 93 (09 Nov 2024 08:00) (90 - 96)  BP: 133/70 (09 Nov 2024 08:00) (122/69 - 133/70)  BP(mean): --  RR: 18 (09 Nov 2024 08:00) (18 - 18)  SpO2: 97% (09 Nov 2024 08:00) (96% - 97%)    Parameters below as of 09 Nov 2024 08:00  Patient On (Oxygen Delivery Method): room air      I&O's Summary    08 Nov 2024 07:01  -  09 Nov 2024 07:00  --------------------------------------------------------  IN: 350 mL / OUT: 0 mL / NET: 350 mL    09 Nov 2024 07:01  -  09 Nov 2024 15:06  --------------------------------------------------------  IN: 444 mL / OUT: 0 mL / NET: 444 mL      Current Antibiotics:    Other medications:  allopurinol 100 milliGRAM(s) Oral daily  amLODIPine   Tablet 5 milliGRAM(s) Oral daily  dextrose 5%. 1000 milliLiter(s) IV Continuous <Continuous>  dextrose 5%. 1000 milliLiter(s) IV Continuous <Continuous>  dextrose 50% Injectable 25 Gram(s) IV Push once  dextrose 50% Injectable 12.5 Gram(s) IV Push once  dextrose 50% Injectable 25 Gram(s) IV Push once  furosemide    Tablet 40 milliGRAM(s) Oral daily  gabapentin 300 milliGRAM(s) Oral two times a day  glucagon  Injectable 1 milliGRAM(s) IntraMuscular once  heparin   Injectable 5000 Unit(s) SubCutaneous every 8 hours  influenza   Vaccine 0.5 milliLiter(s) IntraMuscular once  insulin glargine Injectable (LANTUS) 14 Unit(s) SubCutaneous at bedtime  insulin lispro (ADMELOG) corrective regimen sliding scale   SubCutaneous at bedtime  insulin lispro (ADMELOG) corrective regimen sliding scale   SubCutaneous three times a day before meals  insulin lispro Injectable (ADMELOG) 10 Unit(s) SubCutaneous three times a day before meals  levothyroxine 137 MICROGram(s) Oral daily    11-09    134[L]  |  101  |  66.3[H]  ----------------------------<  230[H]  4.3   |  22.0  |  2.63[H]    Ca    8.9      09 Nov 2024 05:35  Phos  3.9     11-09  Mg     2.2     11-09    TPro  5.7[L]  /  Alb  2.5[L]  /  TBili  <0.2[L]  /  DBili  x   /  AST  51[H]  /  ALT  44[H]  /  AlkPhos  165[H]  11-09    Creatinine: 2.63 mg/dL (11-09-24 @ 05:35)  Creatinine: 2.67 mg/dL (11-08-24 @ 06:20)  Creatinine: 2.71 mg/dL (11-07-24 @ 05:00)  Creatinine: 2.86 mg/dL (11-06-24 @ 10:35)  Creatinine: 3.28 mg/dL (11-05-24 @ 05:20)                          11.0   5.03  )-----------( 358      ( 09 Nov 2024 05:35 )             33.6

## 2024-11-09 NOTE — PROGRESS NOTE ADULT - SUBJECTIVE AND OBJECTIVE BOX
GILMAR KING    [id number]    58y      Female    INTERVAL HPI/OVERNIGHT EVENTS:  patient being seen for dm2, chf and romy. patient seen at bedside and denies any complaints      REVIEW OF SYSTEMS:    CONSTITUTIONAL: No fever, weight loss, or fatigue  RESPIRATORY: No cough, wheezing, hemoptysis; No shortness of breath  CARDIOVASCULAR: No chest pain, palpitations  GASTROINTESTINAL: No abdominal or epigastric pain. No nausea, vomiting  NEUROLOGICAL: No headaches, memory loss, loss of strength.  MISCELLANEOUS:      Vital Signs Last 24 Hrs  T(C): 36.8 (09 Nov 2024 08:00), Max: 37 (09 Nov 2024 05:53)  T(F): 98.2 (09 Nov 2024 08:00), Max: 98.6 (09 Nov 2024 05:53)  HR: 93 (09 Nov 2024 08:00) (90 - 96)  BP: 133/70 (09 Nov 2024 08:00) (122/69 - 133/70)  BP(mean): --  RR: 18 (09 Nov 2024 08:00) (18 - 18)  SpO2: 97% (09 Nov 2024 08:00) (96% - 97%)    Parameters below as of 09 Nov 2024 08:00  Patient On (Oxygen Delivery Method): room air        PHYSICAL EXAM:    GENERAL: pt examined bedside, laying comfortably in bed in NAD  HEENT: NC/AT, moist oral mucosa, clear conjunctiva, sclera nonicteric  RESPIRATORY: decreased breath sounds in B/L bases, otherwise minimal crackles noted   CARDIOVASCULAR: RRR, normal S1 and S2, no murmur/rub/gallop  ABDOMEN: soft, NT/ND, +bowel sounds, no rebound/guarding  MSK: No joint deformities, edema, erythema  EXTREMITIES: No cynaosis, no clubbing, minimal swelling today  PSYCH: affect appropriate and cooperative  NEUROLOGY: A+O to person, place, and time, no focal neurologic deficits appreciated  SKIN: No rashes or no palpable lesions      LABS:                        11.0   5.03  )-----------( 358      ( 09 Nov 2024 05:35 )             33.6     11-09    134[L]  |  101  |  66.3[H]  ----------------------------<  230[H]  4.3   |  22.0  |  2.63[H]    Ca    8.9      09 Nov 2024 05:35  Phos  3.9     11-09  Mg     2.2     11-09    TPro  5.7[L]  /  Alb  2.5[L]  /  TBili  <0.2[L]  /  DBili  x   /  AST  51[H]  /  ALT  44[H]  /  AlkPhos  165[H]  11-09      Urinalysis Basic - ( 09 Nov 2024 05:35 )    Color: x / Appearance: x / SG: x / pH: x  Gluc: 230 mg/dL / Ketone: x  / Bili: x / Urobili: x   Blood: x / Protein: x / Nitrite: x   Leuk Esterase: x / RBC: x / WBC x   Sq Epi: x / Non Sq Epi: x / Bacteria: x          MEDICATIONS  (STANDING):  allopurinol 100 milliGRAM(s) Oral daily  amLODIPine   Tablet 5 milliGRAM(s) Oral daily  dextrose 5%. 1000 milliLiter(s) (100 mL/Hr) IV Continuous <Continuous>  dextrose 5%. 1000 milliLiter(s) (50 mL/Hr) IV Continuous <Continuous>  dextrose 50% Injectable 25 Gram(s) IV Push once  dextrose 50% Injectable 12.5 Gram(s) IV Push once  dextrose 50% Injectable 25 Gram(s) IV Push once  furosemide    Tablet 40 milliGRAM(s) Oral daily  gabapentin 300 milliGRAM(s) Oral two times a day  glucagon  Injectable 1 milliGRAM(s) IntraMuscular once  heparin   Injectable 5000 Unit(s) SubCutaneous every 8 hours  influenza   Vaccine 0.5 milliLiter(s) IntraMuscular once  insulin glargine Injectable (LANTUS) 14 Unit(s) SubCutaneous at bedtime  insulin lispro (ADMELOG) corrective regimen sliding scale   SubCutaneous three times a day before meals  insulin lispro (ADMELOG) corrective regimen sliding scale   SubCutaneous at bedtime  insulin lispro Injectable (ADMELOG) 10 Unit(s) SubCutaneous three times a day before meals  levothyroxine 137 MICROGram(s) Oral daily    MEDICATIONS  (PRN):  acetaminophen     Tablet .. 650 milliGRAM(s) Oral every 6 hours PRN Temp greater or equal to 38C (100.4F), Mild Pain (1 - 3)  aluminum hydroxide/magnesium hydroxide/simethicone Suspension 30 milliLiter(s) Oral every 4 hours PRN Dyspepsia  dextrose Oral Gel 15 Gram(s) Oral once PRN Blood Glucose LESS THAN 70 milliGRAM(s)/deciliter  melatonin 3 milliGRAM(s) Oral at bedtime PRN Insomnia  ondansetron Injectable 4 milliGRAM(s) IV Push every 8 hours PRN Nausea and/or Vomiting  senna 2 Tablet(s) Oral at bedtime PRN Constipation      RADIOLOGY & ADDITIONAL TESTS:

## 2024-11-10 LAB
ALBUMIN SERPL ELPH-MCNC: 2.8 G/DL — LOW (ref 3.3–5.2)
ALP SERPL-CCNC: 179 U/L — HIGH (ref 40–120)
ALT FLD-CCNC: 41 U/L — HIGH
AST SERPL-CCNC: 35 U/L — HIGH
BILIRUB SERPL-MCNC: <0.2 MG/DL — LOW (ref 0.4–2)
BUN SERPL-MCNC: 64.6 MG/DL — HIGH (ref 8–20)
CALCIUM SERPL-MCNC: 9 MG/DL — SIGNIFICANT CHANGE UP (ref 8.4–10.5)
CHLORIDE SERPL-SCNC: 101 MMOL/L — SIGNIFICANT CHANGE UP (ref 96–108)
CO2 SERPL-SCNC: 19 MMOL/L — LOW (ref 22–29)
CREAT SERPL-MCNC: 2.43 MG/DL — HIGH (ref 0.5–1.3)
EGFR: 22 ML/MIN/1.73M2 — LOW
GLUCOSE BLDC GLUCOMTR-MCNC: 114 MG/DL — HIGH (ref 70–99)
GLUCOSE BLDC GLUCOMTR-MCNC: 149 MG/DL — HIGH (ref 70–99)
GLUCOSE BLDC GLUCOMTR-MCNC: 163 MG/DL — HIGH (ref 70–99)
GLUCOSE BLDC GLUCOMTR-MCNC: 253 MG/DL — HIGH (ref 70–99)
GLUCOSE SERPL-MCNC: 207 MG/DL — HIGH (ref 70–99)
MAGNESIUM SERPL-MCNC: 2.2 MG/DL — SIGNIFICANT CHANGE UP (ref 1.6–2.6)
POTASSIUM SERPL-MCNC: 4.2 MMOL/L — SIGNIFICANT CHANGE UP (ref 3.5–5.3)
POTASSIUM SERPL-SCNC: 4.2 MMOL/L — SIGNIFICANT CHANGE UP (ref 3.5–5.3)
PROT SERPL-MCNC: 6.1 G/DL — LOW (ref 6.6–8.7)
SODIUM SERPL-SCNC: 135 MMOL/L — SIGNIFICANT CHANGE UP (ref 135–145)

## 2024-11-10 PROCEDURE — 99233 SBSQ HOSP IP/OBS HIGH 50: CPT

## 2024-11-10 PROCEDURE — 99231 SBSQ HOSP IP/OBS SF/LOW 25: CPT

## 2024-11-10 RX ADMIN — Medication 100 MILLIGRAM(S): at 11:22

## 2024-11-10 RX ADMIN — Medication 6: at 12:29

## 2024-11-10 RX ADMIN — Medication 137 MICROGRAM(S): at 05:23

## 2024-11-10 RX ADMIN — Medication 30 MILLILITER(S): at 19:57

## 2024-11-10 RX ADMIN — GABAPENTIN 300 MILLIGRAM(S): 300 CAPSULE ORAL at 17:49

## 2024-11-10 RX ADMIN — Medication 14 UNIT(S): at 22:44

## 2024-11-10 RX ADMIN — Medication 10 UNIT(S): at 17:27

## 2024-11-10 RX ADMIN — Medication 2: at 17:26

## 2024-11-10 RX ADMIN — Medication 10 UNIT(S): at 12:29

## 2024-11-10 RX ADMIN — Medication 5 MILLIGRAM(S): at 05:22

## 2024-11-10 RX ADMIN — Medication 8 UNIT(S): at 00:01

## 2024-11-10 RX ADMIN — HEPARIN SODIUM 5000 UNIT(S): 10000 INJECTION INTRAVENOUS; SUBCUTANEOUS at 05:22

## 2024-11-10 RX ADMIN — GABAPENTIN 300 MILLIGRAM(S): 300 CAPSULE ORAL at 05:22

## 2024-11-10 RX ADMIN — HEPARIN SODIUM 5000 UNIT(S): 10000 INJECTION INTRAVENOUS; SUBCUTANEOUS at 13:13

## 2024-11-10 RX ADMIN — Medication 40 MILLIGRAM(S): at 05:22

## 2024-11-10 RX ADMIN — HEPARIN SODIUM 5000 UNIT(S): 10000 INJECTION INTRAVENOUS; SUBCUTANEOUS at 22:44

## 2024-11-10 RX ADMIN — Medication 10 UNIT(S): at 08:23

## 2024-11-10 NOTE — PROGRESS NOTE ADULT - ASSESSMENT
57 y/o F w/ PMH HTN, IDDM type I (has medtronic insulin pump), hypothyroid, neuropathies presented to ED c/o 2 weeks of NIXON w/ associated worsening LE edema, nonproductive cough and orthopnea and occasional L-chest pain that is like a burning sensation and only occurs when she lays on her left side.  Clinically hypervolemic w/ scattered rales and b/l LE pretibial and pedal pitting edema. Initial w/u significant for trop 272-->258, BNP 2503, Cr 2.16. CXR w/ pulm vascular congestion and small b/l pleural effusions. Admitted for CHF exacerbation and is now resolved    #New acute decompensated HFpEF  #Abnormal TTE finding  - CTA PE study 11/01 significant for pulm vascular congestion and b/l pleural effusions but negative PE   - TTE 11/02 w/ LVEF 55-60% no WMA reported; Echogenic mass 1.8 cm x 2.0 cm within the lateral wall of the RA potential need for KAROLINA to further characterize mass  - TSH 7.12, A1c 10.6, LDL 84   - KAROLINA done and no mass visualized   - continue lasix 40mg daily PO     #Type II MI, likely demand 2/2 above vs poor renal clearance  - Trops down trending (272-->258)  - continue on telemetry   - TTE 11/02 w/ LVEF 55-60% no WMA, incidental findings as above   - Cardiology following    #SHARA suspect multifactorial 2/2 cardiorenal, prerenal azotemia and ALBERT   - Baseline Cr 1.09 in 09/2024 but 10/31 showed worsening Cr of 1.93 which was likely cardiorenal from acute CHF  - cr imprved  - renal following   - Renal US shows non obstructing b/l calculi, no hydronephrosis and b/l increased renal echotexture compatible with medical renal disease  - may need eventual renal bx as per renal as outpatient     #Incidental CTA chest findings   - Reported to have multiple enlarged lymph nodes, some new and some seen on CT chest from 2023 and are only slight larger in size at this time  - Explained to daughter and pt that she should have a repeat CT chest in 1-3 months to reassess lymph nodes and should f/u with heme/onc outpt     #IDDM Type I w/ neuropathies  - A1c elevaed 10.6   - Has medtronic insulin pump  - Endocrine following, insulin pump form provided, endocrine team to assist with pump management   - Hypoglycemic protocol in place  - c/w gabapentin   -  Lantus 14u and lispro 8u TID as per Endo  - Will need to transition back to insulin pump upon discharge  advised diet and exercise    #HTN  - c/w amlodipine     #Hypothyroid  - c/w synthroid increased to 137mcg  - TSH 7.12       VTE ppx: heparin sq  Dispo: acute.  Anticipate d/c home in 1-2 days

## 2024-11-10 NOTE — PROGRESS NOTE ADULT - SUBJECTIVE AND OBJECTIVE BOX
Reason for visit: SHARA    Subjective: Patient reports feeling ok, she denied chest pain, shortness of breath, urinary complaints.  Reports having good urinary output.  No acute overnight event.   Review of systems: All systems were reviewed in detail, pertinent positive and negative have been mentioned above, otherwise negative.    Physical Exam:  Gen: no acute distress  MS: alert, conversing normally  HENT: NCAT, MMM  CV: S1-S2 normal, no LE edema  Chest: CTAB, no w/r/r,  Abd: soft, NT, ND  Skin: dry, warm, no rash or jaundice    Vital Signs Last 24 Hrs  T(C): 36.6 (10 Nov 2024 00:00), Max: 36.6 (09 Nov 2024 16:30)  T(F): 97.8 (10 Nov 2024 00:00), Max: 97.9 (09 Nov 2024 16:30)  HR: 78 (10 Nov 2024 05:06) (78 - 96)  BP: 123/74 (10 Nov 2024 05:06) (100/58 - 132/77)  RR: 18 (09 Nov 2024 16:30) (18 - 18)  SpO2: 97% (09 Nov 2024 16:30) (97% - 97%)  Parameters below as of 09 Nov 2024 16:30  Patient On (Oxygen Delivery Method): room air    I&O's Summary    09 Nov 2024 07:01  -  10 Nov 2024 07:00  --------------------------------------------------------  IN: 444 mL / OUT: 0 mL / NET: 444 mL    Current medications:  allopurinol 100 milliGRAM(s) Oral daily  amLODIPine   Tablet 5 milliGRAM(s) Oral daily  dextrose 5%. 1000 milliLiter(s) IV Continuous <Continuous>  dextrose 5%. 1000 milliLiter(s) IV Continuous <Continuous>  dextrose 50% Injectable 25 Gram(s) IV Push once  dextrose 50% Injectable 12.5 Gram(s) IV Push once  dextrose 50% Injectable 25 Gram(s) IV Push once  furosemide    Tablet 40 milliGRAM(s) Oral daily  gabapentin 300 milliGRAM(s) Oral two times a day  glucagon  Injectable 1 milliGRAM(s) IntraMuscular once  heparin   Injectable 5000 Unit(s) SubCutaneous every 8 hours  influenza   Vaccine 0.5 milliLiter(s) IntraMuscular once  insulin glargine Injectable (LANTUS) 14 Unit(s) SubCutaneous at bedtime  insulin lispro (ADMELOG) corrective regimen sliding scale   SubCutaneous at bedtime  insulin lispro (ADMELOG) corrective regimen sliding scale   SubCutaneous three times a day before meals  insulin lispro Injectable (ADMELOG) 10 Unit(s) SubCutaneous three times a day before meals  levothyroxine 137 MICROGram(s) Oral daily    11-10    135  |  101  |  64.6[H]  ----------------------------<  207[H]  4.2   |  19.0[L]  |  2.43[H]    Ca    9.0      10 Nov 2024 04:00  Phos  3.9     11-09  Mg     2.2     11-10    TPro  6.1[L]  /  Alb  2.8[L]  /  TBili  <0.2[L]  /  DBili  x   /  AST  35[H]  /  ALT  41[H]  /  AlkPhos  179[H]  11-10    Creatinine: 2.43 mg/dL (11-10-24 @ 04:00)  Creatinine: 2.63 mg/dL (11-09-24 @ 05:35)  Creatinine: 2.67 mg/dL (11-08-24 @ 06:20)  Creatinine: 2.71 mg/dL (11-07-24 @ 05:00)  Creatinine: 2.86 mg/dL (11-06-24 @ 10:35)                          11.0   5.03  )-----------( 358      ( 09 Nov 2024 05:35 )             33.6

## 2024-11-10 NOTE — PROGRESS NOTE ADULT - SUBJECTIVE AND OBJECTIVE BOX
GILMAR KING    [id number]    58y      Female    INTERVAL HPI/OVERNIGHT EVENTS: patient being seen for dm2, chf and romy. patient seen at bedside and denies any complaints.       REVIEW OF SYSTEMS:    CONSTITUTIONAL: No fever, weight loss, or fatigue  RESPIRATORY: No cough, wheezing, hemoptysis; No shortness of breath  CARDIOVASCULAR: No chest pain, palpitations  GASTROINTESTINAL: No abdominal or epigastric pain. No nausea, vomiting  NEUROLOGICAL: No headaches, memory loss, loss of strength.  MISCELLANEOUS:      Vital Signs Last 24 Hrs  T(C): 36.6 (10 Nov 2024 00:00), Max: 36.6 (09 Nov 2024 16:30)  T(F): 97.8 (10 Nov 2024 00:00), Max: 97.9 (09 Nov 2024 16:30)  HR: 78 (10 Nov 2024 05:06) (78 - 96)  BP: 123/74 (10 Nov 2024 05:06) (100/58 - 132/77)  BP(mean): --  RR: 18 (09 Nov 2024 16:30) (18 - 18)  SpO2: 97% (09 Nov 2024 16:30) (97% - 97%)    Parameters below as of 09 Nov 2024 16:30  Patient On (Oxygen Delivery Method): room air        PHYSICAL EXAM:  GENERAL: pt examined bedside, laying comfortably in bed in NAD  HEENT: NC/AT, moist oral mucosa, clear conjunctiva, sclera nonicteric  RESPIRATORY: decreased breath sounds in B/L bases, otherwise minimal crackles noted   CARDIOVASCULAR: RRR, normal S1 and S2, no murmur/rub/gallop  ABDOMEN: soft, NT/ND, +bowel sounds, no rebound/guarding  MSK: No joint deformities, edema, erythema  EXTREMITIES: No cynaosis, no clubbing  PSYCH: affect appropriate and cooperative  NEUROLOGY: A+O to person, place, and time, no focal neurologic deficits appreciated  SKIN: No rashes or no palpable lesions      LABS:                        11.0   5.03  )-----------( 358      ( 09 Nov 2024 05:35 )             33.6     11-10    135  |  101  |  64.6[H]  ----------------------------<  207[H]  4.2   |  19.0[L]  |  2.43[H]    Ca    9.0      10 Nov 2024 04:00  Phos  3.9     11-09  Mg     2.2     11-10    TPro  6.1[L]  /  Alb  2.8[L]  /  TBili  <0.2[L]  /  DBili  x   /  AST  35[H]  /  ALT  41[H]  /  AlkPhos  179[H]  11-10      Urinalysis Basic - ( 10 Nov 2024 04:00 )    Color: x / Appearance: x / SG: x / pH: x  Gluc: 207 mg/dL / Ketone: x  / Bili: x / Urobili: x   Blood: x / Protein: x / Nitrite: x   Leuk Esterase: x / RBC: x / WBC x   Sq Epi: x / Non Sq Epi: x / Bacteria: x          MEDICATIONS  (STANDING):  allopurinol 100 milliGRAM(s) Oral daily  amLODIPine   Tablet 5 milliGRAM(s) Oral daily  dextrose 5%. 1000 milliLiter(s) (100 mL/Hr) IV Continuous <Continuous>  dextrose 5%. 1000 milliLiter(s) (50 mL/Hr) IV Continuous <Continuous>  dextrose 50% Injectable 25 Gram(s) IV Push once  dextrose 50% Injectable 12.5 Gram(s) IV Push once  dextrose 50% Injectable 25 Gram(s) IV Push once  furosemide    Tablet 40 milliGRAM(s) Oral daily  gabapentin 300 milliGRAM(s) Oral two times a day  glucagon  Injectable 1 milliGRAM(s) IntraMuscular once  heparin   Injectable 5000 Unit(s) SubCutaneous every 8 hours  influenza   Vaccine 0.5 milliLiter(s) IntraMuscular once  insulin glargine Injectable (LANTUS) 14 Unit(s) SubCutaneous at bedtime  insulin lispro (ADMELOG) corrective regimen sliding scale   SubCutaneous at bedtime  insulin lispro (ADMELOG) corrective regimen sliding scale   SubCutaneous three times a day before meals  insulin lispro Injectable (ADMELOG) 10 Unit(s) SubCutaneous three times a day before meals  levothyroxine 137 MICROGram(s) Oral daily    MEDICATIONS  (PRN):  acetaminophen     Tablet .. 650 milliGRAM(s) Oral every 6 hours PRN Temp greater or equal to 38C (100.4F), Mild Pain (1 - 3)  aluminum hydroxide/magnesium hydroxide/simethicone Suspension 30 milliLiter(s) Oral every 4 hours PRN Dyspepsia  dextrose Oral Gel 15 Gram(s) Oral once PRN Blood Glucose LESS THAN 70 milliGRAM(s)/deciliter  melatonin 3 milliGRAM(s) Oral at bedtime PRN Insomnia  ondansetron Injectable 4 milliGRAM(s) IV Push every 8 hours PRN Nausea and/or Vomiting  senna 2 Tablet(s) Oral at bedtime PRN Constipation      RADIOLOGY & ADDITIONAL TESTS:

## 2024-11-10 NOTE — PROGRESS NOTE ADULT - ASSESSMENT
58 YOF HTN, DM presented with gradually progressive NIXON and LE edema.  Nephrology consulted for acute kidney injury.  Chest x-ray with pulmonary vascular congestion and small bilateral pleural effusion.    Acute kidney injury on chronic kidney disease:   ·	Baseline likely 1.1 (per EMR in September 24)  ·	Patient is s/p CT with contrast to rule out PE 1 day prior to admission, also took aleve for many days prior to admission   ·	Serum creatinine improving after hitting a plateau down today to 2.43 mg/dl  ·	UA with 100 protein, large blood, pyuria.  UPCR 2.5  ·	Renal ultrasound showing nonobstructive bilateral calculi, no hydronephrosis  ·	Acute kidney injury possibly from contrast-induced nephropathy, ? AIN from NSAID's, ?AGN (low complements)  ·	Possible underlying CKD w/ proteinuria from DM  ·	Low complements both C3/C4, pending MARGI, pending ANCA   ·	serum creatinine is slowly improving though taking a protracted course.  Discussed with patient and family at bedside yesterday that we can continue to observe conservatively with follow-up labs 5 to 7 days postdischarge versus aggressive with the kidney biopsy.  Risk versus benefit for both the approaches were discussed in detail.  Patient wishes to opt for the former approach. Arrange for a f/u as an outpatient 7-10 days from now w/ a repeat BMP, UA and UACR.    Edema improved, continue with current dose of Lasix 40 mg p.o. daily  Nephrolithiasis: Bilateral, outpatient urology follow-up.  Metabolic acidosis: Recommend to discharge on sodium bicarbonate 650 mg p.o. twice daily till followed up by nephrology as an outpatient.  Diabetes mellitus, uncontrolled: A1c 10.6%.Close follow-up with endocrinology.

## 2024-11-11 ENCOUNTER — TRANSCRIPTION ENCOUNTER (OUTPATIENT)
Age: 59
End: 2024-11-11

## 2024-11-11 VITALS
OXYGEN SATURATION: 95 % | TEMPERATURE: 99 F | DIASTOLIC BLOOD PRESSURE: 60 MMHG | RESPIRATION RATE: 17 BRPM | HEART RATE: 94 BPM | SYSTOLIC BLOOD PRESSURE: 106 MMHG

## 2024-11-11 LAB
ALBUMIN SERPL ELPH-MCNC: 2.7 G/DL — LOW (ref 3.3–5.2)
ALP SERPL-CCNC: 176 U/L — HIGH (ref 40–120)
ALT FLD-CCNC: 33 U/L — HIGH
ANA PAT FLD IF-IMP: ABNORMAL
ANA PATTERN 2: ABNORMAL
ANA TITR SER: ABNORMAL
ANION GAP SERPL CALC-SCNC: 13 MMOL/L — SIGNIFICANT CHANGE UP (ref 5–17)
ANTI NUCLEAR FACTOR TITER 2: ABNORMAL
AST SERPL-CCNC: 24 U/L — SIGNIFICANT CHANGE UP
AUTO DIFF PNL BLD: ABNORMAL
BILIRUB SERPL-MCNC: 0.2 MG/DL — LOW (ref 0.4–2)
BUN SERPL-MCNC: 60 MG/DL — HIGH (ref 8–20)
C-ANCA SER-ACNC: NEGATIVE — SIGNIFICANT CHANGE UP
CALCIUM SERPL-MCNC: 8.6 MG/DL — SIGNIFICANT CHANGE UP (ref 8.4–10.5)
CHLORIDE SERPL-SCNC: 101 MMOL/L — SIGNIFICANT CHANGE UP (ref 96–108)
CO2 SERPL-SCNC: 21 MMOL/L — LOW (ref 22–29)
CREAT SERPL-MCNC: 2.25 MG/DL — HIGH (ref 0.5–1.3)
EGFR: 25 ML/MIN/1.73M2 — LOW
GLUCOSE BLDC GLUCOMTR-MCNC: 232 MG/DL — HIGH (ref 70–99)
GLUCOSE BLDC GLUCOMTR-MCNC: 429 MG/DL — HIGH (ref 70–99)
GLUCOSE SERPL-MCNC: 379 MG/DL — HIGH (ref 70–99)
MAGNESIUM SERPL-MCNC: 2.4 MG/DL — SIGNIFICANT CHANGE UP (ref 1.6–2.6)
MPO AB + PR3 PNL SER: SIGNIFICANT CHANGE UP
P-ANCA SER-ACNC: NEGATIVE — SIGNIFICANT CHANGE UP
POTASSIUM SERPL-MCNC: 5.3 MMOL/L — SIGNIFICANT CHANGE UP (ref 3.5–5.3)
POTASSIUM SERPL-SCNC: 5.3 MMOL/L — SIGNIFICANT CHANGE UP (ref 3.5–5.3)
PROT SERPL-MCNC: 6.2 G/DL — LOW (ref 6.6–8.7)
SODIUM SERPL-SCNC: 135 MMOL/L — SIGNIFICANT CHANGE UP (ref 135–145)

## 2024-11-11 PROCEDURE — 96374 THER/PROPH/DIAG INJ IV PUSH: CPT

## 2024-11-11 PROCEDURE — 83615 LACTATE (LD) (LDH) ENZYME: CPT

## 2024-11-11 PROCEDURE — 86225 DNA ANTIBODY NATIVE: CPT

## 2024-11-11 PROCEDURE — 84484 ASSAY OF TROPONIN QUANT: CPT

## 2024-11-11 PROCEDURE — 84550 ASSAY OF BLOOD/URIC ACID: CPT

## 2024-11-11 PROCEDURE — 93306 TTE W/DOPPLER COMPLETE: CPT

## 2024-11-11 PROCEDURE — 86036 ANCA SCREEN EACH ANTIBODY: CPT

## 2024-11-11 PROCEDURE — 83521 IG LIGHT CHAINS FREE EACH: CPT

## 2024-11-11 PROCEDURE — 93005 ELECTROCARDIOGRAM TRACING: CPT

## 2024-11-11 PROCEDURE — 81001 URINALYSIS AUTO W/SCOPE: CPT

## 2024-11-11 PROCEDURE — 93325 DOPPLER ECHO COLOR FLOW MAPG: CPT

## 2024-11-11 PROCEDURE — 36415 COLL VENOUS BLD VENIPUNCTURE: CPT

## 2024-11-11 PROCEDURE — 99232 SBSQ HOSP IP/OBS MODERATE 35: CPT | Mod: GC

## 2024-11-11 PROCEDURE — 83516 IMMUNOASSAY NONANTIBODY: CPT

## 2024-11-11 PROCEDURE — 83735 ASSAY OF MAGNESIUM: CPT

## 2024-11-11 PROCEDURE — 85025 COMPLETE CBC W/AUTO DIFF WBC: CPT

## 2024-11-11 PROCEDURE — 76775 US EXAM ABDO BACK WALL LIM: CPT

## 2024-11-11 PROCEDURE — 87340 HEPATITIS B SURFACE AG IA: CPT

## 2024-11-11 PROCEDURE — 84100 ASSAY OF PHOSPHORUS: CPT

## 2024-11-11 PROCEDURE — 86334 IMMUNOFIX E-PHORESIS SERUM: CPT

## 2024-11-11 PROCEDURE — 99233 SBSQ HOSP IP/OBS HIGH 50: CPT

## 2024-11-11 PROCEDURE — 86038 ANTINUCLEAR ANTIBODIES: CPT

## 2024-11-11 PROCEDURE — 93970 EXTREMITY STUDY: CPT

## 2024-11-11 PROCEDURE — 83880 ASSAY OF NATRIURETIC PEPTIDE: CPT

## 2024-11-11 PROCEDURE — 82784 ASSAY IGA/IGD/IGG/IGM EACH: CPT

## 2024-11-11 PROCEDURE — 82570 ASSAY OF URINE CREATININE: CPT

## 2024-11-11 PROCEDURE — 86704 HEP B CORE ANTIBODY TOTAL: CPT

## 2024-11-11 PROCEDURE — 84155 ASSAY OF PROTEIN SERUM: CPT

## 2024-11-11 PROCEDURE — 83036 HEMOGLOBIN GLYCOSYLATED A1C: CPT

## 2024-11-11 PROCEDURE — 84165 PROTEIN E-PHORESIS SERUM: CPT

## 2024-11-11 PROCEDURE — 82962 GLUCOSE BLOOD TEST: CPT

## 2024-11-11 PROCEDURE — 80053 COMPREHEN METABOLIC PANEL: CPT

## 2024-11-11 PROCEDURE — 99285 EMERGENCY DEPT VISIT HI MDM: CPT

## 2024-11-11 PROCEDURE — 85027 COMPLETE CBC AUTOMATED: CPT

## 2024-11-11 PROCEDURE — 93312 ECHO TRANSESOPHAGEAL: CPT

## 2024-11-11 PROCEDURE — 86706 HEP B SURFACE ANTIBODY: CPT

## 2024-11-11 PROCEDURE — 84443 ASSAY THYROID STIM HORMONE: CPT

## 2024-11-11 PROCEDURE — 71045 X-RAY EXAM CHEST 1 VIEW: CPT

## 2024-11-11 PROCEDURE — 86160 COMPLEMENT ANTIGEN: CPT

## 2024-11-11 PROCEDURE — 86803 HEPATITIS C AB TEST: CPT

## 2024-11-11 PROCEDURE — 80048 BASIC METABOLIC PNL TOTAL CA: CPT

## 2024-11-11 PROCEDURE — 84156 ASSAY OF PROTEIN URINE: CPT

## 2024-11-11 PROCEDURE — 93320 DOPPLER ECHO COMPLETE: CPT

## 2024-11-11 PROCEDURE — 80061 LIPID PANEL: CPT

## 2024-11-11 RX ORDER — INSULIN LISPRO 100/ML
12 VIAL (ML) SUBCUTANEOUS
Refills: 0 | Status: DISCONTINUED | OUTPATIENT
Start: 2024-11-11 | End: 2024-11-11

## 2024-11-11 RX ORDER — LEVOTHYROXINE SODIUM 88 MCG
1 TABLET ORAL
Qty: 30 | Refills: 0
Start: 2024-11-11 | End: 2024-12-10

## 2024-11-11 RX ORDER — LEVOTHYROXINE SODIUM 88 MCG
1 TABLET ORAL
Refills: 0 | DISCHARGE

## 2024-11-11 RX ORDER — INSULIN ASPART 100 [IU]/ML
10 INJECTION, SOLUTION INTRAVENOUS; SUBCUTANEOUS
Qty: 90 | Refills: 0
Start: 2024-11-11 | End: 2024-12-10

## 2024-11-11 RX ORDER — ALLOPURINOL 100 MG
1 TABLET ORAL
Qty: 30 | Refills: 0
Start: 2024-11-11 | End: 2024-12-10

## 2024-11-11 RX ORDER — INSULIN GLARGINE,HUM.REC.ANLOG 100/ML
18 VIAL (ML) SUBCUTANEOUS
Qty: 30 | Refills: 0
Start: 2024-11-11 | End: 2024-12-10

## 2024-11-11 RX ORDER — TRAMADOL HYDROCHLORIDE 50 MG/1
50 TABLET, COATED ORAL ONCE
Refills: 0 | Status: DISCONTINUED | OUTPATIENT
Start: 2024-11-11 | End: 2024-11-11

## 2024-11-11 RX ADMIN — Medication 40 MILLIGRAM(S): at 05:54

## 2024-11-11 RX ADMIN — Medication 10 UNIT(S): at 08:06

## 2024-11-11 RX ADMIN — GABAPENTIN 300 MILLIGRAM(S): 300 CAPSULE ORAL at 05:54

## 2024-11-11 RX ADMIN — Medication 10 UNIT(S): at 12:43

## 2024-11-11 RX ADMIN — Medication 12: at 08:06

## 2024-11-11 RX ADMIN — Medication 5 MILLIGRAM(S): at 05:54

## 2024-11-11 RX ADMIN — HEPARIN SODIUM 5000 UNIT(S): 10000 INJECTION INTRAVENOUS; SUBCUTANEOUS at 13:09

## 2024-11-11 RX ADMIN — TRAMADOL HYDROCHLORIDE 50 MILLIGRAM(S): 50 TABLET, COATED ORAL at 09:34

## 2024-11-11 RX ADMIN — TRAMADOL HYDROCHLORIDE 50 MILLIGRAM(S): 50 TABLET, COATED ORAL at 08:17

## 2024-11-11 RX ADMIN — Medication 137 MICROGRAM(S): at 05:54

## 2024-11-11 RX ADMIN — HEPARIN SODIUM 5000 UNIT(S): 10000 INJECTION INTRAVENOUS; SUBCUTANEOUS at 05:55

## 2024-11-11 RX ADMIN — Medication 4: at 12:43

## 2024-11-11 RX ADMIN — Medication 100 MILLIGRAM(S): at 11:37

## 2024-11-11 NOTE — CHART NOTE - NSCHARTNOTEFT_GEN_A_CORE
To whom it may concern,          Please excuse the absence of Mrs Jain from her place of employment. She was under the care of [facility] from 11/2 - 11/11 for a serious medical condition. She will need further bed rest and may return to work on Monday 11/18/2024 without any restrictions. Thank you for your consideration in this matter and please call [phone] with any questions.       Dr Michael Martin M.D  #[id number]

## 2024-11-11 NOTE — PROGRESS NOTE ADULT - ASSESSMENT
58F with PMH HTN, IDDM type I (has medtronic insulin pump), hypothyroid, neuropathies presented to ED c/o 2 weeks of NIXON w/ associated worsening LE edema, nonproductive cough and orthopnea and occasional L-chest pain that is like a burning sensation and only occurs when she lays on her left side.  Clinically hypervolemic w/ scattered rales and b/l LE pretibial and pedal pitting edema. Initial w/u significant for trop 272-->258, BNP 2503, Cr 2.16. CXR w/ pulm vascular congestion and small b/l pleural effusions. Admitted for CHF exacerbation, continues on IV diuresis, ECHO with notable mass.    Consulted for diabetes management  Home regimen: medtronic pump (basal dosing 19.6 units daily, ICR 1:15 and ISF 1:65-75)  Current a1c: 10.6%  Outpatient Endocrinologist: Dr Rivera    1. Poorly controlled DM1  - On admission could not demonstrate insulin pump competency and proficiency, pump removed  -  this morning, likely related to dietary indiscretion (had juice, sathish crackers, and loorna dunes cookies at bedtime)  - Glucoses were controlled yesterday on current regimen, continue for now  - Continue admelog 10 units TID with meals  - Continue lantus 14 units qhs  - Will follow up with Dr Rivera after discharge    2. Hypothyroid  - TSH elevated 7.12  - LT4 increased to 137 mcg daily    3. SHARA/CKD  - Nephrology following  - Trend serum creatinine and avoid nephrotoxins    4. New acute decompensated HFpEF  - TTE 11/02 with LVEF 55-60%  - On lasix 40mg daily PO  58F with PMH HTN, IDDM type I (has medtronic insulin pump), hypothyroid, neuropathies presented to ED c/o 2 weeks of NIXON w/ associated worsening LE edema, nonproductive cough and orthopnea and occasional L-chest pain that is like a burning sensation and only occurs when she lays on her left side.  Clinically hypervolemic w/ scattered rales and b/l LE pretibial and pedal pitting edema. Initial w/u significant for trop 272-->258, BNP 2503, Cr 2.16. CXR w/ pulm vascular congestion and small b/l pleural effusions. Admitted for CHF exacerbation, continues on IV diuresis, ECHO with notable mass.    Consulted for diabetes management  Home regimen: medtronic pump (basal dosing 19.6 units daily, ICR 1:15 and ISF 1:65-75)  Current a1c: 10.6%  Outpatient Endocrinologist: Dr Rivera    1. Poorly controlled DM1  - On admission could not demonstrate insulin pump competency and proficiency, pump removed  -  this morning, likely related to dietary indiscretion (had juice, sathish crackers, and loorna dunes cookies at bedtime)  - Increase admelog to 12 units TID  - Continue lantus 14 units qhs  - Recommend to discharge on basal bolus therapy, she can discuss continuing insulin pump when she follows up with Dr Rivera    2. Hypothyroid  - TSH elevated 7.12  - LT4 increased to 137 mcg daily    3. SHARA/CKD  - Nephrology following  - Trend serum creatinine and avoid nephrotoxins    4. New acute decompensated HFpEF  - TTE 11/02 with LVEF 55-60%  - On lasix 40mg daily PO

## 2024-11-11 NOTE — PROGRESS NOTE ADULT - NS ATTEND AMEND GEN_ALL_CORE FT
I have seen and examined patient with NP, agree with above assessment and plan
I have seen and examined patient with NP, agree with above assessment and plan.
I agree with plan above and discussed with NP.  will increase slightly meal insulins since she has uncontrollably  snacks.
I have seen and examined patient with NP, agree with above assessment and plan.
I have seen and examined patient with NP, agree with above assessment and plan.
I have seen and examined patient with NP and agree with above assessment and plan.

## 2024-11-11 NOTE — DISCHARGE NOTE PROVIDER - PROVIDER TOKENS
PROVIDER:[TOKEN:[[id number]]],FREE:[LAST:[primary care],PHONE:[(   )    -],FAX:[(   )    -],ADDRESS:[pcp]] PROVIDER:[TOKEN:[[id number]]],FREE:[LAST:[primary care],PHONE:[(   )    -],FAX:[(   )    -],ADDRESS:[pcp]],PROVIDER:[TOKEN:[[id number]]]

## 2024-11-11 NOTE — PROGRESS NOTE ADULT - ASSESSMENT
58 YOF HTN, DM presented with gradually progressive NIXON and LE edema.  Nephrology consulted for acute kidney injury.  Chest x-ray with pulmonary vascular congestion and small bilateral pleural effusion. Patient admitted for exacerbation of CHF with preserved ejection fraction.     Acute Kidney Injury on Chronic Kidney Disease  -creatinine 1.1 on 9/24  -Patient had CT with contrast to rule out PE 1 day prior to admission, also took aleve leading up to admission.  -Renal ultrasound showing nonobstructive bilateral calculi with no hydronephrosis  -C3 and C4 low  -IgA: 397, IgM 154  -JENNIFER kappa: 14.71  -JENNIFER lambda: 7.55  -UA with 100 protein, large blood, pyuria, UPCR 2.5  -possible CKD with proteinuria from DM  -MARGI indeterminate with negative c-ANCA and p-ANCA  -Creatinine slowly improving. Patient elected to follow up as outpatient vs pursuing kidney biopsy Patient will follow up outpatient with labs 5-7 days postdischarge.     Nephrolithiasis  -Bilateral, outpatient urology follow-up    Metabolic acidosis, improving  -continue oral bicarbonate as outpatient.     DM uncontrolled  -outpatient endocrinology follow up    Patient stable for discharge with close outpatient follow up.

## 2024-11-11 NOTE — DISCHARGE NOTE PROVIDER - ATTENDING DISCHARGE PHYSICAL EXAMINATION:
GENERAL: pt examined bedside, laying comfortably in bed in NAD  HEENT: NC/AT, moist oral mucosa, clear conjunctiva, sclera nonicteric  RESPIRATORY: decreased breath sounds in B/L bases, otherwise minimal crackles noted   CARDIOVASCULAR: RRR, normal S1 and S2, no murmur/rub/gallop  ABDOMEN: soft, NT/ND, +bowel sounds, no rebound/guarding  MSK: No joint deformities, edema, erythema  EXTREMITIES: No cynaosis, no clubbing  PSYCH: affect appropriate and cooperative  NEUROLOGY: A+O to person, place, and time, no focal neurologic deficits appreciated  SKIN: No rashes or no palpable lesions

## 2024-11-11 NOTE — DISCHARGE NOTE PROVIDER - CARE PROVIDER_API CALL
Pratik Caceres  Nephrology  [address] [place] [id number]  Phone: [phone]  Fax: [phone]  Follow Up Time:     primary care,   pcp  Phone: (   )    -  Fax: (   )    -  Follow Up Time:    Pratik Caceres  Nephrology  [address] [place] [address]  Phone: [phone]  Fax: [phone]  Follow Up Time:     primary care,   [facility]  Phone: (   )    -  Fax: (   )    -  Follow Up Time:     Melvina Carias  Endocrinology/Metab/Diabetes  [address] [place] [address]  Phone: [phone]  Fax: [phone]  Follow Up Time:

## 2024-11-11 NOTE — PROGRESS NOTE ADULT - SUBJECTIVE AND OBJECTIVE BOX
INTERVAL EVENTS:  Follow up diabetes management.  this morning, pt reports having juice, sathish crackers, and loorna dunes cookies at bedtime.     MEDICATIONS  (STANDING):  allopurinol 100 milliGRAM(s) Oral daily  amLODIPine   Tablet 5 milliGRAM(s) Oral daily  dextrose 5%. 1000 milliLiter(s) (100 mL/Hr) IV Continuous <Continuous>  dextrose 5%. 1000 milliLiter(s) (50 mL/Hr) IV Continuous <Continuous>  dextrose 50% Injectable 25 Gram(s) IV Push once  dextrose 50% Injectable 12.5 Gram(s) IV Push once  dextrose 50% Injectable 25 Gram(s) IV Push once  furosemide    Tablet 40 milliGRAM(s) Oral daily  gabapentin 300 milliGRAM(s) Oral two times a day  glucagon  Injectable 1 milliGRAM(s) IntraMuscular once  heparin   Injectable 5000 Unit(s) SubCutaneous every 8 hours  influenza   Vaccine 0.5 milliLiter(s) IntraMuscular once  insulin glargine Injectable (LANTUS) 14 Unit(s) SubCutaneous at bedtime  insulin lispro (ADMELOG) corrective regimen sliding scale   SubCutaneous three times a day before meals  insulin lispro (ADMELOG) corrective regimen sliding scale   SubCutaneous at bedtime  insulin lispro Injectable (ADMELOG) 10 Unit(s) SubCutaneous three times a day before meals  levothyroxine 137 MICROGram(s) Oral daily    MEDICATIONS  (PRN):  acetaminophen     Tablet .. 650 milliGRAM(s) Oral every 6 hours PRN Temp greater or equal to 38C (100.4F), Mild Pain (1 - 3)  aluminum hydroxide/magnesium hydroxide/simethicone Suspension 30 milliLiter(s) Oral every 4 hours PRN Dyspepsia  dextrose Oral Gel 15 Gram(s) Oral once PRN Blood Glucose LESS THAN 70 milliGRAM(s)/deciliter  melatonin 3 milliGRAM(s) Oral at bedtime PRN Insomnia  ondansetron Injectable 4 milliGRAM(s) IV Push every 8 hours PRN Nausea and/or Vomiting  senna 2 Tablet(s) Oral at bedtime PRN Constipation    Allergies  No Known Allergies    Vital Signs Last 24 Hrs  T(C): 37.1 (11 Nov 2024 08:00), Max: 37.6 (10 Nov 2024 20:36)  T(F): 98.8 (11 Nov 2024 08:00), Max: 99.7 (10 Nov 2024 20:36)  HR: 96 (11 Nov 2024 08:00) (96 - 105)  BP: 151/76 (11 Nov 2024 08:00) (125/75 - 151/76)  BP(mean): --  RR: 18 (11 Nov 2024 08:00) (18 - 18)  SpO2: 96% (11 Nov 2024 08:00) (92% - 96%)    Parameters below as of 11 Nov 2024 08:00  Patient On (Oxygen Delivery Method): nasal cannula    PHYSICAL EXAM:  General: No apparent distress  Neck: Supple, trachea midline, no thyromegaly  Respiratory: Lungs clear bilaterally, normal rate, effort  Cardiac: +S1, S2, no m/r/g  GI: +BS, soft, non tender, non distended  Extremities: No peripheral edema, no pedal lesions  Neuro: A+O X3    LABS:    11-11    135  |  101  |  60.0[H]  ----------------------------<  379[H]  5.3   |  21.0[L]  |  2.25[H]    Ca    8.6      11 Nov 2024 05:42  Mg     2.4     11-11    TPro  6.2[L]  /  Alb  2.7[L]  /  TBili  0.2[L]  /  DBili  x   /  AST  24  /  ALT  33[H]  /  AlkPhos  176[H]  11-11    Urinalysis Basic - ( 11 Nov 2024 05:42 )    Color: x / Appearance: x / SG: x / pH: x  Gluc: 379 mg/dL / Ketone: x  / Bili: x / Urobili: x   Blood: x / Protein: x / Nitrite: x   Leuk Esterase: x / RBC: x / WBC x   Sq Epi: x / Non Sq Epi: x / Bacteria: x    POCT Blood Glucose.: 429 mg/dL (11-11-24 @ 08:01)  POCT Blood Glucose.: 114 mg/dL (11-10-24 @ 22:05)  POCT Blood Glucose.: 163 mg/dL (11-10-24 @ 17:18)  POCT Blood Glucose.: 253 mg/dL (11-10-24 @ 12:16)    Thyroid Stimulating Hormone, Serum: 7.12 uIU/mL (11-03-24 @ 07:21)

## 2024-11-11 NOTE — PROGRESS NOTE ADULT - SUBJECTIVE AND OBJECTIVE BOX
Patient is a 58y Female seen on consultation for the evaluation and management of Adenopathy.  Dr. Thurston called me yesterday afternoon to see this patient while admitted for CHF exacerbation.  Pt has hx of DM, on insulin pump, HTN, Hypothyroidism; admitted with worsening dyspnea, leg swelling, cough.  Found to have hypoxia, leg edema, vasc congestion.  Seen by Cardiology.  Echo shows 55% EF, echogenic mass 1.8 by 2 cm RA.  CT chest done as outpat showed mod sized bilat plerual effusion, enlarged mediastional, hilar, axillary and subpectoral lymph nodes (11/1/24)  Pt feels less dyspneic after treatmen.  Reports poor appetite.  Denies chest pain or palpitations fevers, chills or sweats,  abd pain or vomiting.      PAST MEDICAL & SURGICAL HISTORY:  Diabetes  Hypertension  No significant past surgical history    Allergies  No Known Allergies    MEDICATIONS  (STANDING):  amLODIPine   Tablet 5 milliGRAM(s) Oral daily  dextrose 5%. 1000 milliLiter(s) (100 mL/Hr) IV Continuous <Continuous>  dextrose 5%. 1000 milliLiter(s) (50 mL/Hr) IV Continuous <Continuous>  dextrose 50% Injectable 25 Gram(s) IV Push once  dextrose 50% Injectable 12.5 Gram(s) IV Push once  dextrose 50% Injectable 25 Gram(s) IV Push once  furosemide   Injectable 60 milliGRAM(s) IV Push two times a day  gabapentin 300 milliGRAM(s) Oral two times a day  glucagon  Injectable 1 milliGRAM(s) IntraMuscular once  heparin   Injectable 5000 Unit(s) SubCutaneous every 8 hours  influenza   Vaccine 0.5 milliLiter(s) IntraMuscular once  levothyroxine 125 MICROGram(s) Oral daily    MEDICATIONS  (PRN):  acetaminophen     Tablet .. 650 milliGRAM(s) Oral every 6 hours PRN Temp greater or equal to 38C (100.4F), Mild Pain (1 - 3)  aluminum hydroxide/magnesium hydroxide/simethicone Suspension 30 milliLiter(s) Oral every 4 hours PRN Dyspepsia  dextrose Oral Gel 15 Gram(s) Oral once PRN Blood Glucose LESS THAN 70 milliGRAM(s)/deciliter  melatonin 3 milliGRAM(s) Oral at bedtime PRN Insomnia  ondansetron Injectable 4 milliGRAM(s) IV Push every 8 hours PRN Nausea and/or Vomiting    Vital Signs Last 24 Hrs  T(C): 37.1 (11 Nov 2024 08:00), Max: 37.6 (10 Nov 2024 20:36)  T(F): 98.8 (11 Nov 2024 08:00), Max: 99.7 (10 Nov 2024 20:36)  HR: 96 (11 Nov 2024 08:00) (96 - 105)  BP: 151/76 (11 Nov 2024 08:00) (125/75 - 151/76)  BP(mean): --  RR: 18 (11 Nov 2024 08:00) (18 - 18)  SpO2: 96% (11 Nov 2024 08:00) (92% - 96%)    Parameters below as of 11 Nov 2024 08:00  Patient On (Oxygen Delivery Method): nasal cannula    PHYSICAL EXAM:  Constitutional: WD over weight, NAD  No adenopathy cervical or axillary appreciated  Anicteric  Lungs with decreased BS at bases  Cor RRR normal S1S2  Abd:  Soft, N-T normoactive BS  Extrem with trace bilateral edema      CBC                            12.0   5.12  )-----------( 393      ( 08 Nov 2024 06:20 )             36.4                             11.2   4.85  )-----------( 346      ( 07 Nov 2024 05:00 )             33.8                           11.8   5.13  )-----------( 360      ( 05 Nov 2024 05:20 )             35.5                             12.1   5.59  )-----------( 309      ( 04 Nov 2024 05:49 )             37.2                 CHEM  11-08    137  |  101  |  63.5[H]  ----------------------------<  83  4.6   |  25.0  |  2.67[H]    Ca    9.0      08 Nov 2024 06:20  Phos  4.4     11-08  Mg     2.3     11-08    TPro  6.0  /  Alb  x   /  TBili  x   /  DBili  x   /  AST  x   /  ALT  x   /  AlkPhos  x   11-07 11-07    132[L]  |  97  |  60.7[H]  ----------------------------<  238[H]  4.4   |  22.0  |  2.71[H]    Ca    8.3[L]      07 Nov 2024 05:00  Phos  4.0     11-07  Mg     2.1     11-07    TPro  6.7  /  Alb  3.0[L]  /  TBili  0.2[L]  /  DBili  x   /  AST  54[H]  /  ALT  25  /  AlkPhos  132[H]  11-06 11-05    135  |  97  |  52.1[H]  ----------------------------<  145[H]  4.7   |  23.0  |  3.28[H]    Ca    8.6      05 Nov 2024 05:20  Phos  5.1     11-05  Mg     2.1     11-05    TPro  6.1[L]  /  Alb  2.7[L]  /  TBili  0.3[L]  /  DBili  x   /  AST  11  /  ALT  6   /  AlkPhos  94  11-05 11-04    136  |  100  |  39.9[H]  ----------------------------<  192[H]  4.5   |  20.0[L]  |  3.27[H]    Ca    8.0[L]      04 Nov 2024 05:49  Phos  4.2     11-03  Mg     1.8     11-03    TPro  6.0[L]  /  Alb  2.7[L]  /  TBili  0.3[L]  /  DBili  x   /  AST  10  /  ALT  5   /  AlkPhos  99  11-03

## 2024-11-11 NOTE — PROGRESS NOTE ADULT - SUBJECTIVE AND OBJECTIVE BOX
SUBJECTIVE    LAST 24 HOURS:  TODAY:    OBJECTIVE  PHYSICAL EXAM:    Vital Signs Last 24 Hrs  T(C): 37.1 (11 Nov 2024 08:00), Max: 37.6 (10 Nov 2024 20:36)  T(F): 98.8 (11 Nov 2024 08:00), Max: 99.7 (10 Nov 2024 20:36)  HR: 96 (11 Nov 2024 08:00) (96 - 105)  BP: 151/76 (11 Nov 2024 08:00) (125/75 - 151/76)  BP(mean): --  RR: 18 (11 Nov 2024 08:00) (18 - 18)  SpO2: 96% (11 Nov 2024 08:00) (92% - 96%)    Parameters below as of 11 Nov 2024 08:00  Patient On (Oxygen Delivery Method): nasal cannula            MEDICATIONS  (STANDING):  allopurinol 100 milliGRAM(s) Oral daily  amLODIPine   Tablet 5 milliGRAM(s) Oral daily  dextrose 5%. 1000 milliLiter(s) (100 mL/Hr) IV Continuous <Continuous>  dextrose 5%. 1000 milliLiter(s) (50 mL/Hr) IV Continuous <Continuous>  dextrose 50% Injectable 25 Gram(s) IV Push once  dextrose 50% Injectable 12.5 Gram(s) IV Push once  dextrose 50% Injectable 25 Gram(s) IV Push once  furosemide    Tablet 40 milliGRAM(s) Oral daily  gabapentin 300 milliGRAM(s) Oral two times a day  glucagon  Injectable 1 milliGRAM(s) IntraMuscular once  heparin   Injectable 5000 Unit(s) SubCutaneous every 8 hours  influenza   Vaccine 0.5 milliLiter(s) IntraMuscular once  insulin glargine Injectable (LANTUS) 14 Unit(s) SubCutaneous at bedtime  insulin lispro (ADMELOG) corrective regimen sliding scale   SubCutaneous three times a day before meals  insulin lispro (ADMELOG) corrective regimen sliding scale   SubCutaneous at bedtime  insulin lispro Injectable (ADMELOG) 12 Unit(s) SubCutaneous three times a day before meals  levothyroxine 137 MICROGram(s) Oral daily    MEDICATIONS  (PRN):  acetaminophen     Tablet .. 650 milliGRAM(s) Oral every 6 hours PRN Temp greater or equal to 38C (100.4F), Mild Pain (1 - 3)  aluminum hydroxide/magnesium hydroxide/simethicone Suspension 30 milliLiter(s) Oral every 4 hours PRN Dyspepsia  dextrose Oral Gel 15 Gram(s) Oral once PRN Blood Glucose LESS THAN 70 milliGRAM(s)/deciliter  melatonin 3 milliGRAM(s) Oral at bedtime PRN Insomnia  ondansetron Injectable 4 milliGRAM(s) IV Push every 8 hours PRN Nausea and/or Vomiting  senna 2 Tablet(s) Oral at bedtime PRN Constipation    Allergies    No Known Allergies    Intolerances        LABS:    11-11    135  |  101  |  60.0[H]  ----------------------------<  379[H]  5.3   |  21.0[L]  |  2.25[H]    Ca    8.6      11 Nov 2024 05:42  Mg     2.4     11-11    TPro  6.2[L]  /  Alb  2.7[L]  /  TBili  0.2[L]  /  DBili  x   /  AST  24  /  ALT  33[H]  /  AlkPhos  176[H]  11-11      Urinalysis Basic - ( 11 Nov 2024 05:42 )    Color: x / Appearance: x / SG: x / pH: x  Gluc: 379 mg/dL / Ketone: x  / Bili: x / Urobili: x   Blood: x / Protein: x / Nitrite: x   Leuk Esterase: x / RBC: x / WBC x   Sq Epi: x / Non Sq Epi: x / Bacteria: x      CAPILLARY BLOOD GLUCOSE      POCT Blood Glucose.: 232 mg/dL (11 Nov 2024 12:34)  POCT Blood Glucose.: 429 mg/dL (11 Nov 2024 08:01)  POCT Blood Glucose.: 114 mg/dL (10 Nov 2024 22:05)  POCT Blood Glucose.: 163 mg/dL (10 Nov 2024 17:18)      CULTURE DATA:      RADIOLOGY & ADDITIONAL TESTS: SUBJECTIVE  58 year old female with hx of HTN, DM presented with gradually progressive NIXON and LE edema.   LAST 24 HOURS:  -No acute events  TODAY:  Patient feeling well. Patient denies chest pain, shortness of breath, urinary complaints. Patient has good urinary output. Denies Chest pain, abdominal pain, shortness of breath, fevers, chills, nausea, vomiting, diarrhea, dysuria, headache, dizziness.   Review of systems is otherwise negative, except as mentioned in HPI above.   OBJECTIVE  PHYSICAL EXAM:  GENERAL: no acute distress, comfortably in bed  HEAD: NC/AT  EYES: PERRLA, EOMI, Non-icteric  ENT: Mucous membranes moist, neck supple  NEURO: No focal deficits, moving all extremities spontaneously, A&Ox3, no dysarthria, CN II-XII grossly intact  PSYCH: Normal affect, calm, appropriate insight and judgment, fluent speech  RESP: CTAB, no wrr, non-labored breathing  CVS: RRR, no murmur appreciated  GI: Soft, non-tender, non-distended, no organomegaly, no appreciable masses, +bs all 4 quadrants  : No christie, no suprapubic tenderness  EXTREMITIES: Nontender, no clubbing, cyanosis, or edema  SKIN: No rashes or lesions   Vital Signs Last 24 Hrs  T(C): 37.1 (11 Nov 2024 08:00), Max: 37.6 (10 Nov 2024 20:36)  T(F): 98.8 (11 Nov 2024 08:00), Max: 99.7 (10 Nov 2024 20:36)  HR: 96 (11 Nov 2024 08:00) (96 - 105)  BP: 151/76 (11 Nov 2024 08:00) (125/75 - 151/76)  BP(mean): --  RR: 18 (11 Nov 2024 08:00) (18 - 18)  SpO2: 96% (11 Nov 2024 08:00) (92% - 96%)    Parameters below as of 11 Nov 2024 08:00  Patient On (Oxygen Delivery Method): nasal cannula            MEDICATIONS  (STANDING):  allopurinol 100 milliGRAM(s) Oral daily  amLODIPine   Tablet 5 milliGRAM(s) Oral daily  dextrose 5%. 1000 milliLiter(s) (100 mL/Hr) IV Continuous <Continuous>  dextrose 5%. 1000 milliLiter(s) (50 mL/Hr) IV Continuous <Continuous>  dextrose 50% Injectable 25 Gram(s) IV Push once  dextrose 50% Injectable 12.5 Gram(s) IV Push once  dextrose 50% Injectable 25 Gram(s) IV Push once  furosemide    Tablet 40 milliGRAM(s) Oral daily  gabapentin 300 milliGRAM(s) Oral two times a day  glucagon  Injectable 1 milliGRAM(s) IntraMuscular once  heparin   Injectable 5000 Unit(s) SubCutaneous every 8 hours  influenza   Vaccine 0.5 milliLiter(s) IntraMuscular once  insulin glargine Injectable (LANTUS) 14 Unit(s) SubCutaneous at bedtime  insulin lispro (ADMELOG) corrective regimen sliding scale   SubCutaneous three times a day before meals  insulin lispro (ADMELOG) corrective regimen sliding scale   SubCutaneous at bedtime  insulin lispro Injectable (ADMELOG) 12 Unit(s) SubCutaneous three times a day before meals  levothyroxine 137 MICROGram(s) Oral daily    MEDICATIONS  (PRN):  acetaminophen     Tablet .. 650 milliGRAM(s) Oral every 6 hours PRN Temp greater or equal to 38C (100.4F), Mild Pain (1 - 3)  aluminum hydroxide/magnesium hydroxide/simethicone Suspension 30 milliLiter(s) Oral every 4 hours PRN Dyspepsia  dextrose Oral Gel 15 Gram(s) Oral once PRN Blood Glucose LESS THAN 70 milliGRAM(s)/deciliter  melatonin 3 milliGRAM(s) Oral at bedtime PRN Insomnia  ondansetron Injectable 4 milliGRAM(s) IV Push every 8 hours PRN Nausea and/or Vomiting  senna 2 Tablet(s) Oral at bedtime PRN Constipation    Allergies    No Known Allergies    Intolerances        LABS:    11-11    135  |  101  |  60.0[H]  ----------------------------<  379[H]  5.3   |  21.0[L]  |  2.25[H]    Ca    8.6      11 Nov 2024 05:42  Mg     2.4     11-11    TPro  6.2[L]  /  Alb  2.7[L]  /  TBili  0.2[L]  /  DBili  x   /  AST  24  /  ALT  33[H]  /  AlkPhos  176[H]  11-11      Urinalysis Basic - ( 11 Nov 2024 05:42 )    Color: x / Appearance: x / SG: x / pH: x  Gluc: 379 mg/dL / Ketone: x  / Bili: x / Urobili: x   Blood: x / Protein: x / Nitrite: x   Leuk Esterase: x / RBC: x / WBC x   Sq Epi: x / Non Sq Epi: x / Bacteria: x      CAPILLARY BLOOD GLUCOSE      POCT Blood Glucose.: 232 mg/dL (11 Nov 2024 12:34)  POCT Blood Glucose.: 429 mg/dL (11 Nov 2024 08:01)  POCT Blood Glucose.: 114 mg/dL (10 Nov 2024 22:05)  POCT Blood Glucose.: 163 mg/dL (10 Nov 2024 17:18)      CULTURE DATA:      RADIOLOGY & ADDITIONAL TESTS:

## 2024-11-11 NOTE — DISCHARGE NOTE PROVIDER - NSDCMRMEDTOKEN_GEN_ALL_CORE_FT
2 allopurinol 100 mg oral tablet: 1 tab(s) orally once a day  amLODIPine 5 mg oral tablet: 1 tab(s) orally once a day  gabapentin 300 mg oral capsule: 1 cap(s) orally 2 times a day  Lasix 40 mg oral tablet: 1 tab(s) orally once a day  levothyroxine 137 mcg (0.137 mg) oral tablet: 1 tab(s) orally once a day   allopurinol 100 mg oral tablet: 1 tab(s) orally once a day  amLODIPine 5 mg oral tablet: 1 tab(s) orally once a day  diabetic supplies, pen needles, lancets, alcohol swabs, test strips 3 times a day please: as directed  gabapentin 300 mg oral capsule: 1 cap(s) orally 2 times a day  glucometer: as directed  insulin aspart 100 units/mL injectable solution: 10 unit(s) injectable 3 times a day (before meals)  Lantus 100 units/mL subcutaneous solution: 18 subcutaneous once a day (at bedtime)  Lasix 40 mg oral tablet: 1 tab(s) orally once a day  levothyroxine 137 mcg (0.137 mg) oral tablet: 1 tab(s) orally once a day

## 2024-11-11 NOTE — DISCHARGE NOTE PROVIDER - HOSPITAL COURSE
59 y/o F w/ PMH HTN, IDDM type I (has medtronic insulin pump), hypothyroid, neuropathies presented to ED c/o 2 weeks of NIXON w/ associated worsening LE edema, nonproductive cough and orthopnea and occasional L-chest pain that is like a burning sensation and only occurs when she lays on her left side.  Clinically hypervolemic w/ scattered rales and b/l LE pretibial and pedal pitting edema. Initial w/u significant for trop 272-->258, BNP 2503, Cr 2.16. CXR w/ pulm vascular congestion and small b/l pleural effusions. Admitted for CHF exacerbation     #New acute decompensated HFpEF  #Abnormal TTE finding  - CTA PE study 11/01 significant for pulm vascular congestion and b/l pleural effusions but negative PE   - TTE 11/02 w/ LVEF 55-60% no WMA reported; Echogenic mass 1.8 cm x 2.0 cm within the lateral wall of the RA potential need for KAROLINA to further characterize mass  - TSH 7.12, A1c 10.6, LDL 84   - KAROLINA done and no mass visualized   - continue lasix 40mg daily PO     #Type II MI, likely demand 2/2 above vs poor renal clearance  - Trops down trending (272-->258)  - continue on telemetry   - TTE 11/02 w/ LVEF 55-60% no WMA, incidental findings as above   - Cardiology cleared    #SHARA suspect multifactorial 2/2 cardiorenal, prerenal azotemia and ALBERT   - Baseline Cr 1.09 in 09/2024 but 10/31 showed worsening Cr of 1.93 which was likely cardiorenal from acute CHF  - cr imprved  - renal following   - Renal US shows non obstructing b/l calculi, no hydronephrosis and b/l increased renal echotexture compatible with medical renal disease  - may need eventual renal bx as per renal as outpatient     #Incidental CTA chest findings   - Reported to have multiple enlarged lymph nodes, some new and some seen on CT chest from 2023 and are only slight larger in size at this time  -repeat CT chest in 1-3 months to reassess lymph nodes and should f/u with heme/onc outpt     #IDDM Type I w/ neuropathies  - A1c elevaed 10.6   - Has medtronic insulin pump  -advised diet and exercise    #HTN  - c/w amlodipine     #Hypothyroid  - c/w synthroid increased to 137mcg  - TSH 7.12          57 y/o F w/ PMH HTN, IDDM type I (has medtronic insulin pump), hypothyroid, neuropathies presented to ED c/o 2 weeks of NIXON w/ associated worsening LE edema, nonproductive cough and orthopnea and occasional L-chest pain that is like a burning sensation and only occurs when she lays on her left side.  Clinically hypervolemic w/ scattered rales and b/l LE pretibial and pedal pitting edema. Initial w/u significant for trop 272-->258, BNP 2503, Cr 2.16. CXR w/ pulm vascular congestion and small b/l pleural effusions. Admitted for CHF exacerbation     #New acute decompensated HFpEF  #Abnormal TTE finding  - CTA PE study 11/01 significant for pulm vascular congestion and b/l pleural effusions but negative PE   - TTE 11/02 w/ LVEF 55-60% no WMA reported; Echogenic mass 1.8 cm x 2.0 cm within the lateral wall of the RA potential need for KAROLINA to further characterize mass  - TSH 7.12, A1c 10.6, LDL 84   - KAROLINA done and no mass visualized   - continue lasix 40mg daily PO     #Type II MI, likely demand 2/2 above vs poor renal clearance  - Trops down trending (272-->258)  - continue on telemetry   - TTE 11/02 w/ LVEF 55-60% no WMA, incidental findings as above   - Cardiology cleared    #SHARA suspect multifactorial 2/2 cardiorenal, prerenal azotemia and ALBERT   - Baseline Cr 1.09 in 09/2024 but 10/31 showed worsening Cr of 1.93 which was likely cardiorenal from acute CHF  - cr imprved  - renal following   - Renal US shows non obstructing b/l calculi, no hydronephrosis and b/l increased renal echotexture compatible with medical renal disease  - may need eventual renal bx as per renal as outpatient     #Incidental CTA chest findings   - Reported to have multiple enlarged lymph nodes, some new and some seen on CT chest from 2023 and are only slight larger in size at this time  -repeat CT chest in 1-3 months to reassess lymph nodes and should f/u with heme/onc outpt     #IDDM Type I w/ neuropathies  - A1c elevaed 10.6   - Has medtronic insulin pump (turned off)  -endo reocmmedns lantus and premeal     #HTN  - c/w amlodipine     #Hypothyroid  - c/w synthroid increased to 137mcg  - TSH 7.12          57 y/o F w/ PMH HTN, IDDM type I (has medtronic insulin pump), hypothyroid, neuropathies presented to ED c/o 2 weeks of NIXON w/ associated worsening LE edema, nonproductive cough and orthopnea and occasional L-chest pain that is like a burning sensation and only occurs when she lays on her left side.  Clinically hypervolemic w/ scattered rales and b/l LE pretibial and pedal pitting edema. Initial w/u significant for trop 272-->258, BNP 2503, Cr 2.16. CXR w/ pulm vascular congestion and small b/l pleural effusions. Admitted for CHF exacerbation     #New acute decompensated HFpEF  #Abnormal TTE finding  - CTA PE study 11/01 significant for pulm vascular congestion and b/l pleural effusions but negative PE   - TTE 11/02 w/ LVEF 55-60% no WMA reported; Echogenic mass 1.8 cm x 2.0 cm within the lateral wall of the RA potential need for KAROLINA to further characterize mass  - TSH 7.12, A1c 10.6, LDL 84   - KAROLINA done and no mass visualized   - continue lasix 40mg daily PO     #Type II MI, likely demand 2/2 above vs poor renal clearance  - Trops down trending (272-->258)  - continue on telemetry   - TTE 11/02 w/ LVEF 55-60% no WMA, incidental findings as above   - Cardiology cleared    #SHARA suspect multifactorial 2/2 cardiorenal, prerenal azotemia and ALBERT   - Baseline Cr 1.09 in 09/2024 but 10/31 showed worsening Cr of 1.93 which was likely cardiorenal from acute CHF  - cr imprved  - renal following   - Renal US shows non obstructing b/l calculi, no hydronephrosis and b/l increased renal echotexture compatible with medical renal disease  - may need eventual renal bx as per renal as outpatient     #Incidental CTA chest findings   - Reported to have multiple enlarged lymph nodes, some new and some seen on CT chest from 2023 and are only slight larger in size at this time  -repeat CT chest in 1-3 months to reassess lymph nodes and should f/u with heme/onc outpt     #IDDM Type I w/ neuropathies  - A1c elevated 10.6   - Has medtronic insulin pump: Resume pump tonight 2 hrs before bedtime, follow up with endocrine  - Dietary compliance emphasized    #HTN  - c/w amlodipine     #Hypothyroid  - c/w synthroid increased to 137mcg  - TSH 7.12          59 y/o F w/ PMH HTN, IDDM type I (has medtronic insulin pump), hypothyroid, neuropathies presented to ED c/o 2 weeks of NIXON w/ associated worsening LE edema, nonproductive cough and orthopnea and occasional L-chest pain that is like a burning sensation and only occurs when she lays on her left side.  Clinically hypervolemic w/ scattered rales and b/l LE pretibial and pedal pitting edema. Initial w/u significant for trop 272-->258, BNP 2503, Cr 2.16. CXR w/ pulm vascular congestion and small b/l pleural effusions. Admitted for CHF exacerbation     #New acute decompensated HFpEF  #Abnormal TTE finding  - CTA PE study 11/01 significant for pulm vascular congestion and b/l pleural effusions but negative PE   - TTE 11/02 w/ LVEF 55-60% no WMA reported; Echogenic mass 1.8 cm x 2.0 cm within the lateral wall of the RA potential need for KAROLINA to further characterize mass  - TSH 7.12, A1c 10.6, LDL 84   - KAROLINA done and no mass visualized   - continue lasix 40mg daily PO     #Type II MI, likely demand 2/2 above vs poor renal clearance  - Trops down trending (272-->258)  - continue on telemetry   - TTE 11/02 w/ LVEF 55-60% no WMA, incidental findings as above   - Cardiology cleared    #SHARA suspect multifactorial 2/2 cardiorenal, prerenal azotemia and ALBERT   - Baseline Cr 1.09 in 09/2024 but 10/31 showed worsening Cr of 1.93 which was likely cardiorenal from acute CHF  - cr imprved  - renal following   - Renal US shows non obstructing b/l calculi, no hydronephrosis and b/l increased renal echotexture compatible with medical renal disease  - may need eventual renal bx as per renal as outpatient     #Incidental CTA chest findings   - Reported to have multiple enlarged lymph nodes, some new and some seen on CT chest from 2023 and are only slight larger in size at this time  -repeat CT chest in 1-3 months to reassess lymph nodes and should f/u with heme/onc outpt     #IDDM Type I w/ neuropathies  - A1c elevated 10.6   - Has medtronic insulin pump: Resume pump tonight 2 hrs before bedtime, follow up with endocrine  - Dietary compliance emphasized    #HTN  - c/w amlodipine     #Hypothyroid  - c/w synthroid increased to 137mcg  - TSH 7.12    c

## 2024-11-11 NOTE — PROGRESS NOTE ADULT - ASSESSMENT
57 yo F seen for the evaluation and management of Adenopathy.  Dr. Thurston called me yesterday afternoon to see this patient while admitted for CHF exacerbation.  Pt has hx of DM, on insulin pump, HTN, Hypothyroidism; admitted with worsening dyspnea, leg swelling, cough.  Found to have hypoxia, leg edema, vasc congestion.  Seen by Cardiology.  Echo shows 55% EF, echogenic mass 1.8 by 2 cm RA.  CT chest done as outpat showed mod sized bilat plerual effusion, enlarged mediastional, hilar, axillary and subpectoral lymph nodes (11/1/24)  Pt feels less dyspneic after treatment  Reports poor appetite.        CHF  - Admitted with hypoxia, leg edema  -Cardiology following  - Echogenic mass 1.8 cm x 2.0 cm noted within the lateral wall of the right atrium visualized on the subcostal view, recommend further imaging correlation with cardiac or CT chest study or KAROLINA.   - Preliminary KAROLINA Results: EF normal, no mass visualized  Of note , patient became acutely hypoxic during procedure, but recovered and came out of procedure on room air, SpO2 97%.    Adenopathy  - documented on out patient CT scan-asked to address this by Attending MD  -   - At pt request, can work this up as outpt   - Scr 2.67- Reports kidney damage from IV contrast and will not have any further scans with contrast   - Agreeable to a PET/CT outpt   - She will follow up in the after discharge      57 yo F seen for the evaluation and management of Adenopathy.  Dr. Thurston called me yesterday afternoon to see this patient while admitted for CHF exacerbation.  Pt has hx of DM, on insulin pump, HTN, Hypothyroidism; admitted with worsening dyspnea, leg swelling, cough.  Found to have hypoxia, leg edema, vasc congestion.  Seen by Cardiology.  Echo shows 55% EF, echogenic mass 1.8 by 2 cm RA.  CT chest done as outpat showed mod sized bilat plerual effusion, enlarged mediastional, hilar, axillary and subpectoral lymph nodes (11/1/24)  Pt feels less dyspneic after treatment  Reports poor appetite.        CHF  - Admitted with hypoxia, leg edema  -Cardiology following  - Echogenic mass 1.8 cm x 2.0 cm noted within the lateral wall of the right atrium visualized on the subcostal view, recommend further imaging correlation with cardiac or CT chest study or KAROLINA.   - Preliminary KAROLINA Results: EF normal, no mass visualized  Of note , patient became acutely hypoxic during procedure, but recovered and came out of procedure on room air, SpO2 97%.    Adenopathy  - documented on out patient CT scan-asked to address this by Attending MD  -   - At pt request, can work this up as outpt   - Scr 2.25- Reports kidney damage from IV contrast and will not have any further scans with contrast   - Agreeable to a PET/CT outpt   - She will follow up in the after discharge   Please recall if needed

## 2024-11-11 NOTE — PROGRESS NOTE ADULT - ATTENDING COMMENTS
Sravan on CKD- proteinuric  DMN  CHF exacerbation  Continue diuresis  monitor kidney function- kidney biopsy if no improvement

## 2024-11-11 NOTE — DISCHARGE NOTE PROVIDER - NSDCCPCAREPLAN_GEN_ALL_CORE_FT
PRINCIPAL DISCHARGE DIAGNOSIS  Diagnosis: New onset of congestive heart failure  Assessment and Plan of Treatment:       SECONDARY DISCHARGE DIAGNOSES  Diagnosis: Acute renal failure  Assessment and Plan of Treatment:     Diagnosis: Diabetes  Assessment and Plan of Treatment:     Diagnosis: Hypertension  Assessment and Plan of Treatment:     Diagnosis: Hypothyroidism  Assessment and Plan of Treatment:      PRINCIPAL DISCHARGE DIAGNOSIS  Diagnosis: New onset of congestive heart failure  Assessment and Plan of Treatment: Weigh yourself daily.  If you gain 3lbs in 3 days, or 5lbs in a week call your Health Care Provider.  Do not eat or drink foods containing more than 2000mg of salt (sodium) in your diet every day.  Call your Health Care Provider if you have any swelling or increased swelling in your feet, ankles, and/or stomach.  Take all of your medication as directed.  If you become dizzy call your Health Care Provider.        SECONDARY DISCHARGE DIAGNOSES  Diagnosis: Acute renal failure  Assessment and Plan of Treatment: Avoid nephrotoxic drugs  Follow up with Nephrology for possible Renal biopsy    Diagnosis: Diabetes  Assessment and Plan of Treatment: Resume your insulin pump tonight, 2 hrs befor bedtime  Follow up with your Endocrinologist for further management.  With a diagnosis of diabetes comes a strict diet regimen to help control blood sugars by watching carbohydrate consumption. Carbohydrates, such as starches, fruits, dairy and starchy vegetables, is the food group that affects glucose levels in the body. Carefully monitoring carbohydrate intake is a main component of the diabetic diet; eating three meals each day that are roughly equivalent in size can help control blood sugars. A registered dietitian can help you plan a diet customized to your individual needs.      Diagnosis: Hypertension  Assessment and Plan of Treatment: Continue Amlodipine    Diagnosis: Hypothyroidism  Assessment and Plan of Treatment: Resume synthroid  Follow up with Endocrine    Diagnosis: Abnormal CT scan  Assessment and Plan of Treatment: - Reported to have multiple enlarged lymph nodes, some new and some seen on CT chest from 2023 and are only slight larger in size at this time  - You should have a repeat CT chest in 1-3 months to reassess lymph nodes and should f/u with heme/onc outpt

## 2024-11-11 NOTE — PROGRESS NOTE ADULT - PROVIDER SPECIALTY LIST ADULT
Endocrinology
Internal Medicine
Cardiology
Endocrinology
Endocrinology
Heme/Onc
Intervent Cardiology
Nephrology
Nephrology
Endocrinology
Heme/Onc
Heme/Onc
Internal Medicine
Nephrology
Endocrinology
Endocrinology
Heme/Onc
Hospitalist
Nephrology
Nephrology
Hospitalist

## 2024-11-11 NOTE — PROGRESS NOTE ADULT - REASON FOR ADMISSION
CHF exacerbation

## 2024-11-11 NOTE — DISCHARGE NOTE PROVIDER - CARE PROVIDERS DIRECT ADDRESSES
[email or web address]_Unknown ,[email or web address],DirectAddress_Unknown,[email or web address]

## 2024-11-12 ENCOUNTER — NON-APPOINTMENT (OUTPATIENT)
Age: 59
End: 2024-11-12

## 2024-11-12 PROBLEM — I10 ESSENTIAL (PRIMARY) HYPERTENSION: Chronic | Status: ACTIVE | Noted: 2024-11-02

## 2024-11-12 PROBLEM — E11.9 TYPE 2 DIABETES MELLITUS WITHOUT COMPLICATIONS: Chronic | Status: ACTIVE | Noted: 2024-11-02

## 2024-11-12 LAB — DSDNA AB SER-ACNC: 38 IU/ML — HIGH

## 2024-11-13 LAB — PHOSPHOLIPASE A2 RECEPTOR ELISA: <1.8 RU/ML — SIGNIFICANT CHANGE UP (ref 0–19.9)

## 2024-11-14 LAB
% ALBUMIN: 42.6 % — SIGNIFICANT CHANGE UP
% ALPHA 1: 7.2 % — SIGNIFICANT CHANGE UP
% ALPHA 2: 18.6 % — SIGNIFICANT CHANGE UP
% BETA: 12 % — SIGNIFICANT CHANGE UP
% GAMMA: 19.6 % — SIGNIFICANT CHANGE UP
ALBUMIN SERPL ELPH-MCNC: 2.6 G/DL — LOW (ref 3.6–5.5)
ALBUMIN/GLOB SERPL ELPH: 0.8 RATIO — SIGNIFICANT CHANGE UP
ALPHA1 GLOB SERPL ELPH-MCNC: 0.4 G/DL — SIGNIFICANT CHANGE UP (ref 0.1–0.4)
ALPHA2 GLOB SERPL ELPH-MCNC: 1.1 G/DL — HIGH (ref 0.5–1)
B-GLOBULIN SERPL ELPH-MCNC: 0.7 G/DL — SIGNIFICANT CHANGE UP (ref 0.5–1)
GAMMA GLOBULIN: 1.2 G/DL — SIGNIFICANT CHANGE UP (ref 0.6–1.6)
INTERPRETATION SERPL IFE-IMP: SIGNIFICANT CHANGE UP
PROT PATTERN SERPL ELPH-IMP: SIGNIFICANT CHANGE UP
PROT SERPL-MCNC: 6 G/DL — SIGNIFICANT CHANGE UP (ref 6–8.3)

## 2024-11-14 RX ORDER — OXYCODONE AND ACETAMINOPHEN 7.5; 325 MG/1; MG/1
1 TABLET ORAL
Qty: 20 | Refills: 0
Start: 2024-11-14 | End: 2024-11-18

## 2024-11-21 ENCOUNTER — APPOINTMENT (OUTPATIENT)
Dept: PULMONOLOGY | Facility: CLINIC | Age: 59
End: 2024-11-21
Payer: COMMERCIAL

## 2024-11-21 VITALS
RESPIRATION RATE: 16 BRPM | HEART RATE: 100 BPM | OXYGEN SATURATION: 95 % | DIASTOLIC BLOOD PRESSURE: 70 MMHG | SYSTOLIC BLOOD PRESSURE: 148 MMHG

## 2024-11-21 VITALS — WEIGHT: 127 LBS | BODY MASS INDEX: 24.94 KG/M2 | HEIGHT: 60 IN

## 2024-11-21 DIAGNOSIS — R91.8 OTHER NONSPECIFIC ABNORMAL FINDING OF LUNG FIELD: ICD-10-CM

## 2024-11-21 DIAGNOSIS — R59.0 LOCALIZED ENLARGED LYMPH NODES: ICD-10-CM

## 2024-11-21 DIAGNOSIS — Z03.89 ENCOUNTER FOR OBSERVATION FOR OTHER SUSPECTED DISEASES AND CONDITIONS RULED OUT: ICD-10-CM

## 2024-11-21 PROCEDURE — 99205 OFFICE O/P NEW HI 60 MIN: CPT

## 2024-11-21 PROCEDURE — G2211 COMPLEX E/M VISIT ADD ON: CPT | Mod: NC

## 2024-11-21 RX ORDER — FUROSEMIDE 80 MG/1
TABLET ORAL
Refills: 0 | Status: ACTIVE | COMMUNITY

## 2024-11-21 RX ORDER — ALLOPURINOL 200 MG/1
TABLET ORAL
Refills: 0 | Status: ACTIVE | COMMUNITY

## 2024-11-21 RX ORDER — INSULIN LISPRO 100 [IU]/ML
100 INJECTION, SOLUTION INTRAVENOUS; SUBCUTANEOUS
Refills: 0 | Status: ACTIVE | COMMUNITY

## 2024-11-21 RX ORDER — AMLODIPINE BESYLATE 5 MG/1
TABLET ORAL
Refills: 0 | Status: ACTIVE | COMMUNITY

## 2024-11-25 ENCOUNTER — APPOINTMENT (OUTPATIENT)
Dept: ENDOCRINOLOGY | Facility: CLINIC | Age: 59
End: 2024-11-25
Payer: COMMERCIAL

## 2024-11-25 DIAGNOSIS — E11.9 TYPE 2 DIABETES MELLITUS W/OUT COMPLICATIONS: ICD-10-CM

## 2024-11-25 PROCEDURE — 98960 EDU&TRN PT SELF-MGMT NQHP 1: CPT

## 2024-12-03 PROBLEM — J90 PLEURAL EFFUSION, BILATERAL: Status: ACTIVE | Noted: 2024-12-03

## 2024-12-09 ENCOUNTER — APPOINTMENT (OUTPATIENT)
Dept: THORACIC SURGERY | Facility: CLINIC | Age: 59
End: 2024-12-09
Payer: COMMERCIAL

## 2024-12-09 DIAGNOSIS — R59.0 LOCALIZED ENLARGED LYMPH NODES: ICD-10-CM

## 2024-12-12 ENCOUNTER — OUTPATIENT (OUTPATIENT)
Dept: OUTPATIENT SERVICES | Facility: HOSPITAL | Age: 59
LOS: 1 days | End: 2024-12-12

## 2024-12-12 ENCOUNTER — APPOINTMENT (OUTPATIENT)
Dept: NUCLEAR MEDICINE | Facility: CLINIC | Age: 59
End: 2024-12-12
Payer: COMMERCIAL

## 2024-12-12 DIAGNOSIS — Z03.89 ENCOUNTER FOR OBSERVATION FOR OTHER SUSPECTED DISEASES AND CONDITIONS RULED OUT: ICD-10-CM

## 2024-12-12 PROCEDURE — 78815 PET IMAGE W/CT SKULL-THIGH: CPT | Mod: 26,PI

## 2024-12-16 ENCOUNTER — APPOINTMENT (OUTPATIENT)
Dept: THORACIC SURGERY | Facility: CLINIC | Age: 59
End: 2024-12-16
Payer: COMMERCIAL

## 2024-12-16 VITALS
DIASTOLIC BLOOD PRESSURE: 89 MMHG | OXYGEN SATURATION: 96 % | TEMPERATURE: 98.3 F | WEIGHT: 130 LBS | SYSTOLIC BLOOD PRESSURE: 152 MMHG | RESPIRATION RATE: 16 BRPM | HEART RATE: 99 BPM | HEIGHT: 60 IN | BODY MASS INDEX: 25.52 KG/M2

## 2024-12-16 DIAGNOSIS — Z78.9 OTHER SPECIFIED HEALTH STATUS: ICD-10-CM

## 2024-12-16 DIAGNOSIS — R59.0 LOCALIZED ENLARGED LYMPH NODES: ICD-10-CM

## 2024-12-16 DIAGNOSIS — Z83.3 FAMILY HISTORY OF DIABETES MELLITUS: ICD-10-CM

## 2024-12-16 DIAGNOSIS — Z80.7 FAMILY HISTORY OF OTHER MALIGNANT NEOPLASMS OF LYMPHOID, HEMATOPOIETIC AND RELATED TISSUES: ICD-10-CM

## 2024-12-16 DIAGNOSIS — Z80.1 FAMILY HISTORY OF MALIGNANT NEOPLASM OF TRACHEA, BRONCHUS AND LUNG: ICD-10-CM

## 2024-12-16 DIAGNOSIS — J90 PLEURAL EFFUSION, NOT ELSEWHERE CLASSIFIED: ICD-10-CM

## 2024-12-16 DIAGNOSIS — R91.8 OTHER NONSPECIFIC ABNORMAL FINDING OF LUNG FIELD: ICD-10-CM

## 2024-12-16 PROCEDURE — 99204 OFFICE O/P NEW MOD 45 MIN: CPT

## 2024-12-16 RX ORDER — GABAPENTIN 300 MG
300 TABLET ORAL
Refills: 0 | Status: ACTIVE | COMMUNITY

## 2024-12-18 ENCOUNTER — NON-APPOINTMENT (OUTPATIENT)
Age: 59
End: 2024-12-18

## 2024-12-18 DIAGNOSIS — E04.1 NONTOXIC SINGLE THYROID NODULE: ICD-10-CM

## 2024-12-19 ENCOUNTER — EMERGENCY (EMERGENCY)
Facility: HOSPITAL | Age: 59
LOS: 1 days | Discharge: HOME | End: 2024-12-19
Attending: EMERGENCY MEDICINE
Payer: COMMERCIAL

## 2024-12-19 VITALS
HEART RATE: 107 BPM | RESPIRATION RATE: 20 BRPM | SYSTOLIC BLOOD PRESSURE: 173 MMHG | DIASTOLIC BLOOD PRESSURE: 82 MMHG | OXYGEN SATURATION: 96 % | TEMPERATURE: 98 F | HEIGHT: 65 IN | WEIGHT: 133.6 LBS

## 2024-12-19 VITALS — OXYGEN SATURATION: 98 % | RESPIRATION RATE: 20 BRPM | HEART RATE: 89 BPM

## 2024-12-19 LAB
ALBUMIN SERPL ELPH-MCNC: 2.6 G/DL — LOW (ref 3.3–5.2)
ALP SERPL-CCNC: 80 U/L — SIGNIFICANT CHANGE UP (ref 40–120)
ALT FLD-CCNC: 6 U/L — SIGNIFICANT CHANGE UP
ANION GAP SERPL CALC-SCNC: 14 MMOL/L — SIGNIFICANT CHANGE UP (ref 5–17)
APPEARANCE UR: CLEAR — SIGNIFICANT CHANGE UP
AST SERPL-CCNC: 13 U/L — SIGNIFICANT CHANGE UP
BACTERIA # UR AUTO: ABNORMAL /HPF
BASOPHILS # BLD AUTO: 0.02 K/UL — SIGNIFICANT CHANGE UP (ref 0–0.2)
BASOPHILS NFR BLD AUTO: 0.4 % — SIGNIFICANT CHANGE UP (ref 0–2)
BILIRUB SERPL-MCNC: <0.2 MG/DL — LOW (ref 0.4–2)
BILIRUB UR-MCNC: NEGATIVE — SIGNIFICANT CHANGE UP
BUN SERPL-MCNC: 38.6 MG/DL — HIGH (ref 8–20)
CALCIUM SERPL-MCNC: 8.3 MG/DL — LOW (ref 8.4–10.5)
CAST: 3 /LPF — SIGNIFICANT CHANGE UP (ref 0–4)
CHLORIDE SERPL-SCNC: 102 MMOL/L — SIGNIFICANT CHANGE UP (ref 96–108)
CO2 SERPL-SCNC: 18 MMOL/L — LOW (ref 22–29)
COLOR SPEC: YELLOW — SIGNIFICANT CHANGE UP
CREAT SERPL-MCNC: 3.12 MG/DL — HIGH (ref 0.5–1.3)
DIFF PNL FLD: ABNORMAL
EGFR: 16 ML/MIN/1.73M2 — LOW
EOSINOPHIL # BLD AUTO: 0.12 K/UL — SIGNIFICANT CHANGE UP (ref 0–0.5)
EOSINOPHIL NFR BLD AUTO: 2.1 % — SIGNIFICANT CHANGE UP (ref 0–6)
GLUCOSE SERPL-MCNC: 278 MG/DL — HIGH (ref 70–99)
GLUCOSE UR QL: 500 MG/DL
HCT VFR BLD CALC: 31.9 % — LOW (ref 34.5–45)
HGB BLD-MCNC: 10.4 G/DL — LOW (ref 11.5–15.5)
IMM GRANULOCYTES NFR BLD AUTO: 0.2 % — SIGNIFICANT CHANGE UP (ref 0–0.9)
KETONES UR-MCNC: NEGATIVE MG/DL — SIGNIFICANT CHANGE UP
LEUKOCYTE ESTERASE UR-ACNC: ABNORMAL
LYMPHOCYTES # BLD AUTO: 0.9 K/UL — LOW (ref 1–3.3)
LYMPHOCYTES # BLD AUTO: 16 % — SIGNIFICANT CHANGE UP (ref 13–44)
MCHC RBC-ENTMCNC: 26.3 PG — LOW (ref 27–34)
MCHC RBC-ENTMCNC: 32.6 G/DL — SIGNIFICANT CHANGE UP (ref 32–36)
MCV RBC AUTO: 80.6 FL — SIGNIFICANT CHANGE UP (ref 80–100)
MONOCYTES # BLD AUTO: 0.35 K/UL — SIGNIFICANT CHANGE UP (ref 0–0.9)
MONOCYTES NFR BLD AUTO: 6.2 % — SIGNIFICANT CHANGE UP (ref 2–14)
NEUTROPHILS # BLD AUTO: 4.23 K/UL — SIGNIFICANT CHANGE UP (ref 1.8–7.4)
NEUTROPHILS NFR BLD AUTO: 75.1 % — SIGNIFICANT CHANGE UP (ref 43–77)
NITRITE UR-MCNC: NEGATIVE — SIGNIFICANT CHANGE UP
PH UR: 6.5 — SIGNIFICANT CHANGE UP (ref 5–8)
PLATELET # BLD AUTO: 357 K/UL — SIGNIFICANT CHANGE UP (ref 150–400)
POTASSIUM SERPL-MCNC: 4.5 MMOL/L — SIGNIFICANT CHANGE UP (ref 3.5–5.3)
POTASSIUM SERPL-SCNC: 4.5 MMOL/L — SIGNIFICANT CHANGE UP (ref 3.5–5.3)
PROT SERPL-MCNC: 6 G/DL — LOW (ref 6.6–8.7)
PROT UR-MCNC: 300 MG/DL
RBC # BLD: 3.96 M/UL — SIGNIFICANT CHANGE UP (ref 3.8–5.2)
RBC # FLD: 14.2 % — SIGNIFICANT CHANGE UP (ref 10.3–14.5)
RBC CASTS # UR COMP ASSIST: 73 /HPF — HIGH (ref 0–4)
SODIUM SERPL-SCNC: 134 MMOL/L — LOW (ref 135–145)
SP GR SPEC: 1.01 — SIGNIFICANT CHANGE UP (ref 1–1.03)
SQUAMOUS # UR AUTO: 8 /HPF — HIGH (ref 0–5)
UROBILINOGEN FLD QL: 0.2 MG/DL — SIGNIFICANT CHANGE UP (ref 0.2–1)
WBC # BLD: 5.63 K/UL — SIGNIFICANT CHANGE UP (ref 3.8–10.5)
WBC # FLD AUTO: 5.63 K/UL — SIGNIFICANT CHANGE UP (ref 3.8–10.5)
WBC UR QL: 12 /HPF — HIGH (ref 0–5)

## 2024-12-19 PROCEDURE — 80053 COMPREHEN METABOLIC PANEL: CPT

## 2024-12-19 PROCEDURE — A9540: CPT

## 2024-12-19 PROCEDURE — 71250 CT THORAX DX C-: CPT | Mod: 26,MC

## 2024-12-19 PROCEDURE — 71046 X-RAY EXAM CHEST 2 VIEWS: CPT | Mod: 26

## 2024-12-19 PROCEDURE — 71046 X-RAY EXAM CHEST 2 VIEWS: CPT

## 2024-12-19 PROCEDURE — A9567: CPT

## 2024-12-19 PROCEDURE — 85025 COMPLETE CBC W/AUTO DIFF WBC: CPT

## 2024-12-19 PROCEDURE — 78582 LUNG VENTILAT&PERFUS IMAGING: CPT | Mod: 26,MC

## 2024-12-19 PROCEDURE — 36415 COLL VENOUS BLD VENIPUNCTURE: CPT

## 2024-12-19 PROCEDURE — 99284 EMERGENCY DEPT VISIT MOD MDM: CPT | Mod: 25

## 2024-12-19 PROCEDURE — 71250 CT THORAX DX C-: CPT | Mod: MC

## 2024-12-19 PROCEDURE — 99284 EMERGENCY DEPT VISIT MOD MDM: CPT

## 2024-12-19 PROCEDURE — 81001 URINALYSIS AUTO W/SCOPE: CPT

## 2024-12-19 PROCEDURE — 87086 URINE CULTURE/COLONY COUNT: CPT

## 2024-12-19 PROCEDURE — 78582 LUNG VENTILAT&PERFUS IMAGING: CPT | Mod: MC

## 2024-12-19 RX ORDER — METHOCARBAMOL 500 MG/1
750 TABLET, FILM COATED ORAL ONCE
Refills: 0 | Status: DISCONTINUED | OUTPATIENT
Start: 2024-12-19 | End: 2024-12-19

## 2024-12-19 RX ORDER — METHOCARBAMOL 500 MG/1
1500 TABLET, FILM COATED ORAL ONCE
Refills: 0 | Status: COMPLETED | OUTPATIENT
Start: 2024-12-19 | End: 2024-12-19

## 2024-12-19 RX ORDER — DOXYCYCLINE HYCLATE 150 MG/1
1 TABLET, COATED ORAL
Qty: 14 | Refills: 0
Start: 2024-12-19 | End: 2024-12-25

## 2024-12-19 RX ORDER — DOXYCYCLINE HYCLATE 150 MG/1
100 TABLET, COATED ORAL ONCE
Refills: 0 | Status: COMPLETED | OUTPATIENT
Start: 2024-12-19 | End: 2024-12-19

## 2024-12-19 RX ORDER — ACETAMINOPHEN 500MG 500 MG/1
650 TABLET, COATED ORAL ONCE
Refills: 0 | Status: COMPLETED | OUTPATIENT
Start: 2024-12-19 | End: 2024-12-19

## 2024-12-19 RX ORDER — IBUPROFEN 200 MG
600 TABLET ORAL ONCE
Refills: 0 | Status: COMPLETED | OUTPATIENT
Start: 2024-12-19 | End: 2024-12-19

## 2024-12-19 RX ORDER — ACETAMINOPHEN 500MG 500 MG/1
1000 TABLET, COATED ORAL ONCE
Refills: 0 | Status: DISCONTINUED | OUTPATIENT
Start: 2024-12-19 | End: 2024-12-19

## 2024-12-19 RX ORDER — METHOCARBAMOL 500 MG/1
1 TABLET, FILM COATED ORAL
Qty: 15 | Refills: 0
Start: 2024-12-19 | End: 2024-12-23

## 2024-12-19 RX ADMIN — METHOCARBAMOL 1500 MILLIGRAM(S): 500 TABLET, FILM COATED ORAL at 20:39

## 2024-12-19 RX ADMIN — ACETAMINOPHEN 500MG 650 MILLIGRAM(S): 500 TABLET, COATED ORAL at 20:39

## 2024-12-19 RX ADMIN — DOXYCYCLINE HYCLATE 100 MILLIGRAM(S): 150 TABLET, COATED ORAL at 20:39

## 2024-12-19 NOTE — ED PROVIDER NOTE - PATIENT PORTAL LINK FT
You can access the FollowMyHealth Patient Portal offered by [facility] by registering at the following website: [email or web address]. By joining [employer]’s FollowMyHealth portal, you will also be able to view your health information using other applications (apps) compatible with our system.

## 2024-12-19 NOTE — ED PROVIDER NOTE - CLINICAL SUMMARY MEDICAL DECISION MAKING FREE TEXT BOX
PT with SPMHX of DM, HTN, CHF, advanced CKD presents to the ED with complaint of Lt sided back pain at the level of her lower ribs. pt with CKD, was recently admitted for ckd on lasix, had pleural effusions on bilateral sides of lungs, following with CT surgery, also following with nephrology, has an appt on jan 8, notes recently adjusted lasix from 40 to 20 mg. spoke with nephrology and advised that pt should continue her lasix at 20 mg but follow up closely with nephrologist, will treat with doxycyline due to atelectasis, pt understands the plan, will have clinical care coordinator try to expedite follow up, pt has been sating well on oxygen while in ED 98-99%, Pt reassessed, pt feeling better at this time, vss, pt able to walk, talk and vocalized plan of action. Discussed in depth and explained to pt in depth the next steps that need to be taking including proper follow up with PCP or specialists. All incidental findings were discussed with pt as well. Pt verbalized their concerns and all questions were answered. Pt understands dispo and wants discharge. Given good instructions when to return to ED and importance of f/u.

## 2024-12-19 NOTE — ED PROVIDER NOTE - OBJECTIVE STATEMENT
PT with SPMHX of DM, HTN, CHF, advanced CKD presents to the ED with complaint of Lt sided back pain at the level of her lower ribs. Pt states that she had a gradual onset of Lt sided pain that is constant, non radiating, dull, made worse with activity improved by nothing. Pt states that she discussed this with PCP and was sent to the ED for further investigation. Pt dines cough, SOB, diff breathing, weakness, fever, chills HA, dizziness, CP, NIXON.

## 2024-12-19 NOTE — ED ADULT TRIAGE NOTE - CHIEF COMPLAINT QUOTE
Comes in complaining of left mid back pain x 3 days. Worse at night. Denies urinary symptoms or any other symptoms. Hx of type 1 DM, BP, and Impaired kidney function.

## 2024-12-19 NOTE — ED ADULT NURSE NOTE - OBJECTIVE STATEMENT
alert and oriented x4. pt presenting to the ED complaining of left upper back pain that started a few days ago. Pt sent by PCP for further eval. Pt Denies injury to area, chest pain, shortness of breath, abdominal pain. RR even and non labored. in nad.

## 2024-12-19 NOTE — ED ADULT NURSE NOTE - NSFALLRISK_ED_ALL_ED
[FreeTextEntry1] : Hypogonadism \par wife is 33 yrs old female - negative female workup \par SA: low vol, low morphology X2 SA's\par Advised weight loss, smoking cessation \par recommed proceed to IVF \par Baseline hormones: TT, E2, FSH, LH, CBC, CMP \par \par Varicocele\par unclear benefit to varicocelecotmy given semen parameters\par recommend observation\par Follow up after labs by phone\par 
No

## 2024-12-19 NOTE — ED PROVIDER NOTE - ATTENDING APP SHARED VISIT CONTRIBUTION OF CARE
Dr. Naidu : I have personally seen and examined this patient at the bedside. I have fully participated in the care of this patient. I have reviewed all pertinent clinical information, including history, physical exam, plan and the PAs note and agree except as noted.     60yo F  SPMHX of DM, HTN, CHF, advanced CKD presents to the ED with complaint of Lt sided back pain at the level of her lower ribs. Pt states that she had a gradual onset of Lt sided pain that is constant, non radiating, dull, made worse with activity improved by nothing. Pt states that she discussed this with PCP and was sent to the ED for further investigation to ro pna. also notes a non itchy rash to arms legs body x 2 weeks dneies changes in meds, detergents    Denies f/c/n/v/cp/sob/palpitations/cough/abd.pain/d/c/dysuria/hematuria.no  sick contacts/recent travel.    PE:  head; atraumatic normocephalic  eyes: perrla  Heart: rrr s1s2  lungs: ctab  abd: soft, nt nd + bs no rebound/guarding no cva ttp  le: no swelling no calf ttp  back: no midline cervical/thoracic/lumbar ttp  skin: blanching erythematous rash diffuse no mucosal involvement    -->will check vq to ro pe as tachy ? nodule on pet scan will check cxr to ro pna ct chest to eval for mass  labs pt non toxic appearing

## 2024-12-21 LAB
CULTURE RESULTS: SIGNIFICANT CHANGE UP
SPECIMEN SOURCE: SIGNIFICANT CHANGE UP

## 2024-12-24 ENCOUNTER — TRANSCRIPTION ENCOUNTER (OUTPATIENT)
Age: 59
End: 2024-12-24

## 2024-12-24 ENCOUNTER — RESULT REVIEW (OUTPATIENT)
Age: 59
End: 2024-12-24

## 2024-12-24 ENCOUNTER — OUTPATIENT (OUTPATIENT)
Dept: OUTPATIENT SERVICES | Facility: HOSPITAL | Age: 59
LOS: 1 days | End: 2024-12-24
Payer: COMMERCIAL

## 2024-12-24 VITALS
SYSTOLIC BLOOD PRESSURE: 159 MMHG | WEIGHT: 130.07 LBS | OXYGEN SATURATION: 97 % | HEIGHT: 60 IN | DIASTOLIC BLOOD PRESSURE: 74 MMHG | RESPIRATION RATE: 16 BRPM | HEART RATE: 100 BPM | TEMPERATURE: 99 F

## 2024-12-24 DIAGNOSIS — R59.0 LOCALIZED ENLARGED LYMPH NODES: ICD-10-CM

## 2024-12-24 PROCEDURE — 88341 IMHCHEM/IMCYTCHM EA ADD ANTB: CPT

## 2024-12-24 PROCEDURE — 88341 IMHCHEM/IMCYTCHM EA ADD ANTB: CPT | Mod: 26

## 2024-12-24 PROCEDURE — 88305 TISSUE EXAM BY PATHOLOGIST: CPT | Mod: 26

## 2024-12-24 PROCEDURE — 38505 NEEDLE BIOPSY LYMPH NODES: CPT | Mod: LT

## 2024-12-24 PROCEDURE — 87205 SMEAR GRAM STAIN: CPT

## 2024-12-24 PROCEDURE — 76942 ECHO GUIDE FOR BIOPSY: CPT | Mod: 26

## 2024-12-24 PROCEDURE — 77012 CT SCAN FOR NEEDLE BIOPSY: CPT

## 2024-12-24 PROCEDURE — 88342 IMHCHEM/IMCYTCHM 1ST ANTB: CPT

## 2024-12-24 PROCEDURE — 88305 TISSUE EXAM BY PATHOLOGIST: CPT

## 2024-12-24 PROCEDURE — 88185 FLOWCYTOMETRY/TC ADD-ON: CPT

## 2024-12-24 PROCEDURE — 88342 IMHCHEM/IMCYTCHM 1ST ANTB: CPT | Mod: 26

## 2024-12-24 PROCEDURE — 88184 FLOWCYTOMETRY/ TC 1 MARKER: CPT

## 2024-12-24 PROCEDURE — 88360 TUMOR IMMUNOHISTOCHEM/MANUAL: CPT

## 2024-12-24 NOTE — ASU DISCHARGE PLAN (ADULT/PEDIATRIC) - NS MD DC FALL RISK RISK
For information on Fall & Injury Prevention, visit: [email or web address] OR  [email or web address] OR  https://www.cdc.gov/steadi/patient.html

## 2024-12-24 NOTE — CHART NOTE - NSCHARTNOTEFT_GEN_A_CORE
Vascular & Interventional Radiology Pre-Procedure Note    Procedure Name: US guided left axillary lymph node biopsy    HPI: 59y Female with diffuse lymphadenopathy    59y Female with hx of CHF, DM on insulin pump, HTN, Hypothyroidism, back pain and worsening SHARA, found to have diffuse PET positive lymphadenopathy. 2cm mass seen in RA on echo.  Recent admission for fluid overload, now on lasix.  IR requested to biopsy a lymph node. Left axillary nodes are easiest to access.      PMH/PSH  Localized enlarged lymph nodes  Diabetes  Hypertension  LOCALIZED ENLARGED LYMPH NODES  CHF  SHARA  hypothyroidism  RA mass      Allergies: No Known Allergies      Medications (Abx/Cardiac/Anticoagulation/Blood Products)  · 	Percocet 5 mg-325 mg oral tablet: 1 tab(s) orally every 6 hours as needed for  severe pain MDD: 4  · 	allopurinol 100 mg oral tablet: 1 tab(s) orally once a day  · 	levothyroxine 137 mcg (0.137 mg) oral tablet: 1 tab(s) orally once a day  · 	Lasix 40 mg oral tablet: 1 tab(s) orally once a day  · 	amLODIPine 5 mg oral tablet: 1 tab(s) orally once a day  · 	gabapentin 300 mg oral capsule: 1 cap(s) orally 2 times a day        Data:  152.4  59    T(C): 37  HR: 100  BP: 159/74  RR: 16  SpO2: 97%      Lab  -WBC 5.63 / HgB 10.4 / Hct 31.9 / Plt 357  -Na 134 / Cl 102 / BUN 38.6 / Glucose 278  -K 4.5 / CO2 18.0 / Cr 3.12  -ALT 6 / Alk Phos 80 / T.Bili <0.2      Imaging: diffuse adenopathy      Plan:   -59y Female presents for US guided left axillary node biopsy  -Risks/Benefits/alternatives explained with the patient and witnessed informed consent obtained.

## 2024-12-24 NOTE — ASU DISCHARGE PLAN (ADULT/PEDIATRIC) - FINANCIAL ASSISTANCE
[facility] provides services at a reduced cost to those who are determined to be eligible through [facility]’s financial assistance program. Information regarding [facility]’s financial assistance program can be found by going to [email or web address] or by calling [phone].

## 2024-12-24 NOTE — ASU DISCHARGE PLAN (ADULT/PEDIATRIC) - FINANCIAL ASSISTANCE
[facility] provides services at a reduced cost to those who are determined to be eligible through [facility]’s financial assistance program. Information regarding [facility]’s financial assistance program can be found by going to [email or web address] or by calling [phone].

## 2024-12-24 NOTE — ASU DISCHARGE PLAN (ADULT/PEDIATRIC) - CARE PROVIDER_API CALL
Rohan Louise  Thoracic Surgery  [address] [place] [id number]  Phone: [phone]  Fax: [phone]  Follow Up Time:

## 2024-12-24 NOTE — ASU DISCHARGE PLAN (ADULT/PEDIATRIC) - NS MD DC FALL RISK RISK
For information on Fall & Injury Prevention, visit: [email or web address] OR  [email or web address] OR  https://www.cdc.gov/steadi/patient.html

## 2024-12-27 ENCOUNTER — EMERGENCY (EMERGENCY)
Facility: HOSPITAL | Age: 59
LOS: 1 days | Discharge: HOME | End: 2024-12-27
Attending: EMERGENCY MEDICINE
Payer: COMMERCIAL

## 2024-12-27 VITALS
DIASTOLIC BLOOD PRESSURE: 74 MMHG | SYSTOLIC BLOOD PRESSURE: 164 MMHG | HEART RATE: 101 BPM | TEMPERATURE: 99 F | HEIGHT: 60 IN | WEIGHT: 130.07 LBS | RESPIRATION RATE: 20 BRPM | OXYGEN SATURATION: 99 %

## 2024-12-27 VITALS
TEMPERATURE: 98 F | RESPIRATION RATE: 20 BRPM | HEART RATE: 89 BPM | DIASTOLIC BLOOD PRESSURE: 79 MMHG | SYSTOLIC BLOOD PRESSURE: 135 MMHG | OXYGEN SATURATION: 98 %

## 2024-12-27 LAB
ALBUMIN SERPL ELPH-MCNC: 2.5 G/DL — LOW (ref 3.3–5.2)
ALP SERPL-CCNC: 95 U/L — SIGNIFICANT CHANGE UP (ref 40–120)
ALT FLD-CCNC: 8 U/L — SIGNIFICANT CHANGE UP
ANION GAP SERPL CALC-SCNC: 13 MMOL/L — SIGNIFICANT CHANGE UP (ref 5–17)
AST SERPL-CCNC: 22 U/L — SIGNIFICANT CHANGE UP
BASOPHILS # BLD AUTO: 0.03 K/UL — SIGNIFICANT CHANGE UP (ref 0–0.2)
BASOPHILS NFR BLD AUTO: 0.6 % — SIGNIFICANT CHANGE UP (ref 0–2)
BILIRUB SERPL-MCNC: <0.2 MG/DL — LOW (ref 0.4–2)
BUN SERPL-MCNC: 52 MG/DL — HIGH (ref 8–20)
CALCIUM SERPL-MCNC: 8.7 MG/DL — SIGNIFICANT CHANGE UP (ref 8.4–10.5)
CHLORIDE SERPL-SCNC: 105 MMOL/L — SIGNIFICANT CHANGE UP (ref 96–108)
CO2 SERPL-SCNC: 19 MMOL/L — LOW (ref 22–29)
CREAT SERPL-MCNC: 2.88 MG/DL — HIGH (ref 0.5–1.3)
EGFR: 18 ML/MIN/1.73M2 — LOW
EOSINOPHIL # BLD AUTO: 0.15 K/UL — SIGNIFICANT CHANGE UP (ref 0–0.5)
EOSINOPHIL NFR BLD AUTO: 3 % — SIGNIFICANT CHANGE UP (ref 0–6)
GLUCOSE SERPL-MCNC: 207 MG/DL — HIGH (ref 70–99)
HCT VFR BLD CALC: 33.6 % — LOW (ref 34.5–45)
HGB BLD-MCNC: 11.1 G/DL — LOW (ref 11.5–15.5)
IMM GRANULOCYTES NFR BLD AUTO: 1 % — HIGH (ref 0–0.9)
LYMPHOCYTES # BLD AUTO: 1.22 K/UL — SIGNIFICANT CHANGE UP (ref 1–3.3)
LYMPHOCYTES # BLD AUTO: 24.4 % — SIGNIFICANT CHANGE UP (ref 13–44)
MCHC RBC-ENTMCNC: 26.1 PG — LOW (ref 27–34)
MCHC RBC-ENTMCNC: 33 G/DL — SIGNIFICANT CHANGE UP (ref 32–36)
MCV RBC AUTO: 78.9 FL — LOW (ref 80–100)
MONOCYTES # BLD AUTO: 0.32 K/UL — SIGNIFICANT CHANGE UP (ref 0–0.9)
MONOCYTES NFR BLD AUTO: 6.4 % — SIGNIFICANT CHANGE UP (ref 2–14)
NEUTROPHILS # BLD AUTO: 3.24 K/UL — SIGNIFICANT CHANGE UP (ref 1.8–7.4)
NEUTROPHILS NFR BLD AUTO: 64.6 % — SIGNIFICANT CHANGE UP (ref 43–77)
NT-PROBNP SERPL-SCNC: 992 PG/ML — HIGH (ref 0–300)
PLATELET # BLD AUTO: 371 K/UL — SIGNIFICANT CHANGE UP (ref 150–400)
POTASSIUM SERPL-MCNC: 5.1 MMOL/L — SIGNIFICANT CHANGE UP (ref 3.5–5.3)
POTASSIUM SERPL-SCNC: 5.1 MMOL/L — SIGNIFICANT CHANGE UP (ref 3.5–5.3)
PROT SERPL-MCNC: 6.4 G/DL — LOW (ref 6.6–8.7)
RBC # BLD: 4.26 M/UL — SIGNIFICANT CHANGE UP (ref 3.8–5.2)
RBC # FLD: 15.2 % — HIGH (ref 10.3–14.5)
SODIUM SERPL-SCNC: 137 MMOL/L — SIGNIFICANT CHANGE UP (ref 135–145)
TM INTERPRETATION: SIGNIFICANT CHANGE UP
WBC # BLD: 5.01 K/UL — SIGNIFICANT CHANGE UP (ref 3.8–10.5)
WBC # FLD AUTO: 5.01 K/UL — SIGNIFICANT CHANGE UP (ref 3.8–10.5)

## 2024-12-27 PROCEDURE — 99285 EMERGENCY DEPT VISIT HI MDM: CPT

## 2024-12-27 PROCEDURE — 73562 X-RAY EXAM OF KNEE 3: CPT

## 2024-12-27 PROCEDURE — 80053 COMPREHEN METABOLIC PANEL: CPT

## 2024-12-27 PROCEDURE — 73562 X-RAY EXAM OF KNEE 3: CPT | Mod: 26,50

## 2024-12-27 PROCEDURE — 96374 THER/PROPH/DIAG INJ IV PUSH: CPT

## 2024-12-27 PROCEDURE — 99284 EMERGENCY DEPT VISIT MOD MDM: CPT | Mod: 25

## 2024-12-27 PROCEDURE — 85025 COMPLETE CBC W/AUTO DIFF WBC: CPT

## 2024-12-27 PROCEDURE — 83880 ASSAY OF NATRIURETIC PEPTIDE: CPT

## 2024-12-27 PROCEDURE — 36415 COLL VENOUS BLD VENIPUNCTURE: CPT

## 2024-12-27 RX ADMIN — Medication 2 MILLIGRAM(S): at 15:04

## 2024-12-27 NOTE — ED PROVIDER NOTE - ATTENDING CONTRIBUTION TO CARE
Patient is a 58yo F with PMHx of HTN, hypothyroidism, DMI who presents to the ED complaining of knee pain/injury. Will get xrays, treat pain, get labs, likely PT eval. Patient prefers to have home PT if necessary, does not want to go to [facility].    I, Rafy Martin, performed the initial face to face bedside interview with this patient regarding history of present illness, review of symptoms and relevant past medical, social and family history.  I completed an independent physical examination.  I was the initial provider who evaluated this patient. I have signed out the follow up of any pending tests (i.e. labs, radiological studies) to the resident.  I have communicated the patient’s plan of care and disposition with the resident

## 2024-12-27 NOTE — ED PROVIDER NOTE - CARE PROVIDERS DIRECT ADDRESSES
[email or web address]

## 2024-12-27 NOTE — ED PROVIDER NOTE - CLINICAL SUMMARY MEDICAL DECISION MAKING FREE TEXT BOX
Patient is a 60yo F with PMHx of HTN, hypothyroidism, DMI who presents to the ED complaining of knee pain/injury. Will get xrays, treat pain, get labs, likely PT eval. Patient prefers to have home PT if necessary, does not want to go to [facility].

## 2024-12-27 NOTE — ED ADULT TRIAGE NOTE - BSA (M2)
Spoke to her son, He said he wants something close to her house [place]. Told the son [place] location is closed. Son will call her mother's insurance to see what's closer wound care clinic. 1.55

## 2024-12-27 NOTE — ED PROVIDER NOTE - PHYSICAL EXAMINATION
Gen: AAOx3, NAD, well nourished  HEENT: Normocephalic atraumatic. EOMI. No scleral icterus. Moist mucus membranes.  CV: RRR. Audible S1 and S2. No murmurs. 2+ radial and PT pulses. 1+ pitting edema BL LEs.  Pulm: +Bibasilar rales. No accessory muscle use or respiratory distress.  Abdomen: soft, normoactive BS, non distended, nontender, no rebound, no guarding  Musculoskeletal:  BL knees ttp, swelling R>L, AROM intact, sensation intact, no laxity on anterior/posterior drawer, valgus/varus stress.  Skin: No rashes or lesions. Warm.  Neurologic: No focal neurological deficits. CN II-XII grossly intact.  Psych: Appropriate mood and affect. Cooperative.

## 2024-12-27 NOTE — ED PROVIDER NOTE - OBJECTIVE STATEMENT
Patient is a 60yo F with PMHx of HTN, hypothyroidism, DMI who presents to the ED complaining of knee pain/injury. Patient recently admitted for presumed new onset CHF, but echocardiogram showing normal heart function. Also with SHARA on CKD and proteinuria. Patient has been having swelling and also currently being worked up for lymphoma, underwent lymph node biopsy on 12/24. Complains of generalized weakness, fell on [place] yvette on her knees (more on the R) and since then has been having increasing swelling and pain to the point that she is unable to walk.

## 2024-12-27 NOTE — ED PROVIDER NOTE - NSFOLLOWUPINSTRUCTIONS_ED_ALL_ED_FT
- Follow up with your nephrologist, oncologist, and thoracic surgeon for continued work up.   - Follow up with ortho for your knee pain.  - Take tylenol as needed for pain.    RICE  RICE stands for rest, ice, compression, and elevation. Doing these things helps limit pain and swelling after an injury. RICE also helps injuries heal faster.   - Rest: Pain is your body’s way of telling you to rest an injured area. Whether you have hurt an elbow, hand, foot, or knee, limiting its use will prevent further injury and help you heal.  - Ice: Applying ice right after an injury helps prevent swelling and reduce pain. Don’t place ice directly on your skin. Use a cold pack. Or make an ice pack by putting ice cubes in a plastic bag that seals at the top. Wrap the cold pack or ice pack in a thin cloth. Place it over the injured area. Ice for  10 minutes every  3 hours. Don’t ice for more than  20 minutes at a time.  - Compression: Putting pressure (compression) on an injury helps prevent swelling and provides support. Wrap the injured area firmly with an elastic bandage. If your hand or foot tingles, becomes discolored, or feels cold to the touch, the bandage may be too tight. Rewrap it more loosely. If your bandage becomes too loose, rewrap it. Don't wear an elastic bandage overnight.  - Elevation: Keeping an injury elevated helps reduce swelling, pain, and throbbing. Elevation is most effective when the injury is kept elevated higher than the heart.    Return to the ED if:  - Fingers or toes feel numb, tingly, are cold to the touch, or change color.  - Skin looks shiny or tight.  - Pain, swelling, or bruising gets worse and isn't improved with elevation.  - Signs of infection. These include warm skin, redness, fluid leaking, or bad smell coming from the injured body part.

## 2024-12-27 NOTE — ED PROVIDER NOTE - PROVIDER TOKENS
PROVIDER:[TOKEN:[[id number]],FOLLOWUP:[Routine]],PROVIDER:[TOKEN:[[id number]],FOLLOWUP:[Routine]],PROVIDER:[TOKEN:[[id number]],FOLLOWUP:[Routine]],PROVIDER:[TOKEN:[[id number]],FOLLOWUP:[Routine]]

## 2024-12-27 NOTE — ED PROVIDER NOTE - CARE PROVIDER_API CALL
Rohan Louise  Thoracic Surgery  [address] [place] [address]  Phone: [phone]  Fax: [phone]  Follow Up Time: Routine    Feli Jasso  Nephrology  [address] [place] [address]  Phone: [phone]  Fax: [phone]  Follow Up Time: Routine    Carmen Huang  Medical Oncology  [address] [place] [address]  Phone: [phone]  Fax: [phone]  Follow Up Time: Routine    Leon Smith  Orthopaedic Trauma  [address] [place] [address]  Phone: [phone]  Fax: [phone]  Follow Up Time: Routine

## 2024-12-27 NOTE — ED PROVIDER NOTE - WR INTERPRETATION 1
DVT: Eliquis for a fib  Diet: DASH  Dispo: pending [facility] XR negative - No fracture, No dislocation, No foreign body

## 2024-12-27 NOTE — ED ADULT NURSE NOTE - TEMPLATE LIST FOR HEAD TO TOE ASSESSMENT
Physical Therapy  Initial Assessment   [facility] 5S NEURO SPINE    Name: Alysa Cook  : 1957  MRN: [id number]    Date of Service: 2019    Evaluating PT:  Yadira Olivarez, PT [id number]    Room #:  3324/3767-F  JOSE performed by Shea Ba MD at [facility] PTA. Equipment owned: [employer]     Cardiac

## 2024-12-27 NOTE — ED ADULT TRIAGE NOTE - CHIEF COMPLAINT QUOTE
" I fell on beth yvette and now I have worsening pain" pt c/o pain to right and left knee, denies LOC, blood thinners, hitting her head

## 2024-12-27 NOTE — ED PROVIDER NOTE - CCCP TRG CHIEF CMPLNT
Caesar Kim    Nursing Report ED to Floor:  Mental status: ANOX1  Ambulatory status: PT HAS WEAKNESS AT THIS TIME.   Oxygen Therapy:  ROOM AIR, WEARS CPAP AT NIGHT FOR SLEEP APNEA  Cardiac Rhythm: SINUS LEILA  Admitted from: ED  Safety Concerns:  N/A   Social Issues: N/A  ED Room #:  18    ED Nurse Phone Extension - [phone] or may call [phone].      HPI:   Chief Complaint   Patient presents with    Altered Mental Status    Weakness - Generalized       Past Medical History:  Past Medical History:   Diagnosis Date    AAA (abdominal aortic aneurysm)     Arthritis     Atrial fibrillation     BPH (benign prostatic hyperplasia)     CHF (congestive heart failure)     COPD (chronic obstructive pulmonary disease)     Diabetes mellitus     Elevated cholesterol     GERD (gastroesophageal reflux disease)     Hypertension     Memory loss     Personal history of pulmonary embolism     Pulmonary embolism     Restless leg syndrome     Sleep apnea     Uses CPAP    Unspecified convulsions     Unspecified diastolic (congestive) heart failure     Urinary tract infection         Past Surgical History:  Past Surgical History:   Procedure Laterality Date    CHOLECYSTECTOMY      COLONOSCOPY      CYSTOSCOPY TRANSURETHRAL RESECTION OF PROSTATE N/A 12/2/2022    Procedure: CYSTOSCOPY TRANSURETHRAL RESECTION OF PROSTATE GREENLIGHT;  Surgeon: Darius Costa MD;  Location: [facility];  Service: Urology;  Laterality: N/A;    ENDOSCOPY      EYE SURGERY Bilateral     CATARACTS    SHOULDER SURGERY Left     SKIN BIOPSY      NECK    TONSILLECTOMY      AND ADENOIDS        Admitting Doctor:   Aminah Vides MD    Consulting Provider(s):  Consults       Date and Time Order Name Status Description    1/15/2024  3:05 AM Inpatient Neurology Consult General               Admitting Diagnosis:   The primary encounter diagnosis was Altered mental status, unspecified altered mental status type. Diagnoses of Acute UTI (urinary tract infection), Elevated CK,  Encephalopathy, unspecified, and Essential hypertension were also pertinent to this visit.    Most Recent Vitals:   Vitals:    01/15/24 0700 01/15/24 0701 01/15/24 0702 01/15/24 0727   BP: 118/80      Pulse:  55 56 52   Resp:       Temp:       TempSrc:       SpO2:   95% 94%   Weight:       Height:           Active LDAs/IV Access:   Lines, Drains & Airways       Active LDAs       Name Placement date Placement time Site Days    Peripheral IV 01/14/24 2032 Anterior;Distal;Left;Upper Arm 01/14/24 2032  Arm  less than 1    Peripheral IV 01/14/24 2218 Anterior;Distal;Right;Upper Arm 01/14/24 2218  Arm  less than 1                    Labs (abnormal labs have a star):   Labs Reviewed   SINGLE HSTROPONIN T - Abnormal; Notable for the following components:       Result Value    HS Troponin T 30 (*)     All other components within normal limits    Narrative:     High Sensitive Troponin T Reference Range:  <14.0 ng/L- Negative Female for AMI  <22.0 ng/L- Negative Male for AMI  >=14 - Abnormal Female indicating possible myocardial injury.  >=22 - Abnormal Male indicating possible myocardial injury.   Clinicians would have to utilize clinical acumen, EKG, Troponin, and serial changes to determine if it is an Acute Myocardial Infarction or myocardial injury due to an underlying chronic condition.        URINALYSIS W/ MICROSCOPIC IF INDICATED (NO CULTURE) - Abnormal; Notable for the following components:    Appearance, UA Turbid (*)     Blood, UA Small (1+) (*)     Protein, UA 30 mg/dL (1+) (*)     Leuk Esterase, UA Large (3+) (*)     All other components within normal limits   CBC WITH AUTO DIFFERENTIAL - Abnormal; Notable for the following components:    Lymphocyte % 15.4 (*)     Monocyte % 12.6 (*)     All other components within normal limits   C-REACTIVE PROTEIN - Abnormal; Notable for the following components:    C-Reactive Protein 1.13 (*)     All other components within normal limits   URINE DRUG SCREEN - Abnormal;  Notable for the following components:    Barbiturates Screen, Urine Positive (*)     All other components within normal limits    Narrative:     Cutoff For Drugs Screened:    Amphetamines               500 ng/ml  Barbiturates               200 ng/ml  Benzodiazepines            150 ng/ml  Cocaine                    150 ng/ml  Methadone                  200 ng/ml  Opiates                    100 ng/ml  Phencyclidine               25 ng/ml  THC                         50 ng/ml  Methamphetamine            500 ng/ml  Tricyclic Antidepressants  300 ng/ml  Oxycodone                  100 ng/ml  Buprenorphine               10 ng/ml    The normal value for all drugs tested is negative. This report includes unconfirmed screening results, with the cutoff values listed, to be used for medical treatment purposes only.  Unconfirmed results must not be used for non-medical purposes such as employment or legal testing.  Clinical consideration should be applied to any drug of abuse test, particularly when unconfirmed results are used.     CK - Abnormal; Notable for the following components:    Creatine Kinase 447 (*)     All other components within normal limits   TSH - Abnormal; Notable for the following components:    TSH 5.000 (*)     All other components within normal limits    Narrative:     Due to abnormal TSH results, suggest ordering Free T4.   PHENOBARBITAL LEVEL - Abnormal; Notable for the following components:    Phenobarbital 4.6 (*)     All other components within normal limits   URINALYSIS, MICROSCOPIC ONLY - Abnormal; Notable for the following components:    RBC, UA 3-5 (*)     WBC, UA Too Numerous to Count (*)     Squamous Epithelial Cells, UA 3-6 (*)     All other components within normal limits   TROPONIN - Abnormal; Notable for the following components:    HS Troponin T 32 (*)     All other components within normal limits    Narrative:     High Sensitive Troponin T Reference Range:  <14.0 ng/L- Negative Female for  AMI  <22.0 ng/L- Negative Male for AMI  >=14 - Abnormal Female indicating possible myocardial injury.  >=22 - Abnormal Male indicating possible myocardial injury.   Clinicians would have to utilize clinical acumen, EKG, Troponin, and serial changes to determine if it is an Acute Myocardial Infarction or myocardial injury due to an underlying chronic condition.        HIGH SENSITIVITIY TROPONIN T 2HR - Abnormal; Notable for the following components:    HS Troponin T 30 (*)     All other components within normal limits    Narrative:     High Sensitive Troponin T Reference Range:  <14.0 ng/L- Negative Female for AMI  <22.0 ng/L- Negative Male for AMI  >=14 - Abnormal Female indicating possible myocardial injury.  >=22 - Abnormal Male indicating possible myocardial injury.   Clinicians would have to utilize clinical acumen, EKG, Troponin, and serial changes to determine if it is an Acute Myocardial Infarction or myocardial injury due to an underlying chronic condition.        CK - Abnormal; Notable for the following components:    Creatine Kinase 368 (*)     All other components within normal limits   CBC WITH AUTO DIFFERENTIAL - Abnormal; Notable for the following components:    RBC 4.10 (*)     Hemoglobin 12.7 (*)     Monocyte % 18.8 (*)     Monocytes, Absolute 0.95 (*)     All other components within normal limits   BASIC METABOLIC PANEL - Abnormal; Notable for the following components:    Chloride 108 (*)     All other components within normal limits    Narrative:     GFR Normal >60  Chronic Kidney Disease <60  Kidney Failure <15    The GFR formula is only valid for adults with stable renal function between ages 18 and 70.   RESPIRATORY PANEL PCR W/ COVID-19 (SARS-COV-2), NP SWAB IN UTM/VTP, 2 HR TAT - Normal    Narrative:     In the setting of a positive respiratory panel with a viral infection PLUS a negative procalcitonin without other underlying concern for bacterial infection, consider observing off antibiotics  "or discontinuation of antibiotics and continue supportive care. If the respiratory panel is positive for atypical bacterial infection (Bordetella pertussis, Chlamydophila pneumoniae, or Mycoplasma pneumoniae), consider antibiotic de-escalation to target atypical bacterial infection.   MAGNESIUM - Normal   LIPASE - Normal   LACTIC ACID, PLASMA - Normal   PROCALCITONIN - Normal    Narrative:     As a Marker for Sepsis (Non-Neonates):    1. <0.5 ng/mL represents a low risk of severe sepsis and/or septic shock.  2. >2 ng/mL represents a high risk of severe sepsis and/or septic shock.    As a Marker for Lower Respiratory Tract Infections that require antibiotic therapy:    PCT on Admission    Antibiotic Therapy       6-12 Hrs later    >0.5                Strongly Recommended  >0.25 - <0.5        Recommended   0.1 - 0.25          Discouraged              Remeasure/reassess PCT  <0.1                Strongly Discouraged     Remeasure/reassess PCT    As 28 day mortality risk marker: \"Change in Procalcitonin Result\" (>80% or <=80%) if Day 0 (or Day 1) and Day 4 values are available. Refer to http://www.[employer]-pct-calculator.com    Change in PCT <=80%  A decrease of PCT levels below or equal to 80% defines a positive change in PCT test result representing a higher risk for 28-day all-cause mortality of patients diagnosed with severe sepsis for septic shock.    Change in PCT >80%  A decrease of PCT levels of more than 80% defines a negative change in PCT result representing a lower risk for 28-day all-cause mortality of patients diagnosed with severe sepsis or septic shock.      SALICYLATE LEVEL - Normal   ETHANOL - Normal    Narrative:     Elevated lactic acid concentration and lactate dehydrogenase(LD) activity may falsely elevate enzymatically determined ethanol levels. Not for legal purposes.    ACETAMINOPHEN LEVEL - Normal   PHOSPHORUS - Normal   T4, FREE - Normal    Narrative:     Results may be falsely increased if " patient taking Biotin.     FENTANYL, URINE - Normal    Narrative:     Negative Threshold:      Fentanyl 5 ng/mL     The normal value for the drug tested is negative. This report includes final unconfirmed screening results to be used for medical treatment purposes only. Unconfirmed results must not be used for non-medical purposes such as employment or legal testing. Clinical consideration should be applied to any drug of abuse test, particularly when unconfirmed results are used.          COVID PRE-OP / PRE-PROCEDURE SCREENING ORDER (NO ISOLATION)    Narrative:     The following orders were created for panel order COVID PRE-OP / PRE-PROCEDURE SCREENING ORDER (NO ISOLATION) - Swab, Nasopharynx.  Procedure                               Abnormality         Status                     ---------                               -----------         ------                     Respiratory Panel PCR w/...[[id number]]  Normal              Final result                 Please view results for these tests on the individual orders.   BLOOD CULTURE   BLOOD CULTURE   URINE CULTURE   RAINBOW DRAW    Narrative:     The following orders were created for panel order [place] Draw.  Procedure                               Abnormality         Status                     ---------                               -----------         ------                     Green Top (Gel)[[id number]]                                  Final result               Lavender Top[[id number]]                                     Final result               Gold Top - SST[[id number]]                                   Final result               Plata Top[[id number]]                                         Final result               Light Blue Top[[id number]]                                   Final result                 Please view results for these tests on the individual orders.   COMPREHENSIVE METABOLIC PANEL    Narrative:     GFR Normal >60  Chronic Kidney Disease <60  Kidney  Failure <15    The GFR formula is only valid for adults with stable renal function between ages 18 and 70.   CBC AND DIFFERENTIAL    Narrative:     The following orders were created for panel order CBC & Differential.  Procedure                               Abnormality         Status                     ---------                               -----------         ------                     CBC Auto Differential[[id number]]        Abnormal            Final result                 Please view results for these tests on the individual orders.   GREEN TOP   LAVENDER TOP   GOLD TOP - SST   GRAY TOP   LIGHT BLUE TOP       Meds Given in ED:   Medications   sodium chloride 0.9 % flush 10 mL (has no administration in time range)   lactated ringers infusion (125 mL/hr Intravenous Currently Infusing 1/15/24 0530)   piperacillin-tazobactam (ZOSYN) 3.375 g in iso-osmotic dextrose 50 ml (premix) (has no administration in time range)   apixaban (ELIQUIS) tablet 5 mg (has no administration in time range)   famotidine (PEPCID) tablet 40 mg (has no administration in time range)   finasteride (PROSCAR) tablet 5 mg (has no administration in time range)   gabapentin (NEURONTIN) capsule 100 mg (has no administration in time range)   lacosamide (VIMPAT) tablet 100 mg (has no administration in time range)   losartan (COZAAR) tablet 25 mg (has no administration in time range)   melatonin tablet 5 mg (has no administration in time range)   pantoprazole (PROTONIX) EC tablet 40 mg (0 mg Oral Hold 1/15/24 0528)   PHENobarbital (LUMINAL) tablet 32.4 mg (has no administration in time range)   pramipexole (MIRAPEX) tablet 1.5 mg (has no administration in time range)   topiramate (TOPAMAX) tablet 200 mg (has no administration in time range)   oxybutynin XL (DITROPAN-XL) 24 hr tablet 5 mg (has no administration in time range)   sodium chloride 0.9 % flush 10 mL (has no administration in time range)   sodium chloride 0.9 % flush 10 mL (has no  administration in time range)   sodium chloride 0.9 % infusion 40 mL (has no administration in time range)   sennosides-docusate (PERICOLACE) 8.6-50 MG per tablet 2 tablet (has no administration in time range)     And   polyethylene glycol (MIRALAX) packet 17 g (has no administration in time range)     And   bisacodyl (DULCOLAX) EC tablet 5 mg (has no administration in time range)     And   bisacodyl (DULCOLAX) suppository 10 mg (has no administration in time range)   nitroglycerin (NITROSTAT) SL tablet 0.4 mg (has no administration in time range)   acetaminophen (TYLENOL) tablet 650 mg (has no administration in time range)     Or   acetaminophen (TYLENOL) 160 MG/5ML oral solution 650 mg (has no administration in time range)     Or   acetaminophen (TYLENOL) suppository 650 mg (has no administration in time range)   erythromycin (ROMYCIN) ophthalmic ointment (has no administration in time range)   erythromycin (ROMYCIN) ophthalmic ointment 1 application  (1 application  Right Eye Given 1/14/24 2237)   sodium chloride 0.9 % bolus 1,000 mL (0 mL Intravenous Stopped 1/14/24 2337)   piperacillin-tazobactam (ZOSYN) 3.375 g in iso-osmotic dextrose 50 ml (premix) (0 g Intravenous Stopped 1/15/24 0247)   diazePAM (VALIUM) injection 2.5 mg (2.5 mg Intravenous Given 1/15/24 0432)     lactated ringers, 125 mL/hr, Last Rate: 125 mL/hr (01/15/24 8230)           knee pain/injury

## 2024-12-27 NOTE — ED ADULT NURSE NOTE - OBJECTIVE STATEMENT
C/O knee injury following fall on beth eve. Denies head strike, Denies use of blood thinners. Pt started on diuretic for CHF. 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 Denies SOB. Denies chest pain.

## 2025-01-03 DIAGNOSIS — M25.561 PAIN IN RIGHT KNEE: ICD-10-CM

## 2025-01-03 DIAGNOSIS — E03.9 HYPOTHYROIDISM, UNSPECIFIED: ICD-10-CM

## 2025-01-03 DIAGNOSIS — M25.562 PAIN IN LEFT KNEE: ICD-10-CM

## 2025-01-03 DIAGNOSIS — I10 ESSENTIAL (PRIMARY) HYPERTENSION: ICD-10-CM

## 2025-01-03 DIAGNOSIS — E10.9 TYPE 1 DIABETES MELLITUS WITHOUT COMPLICATIONS: ICD-10-CM

## 2025-01-06 ENCOUNTER — APPOINTMENT (OUTPATIENT)
Dept: THORACIC SURGERY | Facility: CLINIC | Age: 60
End: 2025-01-06
Payer: COMMERCIAL

## 2025-01-06 VITALS
BODY MASS INDEX: 25.52 KG/M2 | WEIGHT: 130 LBS | TEMPERATURE: 97.6 F | DIASTOLIC BLOOD PRESSURE: 87 MMHG | RESPIRATION RATE: 16 BRPM | HEIGHT: 60 IN | HEART RATE: 102 BPM | OXYGEN SATURATION: 97 % | SYSTOLIC BLOOD PRESSURE: 148 MMHG

## 2025-01-06 DIAGNOSIS — R91.8 OTHER NONSPECIFIC ABNORMAL FINDING OF LUNG FIELD: ICD-10-CM

## 2025-01-06 PROCEDURE — 99214 OFFICE O/P EST MOD 30 MIN: CPT

## 2025-01-09 RX ORDER — INSULIN LISPRO 100/ML
0 VIAL (ML) SUBCUTANEOUS
Qty: 0 | Refills: 0 | DISCHARGE
Start: 2025-01-09

## 2025-01-09 RX ORDER — INSULIN GLARGINE-YFGN 100 [IU]/ML
0 INJECTION, SOLUTION SUBCUTANEOUS
Refills: 0 | DISCHARGE
Start: 2025-01-09

## 2025-01-13 ENCOUNTER — APPOINTMENT (OUTPATIENT)
Dept: ENDOCRINOLOGY | Facility: CLINIC | Age: 60
End: 2025-01-13
Payer: COMMERCIAL

## 2025-01-13 PROCEDURE — G0108 DIAB MANAGE TRN  PER INDIV: CPT

## 2025-01-14 ENCOUNTER — APPOINTMENT (OUTPATIENT)
Dept: ENDOCRINOLOGY | Facility: CLINIC | Age: 60
End: 2025-01-14
Payer: COMMERCIAL

## 2025-01-14 VITALS
SYSTOLIC BLOOD PRESSURE: 140 MMHG | HEIGHT: 60 IN | HEART RATE: 109 BPM | DIASTOLIC BLOOD PRESSURE: 60 MMHG | OXYGEN SATURATION: 95 % | WEIGHT: 130 LBS | BODY MASS INDEX: 25.52 KG/M2

## 2025-01-14 DIAGNOSIS — E11.9 TYPE 2 DIABETES MELLITUS W/OUT COMPLICATIONS: ICD-10-CM

## 2025-01-14 PROCEDURE — 99215 OFFICE O/P EST HI 40 MIN: CPT

## 2025-01-14 PROCEDURE — G2211 COMPLEX E/M VISIT ADD ON: CPT | Mod: NC

## 2025-01-14 RX ORDER — BLOOD-GLUCOSE,RECEIVER,CONT
EACH MISCELLANEOUS
Qty: 1 | Refills: 0 | Status: ACTIVE | COMMUNITY
Start: 2025-01-14 | End: 1900-01-01

## 2025-01-14 RX ORDER — BLOOD-GLUCOSE SENSOR
EACH MISCELLANEOUS
Qty: 9 | Refills: 3 | Status: ACTIVE | COMMUNITY
Start: 2025-01-14 | End: 1900-01-01

## 2025-01-16 ENCOUNTER — LABORATORY RESULT (OUTPATIENT)
Age: 60
End: 2025-01-16

## 2025-01-16 ENCOUNTER — NON-APPOINTMENT (OUTPATIENT)
Age: 60
End: 2025-01-16

## 2025-01-16 LAB — TSH SERPL-ACNC: 21.3 UIU/ML

## 2025-01-17 ENCOUNTER — LABORATORY RESULT (OUTPATIENT)
Age: 60
End: 2025-01-17

## 2025-01-17 ENCOUNTER — APPOINTMENT (OUTPATIENT)
Dept: RHEUMATOLOGY | Facility: CLINIC | Age: 60
End: 2025-01-17
Payer: COMMERCIAL

## 2025-01-17 VITALS
DIASTOLIC BLOOD PRESSURE: 60 MMHG | BODY MASS INDEX: 25.52 KG/M2 | OXYGEN SATURATION: 91 % | TEMPERATURE: 98 F | HEIGHT: 60 IN | HEART RATE: 104 BPM | SYSTOLIC BLOOD PRESSURE: 122 MMHG | WEIGHT: 130 LBS

## 2025-01-17 DIAGNOSIS — R76.8 OTHER SPECIFIED ABNORMAL IMMUNOLOGICAL FINDINGS IN SERUM: ICD-10-CM

## 2025-01-17 DIAGNOSIS — R59.0 LOCALIZED ENLARGED LYMPH NODES: ICD-10-CM

## 2025-01-17 DIAGNOSIS — Z86.39 PERSONAL HISTORY OF OTHER ENDOCRINE, NUTRITIONAL AND METABOLIC DISEASE: ICD-10-CM

## 2025-01-17 DIAGNOSIS — M47.816 SPONDYLOSIS W/OUT MYELOPATHY OR RADICULOPATHY, LUMBAR REGION: ICD-10-CM

## 2025-01-17 DIAGNOSIS — E03.9 HYPOTHYROIDISM, UNSPECIFIED: ICD-10-CM

## 2025-01-17 DIAGNOSIS — R76.0 RAISED ANTIBODY TITER: ICD-10-CM

## 2025-01-17 DIAGNOSIS — Z72.3 LACK OF PHYSICAL EXERCISE: ICD-10-CM

## 2025-01-17 LAB
ALBUMIN SERPL ELPH-MCNC: 2.3 G/DL
ALBUMIN SERPL ELPH-MCNC: 2.5 G/DL
ALP BLD-CCNC: 83 U/L
ALP BLD-CCNC: 85 U/L
ALT SERPL-CCNC: 5 U/L
ALT SERPL-CCNC: 8 U/L
ANION GAP SERPL CALC-SCNC: 12 MMOL/L
ANION GAP SERPL CALC-SCNC: 14 MMOL/L
AST SERPL-CCNC: 10 U/L
AST SERPL-CCNC: 8 U/L
BASOPHILS # BLD AUTO: 0.03 K/UL
BASOPHILS NFR BLD AUTO: 0.6 %
BILIRUB SERPL-MCNC: 0.2 MG/DL
BILIRUB SERPL-MCNC: 0.2 MG/DL
BUN SERPL-MCNC: 33 MG/DL
BUN SERPL-MCNC: 35 MG/DL
C3 SERPL-MCNC: 34 MG/DL
C4 SERPL-MCNC: 2 MG/DL
CALCIUM SERPL-MCNC: 8.4 MG/DL
CALCIUM SERPL-MCNC: 8.6 MG/DL
CCP AB SER IA-ACNC: <8 U/ML
CHLORIDE SERPL-SCNC: 103 MMOL/L
CHLORIDE SERPL-SCNC: 106 MMOL/L
CHOLEST SERPL-MCNC: 162 MG/DL
CK SERPL-CCNC: 33 U/L
CO2 SERPL-SCNC: 20 MMOL/L
CO2 SERPL-SCNC: 22 MMOL/L
CREAT SERPL-MCNC: 2.67 MG/DL
CREAT SERPL-MCNC: 2.74 MG/DL
CREAT SPEC-SCNC: 81 MG/DL
CRP SERPL-MCNC: 32 MG/L
EGFR: 19 ML/MIN/1.73M2
EGFR: 20 ML/MIN/1.73M2
EOSINOPHIL # BLD AUTO: 0.11 K/UL
EOSINOPHIL NFR BLD AUTO: 2.2 %
ESTIMATED AVERAGE GLUCOSE: 214 MG/DL
GLUCOSE SERPL-MCNC: 136 MG/DL
GLUCOSE SERPL-MCNC: 286 MG/DL
HBA1C MFR BLD HPLC: 9.1 %
HCT VFR BLD CALC: 32.1 %
HDLC SERPL-MCNC: 31 MG/DL
HGB BLD-MCNC: 9.8 G/DL
IMM GRANULOCYTES NFR BLD AUTO: 0.4 %
LDLC SERPL CALC-MCNC: 95 MG/DL
LYMPHOCYTES # BLD AUTO: 0.93 K/UL
LYMPHOCYTES NFR BLD AUTO: 18.7 %
MAN DIFF?: NORMAL
MCHC RBC-ENTMCNC: 25.9 PG
MCHC RBC-ENTMCNC: 30.5 G/DL
MCV RBC AUTO: 84.7 FL
MICROALBUMIN 24H UR DL<=1MG/L-MCNC: 297.9 MG/DL
MICROALBUMIN/CREAT 24H UR-RTO: 3666 MG/G
MONOCYTES # BLD AUTO: 0.26 K/UL
MONOCYTES NFR BLD AUTO: 5.2 %
NEUTROPHILS # BLD AUTO: 3.63 K/UL
NEUTROPHILS NFR BLD AUTO: 72.9 %
NONHDLC SERPL-MCNC: 130 MG/DL
PLATELET # BLD AUTO: 428 K/UL
POTASSIUM SERPL-SCNC: 4.8 MMOL/L
POTASSIUM SERPL-SCNC: 5 MMOL/L
PROT SERPL-MCNC: 5.8 G/DL
PROT SERPL-MCNC: 5.9 G/DL
RBC # BLD: 3.79 M/UL
RBC # FLD: 14.6 %
RF+CCP IGG SER-IMP: NEGATIVE
RHEUMATOID FACT SER QL: 11 IU/ML
SODIUM SERPL-SCNC: 136 MMOL/L
SODIUM SERPL-SCNC: 141 MMOL/L
THYROGLOB AB SERPL-ACNC: >4000 IU/ML
THYROPEROXIDASE AB SERPL IA-ACNC: 23.7 IU/ML
TRIGL SERPL-MCNC: 206 MG/DL
TSH SERPL-ACNC: 22.5 UIU/ML
WBC # FLD AUTO: 4.98 K/UL

## 2025-01-17 PROCEDURE — G2211 COMPLEX E/M VISIT ADD ON: CPT | Mod: NC

## 2025-01-17 PROCEDURE — 99205 OFFICE O/P NEW HI 60 MIN: CPT

## 2025-01-17 RX ORDER — INSULIN PMP CART,AUT,G6/7,CNTR
EACH SUBCUTANEOUS
Qty: 6 | Refills: 1 | Status: ACTIVE | COMMUNITY
Start: 2025-01-17 | End: 1900-01-01

## 2025-01-17 RX ORDER — INSULIN PMP CART,AUT,G6/7,CNTR
EACH SUBCUTANEOUS
Qty: 1 | Refills: 0 | Status: DISCONTINUED | COMMUNITY
Start: 2025-01-14 | End: 2025-01-17

## 2025-01-17 RX ORDER — INSULIN PMP CART,AUT,G6/7,CNTR
EACH SUBCUTANEOUS
Qty: 6 | Refills: 1 | Status: DISCONTINUED | COMMUNITY
Start: 2025-01-14 | End: 2025-01-17

## 2025-01-17 RX ORDER — INSULIN PMP CART,AUT,G6/7,CNTR
EACH SUBCUTANEOUS
Qty: 1 | Refills: 0 | Status: ACTIVE | COMMUNITY
Start: 2025-01-17 | End: 1900-01-01

## 2025-01-18 LAB
ANA SER QL IA: POSITIVE
CENTROMERE IGG SER-ACNC: >8 AL
CHROMATIN AB SERPL-ACNC: >8 AL
DSDNA AB SER-ACNC: 107 IU/ML
ENA JO1 AB SER IA-ACNC: <0.2 AL
ENA RNP AB SER IA-ACNC: 0.2 AL
ENA SCL70 IGG SER IA-ACNC: <0.2 AL
ENA SM AB SER IA-ACNC: <0.2 AL
ENA SS-A AB SER IA-ACNC: >8 AL
ENA SS-B AB SER IA-ACNC: >8 AL
ERYTHROCYTE [SEDIMENTATION RATE] IN BLOOD BY WESTERGREN METHOD: 82 MM/HR
HAV IGM SER QL: NONREACTIVE
HBV CORE IGG+IGM SER QL: NONREACTIVE
HBV CORE IGM SER QL: NONREACTIVE
HBV E AB SER QL: NONREACTIVE
HBV E AG SER QL: NONREACTIVE
HBV SURFACE AB SER QL: NONREACTIVE
HBV SURFACE AG SER QL: NONREACTIVE
HCV AB SER QL: NONREACTIVE
HCV S/CO RATIO: 0.13 S/CO
HEPATITIS A IGG ANTIBODY: REACTIVE
HEPATITIS A IGG ANTIBODY: REACTIVE
RIBOSOMAL P AB SER IA-ACNC: <0.2 AL

## 2025-01-20 ENCOUNTER — NON-APPOINTMENT (OUTPATIENT)
Age: 60
End: 2025-01-20

## 2025-01-20 DIAGNOSIS — E04.1 NONTOXIC SINGLE THYROID NODULE: ICD-10-CM

## 2025-01-20 LAB
ALDOLASE SERPL-CCNC: 3.4 U/L
RNA POLYMERASE III IGG: 5 UNITS
T4 FREE SERPL-MCNC: 1.2 NG/DL
TSH SERPL-ACNC: 22.5 UIU/ML

## 2025-01-21 ENCOUNTER — APPOINTMENT (OUTPATIENT)
Dept: PULMONOLOGY | Facility: CLINIC | Age: 60
End: 2025-01-21
Payer: COMMERCIAL

## 2025-01-21 VITALS
HEART RATE: 102 BPM | SYSTOLIC BLOOD PRESSURE: 130 MMHG | DIASTOLIC BLOOD PRESSURE: 70 MMHG | OXYGEN SATURATION: 93 % | RESPIRATION RATE: 16 BRPM

## 2025-01-21 VITALS — WEIGHT: 128 LBS | HEIGHT: 60 IN | BODY MASS INDEX: 25.13 KG/M2

## 2025-01-21 VITALS — OXYGEN SATURATION: 95 %

## 2025-01-21 DIAGNOSIS — J90 PLEURAL EFFUSION, NOT ELSEWHERE CLASSIFIED: ICD-10-CM

## 2025-01-21 DIAGNOSIS — R60.9 EDEMA, UNSPECIFIED: ICD-10-CM

## 2025-01-21 DIAGNOSIS — R59.0 LOCALIZED ENLARGED LYMPH NODES: ICD-10-CM

## 2025-01-21 PROCEDURE — G2211 COMPLEX E/M VISIT ADD ON: CPT | Mod: NC

## 2025-01-21 PROCEDURE — 99215 OFFICE O/P EST HI 40 MIN: CPT

## 2025-01-22 ENCOUNTER — NON-APPOINTMENT (OUTPATIENT)
Age: 60
End: 2025-01-22

## 2025-01-22 ENCOUNTER — INPATIENT (INPATIENT)
Facility: HOSPITAL | Age: 60
LOS: 8 days | Discharge: HOME | DRG: 188 | End: 2025-01-31
Attending: STUDENT IN AN ORGANIZED HEALTH CARE EDUCATION/TRAINING PROGRAM | Admitting: STUDENT IN AN ORGANIZED HEALTH CARE EDUCATION/TRAINING PROGRAM
Payer: COMMERCIAL

## 2025-01-22 VITALS
SYSTOLIC BLOOD PRESSURE: 150 MMHG | OXYGEN SATURATION: 95 % | RESPIRATION RATE: 20 BRPM | DIASTOLIC BLOOD PRESSURE: 80 MMHG | TEMPERATURE: 99 F | HEART RATE: 105 BPM | HEIGHT: 60 IN | WEIGHT: 133.16 LBS

## 2025-01-22 DIAGNOSIS — J90 PLEURAL EFFUSION, NOT ELSEWHERE CLASSIFIED: ICD-10-CM

## 2025-01-22 LAB
ALBUMIN MFR SERPL ELPH: 36.8 %
ALBUMIN SERPL ELPH-MCNC: 2.1 G/DL — LOW (ref 3.3–5.2)
ALBUMIN SERPL-MCNC: 2.2 G/DL
ALBUMIN/GLOB SERPL: 0.6 RATIO
ALP SERPL-CCNC: 76 U/L — SIGNIFICANT CHANGE UP (ref 40–120)
ALPHA1 GLOB MFR SERPL ELPH: 9 %
ALPHA1 GLOB SERPL ELPH-MCNC: 0.5 G/DL
ALPHA2 GLOB MFR SERPL ELPH: 22.5 %
ALPHA2 GLOB SERPL ELPH-MCNC: 1.3 G/DL
ALT FLD-CCNC: 8 U/L — SIGNIFICANT CHANGE UP
ANION GAP SERPL CALC-SCNC: 11 MMOL/L — SIGNIFICANT CHANGE UP (ref 5–17)
ANISOCYTOSIS BLD QL: SLIGHT — SIGNIFICANT CHANGE UP
APPEARANCE UR: CLEAR — SIGNIFICANT CHANGE UP
APTT BLD: 26.8 SEC — SIGNIFICANT CHANGE UP (ref 24.5–35.6)
AST SERPL-CCNC: 17 U/L — SIGNIFICANT CHANGE UP
B-GLOBULIN MFR SERPL ELPH: 10.9 %
B-GLOBULIN SERPL ELPH-MCNC: 0.6 G/DL
BACTERIA # UR AUTO: ABNORMAL /HPF
BASOPHILS # BLD AUTO: 0 K/UL — SIGNIFICANT CHANGE UP (ref 0–0.2)
BASOPHILS NFR BLD AUTO: 0 % — SIGNIFICANT CHANGE UP (ref 0–2)
BILIRUB SERPL-MCNC: <0.2 MG/DL — LOW (ref 0.4–2)
BILIRUB UR-MCNC: NEGATIVE — SIGNIFICANT CHANGE UP
BLD GP AB SCN SERPL QL: SIGNIFICANT CHANGE UP
BUN SERPL-MCNC: 32.7 MG/DL — HIGH (ref 8–20)
CALCIUM SERPL-MCNC: 8 MG/DL — LOW (ref 8.4–10.5)
CAST: 4 /LPF — SIGNIFICANT CHANGE UP (ref 0–4)
CHLORIDE SERPL-SCNC: 106 MMOL/L — SIGNIFICANT CHANGE UP (ref 96–108)
CO2 SERPL-SCNC: 19 MMOL/L — LOW (ref 22–29)
COLOR SPEC: YELLOW — SIGNIFICANT CHANGE UP
CREAT SERPL-MCNC: 2.71 MG/DL — HIGH (ref 0.5–1.3)
DIFF PNL FLD: ABNORMAL
EGFR: 20 ML/MIN/1.73M2 — LOW
EOSINOPHIL # BLD AUTO: 0.03 K/UL — SIGNIFICANT CHANGE UP (ref 0–0.5)
EOSINOPHIL NFR BLD AUTO: 0.9 % — SIGNIFICANT CHANGE UP (ref 0–6)
GAMMA GLOB FLD ELPH-MCNC: 1.2 G/DL
GAMMA GLOB MFR SERPL ELPH: 20.8 %
GIANT PLATELETS BLD QL SMEAR: PRESENT — SIGNIFICANT CHANGE UP
GLUCOSE SERPL-MCNC: 258 MG/DL — HIGH (ref 70–99)
GLUCOSE UR QL: 250 MG/DL
HCT VFR BLD CALC: 27 % — LOW (ref 34.5–45)
HGB BLD-MCNC: 8.3 G/DL — LOW (ref 11.5–15.5)
HYPOCHROMIA BLD QL: SLIGHT — SIGNIFICANT CHANGE UP
INR BLD: 0.94 RATIO — SIGNIFICANT CHANGE UP (ref 0.85–1.16)
INTERPRETATION SERPL IEP-IMP: NORMAL
KETONES UR-MCNC: NEGATIVE MG/DL — SIGNIFICANT CHANGE UP
LEUKOCYTE ESTERASE UR-ACNC: NEGATIVE — SIGNIFICANT CHANGE UP
LYMPHOCYTES # BLD AUTO: 0.6 K/UL — LOW (ref 1–3.3)
LYMPHOCYTES # BLD AUTO: 18.4 % — SIGNIFICANT CHANGE UP (ref 13–44)
M PROTEIN SPEC IFE-MCNC: NORMAL
MANUAL SMEAR VERIFICATION: SIGNIFICANT CHANGE UP
MCHC RBC-ENTMCNC: 25.7 PG — LOW (ref 27–34)
MCHC RBC-ENTMCNC: 30.7 G/DL — LOW (ref 32–36)
MCV RBC AUTO: 83.6 FL — SIGNIFICANT CHANGE UP (ref 80–100)
MICROCYTES BLD QL: SLIGHT — SIGNIFICANT CHANGE UP
MONOCYTES # BLD AUTO: 0.03 K/UL — SIGNIFICANT CHANGE UP (ref 0–0.9)
MONOCYTES NFR BLD AUTO: 0.9 % — LOW (ref 2–14)
NEUTROPHILS # BLD AUTO: 2.62 K/UL — SIGNIFICANT CHANGE UP (ref 1.8–7.4)
NEUTROPHILS NFR BLD AUTO: 79.8 % — HIGH (ref 43–77)
NITRITE UR-MCNC: NEGATIVE — SIGNIFICANT CHANGE UP
NT-PROBNP SERPL-SCNC: 805 PG/ML — HIGH (ref 0–300)
PH UR: 6.5 — SIGNIFICANT CHANGE UP (ref 5–8)
PLAT MORPH BLD: NORMAL — SIGNIFICANT CHANGE UP
PLATELET # BLD AUTO: 317 K/UL — SIGNIFICANT CHANGE UP (ref 150–400)
POLYCHROMASIA BLD QL SMEAR: SLIGHT — SIGNIFICANT CHANGE UP
POTASSIUM SERPL-MCNC: 5.3 MMOL/L — SIGNIFICANT CHANGE UP (ref 3.5–5.3)
POTASSIUM SERPL-SCNC: 5.3 MMOL/L — SIGNIFICANT CHANGE UP (ref 3.5–5.3)
PROT SERPL-MCNC: 5.9 G/DL
PROT SERPL-MCNC: 5.9 G/DL
PROT SERPL-MCNC: 6 G/DL — LOW (ref 6.6–8.7)
PROT UR-MCNC: 300 MG/DL
PROTHROM AB SERPL-ACNC: 10.6 SEC — SIGNIFICANT CHANGE UP (ref 9.9–13.4)
RBC # BLD: 3.23 M/UL — LOW (ref 3.8–5.2)
RBC # FLD: 14.6 % — HIGH (ref 10.3–14.5)
RBC BLD AUTO: ABNORMAL
RBC CASTS # UR COMP ASSIST: 18 /HPF — HIGH (ref 0–4)
SMUDGE CELLS # BLD: PRESENT — SIGNIFICANT CHANGE UP
SODIUM SERPL-SCNC: 135 MMOL/L — SIGNIFICANT CHANGE UP (ref 135–145)
SP GR SPEC: 1.01 — SIGNIFICANT CHANGE UP (ref 1–1.03)
SQUAMOUS # UR AUTO: 4 /HPF — SIGNIFICANT CHANGE UP (ref 0–5)
TROPONIN T, HIGH SENSITIVITY RESULT: 62 NG/L — HIGH (ref 0–51)
TROPONIN T, HIGH SENSITIVITY RESULT: 69 NG/L — HIGH (ref 0–51)
UROBILINOGEN FLD QL: 0.2 MG/DL — SIGNIFICANT CHANGE UP (ref 0.2–1)
WBC # BLD: 3.28 K/UL — LOW (ref 3.8–10.5)
WBC # FLD AUTO: 3.28 K/UL — LOW (ref 3.8–10.5)
WBC UR QL: 8 /HPF — HIGH (ref 0–5)

## 2025-01-22 PROCEDURE — 71046 X-RAY EXAM CHEST 2 VIEWS: CPT | Mod: 26

## 2025-01-22 PROCEDURE — 99223 1ST HOSP IP/OBS HIGH 75: CPT

## 2025-01-22 PROCEDURE — 99285 EMERGENCY DEPT VISIT HI MDM: CPT

## 2025-01-22 PROCEDURE — 74176 CT ABD & PELVIS W/O CONTRAST: CPT | Mod: 26

## 2025-01-22 PROCEDURE — 71250 CT THORAX DX C-: CPT | Mod: 26

## 2025-01-22 RX ORDER — INSULIN LISPRO 100/ML
2 VIAL (ML) SUBCUTANEOUS
Refills: 0 | Status: DISCONTINUED | OUTPATIENT
Start: 2025-01-22 | End: 2025-01-23

## 2025-01-22 RX ORDER — LEVOTHYROXINE SODIUM 25 UG/1
50 TABLET ORAL DAILY
Refills: 0 | Status: DISCONTINUED | OUTPATIENT
Start: 2025-01-22 | End: 2025-01-31

## 2025-01-22 RX ORDER — GLUCAGON 3 MG/1
1 POWDER NASAL ONCE
Refills: 0 | Status: DISCONTINUED | OUTPATIENT
Start: 2025-01-22 | End: 2025-01-31

## 2025-01-22 RX ORDER — METHYLPREDNISOLONE ACETATE 40 MG/ML
40 VIAL (ML) INJECTION
Refills: 0 | Status: DISCONTINUED | OUTPATIENT
Start: 2025-01-22 | End: 2025-01-22

## 2025-01-22 RX ORDER — METHYLPREDNISOLONE ACETATE 40 MG/ML
40 VIAL (ML) INJECTION
Refills: 0 | Status: DISCONTINUED | OUTPATIENT
Start: 2025-01-22 | End: 2025-01-29

## 2025-01-22 RX ORDER — INSULIN LISPRO 100/ML
VIAL (ML) SUBCUTANEOUS
Refills: 0 | Status: DISCONTINUED | OUTPATIENT
Start: 2025-01-22 | End: 2025-01-31

## 2025-01-22 RX ORDER — SODIUM CHLORIDE 9 G/ML
1000 INJECTION, SOLUTION INTRAVENOUS
Refills: 0 | Status: DISCONTINUED | OUTPATIENT
Start: 2025-01-22 | End: 2025-01-31

## 2025-01-22 RX ORDER — EPOETIN ALFA 2000 [IU]/ML
20000 SOLUTION INTRAVENOUS; SUBCUTANEOUS
Refills: 0 | Status: DISCONTINUED | OUTPATIENT
Start: 2025-01-22 | End: 2025-01-31

## 2025-01-22 RX ORDER — GABAPENTIN 800 MG/1
300 TABLET ORAL
Refills: 0 | Status: DISCONTINUED | OUTPATIENT
Start: 2025-01-22 | End: 2025-01-31

## 2025-01-22 RX ORDER — ACETAMINOPHEN 160 MG/5ML
650 SUSPENSION ORAL EVERY 6 HOURS
Refills: 0 | Status: DISCONTINUED | OUTPATIENT
Start: 2025-01-22 | End: 2025-01-31

## 2025-01-22 RX ORDER — SODIUM BICARBONATE 42 MG/ML
650 INJECTION, SOLUTION INTRAVENOUS
Refills: 0 | Status: DISCONTINUED | OUTPATIENT
Start: 2025-01-22 | End: 2025-01-28

## 2025-01-22 RX ORDER — ACETAMINOPHEN 160 MG/5ML
1000 SUSPENSION ORAL ONCE
Refills: 0 | Status: COMPLETED | OUTPATIENT
Start: 2025-01-22 | End: 2025-01-22

## 2025-01-22 RX ORDER — DM/PSEUDOEPHED/ACETAMINOPHEN 10-30-250
12.5 CAPSULE ORAL ONCE
Refills: 0 | Status: DISCONTINUED | OUTPATIENT
Start: 2025-01-22 | End: 2025-01-31

## 2025-01-22 RX ORDER — DM/PSEUDOEPHED/ACETAMINOPHEN 10-30-250
15 CAPSULE ORAL ONCE
Refills: 0 | Status: DISCONTINUED | OUTPATIENT
Start: 2025-01-22 | End: 2025-01-31

## 2025-01-22 RX ORDER — MAGNESIUM, ALUMINUM HYDROXIDE 200-225/5
30 SUSPENSION, ORAL (FINAL DOSE FORM) ORAL EVERY 4 HOURS
Refills: 0 | Status: DISCONTINUED | OUTPATIENT
Start: 2025-01-22 | End: 2025-01-31

## 2025-01-22 RX ORDER — INSULIN LISPRO 100/ML
VIAL (ML) SUBCUTANEOUS
Refills: 0 | Status: DISCONTINUED | OUTPATIENT
Start: 2025-01-22 | End: 2025-01-22

## 2025-01-22 RX ORDER — ONDANSETRON 4 MG/1
4 TABLET, ORALLY DISINTEGRATING ORAL EVERY 8 HOURS
Refills: 0 | Status: DISCONTINUED | OUTPATIENT
Start: 2025-01-22 | End: 2025-01-31

## 2025-01-22 RX ORDER — FOLIC ACID 1 MG
1 TABLET ORAL DAILY
Refills: 0 | Status: DISCONTINUED | OUTPATIENT
Start: 2025-01-22 | End: 2025-01-31

## 2025-01-22 RX ORDER — ACETAMINOPHEN, DIPHENHYDRAMINE HCL, PHENYLEPHRINE HCL 325; 25; 5 MG/1; MG/1; MG/1
3 TABLET ORAL AT BEDTIME
Refills: 0 | Status: DISCONTINUED | OUTPATIENT
Start: 2025-01-22 | End: 2025-01-31

## 2025-01-22 RX ORDER — OXYCODONE HYDROCHLORIDE 30 MG/1
5 TABLET ORAL ONCE
Refills: 0 | Status: DISCONTINUED | OUTPATIENT
Start: 2025-01-22 | End: 2025-01-22

## 2025-01-22 RX ORDER — AMLODIPINE BESYLATE 5 MG
5 TABLET ORAL DAILY
Refills: 0 | Status: DISCONTINUED | OUTPATIENT
Start: 2025-01-22 | End: 2025-01-31

## 2025-01-22 RX ORDER — BUMETANIDE 2 MG/1
1 TABLET ORAL DAILY
Refills: 0 | Status: DISCONTINUED | OUTPATIENT
Start: 2025-01-22 | End: 2025-01-29

## 2025-01-22 RX ORDER — DM/PSEUDOEPHED/ACETAMINOPHEN 10-30-250
25 CAPSULE ORAL ONCE
Refills: 0 | Status: DISCONTINUED | OUTPATIENT
Start: 2025-01-22 | End: 2025-01-31

## 2025-01-22 RX ORDER — INSULIN GLARGINE-YFGN 100 [IU]/ML
19 INJECTION, SOLUTION SUBCUTANEOUS AT BEDTIME
Refills: 0 | Status: DISCONTINUED | OUTPATIENT
Start: 2025-01-22 | End: 2025-01-23

## 2025-01-22 RX ADMIN — OXYCODONE HYDROCHLORIDE 5 MILLIGRAM(S): 30 TABLET ORAL at 20:32

## 2025-01-22 RX ADMIN — ACETAMINOPHEN 400 MILLIGRAM(S): 160 SUSPENSION ORAL at 14:25

## 2025-01-22 NOTE — H&P ADULT - NSHPPHYSICALEXAM_GEN_ALL_CORE
GENERAL: pt examined bedside, laying comfortably in bed in NAD  HEENT: NC/AT, moist oral mucosa, clear conjunctiva, sclera nonicteric  RESPIRATORY: Normal respiratory effort; CTA b/l, no wheezing, rhonchi, rales  CARDIOVASCULAR: RRR, normal S1 and S2, no murmur/rub/gallop  ABDOMEN: soft, NT/ND, normoactive bowel sounds, no rebound/guarding  MSK: No joint deformities, edema, erythema  EXTREMITIES: No cynaosis, no clubbing, no lower extremity edema; Peripheral pulses are 2+ bilaterally  PSYCH: affect appropriate and cooperative  NEUROLOGY: A+O to person, place, and time, no focal neurologic deficits appreciated  SKIN: No rashes or no palpable lesions GENERAL: pt examined bedside, laying comfortably in bed in NAD  HEENT: NC/AT, moist oral mucosa, clear conjunctiva, sclera nonicteric  RESPIRATORY: poor inspiratory effort, decreased bs at bases  CARDIOVASCULAR: RRR, normal S1 and S2, no murmur/rub/gallop  ABDOMEN: soft, NT/ND, normoactive bowel sounds, no rebound/guarding  MSK: No joint deformities, edema, erythema  EXTREMITIES: No cynaosis, no clubbing, 3+ pretibial pitting edema   PSYCH: affect appropriate and cooperative  NEUROLOGY: A+O to person, place, and time, no focal neurologic deficits appreciated  SKIN: erythmatous rash on face

## 2025-01-22 NOTE — CONSULT NOTE ADULT - ASSESSMENT
57 y/o F w/ PMH HTN, IDDM type I (has medtronic insulin pump), hypothyroid, neuropathies presented to ED c/o 2 weeks of NIXON w/ associated worsening LE edema  Prior admission  to hospital Nov 2024 noted   Progressive renal failure ---> creat was 1.1 in sept 2024 --> now 2.7 , assoc with microscopic hematuria and near nephrotic proteinuria   Prior Urine protein / creta ratio was 2.5 ( 24 h urine never done )   + clinical oral ulcers , rashes , serositis ( effusions )   Prior serology showed elevated dsDNA , MARGI + 1/1280  and low C3/C4 --> CH 50 not done   P/C ANCA (-) , PLA2R (-) , ROMELIA (-) , Hep B and C (-)   Noncontrast CT here - shows no hydro 1/22 , small stones   KAROLINA 11/24 noted     Recommend :   R/O SLE related  Nephritis     Will add Bumex diuresis 1 mg IV daily - adjust based on lab stability in am   Pulm to evaluate re - pleural effusion drainage   Strict I/O   Formal 24 h urine  collection   Repeat LE venous doppler   Check SYDNEE , GBM Ab  , Quant gold , SYDNEE panel , ACLA , Beta 2 Glyco , ACE , HCG ,  CPK    Will attempt to get on IR schedule for renal Bx tomorrow - NPO p MN --> will discuss in am with IR ( coags and plat count OK )   Start on dose Solumedrol 40 mg IV bid   D/W Dr Simpson --> she will call Endo to adjust insulin     Anemia - Add Weekly epogen and folate   Send of LDH , Hapto , Iron stores     MA - Add oral Bicarb bid     RO - Check PTH , phos , Vit D levels     Will follow   Discussed with daughter

## 2025-01-22 NOTE — ED PROVIDER NOTE - OBJECTIVE STATEMENT
59-year-old female past medical history of hypertension, hypothyroidism, diabetes on insulin sent in for admission.  Spoke to Dr. Matson from pulmonology who states that patient has bilateral pleural effusions and worsening creatinine and has become more short of breath.  Endorsing back pain as well as left upper abdominal pain and difficulty sleeping on her left side that she coughs.  Patient states symptoms have been ongoing since November but since the last few days she has had a constant nonradiating nonexertional or pleuritic midsternal/left-sided chest discomfort.  Dr. Matson would like renal and thoracic on board as patient will likely need renal biopsy.  He states that patient has had worsening lower extremity edema which patient corroborates and was 91% on room air.

## 2025-01-22 NOTE — ED ADULT NURSE REASSESSMENT NOTE - NS ED NURSE REASSESS COMMENT FT1
Report received from TIMUR Mcadams. PT A,Ox4, VSS, pt endorsing pain, ACP contacted for pain medication, pt on cardiac monitor, safety and comfort measures maintained.

## 2025-01-22 NOTE — CONSULT NOTE ADULT - SUBJECTIVE AND OBJECTIVE BOX
Patient is a 59y old  Female who presents with a chief complaint of sob/patel (22 Jan 2025 17:48)      HPI:   · Chief Complaint: The patient is a 59y Female complaining of shortness of breath.  · HPI Objective Statement: 59-year-old female past medical history of hypertension, hypothyroidism, diabetes on insulin sent in for admission.  Spoke to Dr. Matson from pulmonology who states that patient has bilateral pleural effusions and worsening creatinine and has become more short of breath.  Endorsing back pain as well as left upper abdominal pain and difficulty sleeping on her left side that she coughs.  Patient states symptoms have been ongoing since November but since the last few days she has had a constant nonradiating nonexertional or pleuritic midsternal/left-sided chest discomfort.  Dr. Matson would like renal and thoracic on board as patient will likely need renal biopsy.  He states that patient has had worsening lower extremity edema which patient corroborates and was 91% on room air.      Patient sent to ER re. clinical  fluid overload   Follows with Dr Albrecht   Has not felt well since Nov 2024   Recent admission noted   IRDM - On pump , now broke , so does injections   HAd L axillary LN BX - 12/24/24 ---> No abnormalities   Serology reviewed         PAST MEDICAL & SURGICAL HISTORY:  Diabetes      Hypertension      No significant past surgical history          FAMILY HISTORY:  FH: non-Hodgkin's lymphoma (Mother)        Social History:    MEDICATIONS  (STANDING):  dextrose 5%. 1000 milliLiter(s) (50 mL/Hr) IV Continuous <Continuous>  dextrose 5%. 1000 milliLiter(s) (100 mL/Hr) IV Continuous <Continuous>  dextrose 50% Injectable 25 Gram(s) IV Push once  dextrose 50% Injectable 12.5 Gram(s) IV Push once  dextrose 50% Injectable 25 Gram(s) IV Push once  glucagon  Injectable 1 milliGRAM(s) IntraMuscular once    MEDICATIONS  (PRN):  acetaminophen     Tablet .. 650 milliGRAM(s) Oral every 6 hours PRN Temp greater or equal to 38C (100.4F), Mild Pain (1 - 3)  aluminum hydroxide/magnesium hydroxide/simethicone Suspension 30 milliLiter(s) Oral every 4 hours PRN Dyspepsia  dextrose Oral Gel 15 Gram(s) Oral once PRN Blood Glucose LESS THAN 70 milliGRAM(s)/deciliter  melatonin 3 milliGRAM(s) Oral at bedtime PRN Insomnia  ondansetron Injectable 4 milliGRAM(s) IV Push every 8 hours PRN Nausea and/or Vomiting      Allergies    No Known Allergies    Intolerances          Vital Signs Last 24 Hrs  T(C): 36.7 (22 Jan 2025 15:40), Max: 37.2 (22 Jan 2025 10:04)  T(F): 98.1 (22 Jan 2025 15:40), Max: 98.9 (22 Jan 2025 10:04)  HR: 88 (22 Jan 2025 15:40) (88 - 105)  BP: 118/74 (22 Jan 2025 15:40) (118/74 - 150/80)  BP(mean): --  RR: 19 (22 Jan 2025 15:40) (18 - 20)  SpO2: 98% (22 Jan 2025 15:40) (95% - 98%)    Parameters below as of 22 Jan 2025 15:40  Patient On (Oxygen Delivery Method): room air      Daily Height in cm: 152.4 (22 Jan 2025 10:04)    Daily   I&O's Detail    I&O's Summary      PHYSICAL EXAM:    GENERAL: NAD,  HEAD:  Atraumatic, No facial edema   NECK: Supple, No JVD,   NERVOUS SYSTEM:  Alert & Oriented X3,   CHEST/LUNG: EAE , Dec BS at bases   HEART: Regular rate and rhythm; No gallop or rub   ABDOMEN: Soft, Nontender, Nondistended; Bowel sounds present  EXTREMITIES:  ++ edema , no cords , no joint deformity         LABS:                        8.3    3.28  )-----------( 317      ( 22 Jan 2025 12:50 )             27.0     01-22    135  |  106  |  32.7[H]  ----------------------------<  258[H]  5.3   |  19.0[L]  |  2.71[H]    Ca    8.0[L]      22 Jan 2025 12:50    TPro  6.0[L]  /  Alb  2.1[L]  /  TBili  <0.2[L]  /  DBili  x   /  AST  17  /  ALT  8   /  AlkPhos  76  01-22    PT/INR - ( 22 Jan 2025 14:30 )   PT: 10.6 sec;   INR: 0.94 ratio         PTT - ( 22 Jan 2025 14:30 )  PTT:26.8 sec  Urinalysis Basic - ( 22 Jan 2025 12:50 )    Color: x / Appearance: x / SG: x / pH: x  Gluc: 258 mg/dL / Ketone: x  / Bili: x / Urobili: x   Blood: x / Protein: x / Nitrite: x   Leuk Esterase: x / RBC: x / WBC x   Sq Epi: x / Non Sq Epi: x / Bacteria: x      Date Processed:12/24/2024   Date Reported: 12/27/2024 11:38   Accession #: [id number]   Surgical Pathology Number: [id number]   ________________________________________________________________________   CLINICAL DATA: R/O lymphoma   ________________________________________________________________________   ________________________________________________________________________   DIAGNOSIS:   Lymph node, left axillary:   - The lymphocyte immunophenotypic findings show no diagnostic abnormalities         < from: CT Abdomen and Pelvis No Cont (01.22.25 @ 15:06) >    ACC: [id number] EXAM:  CT ABDOMEN AND PELVIS   ORDERED BY: RJ ARZATE     ACC: [id number] EXAM:  CT CHEST   ORDERED BY: RJ ARZATE     PROCEDURE DATE:  01/22/2025          INTERPRETATION:  CLINICAL INFORMATION: Left upper quadrant abdominal   pain. Worsening pleural effusions.    COMPARISON: CT scan of the chest from 12/19/2024 and CT portion of PET/CT   from 12/12/2024.    CONTRAST/COMPLICATIONS:  IV Contrast: None  Oral Contrast: None  .    PROCEDURE:  CT of the Chest, Abdomen and Pelvis was performed.  Sagittal and coronal reformats were performed.    FINDINGS:  CHEST:  LUNGS AND LARGE AIRWAYS: Patent central airways. Atelectatic changes.   Scattered nodular densities do not appear significantly changed. For   example, 6 mm nodule in the right upper lobe on series 3 image 23 and a   1.1 cm nodule adjacent to the left major fissure on series 3 image 54.   Pleural-based nodularity is grossly unchanged. Mild interlobular septal   thickening is stable..  PLEURA: Moderate pleural effusions, right greater than left appear mildly   increased.  VESSELS: Within normal limits.  HEART: Heart size is normal. No pericardial effusion.  MEDIASTINUM AND MAVIS: Multiple small mediastinal lymph nodes, some of   which are mildly enlarged. A few ofthese appear mildly decreased. For   example, lymph node to the right of the roldan on series 3 image 32   measuring up to 1.1 cm in greatest short axis, previously 1.3 cm and   anterior mediastinal lymph node on series 3 image 30 measuring 9 mm in   greatest short axis, previously 1.1 cm.  CHEST WALL AND LOWER NECK: Multiple bilateral axillary lymph nodes which   do not appear significantly enlarged. These appear grossly stable.    ABDOMEN AND PELVIS:  LIVER: Within normal limits.  BILE DUCTS: Normal caliber.  GALLBLADDER: Within normal limits.  SPLEEN: Within normal limits.  PANCREAS: Within normal limits.  ADRENALS: Within normal limits.  KIDNEYS/URETERS: Multiple bilateral nonobstructing stones are grossly   unchanged.    BLADDER: Within normal limits.  REPRODUCTIVE ORGANS: Grossly unremarkable.    BOWEL: No bowel obstruction. Appendix within normal limits.  PERITONEUM/RETROPERITONEUM: No ascites.  VESSELS: Mild vascular calcifications.  LYMPH NODES: Retroperitoneal and pelvic lymphadenopathy. For example:    Left common iliac lymph node measuring 1.5 x 1.2 cm on series 3 image   128, previously 1.3 x 1.2 cm.    Right internal iliac lymph node on series 3 image 139 measuring 1.8 x 1.4   cm, previously 1.7 x 1.2 cm.  2.3 x 1.4 cm left external iliac lymph node on series 3 image 163,   previously 2.1 x 1.7 cm.    ABDOMINAL WALL: Postsurgical changes. In the anterior abdominal wall.  BONES: Within normal limits.    IMPRESSION:  Bilateral moderate pleural effusions, slightly increased when compared   with 12/19/2024. Bilateral pulmonary nodules do not appear significantly   changed. Multiple small mediastinal and axillary lymph nodes, some of   which have mildly decreased in size. Retroperitoneal and pelvic   lymphadenopathy which has mildly increased in size.    Bilateral nonobstructing renal stones.        --- End of Report ---            TIMUR CASTILLO MD; Attending Radiologist  This document has been electronically signed. Jan 22 2025  3:26PM    < end of copied text >  RADIOLOGY & ADDITIONAL TESTS:  < from: NM Pulmonary Ventilation/Perfusion Scan (12.19.24 @ 16:18) >  ACC: [id number] EXAM:  NM PULM VENTILATION PERFUS IMG   ORDERED BY: TYRELL SUAREZ     PROCEDURE DATE:  12/19/2024          INTERPRETATION:  RADIOPHARMACEUTICAL: 1.0 mCi Tc-99m-DTPA via inhalation;   6.2 mCi Tc-99m-MAA, I.V.    CLINICAL INFORMATION: 59 year old female with left-sided chest pain;   referred to evaluate for pulmonary embolism.    TECHNIQUE:  Ventilation and perfusion images of the lungs were obtained   following administration of Tc-99m-DTPA and Tc-99m-MAA. Images were   obtained in the anterior, posterior, both lateral, and all 4 oblique   projections. The study was interpreted in conjunction with a chest   radiograph of 12/19/2024.    COMPARISON: None    FINDINGS: There is heterogeneous distribution of radiopharmaceutical in   both lungs on the ventilation and perfusion images. There are no   segmental perfusion defects in either lung.    IMPRESSION: Very low probability of pulmonary embolus.    --- End of Report ---            MELANI DUVAL MD; Attending Nuclear Medicine  This document has been  < from: NM PET/CT Onc FDG Skull to Thigh, Inital (12.12.24 @ 13:42) >  EXAM: [id number] - PETCT [facility] ONC FDG INIT  - ORDERED BY: MAR SMITH      PROCEDURE DATE:  12/12/2024           INTERPRETATION:  CLINICAL INFORMATION: Lymphadenopathy    TREATMENT STRATEGY EVALUATION: Initial  AREA IMAGED: Skull base-to-thigh  FASTING BLOOD SUGAR: 132 mg/dl  RADIOPHARMACEUTICAL: 9.92 mCi F-18 FDG, I.V.  I.V. SITE: antecubital right  UPTAKE PERIOD: 53 min  SCANNER: Vetiary  ORAL CONTRAST: None, patient declined.  PHARMACOLOGIC INTERVENTION: None.    TECHNIQUE: Following intravenous injection of above radiopharmaceutical   and uptake period, PET/CT was performed. CT protocol was optimized for   PET attenuation correction and anatomic localization and was not designed   to produce and cannot replace state-of-the-art diagnostic CT images with   specific imaging protocols for different body parts and indications.   Images were reconstructed and reviewed in axial, coronal and sagittal   views and three-dimensional MIP.    The standardized uptake values (SUV)are normalized to patient body   weight and indicate the highest activity concentration (SUVmax) in a   given site. All image numbers refer to axial image number.    COMPARISON:  None.    OTHER STUDIES USED FOR CORRELATION: None    FINDINGS:    HEAD/NECK: Physiologic FDG activity in visualized brain. FDG avid   bilateral cervical lymphadenopathy. Mild diffuse FDG uptake in the   thyroid.    THORAX: Numerous FDG avid supraclavicular, mediastinal, bilateral hilar,   supraclavicular, and axillary lymph nodes. Largest lymph node in the   subcarinal station measures 4.1 x 2.3 cm, SUV 6.9 (image 235).    LUNGS: Mildly FDG avid 1.1 cm left lower lobe nodule, SUV 2.3 (image 99).   Additional scattered 4 mm nodules bilaterally. Bilateral lower lobe   partial passive atelectasis.    PLEURA/PERICARDIUM: Moderate bilateral pleural effusions, right greater   than left. No focal FDG avid pleural lesions. No pericardial effusion.    HEPATOBILIARY/PANCREAS: Physiologic FDG activity.  For reference, normal   liver demonstrates SUV mean 2.6.    SPLEEN: Physiologic FDG activity. Normal in size, 6.3 cm.    ADRENAL GLANDS: No abnormal FDG activity. No nodule.    KIDNEYS/URINARY BLADDER: Physiologic excreted FDG activity.   Nonobstructing bilateral renal stones.    REPRODUCTIVE ORGANS: No abnormal FDG activity in the uterus.    ABDOMINOPELVIC LYMPH NODES/RETROPERITONEUM: Numerous FDG avid   retroperitoneal, iliac, and inguinal lymph nodes. Most prominently FDG   avid lymph node is in the right retroperitoneal/presacral region measures   1.6 x 1.3 cm, SUV 7.0 (image 178). Possible additional FDG avid   aortocaval lymphadenopathy (image 139).    ESOPHAGUS/STOMACH/BOWEL/PERITONEUM/MESENTERY: No abnormal FDG activity.    VESSELS: Few scattered atherosclerotic changes in the aorta. No aneurysm.   Coronary artery calcifications.    BONES/SOFT TISSUES: No focal FDG avid bone lesions. No FDG avid skin   lesions.    IMPRESSION:    1. Mildly FDG avid lymphadenopathy in the neck, chest, abdomen, and   pelvis. This is concerning for lymphoma.    2. FDG avid left lower lobe nodule (SUV 2.3). This can be correlated with   biopsy to evaluate for signs of malignancy.    3. Moderate bilateral pleural effusions.    4. Mild diffuse FDG uptake in the thyroid. Recommend correlation with TSH.    --- End of Report ---             HUNTER MORGAN DO; Resident Radiologist  This document has been electronically signed.  OMAR ZEE MD; Attending Radiologist   This document has been electronically signed. Dec17 2024  1:50PM    < end of copied text >  < from: US Renal (11.02.24 @ 17:43) >    ACC: [id number] EXAM:  US KIDNEY(S)   ORDERED BY: LUIS CROOKS     PROCEDURE DATE:  11/02/2024          INTERPRETATION:  CLINICAL INFORMATION: New heart failure/kidney failure.    COMPARISON: None available.    TECHNIQUE: Sonography of the kidneys and bladder.    FINDINGS:  Right kidney: 11.4 cm. No renal mass or hydronephrosis. Slight fullness   right intrarenal collecting system without overt hydronephrosis. 1.5 x   0.8 x 1.2 cm nonobstructing lower pole calculus. Increased renal   echotexture.    Left kidney: 11.6 cm. No renal mass or hydronephrosis. 1.4 x 1.1 x 1.6 cm   nonobstructing upper pole calculus. Increased renal echotexture.    Urinary bladder: Grossly unremarkable.    IMPRESSION:  Bilateral nonobstructing intrarenal calculi.    Slight fullness of right intrarenal collecting system without overt   hydronephrosis. No left hydronephrosis.    Bilateral increased renal echotexture compatible with medical renal   disease.    --- End of Report ---            CHANTE XIE MD; Attending Radiologist  This document has been electronically signed. Nov 2 2024  6:31PM    < end of copied text >  < from: US Duplex Venous Lower Ext Complete, Bilateral (11.04.24 @ 06:40) >    ACC: [id number] EXAM:  US DPLX LWR EXT VEINS COMPL BI   ORDERED BY:   MELANI LOPEZ     PROCEDURE DATE:  11/04/2024          INTERPRETATION:  CLINICAL INFORMATION: HEART FAILURE, UNSPECIFIED    COMPARISON: None available.    TECHNIQUE: Duplex sonography of the BILATERAL LOWER extremity veins with   color and spectral Doppler, with and without compression.    FINDINGS:    RIGHT:  Normal compressibility of the RIGHT common femoral, femoral and popliteal   veins.  Doppler examination shows normal spontaneous and phasic flow.  No RIGHT calf vein thrombosis is detected.    LEFT:  Normal compressibility of the LEFT common femoral, femoral and popliteal   veins.  Doppler examination shows normal spontaneous and phasic flow.  No LEFT calf veinthrombosis is detected.    IMPRESSION:  No evidence of deep venous thrombosis in either lower extremity.            CONCLUSIONS:      1. Left ventricular cavity is normal in size. Left ventricular systolic function is normal with an ejection fraction visually estimated at 60 to 65 %.   2. Normal right ventricular systolic function.   3. Normal left and right atrial size. No right atrial mass visualized. There is thickening of the right atrial wall, likely anatomic variant.   4. No evidence of a patent foramen ovale by color flow Doppler.   5. No aortic valve stenosis.   6. No evidence of aortic regurgitation.   7. Structurally normal pulmonic valve with normal leaflet excursion.   8. No pericardial effusion seen.   9. Small right pleural effusion noted.  10. No evidence of left atrial or left atrial appendage thrombus.  11. Structurally normal tricuspid valve with normal leaflet excursion. Mild tricuspid regurgitation.  12. Trace mitral regurgitation.  13. Structurally normal mitral valve with normal leaflet excursion.  14. Mild atheroma in the visualized portions of the descending aorta.

## 2025-01-22 NOTE — PROGRESS NOTE ADULT - ASSESSMENT
58 y/o F w/ PMH of HTN, hypothyroid, IDDM type I, chronic anemia, HFpEF (11/02/24 55%), CKD was sent in from Dr. Matson's office (pulm) b/c of pt having worsening SOB, worsening renal function, evidence of b/l pleural effusions.  Pt states her symptoms have been worsening over the past few months and she now has been having difficult laying flat or on left side bc of worsening sob and nonproductive cough.  Pt also reports worsening b/l LE edema.   She also c/o left upper abd pain worse w/ deep inspiration.  Pt has had worsening renal function since september she also has had rashes and mylagias and elevated MARGI.  Concern is for lupus nephritis.  Dr. Matson would like renal consulted for renal biopsy and thoracic consult for pleural effusions. Patient saturating well on RA, but NIXON and uncomfortable laying flat. CT with B/L pleural effusions. On POCUS R>L with good window.

## 2025-01-22 NOTE — H&P ADULT - VTE RISK ASSESSMENT
VTE Assessment already completed for this visit no back pain, no gout, no musculoskeletal pain, no neck pain, and no weakness.

## 2025-01-22 NOTE — H&P ADULT - NSHPLABSRESULTS_GEN_ALL_CORE
8.3    3.28  )-----------( 317      ( 22 Jan 2025 12:50 )             27.0       01-22    135  |  106  |  32.7[H]  ----------------------------<  258[H]  5.3   |  19.0[L]  |  2.71[H]    Ca    8.0[L]      22 Jan 2025 12:50    TPro  6.0[L]  /  Alb  2.1[L]  /  TBili  <0.2[L]  /  DBili  x   /  AST  17  /  ALT  8   /  AlkPhos  76  01-22      < from: CT Chest No Cont (01.22.25 @ 15:06) >      IMPRESSION:  Bilateral moderate pleural effusions, slightly increased when compared   with 12/19/2024. Bilateral pulmonary nodules do not appear significantly   changed. Multiple small mediastinal and axillary lymph nodes, some of   which have mildly decreased in size. Retroperitoneal and pelvic   lymphadenopathy which has mildly increased in size.    Bilateral nonobstructing renal stones.    < end of copied text >

## 2025-01-22 NOTE — H&P ADULT - ASSESSMENT
58 y/o F w/ PMH of HTN, hypothyroid, IDDM type I, chronic anemia, HFpEF (11/02/24 55%), CKD was sent in from Dr. Matson's office (pulm) b/c of pt having worsening SOB, worsening renal function, evidence of b/l pleural effusions w/ concern for lupus nephritis. VS stable in ED and sating well ORA.  Initial w/u significant for WBC 3.28, ANC normal, Hb 8.3 and UA w/ RBC and proteinuria.  CTSx and nephro consulted by ED. Will admit for worsening pleural effusions and r/o lupus.      b/l pleural effusions and b/l LE edema likely from third spacing due to progressively worsening renal function    - Likely source of SOB/NIXON and upper abd pain   - CT chest non con w/ b/l mod pleural effusion increased from prior and has b/l pulm nodules, and small mediastinal/axiallary lymphnodes and retroperitoneal/pelvic lymphadenopathy   -  but on exam has 3+LE pitting edema   - Will place on IV diuresis for now pending CTSx eval   - SOB/NIXON could also be from worsening anemia   - PE considered but unable to do CTA given renal function   - Will order ddimer but at this time dont have high suspect for PE therefore will not start on full dose AC   - Will order b/l LE doppler to r/o DVT   - PRN bronchodilators if needed  - TTE from 11/2024 w/ pEF  - CTSx consulted to assess       Acute on chronic anemia   - Baseline Hb 10-12 but today 8.3  - Will check anemia panel   - No signs of hemolysis or bleeding    - Monitor CBC and transfuse for Hb<7      Type II MI, likely poor renal clearance vs demand due to above   - No chest pain at this time   - Will repeat trop and if relatively flat then no further w/u   - If notably uptrends will consult cardiology   - EKG w/ no acute ischemic changes   - TTE pending for above w/u   - Will check lipid panel and A1c  - Monitor on telemetry       Chronic HFpEF  -  which is improved from prior and suspect vol overload more likely from nephrotic type renal dx and less likely decompensated HF  - TTE from 11/2024 w/ LVEF 55%  - c/w lasix for diuresis   - Monitor estrada weights and I/O's       SIRS w/ no clear source of sepsis   - Mild tachycardia on arrival but currently HR normal   - WBC 3.28 but ANC normal   - Will r/o infection however suspect more likely tachyardia related to effusions and acute anemia   - Leukopenia could be from undiagnosed malignancy given smudge cells on smear and lymphadenopathy on CT chest  - Hold off on IVFs as clinically hypervolemic and may worsen pleural effusions   - Defer procal as unreliable in setting of abnormal renal function   - Trend CBC and monitor temperature       Progressing CKD, r/o lupus nephropathy   - Cr remains relatively unchanged from december   - However Cr in Sept 2024 was 1.09 and has been worsening since   - Initially suspected to be cardiorenal given decompensated HF and ALBERT on prior admission but Cr never improved   - UA w/ 300protein and +RBC   - MARGI positive and reports symptoms consistent w/ lupus   - Avoid nephrotoxic meds or renally dose if needed  - Discussed w/ nephrology and will make npo for renal biopsy tomorrow and start on steroids       IDDM   - f/u A1c  - Resume basal insulin regimen   - Will likely need bolus regimen given starting pt on steroids   - Will consult endocrinology         VTE ppx:  Hold off on chemical ppx given pt may go for renal biopsy tomorrow so place on SCDs for now.     Dispo: Acute.

## 2025-01-22 NOTE — ED PROVIDER NOTE - CLINICAL SUMMARY MEDICAL DECISION MAKING FREE TEXT BOX
Spoke again to Dano.  He agrees with noncontrast CT imaging as well as VQ scan inpatient for evaluate for PE Spoke again to Dano.  He agrees with noncontrast CT imaging as well as VQ scan inpatient for evaluate for PE    History and physical as noted.  Patient with mild leukopenia as well as new anemia, creatinine 2.71 which is stable from December, troponin 62 in the setting of elevated creatinine EKG normal sinus rhythm normal axis no diagnostic ischemic changes.  CT chest abdomen pelvis done without contrast given elevated creatinine, shows bilateral moderate pleural effusions slightly increased from December.  Unchanged bilateral pulmonary nodules and small multiple mediastinal and axillary lymph nodes as well as retroperitoneal pelvic lymphadenopathy and bilateral nonobstructing renal stones.  Patient is not tachycardic is not hypoxic and had duplex yesterday which was reportedly negative.  Will not initiate empiric treatment for PE at this point.  Case discussed with hospitalist Dr. Salgado on telemetry

## 2025-01-22 NOTE — ED ADULT TRIAGE NOTE - TEMPERATURE IN CELSIUS (DEGREES C)
Problem: DISCHARGE PLANNING - CARE MANAGEMENT  Goal: Discharge to post-acute care or home with appropriate resources  Description  INTERVENTIONS:  - Conduct assessment to determine patient/family and health care team treatment goals, and need for post-acute services based on payer coverage, community resources, and patient preferences, and barriers to discharge  - Address psychosocial, clinical, and financial barriers to discharge as identified in assessment in conjunction with the patient/family and health care team  - Arrange appropriate level of post-acute services according to patients   needs and preference and payer coverage in collaboration with the physician and health care team  - Communicate with and update the patient/family, physician, and health care team regarding progress on the discharge plan  - Arrange appropriate transportation to post-acute venues  - CM will follow D/C POC for any D/C needs    Outcome: Progressing 37.2

## 2025-01-22 NOTE — ED ADULT NURSE NOTE - OBJECTIVE STATEMENT
Pt A&Ox4 presenting to the ED c/o L sided pain radiating to the L abdominal quadrant. PMH HTN, diabetes, kidney function, pleural effusions. Pt endorses constant chest pain x3 days, difficulty breathing when lying down. Pt states "when I lay on my left side it hurts and when I lay on my right Im coughing and its hard to breath." Pt respirations are even and nonlabored, pt is NAD,. Pt remains on continuos cardiac monitoring NSR HR 61,  97% RA. Bed locked and in lowest position with safety maintained and family at bedside.

## 2025-01-23 ENCOUNTER — RESULT REVIEW (OUTPATIENT)
Age: 60
End: 2025-01-23

## 2025-01-23 LAB
24R-OH-CALCIDIOL SERPL-MCNC: 6.6 NG/ML — LOW (ref 30–80)
A1C WITH ESTIMATED AVERAGE GLUCOSE RESULT: 9.3 % — HIGH (ref 4–5.6)
ALBUMIN SERPL ELPH-MCNC: 2.1 G/DL — LOW (ref 3.3–5.2)
ALP SERPL-CCNC: 78 U/L — SIGNIFICANT CHANGE UP (ref 40–120)
ALT FLD-CCNC: <5 U/L — SIGNIFICANT CHANGE UP
ANION GAP SERPL CALC-SCNC: 12 MMOL/L — SIGNIFICANT CHANGE UP (ref 5–17)
AST SERPL-CCNC: 7 U/L — SIGNIFICANT CHANGE UP
B PERT IGG+IGM PNL SER: CLEAR — SIGNIFICANT CHANGE UP
BASOPHILS # BLD AUTO: 0.03 K/UL — SIGNIFICANT CHANGE UP (ref 0–0.2)
BASOPHILS NFR BLD AUTO: 0.7 % — SIGNIFICANT CHANGE UP (ref 0–2)
BILIRUB SERPL-MCNC: <0.2 MG/DL — LOW (ref 0.4–2)
BUN SERPL-MCNC: 32 MG/DL — HIGH (ref 8–20)
CALCIUM SERPL-MCNC: 7.9 MG/DL — LOW (ref 8.4–10.5)
CALCIUM SERPL-MCNC: 8.7 MG/DL — SIGNIFICANT CHANGE UP (ref 8.4–10.5)
CHLORIDE SERPL-SCNC: 105 MMOL/L — SIGNIFICANT CHANGE UP (ref 96–108)
CK SERPL-CCNC: 20 U/L — LOW (ref 25–170)
CO2 SERPL-SCNC: 19 MMOL/L — LOW (ref 22–29)
COLOR FLD: YELLOW — SIGNIFICANT CHANGE UP
CREAT SERPL-MCNC: 2.62 MG/DL — HIGH (ref 0.5–1.3)
CULTURE RESULTS: SIGNIFICANT CHANGE UP
EGFR: 20 ML/MIN/1.73M2 — LOW
EOSINOPHIL # BLD AUTO: 0.12 K/UL — SIGNIFICANT CHANGE UP (ref 0–0.5)
EOSINOPHIL NFR BLD AUTO: 2.7 % — SIGNIFICANT CHANGE UP (ref 0–6)
ESTIMATED AVERAGE GLUCOSE: 220 MG/DL — HIGH (ref 68–114)
FERRITIN SERPL-MCNC: 160 NG/ML — SIGNIFICANT CHANGE UP (ref 13–330)
FLUID INTAKE SUBSTANCE CLASS: SIGNIFICANT CHANGE UP
GAS PNL BLDV: SIGNIFICANT CHANGE UP
GLUCOSE BLDC GLUCOMTR-MCNC: 248 MG/DL — HIGH (ref 70–99)
GLUCOSE BLDC GLUCOMTR-MCNC: 326 MG/DL — HIGH (ref 70–99)
GLUCOSE BLDC GLUCOMTR-MCNC: 329 MG/DL — HIGH (ref 70–99)
GLUCOSE BLDC GLUCOMTR-MCNC: 475 MG/DL — CRITICAL HIGH (ref 70–99)
GLUCOSE BLDC GLUCOMTR-MCNC: 550 MG/DL — CRITICAL HIGH (ref 70–99)
GLUCOSE BLDC GLUCOMTR-MCNC: 564 MG/DL — CRITICAL HIGH (ref 70–99)
GLUCOSE BLDC GLUCOMTR-MCNC: 595 MG/DL — CRITICAL HIGH (ref 70–99)
GLUCOSE SERPL-MCNC: 310 MG/DL — HIGH (ref 70–99)
HAPTOGLOB SERPL-MCNC: 608 MG/DL — HIGH (ref 34–200)
HCG SERPL-ACNC: 8.8 MIU/ML — SIGNIFICANT CHANGE UP
HCT VFR BLD CALC: 31.3 % — LOW (ref 34.5–45)
HGB BLD-MCNC: 9.6 G/DL — LOW (ref 11.5–15.5)
IMM GRANULOCYTES NFR BLD AUTO: 0.4 % — SIGNIFICANT CHANGE UP (ref 0–0.9)
IRON SATN MFR SERPL: 11 % — LOW (ref 14–50)
IRON SATN MFR SERPL: 18 UG/DL — LOW (ref 37–145)
LDH SERPL L TO P-CCNC: 171 U/L — SIGNIFICANT CHANGE UP (ref 98–192)
LYMPHOCYTES # BLD AUTO: 0.75 K/UL — LOW (ref 1–3.3)
LYMPHOCYTES # BLD AUTO: 16.6 % — SIGNIFICANT CHANGE UP (ref 13–44)
LYMPHOCYTES # FLD: 50 % — SIGNIFICANT CHANGE UP
M TB IFN-G BLD-IMP: ABNORMAL
MAGNESIUM SERPL-MCNC: 1.9 MG/DL — SIGNIFICANT CHANGE UP (ref 1.6–2.6)
MCHC RBC-ENTMCNC: 25.3 PG — LOW (ref 27–34)
MCHC RBC-ENTMCNC: 30.7 G/DL — LOW (ref 32–36)
MCV RBC AUTO: 82.4 FL — SIGNIFICANT CHANGE UP (ref 80–100)
MONOCYTES # BLD AUTO: 0.24 K/UL — SIGNIFICANT CHANGE UP (ref 0–0.9)
MONOCYTES NFR BLD AUTO: 5.3 % — SIGNIFICANT CHANGE UP (ref 2–14)
MONOS+MACROS # FLD: 16 % — SIGNIFICANT CHANGE UP
NEUTROPHILS # BLD AUTO: 3.36 K/UL — SIGNIFICANT CHANGE UP (ref 1.8–7.4)
NEUTROPHILS NFR BLD AUTO: 74.3 % — SIGNIFICANT CHANGE UP (ref 43–77)
NEUTROPHILS-BODY FLUID: 34 % — SIGNIFICANT CHANGE UP
PH FLD: 8 — SIGNIFICANT CHANGE UP
PHOSPHATE SERPL-MCNC: 3.4 MG/DL — SIGNIFICANT CHANGE UP (ref 2.4–4.7)
PLATELET # BLD AUTO: 430 K/UL — HIGH (ref 150–400)
POTASSIUM SERPL-MCNC: 4.5 MMOL/L — SIGNIFICANT CHANGE UP (ref 3.5–5.3)
POTASSIUM SERPL-SCNC: 4.5 MMOL/L — SIGNIFICANT CHANGE UP (ref 3.5–5.3)
PROT SERPL-MCNC: 5.8 G/DL — LOW (ref 6.6–8.7)
PTH-INTACT FLD-MCNC: 96 PG/ML — HIGH (ref 15–65)
QUANTIFERON TB PLUS MITOGEN MINUS NIL: 0.46 IU/ML
QUANTIFERON TB PLUS NIL: 0.1 IU/ML
QUANTIFERON TB PLUS TB1 MINUS NIL: 0 IU/ML
QUANTIFERON TB PLUS TB2 MINUS NIL: 0 IU/ML
RAPID RVP RESULT: SIGNIFICANT CHANGE UP
RBC # BLD: 3.8 M/UL — SIGNIFICANT CHANGE UP (ref 3.8–5.2)
RBC # BLD: 3.8 M/UL — SIGNIFICANT CHANGE UP (ref 3.8–5.2)
RBC # FLD: 14.8 % — HIGH (ref 10.3–14.5)
RCV VOL RI: <2000 CELLS/UL — SIGNIFICANT CHANGE UP
RETICS #: 52.8 K/UL — SIGNIFICANT CHANGE UP (ref 25–125)
RETICS/RBC NFR: 1.4 % — SIGNIFICANT CHANGE UP (ref 0.5–2.5)
SARS-COV-2 RNA SPEC QL NAA+PROBE: SIGNIFICANT CHANGE UP
SODIUM SERPL-SCNC: 136 MMOL/L — SIGNIFICANT CHANGE UP (ref 135–145)
SPECIMEN SOURCE: SIGNIFICANT CHANGE UP
TIBC SERPL-MCNC: 162 UG/DL — LOW (ref 220–430)
TOTAL NUCLEATED CELL COUNT, BODY FLUID: 170 CELLS/UL — SIGNIFICANT CHANGE UP
TRANSFERRIN SERPL-MCNC: 113 MG/DL — LOW (ref 192–382)
TUBE TYPE: SIGNIFICANT CHANGE UP
VIT B12 SERPL-MCNC: 375 PG/ML — SIGNIFICANT CHANGE UP (ref 232–1245)
WBC # BLD: 4.52 K/UL — SIGNIFICANT CHANGE UP (ref 3.8–10.5)
WBC # FLD AUTO: 4.52 K/UL — SIGNIFICANT CHANGE UP (ref 3.8–10.5)
WBC COUNT.: 150 CELLS/UL — SIGNIFICANT CHANGE UP

## 2025-01-23 PROCEDURE — 71045 X-RAY EXAM CHEST 1 VIEW: CPT | Mod: 26

## 2025-01-23 PROCEDURE — 99233 SBSQ HOSP IP/OBS HIGH 50: CPT

## 2025-01-23 PROCEDURE — 93970 EXTREMITY STUDY: CPT | Mod: 26

## 2025-01-23 PROCEDURE — 88305 TISSUE EXAM BY PATHOLOGIST: CPT | Mod: 26

## 2025-01-23 PROCEDURE — 32557 INSERT CATH PLEURA W/ IMAGE: CPT | Mod: RT

## 2025-01-23 PROCEDURE — 71045 X-RAY EXAM CHEST 1 VIEW: CPT | Mod: 26,77

## 2025-01-23 PROCEDURE — 99223 1ST HOSP IP/OBS HIGH 75: CPT

## 2025-01-23 PROCEDURE — 99232 SBSQ HOSP IP/OBS MODERATE 35: CPT | Mod: 25

## 2025-01-23 PROCEDURE — 99221 1ST HOSP IP/OBS SF/LOW 40: CPT | Mod: 25

## 2025-01-23 PROCEDURE — 88112 CYTOPATH CELL ENHANCE TECH: CPT | Mod: 26

## 2025-01-23 PROCEDURE — 93306 TTE W/DOPPLER COMPLETE: CPT | Mod: 26

## 2025-01-23 RX ORDER — IRON SUCROSE 20 MG/ML
200 INJECTION, SOLUTION INTRAVENOUS EVERY 24 HOURS
Refills: 0 | Status: COMPLETED | OUTPATIENT
Start: 2025-01-23 | End: 2025-01-27

## 2025-01-23 RX ORDER — ALPRAZOLAM 2 MG
0.25 TABLET ORAL EVERY 12 HOURS
Refills: 0 | Status: DISCONTINUED | OUTPATIENT
Start: 2025-01-23 | End: 2025-01-23

## 2025-01-23 RX ORDER — SODIUM CHLORIDE 9 G/ML
1000 INJECTION, SOLUTION INTRAVENOUS
Refills: 0 | Status: DISCONTINUED | OUTPATIENT
Start: 2025-01-23 | End: 2025-01-24

## 2025-01-23 RX ORDER — ACETAMINOPHEN 160 MG/5ML
1000 SUSPENSION ORAL ONCE
Refills: 0 | Status: COMPLETED | OUTPATIENT
Start: 2025-01-23 | End: 2025-01-23

## 2025-01-23 RX ORDER — CYANOCOBALAMIN (VITAMIN B-12) 1000MCG/ML
1000 VIAL (ML) INJECTION DAILY
Refills: 0 | Status: DISCONTINUED | OUTPATIENT
Start: 2025-01-23 | End: 2025-01-31

## 2025-01-23 RX ORDER — SODIUM ZIRCONIUM CYCLOSILICATE 5 G/5G
10 POWDER, FOR SUSPENSION ORAL ONCE
Refills: 0 | Status: COMPLETED | OUTPATIENT
Start: 2025-01-23 | End: 2025-01-23

## 2025-01-23 RX ORDER — INSULIN LISPRO 100/ML
4 VIAL (ML) SUBCUTANEOUS
Refills: 0 | Status: DISCONTINUED | OUTPATIENT
Start: 2025-01-23 | End: 2025-01-25

## 2025-01-23 RX ORDER — INSULIN GLARGINE-YFGN 100 [IU]/ML
20 INJECTION, SOLUTION SUBCUTANEOUS
Refills: 0 | Status: DISCONTINUED | OUTPATIENT
Start: 2025-01-24 | End: 2025-01-24

## 2025-01-23 RX ORDER — OXYCODONE HYDROCHLORIDE 30 MG/1
5 TABLET ORAL EVERY 4 HOURS
Refills: 0 | Status: DISCONTINUED | OUTPATIENT
Start: 2025-01-23 | End: 2025-01-30

## 2025-01-23 RX ORDER — IRON SUCROSE 20 MG/ML
100 INJECTION, SOLUTION INTRAVENOUS EVERY 24 HOURS
Refills: 0 | Status: DISCONTINUED | OUTPATIENT
Start: 2025-01-23 | End: 2025-01-23

## 2025-01-23 RX ORDER — INSULIN GLARGINE-YFGN 100 [IU]/ML
19 INJECTION, SOLUTION SUBCUTANEOUS ONCE
Refills: 0 | Status: COMPLETED | OUTPATIENT
Start: 2025-01-23 | End: 2025-01-23

## 2025-01-23 RX ADMIN — Medication 5 MILLIGRAM(S): at 06:31

## 2025-01-23 RX ADMIN — GABAPENTIN 300 MILLIGRAM(S): 800 TABLET ORAL at 17:20

## 2025-01-23 RX ADMIN — SODIUM BICARBONATE 650 MILLIGRAM(S): 42 INJECTION, SOLUTION INTRAVENOUS at 06:31

## 2025-01-23 RX ADMIN — SODIUM BICARBONATE 650 MILLIGRAM(S): 42 INJECTION, SOLUTION INTRAVENOUS at 17:20

## 2025-01-23 RX ADMIN — Medication 0.25 MILLIGRAM(S): at 17:27

## 2025-01-23 RX ADMIN — Medication 1000 MICROGRAM(S): at 17:21

## 2025-01-23 RX ADMIN — Medication 40 MILLIGRAM(S): at 17:20

## 2025-01-23 RX ADMIN — Medication 40 MILLIGRAM(S): at 06:31

## 2025-01-23 RX ADMIN — ACETAMINOPHEN 400 MILLIGRAM(S): 160 SUSPENSION ORAL at 15:59

## 2025-01-23 RX ADMIN — Medication 12: at 18:10

## 2025-01-23 RX ADMIN — BUMETANIDE 1 MILLIGRAM(S): 2 TABLET ORAL at 06:30

## 2025-01-23 RX ADMIN — IRON SUCROSE 110 MILLIGRAM(S): 20 INJECTION, SOLUTION INTRAVENOUS at 12:28

## 2025-01-23 RX ADMIN — EPOETIN ALFA 20000 UNIT(S): 2000 SOLUTION INTRAVENOUS; SUBCUTANEOUS at 12:41

## 2025-01-23 RX ADMIN — OXYCODONE HYDROCHLORIDE 5 MILLIGRAM(S): 30 TABLET ORAL at 15:59

## 2025-01-23 RX ADMIN — INSULIN GLARGINE-YFGN 19 UNIT(S): 100 INJECTION, SOLUTION SUBCUTANEOUS at 20:38

## 2025-01-23 RX ADMIN — GABAPENTIN 300 MILLIGRAM(S): 800 TABLET ORAL at 06:31

## 2025-01-23 RX ADMIN — SODIUM ZIRCONIUM CYCLOSILICATE 10 GRAM(S): 5 POWDER, FOR SUSPENSION ORAL at 22:39

## 2025-01-23 RX ADMIN — Medication 2 UNIT(S): at 22:39

## 2025-01-23 RX ADMIN — Medication 1 MILLIGRAM(S): at 12:00

## 2025-01-23 RX ADMIN — OXYCODONE HYDROCHLORIDE 5 MILLIGRAM(S): 30 TABLET ORAL at 16:50

## 2025-01-23 RX ADMIN — Medication 4 UNIT(S): at 17:13

## 2025-01-23 RX ADMIN — LEVOTHYROXINE SODIUM 50 MICROGRAM(S): 25 TABLET ORAL at 06:31

## 2025-01-23 RX ADMIN — Medication 2 UNIT(S): at 12:00

## 2025-01-23 NOTE — CONSULT NOTE ADULT - SUBJECTIVE AND OBJECTIVE BOX
HPI:  58 y/o F w/ PMH of HTN, hypothyroid, IDDM type I, chronic anemia, HFpEF (11/02/24 55%), CKD was sent in from Dr. Matson's office (pulm) b/c of pt having worsening SOB, worsening renal function, evidence of b/l pleural effusions.  Pt states her symptoms have been worsening over the past few months and she now has been having difficult laying flat or on left side bc of worsening sob and nonproductive cough.  Pt also reports worsening b/l LE edema.   She also c/o left upper abd pain worse w/ deep inspiration.  Pt has had worsening renal function since september she also has had rashes and mylagias and elevated MARGI.  Concern is for lupus nephritis.  Dr. Matson would like renal consulted for renal biopsy and thoracic consult for pleural effusions.   (22 Jan 2025 17:48)    IR consulted for renal biopsy d/t concerns for lupus nephritis.       ============================================================================  Medications:  Home Medications:  amLODIPine 5 mg oral tablet: 1 tab(s) orally once a day (22 Jan 2025 18:35)  gabapentin 300 mg oral capsule: 1 cap(s) orally 2 times a day (22 Jan 2025 18:35)  HumaLOG KwikPen 100 UNIT/ML Subcutaneous Solution Pen-injector:  (22 Jan 2025 18:37)  Lantus SoloStar 100 UNIT/ML Subcutaneous Solution Pen-injector: 19u qhs (22 Jan 2025 18:36)  Lasix 40 mg oral tablet: 1 tab(s) orally once a day (22 Jan 2025 18:35)  levothyroxine 50 mcg (0.05 mg) oral capsule: 1 cap(s) orally once a day (22 Jan 2025 18:36)    MEDICATIONS  (STANDING):  amLODIPine   Tablet 5 milliGRAM(s) Oral daily  buMETAnide Injectable 1 milliGRAM(s) IV Push daily  dextrose 5%. 1000 milliLiter(s) (100 mL/Hr) IV Continuous <Continuous>  dextrose 5%. 1000 milliLiter(s) (50 mL/Hr) IV Continuous <Continuous>  dextrose 5%. 1000 milliLiter(s) (50 mL/Hr) IV Continuous <Continuous>  dextrose 50% Injectable 25 Gram(s) IV Push once  dextrose 50% Injectable 12.5 Gram(s) IV Push once  dextrose 50% Injectable 25 Gram(s) IV Push once  epoetin mira-epbx (RETACRIT) Injectable 73062 Unit(s) SubCutaneous every 7 days  folic acid 1 milliGRAM(s) Oral daily  gabapentin 300 milliGRAM(s) Oral two times a day  glucagon  Injectable 1 milliGRAM(s) IntraMuscular once  influenza   Vaccine 0.5 milliLiter(s) IntraMuscular once  insulin glargine Injectable (LANTUS) 19 Unit(s) SubCutaneous at bedtime  insulin lispro (ADMELOG) corrective regimen sliding scale   SubCutaneous three times a day before meals  insulin lispro Injectable (ADMELOG) 2 Unit(s) SubCutaneous three times a day before meals  iron sucrose Injectable 100 milliGRAM(s) IV Push every 24 hours  iron sucrose IVPB 200 milliGRAM(s) IV Intermittent every 24 hours  levothyroxine 50 MICROGram(s) Oral daily  methylPREDNISolone sodium succinate Injectable 40 milliGRAM(s) IV Push two times a day  sodium bicarbonate 650 milliGRAM(s) Oral two times a day    MEDICATIONS  (PRN):  acetaminophen     Tablet .. 650 milliGRAM(s) Oral every 6 hours PRN Temp greater or equal to 38C (100.4F), Mild Pain (1 - 3)  aluminum hydroxide/magnesium hydroxide/simethicone Suspension 30 milliLiter(s) Oral every 4 hours PRN Dyspepsia  dextrose Oral Gel 15 Gram(s) Oral once PRN Blood Glucose LESS THAN 70 milliGRAM(s)/deciliter  melatonin 3 milliGRAM(s) Oral at bedtime PRN Insomnia  ondansetron Injectable 4 milliGRAM(s) IV Push every 8 hours PRN Nausea and/or Vomiting      Allergies:   No Known Allergies    ============================================================================  PAST MEDICAL & SURGICAL HISTORY:  Diabetes      Hypertension      No significant past surgical history          FAMILY HISTORY:  FH: non-Hodgkin's lymphoma (Mother)        Social History:      ============================================================================  Vitals:  Vital Signs Last 24 Hrs  T(C): 37 (23 Jan 2025 11:38), Max: 37 (23 Jan 2025 07:56)  T(F): 98.6 (23 Jan 2025 11:38), Max: 98.6 (23 Jan 2025 07:56)  HR: 100 (23 Jan 2025 11:38) (88 - 100)  BP: 112/68 (23 Jan 2025 11:38) (112/68 - 149/77)  BP(mean): --  RR: 18 (23 Jan 2025 11:38) (16 - 19)  SpO2: 94% (23 Jan 2025 11:38) (90% - 98%)    Parameters below as of 23 Jan 2025 11:38  Patient On (Oxygen Delivery Method): room air    Labs:                        9.6    4.52  )-----------( 430      ( 23 Jan 2025 04:58 )             31.3     01-23    136  |  105  |  32.0[H]  ----------------------------<  310[H]  4.5   |  19.0[L]  |  2.62[H]    Ca    7.9[L]      23 Jan 2025 04:58  Phos  3.4     01-23  Mg     1.9     01-23    TPro  5.8[L]  /  Alb  2.1[L]  /  TBili  <0.2[L]  /  DBili  x   /  AST  7   /  ALT  <5  /  AlkPhos  78  01-23    PT/INR - ( 22 Jan 2025 14:30 )   PT: 10.6 sec;   INR: 0.94 ratio         PTT - ( 22 Jan 2025 14:30 )  PTT:26.8 sec    Imaging:   Pertinent Imaging Reviewed.    ============================================================================

## 2025-01-23 NOTE — PROGRESS NOTE ADULT - ASSESSMENT
59F PMH of HTN, hypothyroid, IDDM type I, chronic anemia, HFpEF (11/02/24 55%), CKD was sent in from Dr. Matson's office (pulm) for worsening SOB, worsening renal function, evidence of b/l pleural effusions concern for nephritis, CT chest wtih b/l effusions;      .

## 2025-01-23 NOTE — PROVIDER CONTACT NOTE (CRITICAL VALUE NOTIFICATION) - SITUATION
Pt sent from pulm due to worsening SOB/renal function. Evidence of b/l pleural effusions with concern for lupus nephritis present on CT. Pt FS over 500 multiple times even after insulin. STAT labs placed to verify glucose, glucose came back at 549.

## 2025-01-23 NOTE — CHART NOTE - NSCHARTNOTEFT_GEN_A_CORE
[facility] ERHR [id number]  GILMAR KING, 59y, Female  [id number]    Notified by RN that the above patient had a critical value of: glucose of 564, pt is asymptomatic.  Upon review of patient's chart the patient had a blood glucose of 595 at 16:59 per RN patient requested 4 units of insulin, then to eat and then to have glucose rechecked. Results were not discussed with a provider. Repeat glucose was found to be 550, 564 at 18:00. Patient scheduled for 12 units admelog. Of note the patient  was scheduled for lantus 19units, which she has not received.       Interventions Taken:  1) Repeat blood glucose STAT   2) Labs: VBG w/ lytes, acetone  3) RN to given lantus now      Peyton Sánchez NP  Department of Medicine [facility] ERHR [address]  GILMAR KING, 59y, Female  [id number]    Notified by RN that the above patient had a critical value of: glucose of 564, pt is asymptomatic.  Upon review of patient's chart the patient had a blood glucose of 595 at 16:59 per RN patient requested 4 units of insulin, then to eat and then to have glucose rechecked. Results were not discussed with a provider. Repeat glucose was found to be 550, 564 at 18:00. Patient scheduled for 12 units admelog. Of note the patient  was scheduled for lantus 19units, which she has not received.       Interventions Taken:  1) Repeat blood glucose STAT   2) Labs: VBG w/ lytes, acetone  3) RN to given lantus now    **Addendum: VBG, no acidosis, gap of 7, potassium of 5.5, glucose 549. Acetone pending. Will give 2 regular insulin and lokelma. Repeat blood glucose per hyperkalemia protocol. Repeat BMP in AM.       Peyton Sánchez NP  Department of Medicine

## 2025-01-23 NOTE — PHYSICAL THERAPY INITIAL EVALUATION ADULT - ADDITIONAL COMMENTS
pt lives with boyfriend who is unable to assist due to his own health issues. pt lives in a 2-story house with 2 steps to enter (no rail) then 14 to second floor (+rail) and 12 to basement(+rail). no DME.

## 2025-01-23 NOTE — PHYSICAL THERAPY INITIAL EVALUATION ADULT - PERTINENT HX OF CURRENT PROBLEM, REHAB EVAL
58 y/o F w/ PMH of HTN, hypothyroid, IDDM type I, chronic anemia, HFpEF (11/02/24 55%), CKD was sent in from Dr. Matson's office (pulm) b/c of pt having worsening SOB, worsening renal function, evidence of b/l pleural effusions w/ concern for lupus nephritis. VS stable in ED and sating well ORA.  Initial w/u significant for WBC 3.28, ANC normal, Hb 8.3 and UA w/ RBC and proteinuria.  CTSx and nephro consulted by ED.  admitted for worsening pleural effusions and r/o lupus.

## 2025-01-23 NOTE — CONSULT NOTE ADULT - SUBJECTIVE AND OBJECTIVE BOX
Patient is a 59y old  Female who presents with a chief complaint of sob/patel (22 Jan 2025 21:25)    HPI:  60 y/o F w/ PMH of HTN, hypothyroid, IDDM type I, chronic anemia, HFpEF (11/02/24 55%), CKD was sent in from Dr. Matson's office (pulm) b/c of pt having worsening SOB, worsening renal function, evidence of b/l pleural effusions.  Patient is being evaluated for worsening kidney function.   Concern is for lupus nephritis.     Endocrine was consulted for hyperglycemia     PAST MEDICAL & SURGICAL HISTORY:  Diabetes    Hypertension    No significant past surgical history        Social History:      FAMILY HISTORY:  FH: non-Hodgkin's lymphoma (Mother)          Allergies    No Known Allergies    Intolerances        ROS as noted in the HPI    MEDICATIONS  (STANDING):  amLODIPine   Tablet 5 milliGRAM(s) Oral daily  buMETAnide Injectable 1 milliGRAM(s) IV Push daily  dextrose 5%. 1000 milliLiter(s) (100 mL/Hr) IV Continuous <Continuous>  dextrose 5%. 1000 milliLiter(s) (50 mL/Hr) IV Continuous <Continuous>  dextrose 50% Injectable 25 Gram(s) IV Push once  dextrose 50% Injectable 12.5 Gram(s) IV Push once  dextrose 50% Injectable 25 Gram(s) IV Push once  epoetin mira-epbx (RETACRIT) Injectable 83360 Unit(s) SubCutaneous every 7 days  folic acid 1 milliGRAM(s) Oral daily  gabapentin 300 milliGRAM(s) Oral two times a day  glucagon  Injectable 1 milliGRAM(s) IntraMuscular once  influenza   Vaccine 0.5 milliLiter(s) IntraMuscular once  insulin glargine Injectable (LANTUS) 19 Unit(s) SubCutaneous at bedtime  insulin lispro (ADMELOG) corrective regimen sliding scale   SubCutaneous three times a day before meals  insulin lispro Injectable (ADMELOG) 2 Unit(s) SubCutaneous three times a day before meals  iron sucrose IVPB 200 milliGRAM(s) IV Intermittent every 24 hours  levothyroxine 50 MICROGram(s) Oral daily  methylPREDNISolone sodium succinate Injectable 40 milliGRAM(s) IV Push two times a day  sodium bicarbonate 650 milliGRAM(s) Oral two times a day    MEDICATIONS  (PRN):  acetaminophen     Tablet .. 650 milliGRAM(s) Oral every 6 hours PRN Temp greater or equal to 38C (100.4F), Mild Pain (1 - 3)  aluminum hydroxide/magnesium hydroxide/simethicone Suspension 30 milliLiter(s) Oral every 4 hours PRN Dyspepsia  dextrose Oral Gel 15 Gram(s) Oral once PRN Blood Glucose LESS THAN 70 milliGRAM(s)/deciliter  melatonin 3 milliGRAM(s) Oral at bedtime PRN Insomnia  ondansetron Injectable 4 milliGRAM(s) IV Push every 8 hours PRN Nausea and/or Vomiting      Vital Signs Last 24 Hrs  T(C): 37 (23 Jan 2025 11:38), Max: 37 (23 Jan 2025 07:56)  T(F): 98.6 (23 Jan 2025 11:38), Max: 98.6 (23 Jan 2025 07:56)  HR: 100 (23 Jan 2025 11:38) (88 - 100)  BP: 112/68 (23 Jan 2025 11:38) (112/68 - 149/77)  BP(mean): --  RR: 18 (23 Jan 2025 11:38) (16 - 19)  SpO2: 94% (23 Jan 2025 11:38) (90% - 98%)    Parameters below as of 23 Jan 2025 11:38  Patient On (Oxygen Delivery Method): room air          Physical Exam:    Constitutional: NAD, well-developed  HEENT: EOMI, no exophalmos  Respiratory: CTAB, normal respirations  Cardiovascular: S1 and S2, RRR  Extremities: No peripheral edema  Neurological: AOx3, no focal deficits      LABS  01-23    136  |  105  |  32.0[H]  ----------------------------<  310[H]  4.5   |  19.0[L]  |  2.62[H]    Ca    7.9[L]      23 Jan 2025 04:58  Phos  3.4     01-23  Mg     1.9     01-23    TPro  5.8[L]  /  Alb  2.1[L]  /  TBili  <0.2[L]  /  DBili  x   /  AST  7   /  ALT  <5  /  AlkPhos  78  01-23                          9.6    4.52  )-----------( 430      ( 23 Jan 2025 04:58 )             31.3       A1C with Estimated Average Glucose Result: 9.3 % (01-23-25 @ 04:58)        Ketone - Urine: Negative mg/dL (01-22 @ 12:20)    Alkaline Phosphatase: 78 U/L (01-23-25 @ 04:58)  Albumin: 2.1 g/dL (01-23-25 @ 04:58)  Aspartate Aminotransferase (AST/SGOT): 7 U/L (01-23-25 @ 04:58)  Alanine Aminotransferase (ALT/SGPT): <5 U/L (01-23-25 @ 04:58)  Alkaline Phosphatase: 76 U/L (01-22-25 @ 12:50)  Albumin: 2.1 g/dL (01-22-25 @ 12:50)  Aspartate Aminotransferase (AST/SGOT): 17 U/L (01-22-25 @ 12:50)  Alanine Aminotransferase (ALT/SGPT): 8 U/L (01-22-25 @ 12:50)          CAPILLARY BLOOD GLUCOSE      POCT Blood Glucose.: 326 mg/dL (23 Jan 2025 11:44)  POCT Blood Glucose.: 248 mg/dL (23 Jan 2025 09:01)  POCT Blood Glucose.: 148 mg/dL (22 Jan 2025 16:26)  POCT Blood Glucose.: 229 mg/dL (22 Jan 2025 14:23)      Imaging     Patient is a 59y old  Female who presents with a chief complaint of sob/patel (22 Jan 2025 21:25)    HPI:  60 y/o F w/ PMH of HTN, hypothyroid, IDDM type I, chronic anemia, HFpEF (11/02/24 55%), CKD was sent in from Dr. Matson's office (pulm) b/c of pt having worsening SOB, worsening renal function, evidence of b/l pleural effusions.  Patient is being evaluated for worsening kidney function.   Concern is for lupus nephritis.     I see the patient at the office for type 1 DM. Was on pump and was in the process of getting Omni 5, but was found to be very expensive.     Currently on basal bolus doses below     Endocrine was consulted for hyperglycemia     PAST MEDICAL & SURGICAL HISTORY:  Diabetes    Hypertension    No significant past surgical history        Social History:      FAMILY HISTORY:  FH: non-Hodgkin's lymphoma (Mother)          Allergies    No Known Allergies    Intolerances        ROS as noted in the HPI    MEDICATIONS  (STANDING):  amLODIPine   Tablet 5 milliGRAM(s) Oral daily  buMETAnide Injectable 1 milliGRAM(s) IV Push daily  dextrose 5%. 1000 milliLiter(s) (100 mL/Hr) IV Continuous <Continuous>  dextrose 5%. 1000 milliLiter(s) (50 mL/Hr) IV Continuous <Continuous>  dextrose 50% Injectable 25 Gram(s) IV Push once  dextrose 50% Injectable 12.5 Gram(s) IV Push once  dextrose 50% Injectable 25 Gram(s) IV Push once  epoetin mira-epbx (RETACRIT) Injectable 71092 Unit(s) SubCutaneous every 7 days  folic acid 1 milliGRAM(s) Oral daily  gabapentin 300 milliGRAM(s) Oral two times a day  glucagon  Injectable 1 milliGRAM(s) IntraMuscular once  influenza   Vaccine 0.5 milliLiter(s) IntraMuscular once  insulin glargine Injectable (LANTUS) 19 Unit(s) SubCutaneous at bedtime  insulin lispro (ADMELOG) corrective regimen sliding scale   SubCutaneous three times a day before meals  insulin lispro Injectable (ADMELOG) 2 Unit(s) SubCutaneous three times a day before meals  iron sucrose IVPB 200 milliGRAM(s) IV Intermittent every 24 hours  levothyroxine 50 MICROGram(s) Oral daily  methylPREDNISolone sodium succinate Injectable 40 milliGRAM(s) IV Push two times a day  sodium bicarbonate 650 milliGRAM(s) Oral two times a day    MEDICATIONS  (PRN):  acetaminophen     Tablet .. 650 milliGRAM(s) Oral every 6 hours PRN Temp greater or equal to 38C (100.4F), Mild Pain (1 - 3)  aluminum hydroxide/magnesium hydroxide/simethicone Suspension 30 milliLiter(s) Oral every 4 hours PRN Dyspepsia  dextrose Oral Gel 15 Gram(s) Oral once PRN Blood Glucose LESS THAN 70 milliGRAM(s)/deciliter  melatonin 3 milliGRAM(s) Oral at bedtime PRN Insomnia  ondansetron Injectable 4 milliGRAM(s) IV Push every 8 hours PRN Nausea and/or Vomiting      Vital Signs Last 24 Hrs  T(C): 37 (23 Jan 2025 11:38), Max: 37 (23 Jan 2025 07:56)  T(F): 98.6 (23 Jan 2025 11:38), Max: 98.6 (23 Jan 2025 07:56)  HR: 100 (23 Jan 2025 11:38) (88 - 100)  BP: 112/68 (23 Jan 2025 11:38) (112/68 - 149/77)  BP(mean): --  RR: 18 (23 Jan 2025 11:38) (16 - 19)  SpO2: 94% (23 Jan 2025 11:38) (90% - 98%)    Parameters below as of 23 Jan 2025 11:38  Patient On (Oxygen Delivery Method): room air          Physical Exam:    Constitutional: NAD, well-developed  HEENT: EOMI, no exophalmos  Respiratory: CTAB, normal respirations  Cardiovascular: S1 and S2, RRR  Extremities: No peripheral edema  Neurological: AOx3, no focal deficits      LABS  01-23    136  |  105  |  32.0[H]  ----------------------------<  310[H]  4.5   |  19.0[L]  |  2.62[H]    Ca    7.9[L]      23 Jan 2025 04:58  Phos  3.4     01-23  Mg     1.9     01-23    TPro  5.8[L]  /  Alb  2.1[L]  /  TBili  <0.2[L]  /  DBili  x   /  AST  7   /  ALT  <5  /  AlkPhos  78  01-23                          9.6    4.52  )-----------( 430      ( 23 Jan 2025 04:58 )             31.3       A1C with Estimated Average Glucose Result: 9.3 % (01-23-25 @ 04:58)        Ketone - Urine: Negative mg/dL (01-22 @ 12:20)    Alkaline Phosphatase: 78 U/L (01-23-25 @ 04:58)  Albumin: 2.1 g/dL (01-23-25 @ 04:58)  Aspartate Aminotransferase (AST/SGOT): 7 U/L (01-23-25 @ 04:58)  Alanine Aminotransferase (ALT/SGPT): <5 U/L (01-23-25 @ 04:58)  Alkaline Phosphatase: 76 U/L (01-22-25 @ 12:50)  Albumin: 2.1 g/dL (01-22-25 @ 12:50)  Aspartate Aminotransferase (AST/SGOT): 17 U/L (01-22-25 @ 12:50)  Alanine Aminotransferase (ALT/SGPT): 8 U/L (01-22-25 @ 12:50)          CAPILLARY BLOOD GLUCOSE      POCT Blood Glucose.: 326 mg/dL (23 Jan 2025 11:44)  POCT Blood Glucose.: 248 mg/dL (23 Jan 2025 09:01)  POCT Blood Glucose.: 148 mg/dL (22 Jan 2025 16:26)  POCT Blood Glucose.: 229 mg/dL (22 Jan 2025 14:23)      Imaging

## 2025-01-23 NOTE — CONSULT NOTE ADULT - ASSESSMENT
Patient is a 59 year-old female admitted for SOB. Seen in consultation for Renal biopsy. Discussed with Dr. Barraza, will add on to AM schedule. Keep NPO. Hold any AC.     Please call extension [phone] with any questions, concerns or issues regarding above.

## 2025-01-23 NOTE — CONSULT NOTE ADULT - ASSESSMENT
58 y/o F w/ PMH of HTN, hypothyroid, IDDM type I, chronic anemia, HFpEF (11/02/24 55%), CKD was sent in from Dr. Matson's office (pulm) b/c of pt having worsening SOB, worsening renal function, evidence of b/l pleural effusions.  Patient is being evaluated for worsening kidney function.   Concern is for lupus nephritis.     Endocrine was consulted for hyperglycemia     Type 2 DM with hyperglycemia    Home Medication:  Lantus 19  Humalog       A1c 9.3  Start Lantus  daily  Start Admelog  units TID before meals, hold of NPO   Start moderate dose Admelog sliding scale TID and bedtime   Monitor POCT   Diabetes education   Diabetic Diet     Hypothyroidism   TSH 7 from November, please repeat TSH   Clinically euthyroid   On LT4 50 mcg daily  Continue LT4   58 y/o F w/ PMH of HTN, hypothyroid, IDDM type I, chronic anemia, HFpEF (11/02/24 55%), CKD was sent in from Dr. Matson's office (pulm) b/c of pt having worsening SOB, worsening renal function, evidence of b/l pleural effusions.  Patient is being evaluated for worsening kidney function.   Concern is for lupus nephritis.     Endocrine was consulted for hyperglycemia     Type 1 DM with hyperglycemia    Home Medication:  Lantus 19  Humalog ICR 1:10      A1c 9.3  Contiue Lantus 19 daily, give one dose now, as patient takes the dose in the morning   Switch to Lantus 20 units tomorrow in the morning   Increase Admelog to 4 unit TID now  Lower to low dose Admelog sliding scale TID and bedtime   Monitor POCT   Diabetes education   Diabetic Diet     Hypothyroidism   TSH 7 from November, please repeat TSH   Clinically euthyroid   On LT4 50 mcg daily, continue for now   Continue LT4

## 2025-01-23 NOTE — PROVIDER CONTACT NOTE (CRITICAL VALUE NOTIFICATION) - ASSESSMENT
Pt not complaining of any pain, no s/s of distress noted. VSS. Pt AxO x4 and OOB to BR. Pt asymptomatic.

## 2025-01-23 NOTE — PROVIDER CONTACT NOTE (CRITICAL VALUE NOTIFICATION) - BACKGROUND
Pt has history of HTN, DMI, chronic anemia, CHD, and HFrEF. Pt administers her own insulin, hesitant to get admelog and sliding scale.

## 2025-01-23 NOTE — PROGRESS NOTE ADULT - ASSESSMENT
59 y/o F w/ PMH HTN, IDDM type I (has medtronic insulin pump), hypothyroid, neuropathies presented to ED c/o 2 weeks of NIXON w/ associated worsening LE edema  Prior admission  to hospital Nov 2024 noted   Progressive renal failure ---> creat was 1.1 in sept 2024 --> now 2.7 , assoc with microscopic hematuria and near nephrotic proteinuria   Prior Urine protein / creta ratio was 2.5 ( 24 h urine never done )   + clinical oral ulcers , rashes , serositis ( effusions )   Prior serology showed elevated dsDNA , MARGI + 1/1280  and low C3/C4 --> CH 50 not done   P/C ANCA (-) , PLA2R (-) , ROMELIA (-) , Hep B and C (-)   Noncontrast CT here - shows no hydro 1/22 , small stones   KAROLINA 11/24 noted     Recommend :   R/O SLE related  Nephritis     Added  Bumex diuresis 1 mg IV daily - adjust based on lab stability in am   Pulm to evaluate re - pleural effusion drainage   Strict I/O   Formal 24 h urine  collection in process   Repeat LE venous doppler - No DVT   HCG Negative , CPK 20   Check SYDNEE , GBM Ab  , Quant gold , SYDNEE panel , ACLA , Beta 2 Glyco , ACE --> Pending   Will attempt to get on IR schedule for renal Bx tomorrow - NPO p MN -->Discussed  with IR ( coags and plat count OK )   Started on dose Solumedrol 40 mg IV bid   Endo to adjust insulin     Anemia - Added Weekly epogen and folate   LDH OK , Follow hapto  Add Venofer     MA - Added oral Bicarb bid     RO - Follow  PTH , phos , Vit D levels     Will follow   Discussed with daughter

## 2025-01-23 NOTE — PATIENT PROFILE ADULT - VISION (WITH CORRECTIVE LENSES IF THE PATIENT USUALLY WEARS THEM):
no chest pain, no cough, and no shortness of breath. Normal vision: sees adequately in most situations; can see medication labels, newsprint

## 2025-01-23 NOTE — PHYSICAL THERAPY INITIAL EVALUATION ADULT - TRANSFER SKILLS, REHAB EVAL
----- Message from Lesley Mendoza MD sent at 4/28/2021 11:01 AM CDT -----  Please notify pt of normal urine results.     independent

## 2025-01-23 NOTE — PROGRESS NOTE ADULT - ASSESSMENT
60 y/o F w/ PMH of HTN, hypothyroid, IDDM type I, chronic anemia, HFpEF (11/02/24 55%), CKD was sent in from Dr. Matson's office (pulm) b/c of pt having worsening SOB, worsening renal function, evidence of b/l pleural effusions w/ concern for lupus nephritis. VS stable in ED and sating well ORA.  Initial w/u significant for WBC 3.28, ANC normal, Hb 8.3 and UA w/ RBC and proteinuria.  CTSx and nephro consulted by ED.  admitted for worsening pleural effusions and r/o lupus.      b/l pleural effusions and b/l LE edema likely from third spacing due to progressively worsening renal function    - Likely source of SOB/NIXON and upper abd pain   - CT chest non con w/ b/l mod pleural effusion increased from prior and has b/l pulm nodules, and small mediastinal/axiallary lymphnodes and retroperitoneal/pelvic lymphadenopathy   -  but on exam has 3+LE pitting edema   -diruesis with bumex and plan as per ct surgery   - follow up cjest xray    - PE considered but unable to do CTA given renal function   -  b/l LE doppler to r/o DVT   - PRN bronchodilators if needed  - TTE from 11/2024 w/ pEF, will reorder  - CTSx consult following    Acute on chronic anemia   - Baseline Hb 10-12 monitor  - add iv iron  - add cyanocoabalmin    Type II MI, likely poor renal clearance vs demand due to above   - No chest pain at this time   -  consult cardiology if tte has dec ef  - EKG w/ no acute ischemic changes   - TTE pending  - Monitor on telemetry     Chronic HFpEF  -  which is improved from prior and suspect vol overload more likely from nephrotic type renal dx and less likely decompensated HF  - TTE from 11/2024 w/ LVEF 55%  - c/w bumex for diuresis   - Monitor estrada weights and I/O's     SIRS w/ no clear source of sepsis   - Mild tachycardia on arrival but currently HR normal   - Will r/o infection however suspect more likely tachyardia related to effusions and acute anemia   - Leukopenia could be from undiagnosed malignancy given smudge cells on smear and lymphadenopathy on CT chest    Progressing CKD, r/o lupus nephropathy   - Cr remains relatively unchanged from december   - However Cr in Sept 2024 was 1.09 and has been worsening since   - Initially suspected to be cardiorenal given decompensated HF and ALBERT on prior admission but Cr never improved   - UA w/ 300protein and +RBC   - MARGI positive and reports symptoms consistent w/ lupus   - nephro followin, f.u blood work  npo pmn for ir renal bx in am     IDDM   endo following      VTE ppx:  Hold off on chemical ppx scds  -

## 2025-01-24 LAB
ACETONE SERPL-MCNC: NEGATIVE — SIGNIFICANT CHANGE UP
ALBUMIN FLD-MCNC: 1.3 G/DL — SIGNIFICANT CHANGE UP
ALBUMIN SERPL ELPH-MCNC: 2.3 G/DL — LOW (ref 3.3–5.2)
ALP SERPL-CCNC: 76 U/L — SIGNIFICANT CHANGE UP (ref 40–120)
ALT FLD-CCNC: <5 U/L — SIGNIFICANT CHANGE UP
ANION GAP SERPL CALC-SCNC: 13 MMOL/L — SIGNIFICANT CHANGE UP (ref 5–17)
ANTI-RIBONUCLEAR PROTEIN: 0.3 AI — SIGNIFICANT CHANGE UP
AST SERPL-CCNC: 7 U/L — SIGNIFICANT CHANGE UP
B-OH-BUTYR SERPL-SCNC: 0.2 MMOL/L — SIGNIFICANT CHANGE UP
B2 GLYCOPROT1 IGA SER QL: 14.9 U/ML — SIGNIFICANT CHANGE UP
B2 GLYCOPROT1 IGG SER-ACNC: <1.4 U/ML — SIGNIFICANT CHANGE UP
B2 GLYCOPROT1 IGM SER-ACNC: 6.8 U/ML — SIGNIFICANT CHANGE UP
BASOPHILS # BLD AUTO: 0 K/UL — SIGNIFICANT CHANGE UP (ref 0–0.2)
BASOPHILS NFR BLD AUTO: 0 % — SIGNIFICANT CHANGE UP (ref 0–2)
BILIRUB SERPL-MCNC: <0.2 MG/DL — LOW (ref 0.4–2)
BUN SERPL-MCNC: 37.9 MG/DL — HIGH (ref 8–20)
CALCIUM SERPL-MCNC: 8.3 MG/DL — LOW (ref 8.4–10.5)
CARDIOLIPIN IGM SER-MCNC: 11.7 MPL U/ML — SIGNIFICANT CHANGE UP
CARDIOLIPIN IGM SER-MCNC: 2 GPL U/ML — SIGNIFICANT CHANGE UP
CHLORIDE SERPL-SCNC: 104 MMOL/L — SIGNIFICANT CHANGE UP (ref 96–108)
CO2 SERPL-SCNC: 20 MMOL/L — LOW (ref 22–29)
COLLECT DURATION TIME UR: 24 HR — SIGNIFICANT CHANGE UP
CREAT SERPL-MCNC: 2.63 MG/DL — HIGH (ref 0.5–1.3)
DEPRECATED CARDIOLIPIN IGA SER: 22.8 APL U/ML — HIGH
EGFR: 20 ML/MIN/1.73M2 — LOW
ENA SM AB FLD QL: 0.2 AI — SIGNIFICANT CHANGE UP
EOSINOPHIL # BLD AUTO: 0 K/UL — SIGNIFICANT CHANGE UP (ref 0–0.5)
EOSINOPHIL NFR BLD AUTO: 0 % — SIGNIFICANT CHANGE UP (ref 0–6)
GBM IGG SER-ACNC: <0.2 AI — SIGNIFICANT CHANGE UP
GLOMERULAR BASEMENT MEMBRANE A INTERPRETATION: NEGATIVE — SIGNIFICANT CHANGE UP
GLUCOSE BLDC GLUCOMTR-MCNC: 196 MG/DL — HIGH (ref 70–99)
GLUCOSE BLDC GLUCOMTR-MCNC: 228 MG/DL — HIGH (ref 70–99)
GLUCOSE BLDC GLUCOMTR-MCNC: 254 MG/DL — HIGH (ref 70–99)
GLUCOSE BLDC GLUCOMTR-MCNC: 279 MG/DL — HIGH (ref 70–99)
GLUCOSE BLDC GLUCOMTR-MCNC: 286 MG/DL — HIGH (ref 70–99)
GLUCOSE BLDC GLUCOMTR-MCNC: 297 MG/DL — HIGH (ref 70–99)
GLUCOSE FLD-MCNC: 409 MG/DL — SIGNIFICANT CHANGE UP
GLUCOSE SERPL-MCNC: 236 MG/DL — HIGH (ref 70–99)
GRAM STN FLD: SIGNIFICANT CHANGE UP
HCT VFR BLD CALC: 31.5 % — LOW (ref 34.5–45)
HGB BLD-MCNC: 9.8 G/DL — LOW (ref 11.5–15.5)
IMM GRANULOCYTES NFR BLD AUTO: 0.9 % — SIGNIFICANT CHANGE UP (ref 0–0.9)
LDH SERPL L TO P-CCNC: 146 U/L — SIGNIFICANT CHANGE UP (ref 98–192)
LDH SERPL L TO P-CCNC: 70 U/L — SIGNIFICANT CHANGE UP
LYMPHOCYTES # BLD AUTO: 0.78 K/UL — LOW (ref 1–3.3)
LYMPHOCYTES # BLD AUTO: 23.1 % — SIGNIFICANT CHANGE UP (ref 13–44)
MAGNESIUM SERPL-MCNC: 1.8 MG/DL — SIGNIFICANT CHANGE UP (ref 1.6–2.6)
MCHC RBC-ENTMCNC: 25.5 PG — LOW (ref 27–34)
MCHC RBC-ENTMCNC: 31.1 G/DL — LOW (ref 32–36)
MCV RBC AUTO: 82 FL — SIGNIFICANT CHANGE UP (ref 80–100)
MONOCYTES # BLD AUTO: 0.11 K/UL — SIGNIFICANT CHANGE UP (ref 0–0.9)
MONOCYTES NFR BLD AUTO: 3.3 % — SIGNIFICANT CHANGE UP (ref 2–14)
NEUTROPHILS # BLD AUTO: 2.46 K/UL — SIGNIFICANT CHANGE UP (ref 1.8–7.4)
NEUTROPHILS NFR BLD AUTO: 72.7 % — SIGNIFICANT CHANGE UP (ref 43–77)
PHOSPHATE SERPL-MCNC: 3.6 MG/DL — SIGNIFICANT CHANGE UP (ref 2.4–4.7)
PLATELET # BLD AUTO: 517 K/UL — HIGH (ref 150–400)
POTASSIUM SERPL-MCNC: 5.3 MMOL/L — SIGNIFICANT CHANGE UP (ref 3.5–5.3)
POTASSIUM SERPL-SCNC: 5.3 MMOL/L — SIGNIFICANT CHANGE UP (ref 3.5–5.3)
PROT 24H UR-MRATE: 4394 MG/24HR — HIGH (ref 50–100)
PROT FLD-MCNC: 2.9 G/DL — SIGNIFICANT CHANGE UP
PROT SERPL-MCNC: 5.9 G/DL — LOW (ref 6.6–8.7)
RBC # BLD: 3.84 M/UL — SIGNIFICANT CHANGE UP (ref 3.8–5.2)
RBC # FLD: 14.6 % — HIGH (ref 10.3–14.5)
SODIUM SERPL-SCNC: 137 MMOL/L — SIGNIFICANT CHANGE UP (ref 135–145)
SPECIMEN SOURCE: SIGNIFICANT CHANGE UP
TOTAL VOLUME - 24 HOUR: 1175 ML — SIGNIFICANT CHANGE UP
TOTAL VOLUME - 24 HOUR: 1175 ML — SIGNIFICANT CHANGE UP
URINE CREATININE CALCULATION: 0.6 G/24 HR — LOW (ref 0.8–1.8)
URINE CREATININE CALCULATION: 0.6 G/24 HR — LOW (ref 0.8–1.8)
VIT D25+D1,25 OH+D1,25 PNL SERPL-MCNC: 27.3 PG/ML — SIGNIFICANT CHANGE UP (ref 19.9–79.3)
WBC # BLD: 3.38 K/UL — LOW (ref 3.8–10.5)
WBC # FLD AUTO: 3.38 K/UL — LOW (ref 3.8–10.5)

## 2025-01-24 PROCEDURE — 71045 X-RAY EXAM CHEST 1 VIEW: CPT | Mod: 26

## 2025-01-24 PROCEDURE — 88350 IMFLUOR EA ADDL 1ANTB STN PX: CPT | Mod: 26

## 2025-01-24 PROCEDURE — 88348 ELECTRON MICROSCOPY DX: CPT | Mod: 26

## 2025-01-24 PROCEDURE — 99233 SBSQ HOSP IP/OBS HIGH 50: CPT

## 2025-01-24 PROCEDURE — 88313 SPECIAL STAINS GROUP 2: CPT | Mod: 26

## 2025-01-24 PROCEDURE — 50200 RENAL BIOPSY PERQ: CPT | Mod: LT

## 2025-01-24 PROCEDURE — 77012 CT SCAN FOR NEEDLE BIOPSY: CPT | Mod: 26

## 2025-01-24 PROCEDURE — 88346 IMFLUOR 1ST 1ANTB STAIN PX: CPT | Mod: 26

## 2025-01-24 PROCEDURE — 99231 SBSQ HOSP IP/OBS SF/LOW 25: CPT

## 2025-01-24 PROCEDURE — 99232 SBSQ HOSP IP/OBS MODERATE 35: CPT

## 2025-01-24 RX ORDER — PANTOPRAZOLE 20 MG/1
40 TABLET, DELAYED RELEASE ORAL
Refills: 0 | Status: DISCONTINUED | OUTPATIENT
Start: 2025-01-24 | End: 2025-01-31

## 2025-01-24 RX ORDER — SENNOSIDES 8.6 MG
2 TABLET ORAL AT BEDTIME
Refills: 0 | Status: DISCONTINUED | OUTPATIENT
Start: 2025-01-24 | End: 2025-01-31

## 2025-01-24 RX ORDER — INSULIN GLARGINE-YFGN 100 [IU]/ML
20 INJECTION, SOLUTION SUBCUTANEOUS
Refills: 0 | Status: DISCONTINUED | OUTPATIENT
Start: 2025-01-25 | End: 2025-01-25

## 2025-01-24 RX ORDER — SENNOSIDES 8.6 MG
2 TABLET ORAL ONCE
Refills: 0 | Status: COMPLETED | OUTPATIENT
Start: 2025-01-24 | End: 2025-01-24

## 2025-01-24 RX ORDER — POLYETHYLENE GLYCOL 3350 17 G/17G
17 POWDER, FOR SOLUTION ORAL AT BEDTIME
Refills: 0 | Status: COMPLETED | OUTPATIENT
Start: 2025-01-24 | End: 2025-01-28

## 2025-01-24 RX ORDER — INSULIN GLARGINE-YFGN 100 [IU]/ML
20 INJECTION, SOLUTION SUBCUTANEOUS ONCE
Refills: 0 | Status: COMPLETED | OUTPATIENT
Start: 2025-01-24 | End: 2025-01-24

## 2025-01-24 RX ADMIN — SODIUM BICARBONATE 650 MILLIGRAM(S): 42 INJECTION, SOLUTION INTRAVENOUS at 17:49

## 2025-01-24 RX ADMIN — Medication 4 UNIT(S): at 17:50

## 2025-01-24 RX ADMIN — OXYCODONE HYDROCHLORIDE 5 MILLIGRAM(S): 30 TABLET ORAL at 12:23

## 2025-01-24 RX ADMIN — Medication 4 UNIT(S): at 12:00

## 2025-01-24 RX ADMIN — Medication 5 MILLIGRAM(S): at 05:24

## 2025-01-24 RX ADMIN — Medication 6: at 07:32

## 2025-01-24 RX ADMIN — Medication 1000 MICROGRAM(S): at 11:57

## 2025-01-24 RX ADMIN — Medication 40 MILLIGRAM(S): at 05:24

## 2025-01-24 RX ADMIN — OXYCODONE HYDROCHLORIDE 5 MILLIGRAM(S): 30 TABLET ORAL at 13:30

## 2025-01-24 RX ADMIN — OXYCODONE HYDROCHLORIDE 5 MILLIGRAM(S): 30 TABLET ORAL at 06:22

## 2025-01-24 RX ADMIN — Medication 2 TABLET(S): at 16:12

## 2025-01-24 RX ADMIN — GABAPENTIN 300 MILLIGRAM(S): 800 TABLET ORAL at 05:24

## 2025-01-24 RX ADMIN — Medication 40 MILLIGRAM(S): at 17:49

## 2025-01-24 RX ADMIN — Medication 1 MILLIGRAM(S): at 11:57

## 2025-01-24 RX ADMIN — OXYCODONE HYDROCHLORIDE 5 MILLIGRAM(S): 30 TABLET ORAL at 06:47

## 2025-01-24 RX ADMIN — BUMETANIDE 1 MILLIGRAM(S): 2 TABLET ORAL at 05:24

## 2025-01-24 RX ADMIN — LEVOTHYROXINE SODIUM 50 MICROGRAM(S): 25 TABLET ORAL at 05:24

## 2025-01-24 RX ADMIN — INSULIN GLARGINE-YFGN 20 UNIT(S): 100 INJECTION, SOLUTION SUBCUTANEOUS at 11:58

## 2025-01-24 RX ADMIN — GABAPENTIN 300 MILLIGRAM(S): 800 TABLET ORAL at 17:49

## 2025-01-24 RX ADMIN — IRON SUCROSE 110 MILLIGRAM(S): 20 INJECTION, SOLUTION INTRAVENOUS at 12:34

## 2025-01-24 RX ADMIN — SODIUM BICARBONATE 650 MILLIGRAM(S): 42 INJECTION, SOLUTION INTRAVENOUS at 05:23

## 2025-01-24 RX ADMIN — Medication 4: at 17:49

## 2025-01-24 RX ADMIN — Medication 6: at 11:59

## 2025-01-24 RX ADMIN — POLYETHYLENE GLYCOL 3350 17 GRAM(S): 17 POWDER, FOR SOLUTION ORAL at 21:47

## 2025-01-24 NOTE — PROGRESS NOTE ADULT - ASSESSMENT
60 y/o F w/ PMH of HTN, hypothyroid, IDDM type I, chronic anemia, HFpEF (11/02/24 55%), CKD was sent in from Dr. Matson's office (pulm) b/c of pt having worsening SOB, worsening renal function, evidence of b/l pleural effusions w/ concern for lupus nephritis. VS stable in ED and sating well ORA.  Initial w/u significant for WBC 3.28, ANC normal, Hb 8.3 and UA w/ RBC and proteinuria.  CTSx and nephro consulted by ED.  admitted for worsening pleural effusions and r/o lupus.      b/l pleural effusions and b/l LE edema likely from third spacing due to progressively worsening renal function    - CT chest non con w/ b/l mod pleural effusion increased from prior and has b/l pulm nodules, and small mediastinal/axiallary lymphnodes and retroperitoneal/pelvic lymphadenopathy   -diruesis with bumex   ct surgery following, managing right chest tube   -  b/l LE doppler negative   - PRN bronchodilators if needed  - TTE from 11/2024 w/ pEF,     lupus nephritis?    nephrology following    s/p IR kidney biopsy 1/24    awaiting biopsy results    on empiric steroids    Acute on chronic anemia   - Baseline Hb 10-12 monitor  - add iv iron  - add cyanocoabalmin    Type II MI, likely poor renal clearance vs demand due to above   - No chest pain at this time   - EKG w/ no acute ischemic changes   - TTE with preserved ef   - Monitor on telemetry     Chronic HFpEF  -  which is improved from prior and suspect vol overload more likely from nephrotic type renal dx and less likely decompensated HF  - TTE from 11/2024 w/ LVEF 55%  - c/w bumex for diuresis     SIRS w/ no clear source of sepsis   - Mild tachycardia on arrival but currently HR normal   - Will r/o infection however suspect more likely tachycardia related to effusions and acute anemia   - Leukopenia could be from undiagnosed malignancy given smudge cells on smear and lymphadenopathy on CT chest    Progressing CKD, r/o lupus nephropathy   - Cr remains relatively unchanged from december   - However Cr in Sept 2024 was 1.09 and has been worsening since   - Initially suspected to be cardiorenal given decompensated HF and ALBERT on prior admission but Cr never improved   - UA w/ 300protein and +RBC   - MARGI positive and reports symptoms consistent w/ lupus   - nephro followin, f.u blood work    DM   endo following  steroid induced hyperglycemia    adjust insulin as needed       VTE ppx:  Hold off on chemical ppx scds  -

## 2025-01-24 NOTE — PROGRESS NOTE ADULT - ASSESSMENT
59F with PMH of HTN, hypothyroid, IDDM type I, chronic anemia, HFpEF (11/02/24 55%), CKD was sent in from Dr. Matson's office (pulm) b/c of pt having worsening SOB, worsening renal function, evidence of b/l pleural effusions. Patient is being evaluated for worsening kidney function.     Consulted for diabetes management  Home regimen: Lantus 19 units daily, Humalog ICR 1:10  Current a1c: 9.3%    1. Poorly controlled DM1  - Glucose in 500s yesterday, never received lantus that was ordered  - Received stat dose of lantus 20 units now. continue lantus 20 units daily at 1PM starting tomorrow  - Continue admelog 4 units TID and correction scale  - Diabetic diet  - Do not hold basal insulin in T1DM as it can result in DKA    2. Hypothyroidism   - Check TSH  - On LT4 50 mcg daily, continue for now     3. Pleural effusion  - R pigtail placed  - Thoracic surgery following    4. Renal failure  - Nephrology following, renal biopsy  - On bumex IV daily  - I&Os   59F with PMH of HTN, hypothyroid, IDDM type I, chronic anemia, HFpEF (11/02/24 55%), CKD was sent in from Dr. Matson's office (pulm) b/c of pt having worsening SOB, worsening renal function, evidence of b/l pleural effusions. Patient is being evaluated for worsening kidney function.     Consulted for diabetes management  Home regimen: Lantus 19 units daily, Humalog ICR 1:10  Current a1c: 9.3%    1. Poorly controlled DM1  - Glucose in 500s yesterday, never received lantus that was ordered  - Received stat dose of lantus 20 units now. continue lantus 20 units daily at 1PM starting tomorrow  - Continue admelog 4 units TID and correction scale  - Diabetic diet  - Do not hold basal insulin in T1DM as it can result in DKA  - She is also receiving solumedrol 40mg q12 hr    2. Hypothyroidism   - Check TSH  - On LT4 50 mcg daily, continue for now     3. Pleural effusion  - R pigtail placed  - Thoracic surgery following    4. Renal failure  - Nephrology following, renal biopsy  - On bumex IV daily  - I&Os

## 2025-01-24 NOTE — PROCEDURE NOTE - PLAN
- may resume diet  - no anticoagulation for 48 hrs   - SCD's while in bed while anticoagulation on hold

## 2025-01-24 NOTE — CHART NOTE - NSCHARTNOTEFT_GEN_A_CORE
spoke to Dr. Best, mediastinal lymphadenopathy smaller on repeat imaging but iliac nodes bigger. d/w IR, iliac LN amenable to biopsy but can be done as outpt. Dr. Best prefers it is done while pt is inpt. d/w medicine will determine if necessary or not.    thoracic surgery to sign off.  please reconsult as needed  [phone]

## 2025-01-24 NOTE — PROGRESS NOTE ADULT - ASSESSMENT
59 y/o F w/ PMH HTN, IDDM type I (has medtronic insulin pump), hypothyroid, neuropathies presented to ED c/o 2 weeks of NIXON w/ associated worsening LE edema. Prior admission  to hospital Nov 2024 noted   Progressive renal failure ---> creat was 1.1 in sept 2024 --> now 2.7 , assoc with microscopic hematuria and near nephrotic range proteinuria   Prior Urine protein / creta ratio was 2.5 ( 24 h urine never done )   + clinical oral ulcers , rashes , serositis ( effusions )   Prior serology showed elevated dsDNA , MARGI + 1/1280  and low C3/C4 --> CH 50 not done   P/C ANCA (-) , PLA2R (-) , ROMELIA (-) , Hep B and C (-)   Noncontrast CT here - shows no hydro 1/22 , small stones   KAROLINA 11/24 noted     SHARA on CKD stage III/ IV?  Serum Cr downtrending Cr 2.8--> 2.7--> 2.6  R/O SLE related  Nephritis   HyperVolemic  Cont Bumex 1 mg IV daily  pleural effusion --> Pulm to evaluate re - s/p R side chest tube placed  Strict I/O   Formal 24 h urine  collection in process --> ongoing  Repeat LE venous doppler - No DVT   HCG Negative , CPK 20   Check SYDNEE , GBM Ab  , Quant gold , SYDNEE panel , ACLA , Beta 2 Glyco , ACE --> Pending   Cont Solumedrol 40 mg IV bid added ppi for GI ppx daily    Anemia - cont Weekly epogen and folate   LDH OK , Follow hapto  cont Venofer     MA - cont oral Bicarb bid     RO - Follow  PTH , phos , Vit D levels     Discussed plans with daughter and pt at bedside also discussed w Dr Escobar    Renally dose meds, avoid nephrotoxic agents  Monitor labs will follow

## 2025-01-24 NOTE — PROGRESS NOTE ADULT - PROBLEM SELECTOR PLAN 1
POCUS with R>L effusion  R pigtail placed (see additional details in procedure note)  f/u fluid analysis  possible d/c tomorrow  per pulm h/o medastinal lymphadenopathy and possible need for EBUS, prior L ax LN bx atypical cells--> per Dr. Louise will consider next week if needed, await fluid results  daily cxr while pigtail in place  record drainage q shit  [facility]  [facility] surgery to follow    d/w Dr. Louise
bilateral  POCUS with good windows, R>L  Plan for R pigtail chest tube in AM  Daily CXR  D/W Dr. Louise
POCUS with R>L effusion  R pigtail placed   f/u fluid analysis  per pulm h/o medastinal lymphadenopathy and possible need for EBUS, prior L ax LN bx atypical cells may need eventual EBUS  RIght chest tube removed today. Pending CXR  Thoracic to sign off if CXR WNL  No plans to drain small left effusion at this time.  May follow up with Dr. Louise in [facility] Surgery office for further EBUS workup    d/w Dr. Esparza

## 2025-01-24 NOTE — PROGRESS NOTE ADULT - ASSESSMENT
59F PMH of HTN, hypothyroid, IDDM type I, chronic anemia, HFpEF (11/02/24 55%), CKD was sent in from Dr. Matson's office (pulm) for worsening SOB, worsening renal function, evidence of b/l pleural effusions concern for nephritis, CT chest wtih b/l effusions; s/p RT PTC on 1/23      .

## 2025-01-25 LAB
ANION GAP SERPL CALC-SCNC: 13 MMOL/L — SIGNIFICANT CHANGE UP (ref 5–17)
BUN SERPL-MCNC: 49.7 MG/DL — HIGH (ref 8–20)
CALCIUM SERPL-MCNC: 8 MG/DL — LOW (ref 8.4–10.5)
CHLORIDE SERPL-SCNC: 99 MMOL/L — SIGNIFICANT CHANGE UP (ref 96–108)
CO2 SERPL-SCNC: 20 MMOL/L — LOW (ref 22–29)
CREAT SERPL-MCNC: 3.01 MG/DL — HIGH (ref 0.5–1.3)
EGFR: 17 ML/MIN/1.73M2 — LOW
GLUCOSE BLDC GLUCOMTR-MCNC: 267 MG/DL — HIGH (ref 70–99)
GLUCOSE BLDC GLUCOMTR-MCNC: 293 MG/DL — HIGH (ref 70–99)
GLUCOSE BLDC GLUCOMTR-MCNC: 324 MG/DL — HIGH (ref 70–99)
GLUCOSE BLDC GLUCOMTR-MCNC: 364 MG/DL — HIGH (ref 70–99)
GLUCOSE BLDC GLUCOMTR-MCNC: 440 MG/DL — HIGH (ref 70–99)
GLUCOSE BLDC GLUCOMTR-MCNC: 493 MG/DL — CRITICAL HIGH (ref 70–99)
GLUCOSE SERPL-MCNC: 399 MG/DL — HIGH (ref 70–99)
HCT VFR BLD CALC: 33.2 % — LOW (ref 34.5–45)
HGB BLD-MCNC: 10.3 G/DL — LOW (ref 11.5–15.5)
MAGNESIUM SERPL-MCNC: 1.9 MG/DL — SIGNIFICANT CHANGE UP (ref 1.6–2.6)
MCHC RBC-ENTMCNC: 25.3 PG — LOW (ref 27–34)
MCHC RBC-ENTMCNC: 31 G/DL — LOW (ref 32–36)
MCV RBC AUTO: 81.6 FL — SIGNIFICANT CHANGE UP (ref 80–100)
PHOSPHATE SERPL-MCNC: 5.2 MG/DL — HIGH (ref 2.4–4.7)
PLATELET # BLD AUTO: 496 K/UL — HIGH (ref 150–400)
POTASSIUM SERPL-MCNC: 5.6 MMOL/L — HIGH (ref 3.5–5.3)
POTASSIUM SERPL-SCNC: 5.6 MMOL/L — HIGH (ref 3.5–5.3)
RBC # BLD: 4.07 M/UL — SIGNIFICANT CHANGE UP (ref 3.8–5.2)
RBC # FLD: 14.9 % — HIGH (ref 10.3–14.5)
SODIUM SERPL-SCNC: 132 MMOL/L — LOW (ref 135–145)
WBC # BLD: 7.64 K/UL — SIGNIFICANT CHANGE UP (ref 3.8–10.5)
WBC # FLD AUTO: 7.64 K/UL — SIGNIFICANT CHANGE UP (ref 3.8–10.5)

## 2025-01-25 PROCEDURE — 99232 SBSQ HOSP IP/OBS MODERATE 35: CPT

## 2025-01-25 PROCEDURE — 99233 SBSQ HOSP IP/OBS HIGH 50: CPT

## 2025-01-25 PROCEDURE — 99222 1ST HOSP IP/OBS MODERATE 55: CPT

## 2025-01-25 RX ORDER — SODIUM ZIRCONIUM CYCLOSILICATE 5 G/5G
10 POWDER, FOR SUSPENSION ORAL ONCE
Refills: 0 | Status: COMPLETED | OUTPATIENT
Start: 2025-01-25 | End: 2025-01-25

## 2025-01-25 RX ORDER — INSULIN LISPRO 100/ML
6 VIAL (ML) SUBCUTANEOUS
Refills: 0 | Status: DISCONTINUED | OUTPATIENT
Start: 2025-01-25 | End: 2025-01-25

## 2025-01-25 RX ORDER — INSULIN GLARGINE-YFGN 100 [IU]/ML
25 INJECTION, SOLUTION SUBCUTANEOUS
Refills: 0 | Status: DISCONTINUED | OUTPATIENT
Start: 2025-01-25 | End: 2025-01-27

## 2025-01-25 RX ORDER — INSULIN LISPRO 100/ML
8 VIAL (ML) SUBCUTANEOUS
Refills: 0 | Status: DISCONTINUED | OUTPATIENT
Start: 2025-01-25 | End: 2025-01-26

## 2025-01-25 RX ADMIN — GABAPENTIN 300 MILLIGRAM(S): 800 TABLET ORAL at 17:20

## 2025-01-25 RX ADMIN — OXYCODONE HYDROCHLORIDE 5 MILLIGRAM(S): 30 TABLET ORAL at 09:00

## 2025-01-25 RX ADMIN — SODIUM BICARBONATE 650 MILLIGRAM(S): 42 INJECTION, SOLUTION INTRAVENOUS at 05:52

## 2025-01-25 RX ADMIN — Medication 40 MILLIGRAM(S): at 05:53

## 2025-01-25 RX ADMIN — Medication 40 MILLIGRAM(S): at 18:21

## 2025-01-25 RX ADMIN — Medication 4 UNIT(S): at 11:41

## 2025-01-25 RX ADMIN — Medication 12: at 08:28

## 2025-01-25 RX ADMIN — BUMETANIDE 1 MILLIGRAM(S): 2 TABLET ORAL at 05:53

## 2025-01-25 RX ADMIN — Medication 10: at 11:40

## 2025-01-25 RX ADMIN — GABAPENTIN 300 MILLIGRAM(S): 800 TABLET ORAL at 05:52

## 2025-01-25 RX ADMIN — Medication 4 UNIT(S): at 08:29

## 2025-01-25 RX ADMIN — INSULIN GLARGINE-YFGN 20 UNIT(S): 100 INJECTION, SOLUTION SUBCUTANEOUS at 08:35

## 2025-01-25 RX ADMIN — OXYCODONE HYDROCHLORIDE 5 MILLIGRAM(S): 30 TABLET ORAL at 09:45

## 2025-01-25 RX ADMIN — PANTOPRAZOLE 40 MILLIGRAM(S): 20 TABLET, DELAYED RELEASE ORAL at 05:55

## 2025-01-25 RX ADMIN — Medication 8 UNIT(S): at 18:18

## 2025-01-25 RX ADMIN — ACETAMINOPHEN 650 MILLIGRAM(S): 160 SUSPENSION ORAL at 18:57

## 2025-01-25 RX ADMIN — IRON SUCROSE 110 MILLIGRAM(S): 20 INJECTION, SOLUTION INTRAVENOUS at 11:40

## 2025-01-25 RX ADMIN — SODIUM BICARBONATE 650 MILLIGRAM(S): 42 INJECTION, SOLUTION INTRAVENOUS at 17:20

## 2025-01-25 RX ADMIN — Medication 1 MILLIGRAM(S): at 11:41

## 2025-01-25 RX ADMIN — ACETAMINOPHEN 650 MILLIGRAM(S): 160 SUSPENSION ORAL at 17:47

## 2025-01-25 RX ADMIN — SODIUM ZIRCONIUM CYCLOSILICATE 10 GRAM(S): 5 POWDER, FOR SUSPENSION ORAL at 09:00

## 2025-01-25 RX ADMIN — Medication 1000 MICROGRAM(S): at 11:41

## 2025-01-25 RX ADMIN — Medication 5 MILLIGRAM(S): at 05:52

## 2025-01-25 RX ADMIN — Medication 8: at 18:16

## 2025-01-25 RX ADMIN — LEVOTHYROXINE SODIUM 50 MICROGRAM(S): 25 TABLET ORAL at 05:52

## 2025-01-25 NOTE — PROGRESS NOTE ADULT - ASSESSMENT
58 y/o F w/ PMH of HTN, hypothyroid, IDDM type I, chronic anemia, HFpEF (11/02/24 55%), CKD was sent in from Dr. Matson's office (pulm) b/c of pt having worsening SOB, worsening renal function, evidence of b/l pleural effusions w/ concern for lupus nephritis. VS stable in ED and sating well ORA.  Initial w/u significant for WBC 3.28, ANC normal, Hb 8.3 and UA w/ RBC and proteinuria.  CTSx and nephro consulted by ED. Admitted for worsening pleural effusions and r/o lupus.    Rule out Lupus nephritis  - s/p IR kidney biopsy 1/24, pending results  - Continue Solumedrol 40mg IVP BID  - Follow up Rheumatology consult    B/l pleural effusions and b/l LE edema likely from third spacing due to progressively worsening renal function    - CT chest non con w/ b/l mod pleural effusion increased from prior and has b/l pulm nodules, and small mediastinal/axiallary lymphnodes and retroperitoneal/pelvic lymphadenopathy   - TTE from 11/2024 w/ preserved EF  - Right chest tube removed  - Continue Bumex 1mg IVP daily  - Nephrology following  - CTS following  - Now on room air and saturating well, no respiratory distress  - Follow up CXR in AM    Acute on chronic anemia   - Baseline Hb 10-12 monitor  - On Venofer, folate, cyanocobalamin, Retacrit    Type II MI, likely poor renal clearance vs demand due to above   - No chest pain at this time   - EKG w/ no acute ischemic changes   - TTE with preserved EF  - Monitor on telemetry     Chronic HFpEF  HTN  -  which is improved from prior and suspect vol overload more likely from nephrotic type renal dx and less likely decompensated HF  - TTE from 11/2024 w/ LVEF 55%  - c/w bumex for diuresis   - Continue amlodipine    Progressing CKD, r/o lupus nephropathy   - Cr remains relatively unchanged from december   - However Cr in Sept 2024 was 1.09 and has been worsening since   - Initially suspected to be cardiorenal given decompensated HF and ALBERT on prior admission but Cr never improved   - UA w/ 300protein and +RBC   - MARGI positive and reports symptoms consistent w/ lupus   - Follow up biopsy results and serology    DM   - Increased Lantus to 25u HS and Lispro 6u TID  - Endocrinology following  - Continue gabapentin    Constipation  - Continue senna and miralax    Anxiety  - Continue PRN Xanax     Hypothyroidism  - Continue Synthroid      DVT/GI ppx: SCD (no AC for 48hr as per IR), Protonix  Diet: DASH and renal restricted  Code Status: Full code  Dispo: Medically acute, likely 2-3 days

## 2025-01-25 NOTE — CONSULT NOTE ADULT - SUBJECTIVE AND OBJECTIVE BOX
Serologies  MARGI 1:1280 - patterns both centromere+ and homogenous  dsDNA 63  ACL IgA 22.8  Low C3, c4  SHARA - worsening Cr  Nephrotic range 4g proteinuria on 24h urine  Microscopic hematuria  Anemia  CT/CXR: B/L moderate pleural effusions, b/l pulmonary nodules. Multiple small mediastinal and axillary lymph nodes, some Retroperitoneal and pelvic lymphadenopathy  LN bx: inflammatory cell makeup  Renal bx: pending    Ddx concern for overlap SLE+limited systemic sclerosis (scleroderma)  SLE with renal invovlement pending bx  Constitutional: fatigue, weakness, fever  Pulm involvement- pulm effusion serositis with pulm nodules  Cardiac- Normal EF, no effusion  MSK: polyarthralgia  Skin:  LAD diffusely w/ mixed lymphocytic background    will eval later this afternoon/early evening                                         [facility] Rheumatology                                    Khadar Argueta MD, PhD, GLEN                                    [address]  ---------------------------------------------------------------------------------------------------------    HISTORY OF PRESENT ILLNESS:  59y F with HTN, DM1, chronic anemia, Hashimoto's w/ hypothyroidism, HFpEF and SHARA on CKI p/w worsening SOB, elevated Cr concerned for SLE w/ LN. Labs w/ mild leukopenia 3.28, Hb 8.3, and UA w/ microscopic hematuria and proteinuria. Seen by CTSx and nephro, s/p chest tube and pleural drain >800cc, s/p renal bx pending.  ROS: fatigue, weakness, Raynaud's, diffuse maculopapular rash, polyarthralgia, and previous SOB now improved. Denies significant GERD, dysphagia, calcinosis, sclerodactyly, nodules, or other skin changes    Serologies inpatient and outpatient reviewed-  MARGI 1:1280 - patterns both centromere+ and homogenous  dsDNA 63, 107 (on 25)  Chromatin >8.0  SSA >8.0  SSB >8.0  Centromere B > 8.0  ACL IgA 22.8  Low C3, c4  SHARA - worsening Cr  Nephrotic range 4g proteinuria on 24h urine  Microscopic hematuria  Anemia  CT/CXR: B/L moderate pleural effusions, b/l pulmonary nodules. Multiple small mediastinal and axillary lymph nodes, some Retroperitoneal and pelvic lymphadenopathy  LN bx: inflammatory cell makeup  Renal bx: pending    PAST MEDICAL & SURGICAL HISTORY:  Diabetes  Hypertension    No significant past surgical history        REVIEW OF SYSTEMS  As above    MEDICATIONS  (STANDING):  amLODIPine   Tablet 5 milliGRAM(s) Oral daily  buMETAnide Injectable 1 milliGRAM(s) IV Push daily  cyanocobalamin 1000 MICROGram(s) Oral daily  dextrose 5%. 1000 milliLiter(s) (100 mL/Hr) IV Continuous <Continuous>  dextrose 5%. 1000 milliLiter(s) (50 mL/Hr) IV Continuous <Continuous>  dextrose 50% Injectable 25 Gram(s) IV Push once  dextrose 50% Injectable 12.5 Gram(s) IV Push once  dextrose 50% Injectable 25 Gram(s) IV Push once  epoetin mira-epbx (RETACRIT) Injectable 21925 Unit(s) SubCutaneous every 7 days  folic acid 1 milliGRAM(s) Oral daily  gabapentin 300 milliGRAM(s) Oral two times a day  glucagon  Injectable 1 milliGRAM(s) IntraMuscular once  influenza   Vaccine 0.5 milliLiter(s) IntraMuscular once  insulin glargine Injectable (LANTUS) 25 Unit(s) SubCutaneous <User Schedule>  insulin lispro (ADMELOG) corrective regimen sliding scale   SubCutaneous three times a day before meals  insulin lispro Injectable (ADMELOG) 8 Unit(s) SubCutaneous three times a day before meals  iron sucrose IVPB 200 milliGRAM(s) IV Intermittent every 24 hours  levothyroxine 50 MICROGram(s) Oral daily  methylPREDNISolone sodium succinate Injectable 40 milliGRAM(s) IV Push two times a day  pantoprazole    Tablet 40 milliGRAM(s) Oral before breakfast  polyethylene glycol 3350 17 Gram(s) Oral at bedtime  sodium bicarbonate 650 milliGRAM(s) Oral two times a day    MEDICATIONS  (PRN):  acetaminophen     Tablet .. 650 milliGRAM(s) Oral every 6 hours PRN Temp greater or equal to 38C (100.4F), Mild Pain (1 - 3)  ALPRAZolam 0.25 milliGRAM(s) Oral every 12 hours PRN anxiety  aluminum hydroxide/magnesium hydroxide/simethicone Suspension 30 milliLiter(s) Oral every 4 hours PRN Dyspepsia  dextrose Oral Gel 15 Gram(s) Oral once PRN Blood Glucose LESS THAN 70 milliGRAM(s)/deciliter  melatonin 3 milliGRAM(s) Oral at bedtime PRN Insomnia  ondansetron Injectable 4 milliGRAM(s) IV Push every 8 hours PRN Nausea and/or Vomiting  oxyCODONE    IR 5 milliGRAM(s) Oral every 4 hours PRN Severe Pain (7 - 10)  senna 2 Tablet(s) Oral at bedtime PRN Constipation      Allergies    No Known Allergies    Intolerances        PERTINENT MEDICATION HISTORY:    SOCIAL HISTORY:     Tobacco: N      Alcohol use: N      Illicit drug use: N    FAMILY HISTORY:  FH: non-Hodgkin's lymphoma (Mother)        Vital Signs Last 24 Hrs  T(C): 36.4 (2025 15:59), Max: 36.4 (2025 08:58)  T(F): 97.5 (2025 15:59), Max: 97.5 (2025 08:58)  HR: 88 (2025 15:59) (80 - 88)  BP: 116/70 (2025 15:59) (112/67 - 138/72)  BP(mean): --  RR: 18 (2025 15:59) (17 - 18)  SpO2: 94% (2025 15:59) (93% - 96%)    Parameters below as of 2025 11:50  Patient On (Oxygen Delivery Method): room air        Daily     Daily Weight in k.2 (2025 05:06)    PHYSICAL EXAM:    Constitutional: NAD  Eyes: EOMI, no lacrimal enlargement  ENMT: no oral/nasal ulcers, no parotid tenderness/enlargement, +mild dry mouth  Neck: supple   Respiratory: CTA  Cardiovascular: S1S2  Gastrointestinal: soft NT   Vascular: 2+ DP/PT  Neurological: no FND  Skin: no active rash. Mild Raynaud's  Lymph Nodes: no cervical or supraclav LAD  Musculoskeletal: no synovitis  Psychiatric: appropriate    LABS:                        10.3   7.64  )-----------( 496      ( 2025 04:30 )             33.2     01-25    132[L]  |  99  |  49.7[H]  ----------------------------<  399[H]  5.6[H]   |  20.0[L]  |  3.01[H]    Ca    8.0[L]      2025 04:30  Phos  5.2     01-25  Mg     1.9     -25    TPro  5.9[L]  /  Alb  2.3[L]  /  TBili  <0.2[L]  /  DBili  x   /  AST  7   /  ALT  <5  /  AlkPhos  76  01-24      Urinalysis Basic - ( 2025 04:30 )    Color: x / Appearance: x / SG: x / pH: x  Gluc: 399 mg/dL / Ketone: x  / Bili: x / Urobili: x   Blood: x / Protein: x / Nitrite: x   Leuk Esterase: x / RBC: x / WBC x   Sq Epi: x / Non Sq Epi: x / Bacteria: x      Lactate Dehydrogenase, Serum: 146 U/L [98 - 192] (25 @ 03:25)  Monocyte/Macrophage Count - Body Fluid: 16 % (25 @ 15:30)  Total RBC Count: <2000 cells/uL (25 @ 15:30)  Body Fluid Appearance: Clear (25 @ 15:30)  Color - Body Fluid: Yellow (25 @ 15:30)  Total Nucleated Cell Count, Body Fluid: 170 cells/uL (25 @ 15:30)  Culture Results:   No growth to date. (25 @ 15:30)  Culture Results:   No growth (25 @ 15:30)  Beta 2 Glycoprotein 1 IgA Antibody: 14.9 U/mL (25 @ 11:10)  Beta 2 Glycoprotein 1 IgG Antibody: <1.4 U/mL (25 @ 11:10)  Beta 2 Glycoprotein 1 IgM Antibody: 6.8 U/mL (25 @ 11:10)  Anticardiolipin IgG, Serum: 2.0 GPL U/mL (25 @ 11:10)  Anticardiolipin IgM, Serum: 11.7 MPL U/mL (25 @ 11:10)  Haptoglobin, Serum: 608 mg/dL *H* [34 - 200] (25 @ 11:10)  Lactate Dehydrogenase, Serum: 171 U/L [98 - 192] (25 @ 11:10)  Vitamin D, 1, 25-Dihydroxy: 27.3 pg/mL [19.9 - 79.3] (25 @ 11:10)  Vitamin D, 25-Hydroxy: 6.6 ng/mL *L* [30.0 - 80.0] (25 @ 11:10)  Reticulocyte Percent: 1.4 % [0.5 - 2.5] (25 @ 04:58)  Protein, Urine: 300 mg/dL *!* (25 @ 12:20)  Culture Results:   <10,000 CFU/mL Normal Urogenital Megan (25 @ 12:20)      RADIOLOGY & ADDITIONAL STUDIES:

## 2025-01-25 NOTE — PROGRESS NOTE ADULT - ASSESSMENT
59F with PMH of HTN, hypothyroid, IDDM type I, chronic anemia, HFpEF (11/02/24 55%), CKD was sent in from Dr. Matson's office (pulm) b/c of pt having worsening SOB, worsening renal function, evidence of b/l pleural effusions. Patient is being evaluated for worsening kidney function.     Consulted for diabetes management  Home regimen: Lantus 19 units daily, Humalog ICR 1:10  Current a1c: 9.3%    1. Poorly controlled DM1  - Glucose in 400s yesterday, as patient is on solumedrol 40 mg BID for lupus nephritis   - Received stat dose of lantus 20 units today. Increase lantus 25 units daily at 1PM starting tomorrow  - Increase admelog 8 units TID and correction scale  -Please let us know when the steroid dose to be stopped to change insulin accordingly   - Diabetic diet  - Do not hold basal insulin in T1DM as it can result in DKA    2. Hypothyroidism   - Check TSH  - On LT4 50 mcg daily, continue for now     3. Pleural effusion  - R pigtail placed  - Thoracic surgery following    4. Renal failure  - Nephrology following, renal biopsy  - On bumex IV daily  - I&Os

## 2025-01-25 NOTE — CONSULT NOTE ADULT - PROVIDER SPECIALTY LIST ADULT
Pt started complaining of tingling in her legs from the knees down earlier today. Nothing obvious noted upon assessment. Pt asked to have the doctor come take a look. Dr. Murillo notified around noon. She stated she would be by to speak with her. Pt now asking if she will still be coming by bc she doesn't understand why she would be having that tingling. Asked Dr. Murillo if she had an idea of when she would be by. Awaiting response.     
Nephrology-Glomerular
Intervent Radiology
Rheumatology
Endocrinology

## 2025-01-25 NOTE — CONSULT NOTE ADULT - ASSESSMENT
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 She has features of serositis, polyarthralgia, proteinuria/hematuria, Raynaud's, and generalized weakness c/w SLE most likely. Her centromere B positive results may portray a possible overlap with limited systemic sclerosis however she only has Raynaud's and lacks other diagnostic criteria. Her glucose levels remains significant elevated due to her systemic steroid dosing, currently solumedrol 80mg BID.    - pending renal bx results,   will likely require CellCept and Benylsta IV vs calcineurin inhibitor with steroid taper over 3-6 months depending on class dx.  Benlysta can be begun outpatient at [facility] infusion suite after D/C  - c/w current steroid dosing, may then taper to prednisone 60mg daily for D/C home  - would start HCQ 200mg daily    Will follow  Can f/u outpatient with Dr. Kirkpatrick or myself  [facility] Rheumatology  [facility]    [phone]

## 2025-01-25 NOTE — PROGRESS NOTE ADULT - ASSESSMENT
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

## 2025-01-26 LAB
BLD GP AB SCN SERPL QL: SIGNIFICANT CHANGE UP
C3 SERPL-MCNC: 32 MG/DL — LOW (ref 81–157)
C4 SERPL-MCNC: 2 MG/DL — LOW (ref 13–39)
CENTROMERE AB SER-ACNC: >8 AI — HIGH
CENTROMERE AB SER-ACNC: >8 AI — HIGH
CRP SERPL-MCNC: 12 MG/L — HIGH
ENA SCL70 AB SER-ACNC: 0.2 AI — SIGNIFICANT CHANGE UP
ERYTHROCYTE [SEDIMENTATION RATE] IN BLOOD: 55 MM/HR — HIGH (ref 0–20)
GLUCOSE BLDC GLUCOMTR-MCNC: 291 MG/DL — HIGH (ref 70–99)
GLUCOSE BLDC GLUCOMTR-MCNC: 313 MG/DL — HIGH (ref 70–99)
GLUCOSE BLDC GLUCOMTR-MCNC: 348 MG/DL — HIGH (ref 70–99)
GLUCOSE BLDC GLUCOMTR-MCNC: 349 MG/DL — HIGH (ref 70–99)
GLUCOSE BLDC GLUCOMTR-MCNC: 364 MG/DL — HIGH (ref 70–99)
HBV CORE AB SER-ACNC: SIGNIFICANT CHANGE UP

## 2025-01-26 PROCEDURE — 71045 X-RAY EXAM CHEST 1 VIEW: CPT | Mod: 26

## 2025-01-26 PROCEDURE — 99232 SBSQ HOSP IP/OBS MODERATE 35: CPT

## 2025-01-26 PROCEDURE — 99233 SBSQ HOSP IP/OBS HIGH 50: CPT

## 2025-01-26 RX ORDER — HEPARIN SODIUM,PORCINE 10000/ML
5000 VIAL (ML) INJECTION EVERY 12 HOURS
Refills: 0 | Status: DISCONTINUED | OUTPATIENT
Start: 2025-01-27 | End: 2025-01-31

## 2025-01-26 RX ORDER — SODIUM ZIRCONIUM CYCLOSILICATE 5 G/5G
10 POWDER, FOR SUSPENSION ORAL DAILY
Refills: 0 | Status: COMPLETED | OUTPATIENT
Start: 2025-01-26 | End: 2025-01-27

## 2025-01-26 RX ORDER — INSULIN LISPRO 100/ML
12 VIAL (ML) SUBCUTANEOUS
Refills: 0 | Status: DISCONTINUED | OUTPATIENT
Start: 2025-01-26 | End: 2025-01-27

## 2025-01-26 RX ORDER — INSULIN LISPRO 100/ML
VIAL (ML) SUBCUTANEOUS AT BEDTIME
Refills: 0 | Status: DISCONTINUED | OUTPATIENT
Start: 2025-01-26 | End: 2025-01-31

## 2025-01-26 RX ADMIN — Medication 8: at 16:34

## 2025-01-26 RX ADMIN — IRON SUCROSE 110 MILLIGRAM(S): 20 INJECTION, SOLUTION INTRAVENOUS at 12:09

## 2025-01-26 RX ADMIN — Medication 12 UNIT(S): at 16:34

## 2025-01-26 RX ADMIN — INSULIN GLARGINE-YFGN 25 UNIT(S): 100 INJECTION, SOLUTION SUBCUTANEOUS at 12:07

## 2025-01-26 RX ADMIN — Medication 2: at 22:56

## 2025-01-26 RX ADMIN — Medication 8 UNIT(S): at 12:08

## 2025-01-26 RX ADMIN — Medication 1 MILLIGRAM(S): at 12:09

## 2025-01-26 RX ADMIN — Medication 8: at 12:07

## 2025-01-26 RX ADMIN — Medication 10: at 08:10

## 2025-01-26 RX ADMIN — GABAPENTIN 300 MILLIGRAM(S): 800 TABLET ORAL at 17:32

## 2025-01-26 RX ADMIN — SODIUM ZIRCONIUM CYCLOSILICATE 10 GRAM(S): 5 POWDER, FOR SUSPENSION ORAL at 12:08

## 2025-01-26 RX ADMIN — SODIUM BICARBONATE 650 MILLIGRAM(S): 42 INJECTION, SOLUTION INTRAVENOUS at 06:15

## 2025-01-26 RX ADMIN — PANTOPRAZOLE 40 MILLIGRAM(S): 20 TABLET, DELAYED RELEASE ORAL at 06:15

## 2025-01-26 RX ADMIN — Medication 8 UNIT(S): at 08:11

## 2025-01-26 RX ADMIN — LEVOTHYROXINE SODIUM 50 MICROGRAM(S): 25 TABLET ORAL at 06:15

## 2025-01-26 RX ADMIN — Medication 5 MILLIGRAM(S): at 06:15

## 2025-01-26 RX ADMIN — GABAPENTIN 300 MILLIGRAM(S): 800 TABLET ORAL at 06:15

## 2025-01-26 RX ADMIN — Medication 40 MILLIGRAM(S): at 17:33

## 2025-01-26 RX ADMIN — BUMETANIDE 1 MILLIGRAM(S): 2 TABLET ORAL at 06:15

## 2025-01-26 RX ADMIN — SODIUM BICARBONATE 650 MILLIGRAM(S): 42 INJECTION, SOLUTION INTRAVENOUS at 17:33

## 2025-01-26 RX ADMIN — Medication 1000 MICROGRAM(S): at 12:09

## 2025-01-26 RX ADMIN — Medication 40 MILLIGRAM(S): at 06:16

## 2025-01-26 NOTE — DISCHARGE NOTE PROVIDER - CARE PROVIDERS DIRECT ADDRESSES
,DirectAddress_Unknown,[email or web address],DirectAddress_Unknown,[email or web address] ,DirectAddress_Unknown,[email or web address],DirectAddress_Unknown,[email or web address]

## 2025-01-26 NOTE — DISCHARGE NOTE PROVIDER - HOSPITAL COURSE
60 y/o F w/ PMH of HTN, hypothyroid, IDDM type I, chronic anemia, HFpEF (11/02/24 55%), CKD was sent in from Dr. Matson's office (pulm) b/c of pt having worsening SOB, worsening renal function, evidence of b/l pleural effusions w/ concern for lupus nephritis. VS stable in ED and sating well ORA.  Initial w/u significant for WBC 3.28, ANC normal, Hb 8.3 and UA w/ RBC and proteinuria.  CTSx and nephro consulted by ED. Admitted for worsening pleural effusions and r/o lupus. MARGI positive and reports symptoms consistent w/ lupus. Had right chest tube placed which was eventually removed. Renal biopsy was done which showed extensive chronic glomerular sclerosis >71%, rare immune complex deposits, IFTA indicative of chronicity and prior injury accrued. Rheumatology and Nephrology were following closely. She was transitioned to PO steroids inpatient and started on mycophenolate. Rheumatology giving their recommendations as follows:    - rapid taper prednisone 60mg daily x 3 days, 40mg x 3 days, 30mg x 3 days, 20mg x 5 days, 10mg x 5 days, then remain on prednisone 5mg daily and plan to D/C based on response to MMF  - start MMF (mycophenolate mofetil) 500mg BID (ordered) x 3 days, then increase to 1000mg BID.  Would then increase to 1000mg TID vs 1500mg BID based on tolerance outpatient to max induction  (If unable to tolerate MMF due to GI upset, diarrhea, nausea, would switch to Myfortic 360mg BID x 7 days then Myfortic 720mg BID thereafter.)  May consider Benylsta outpatient based on response and if steroid taper tolerated for serositis, if any pulmonary changes   - start HCQ 200mg daily outpatient  - adjust insulin requirements as needed for steroid taper, will need careful monitoring w/ endo to avoid hypoglycemia    Nephrology saw the patient and agree with above. She was also started on losartan inpatient. 58 y/o F w/ PMH of HTN, hypothyroid, IDDM type I, chronic anemia, HFpEF (11/02/24 55%), CKD was sent in from Dr. Matson's office (pulm) b/c of pt having worsening SOB, worsening renal function, evidence of b/l pleural effusions w/ concern for lupus nephritis. VS stable in ED and sating well ORA.  Initial w/u significant for WBC 3.28, ANC normal, Hb 8.3 and UA w/ RBC and proteinuria.  CTSx and nephro consulted by ED. Admitted for worsening pleural effusions and r/o lupus. MARGI positive and reports symptoms consistent w/ lupus. Had right chest tube placed which was eventually removed. Renal biopsy was done which showed extensive chronic glomerular sclerosis >71%, rare immune complex deposits, IFTA indicative of chronicity and prior injury accrued. Rheumatology and Nephrology were following closely. She was transitioned to PO steroids inpatient and started on mycophenolate. Rheumatology giving their recommendations as follows:    - rapid taper prednisone 60mg daily x 3 days, 40mg x 3 days, 30mg x 3 days, 20mg x 5 days, 10mg x 5 days, then remain on prednisone 5mg daily and plan to D/C based on response to MMF  - start MMF (mycophenolate mofetil) 500mg BID (ordered) x 3 days, then increase to 1000mg BID.  Would then increase to 1000mg TID vs 1500mg BID based on tolerance outpatient to max induction  (If unable to tolerate MMF due to GI upset, diarrhea, nausea, would switch to Myfortic 360mg BID x 7 days then Myfortic 720mg BID thereafter.)  May consider Benylsta outpatient based on response and if steroid taper tolerated for serositis, if any pulmonary changes   - start HCQ 200mg daily outpatient  - adjust insulin requirements as needed for steroid taper, will need careful monitoring w/ endo to avoid hypoglycemia    Nephrology saw the patient and agree with above. She was also started on losartan by Nephrology.  She is being started on daily Bactrim prophylaxis while on steroids.  Insulin is being adjusted to Lantus 40u HS and Lispro 10u TID while on steroids.

## 2025-01-26 NOTE — DISCHARGE NOTE PROVIDER - NPI NUMBER (FOR SYSADMIN USE ONLY) :
[[id number]],[[id number]] [[id number]],[[id number]]

## 2025-01-26 NOTE — PROGRESS NOTE ADULT - ASSESSMENT
59F with PMH of HTN, hypothyroid, IDDM type I, chronic anemia, HFpEF (11/02/24 55%), CKD was sent in from Dr. Matson's office (pulm) b/c of pt having worsening SOB, worsening renal function, evidence of b/l pleural effusions. Patient is being evaluated for worsening kidney function.     Consulted for diabetes management  Home regimen: Lantus 19 units daily, Humalog ICR 1:10  Current a1c: 9.3%    1. Poorly controlled DM1  - Glucose in 400s yesterday still, as patient is on solumedrol 40 mg BID for lupus nephritis   - Increased lantus to 25 units daily at 1PM did not start till today, consider increasing to 30 tomorrow after evaluating the morning sugar   - Increase admelog 12 units TID and correction scale  -Please let us know when the steroid dose to be stopped to change insulin accordingly   - Diabetic diet  - Do not hold basal insulin in T1DM as it can result in DKA    2. Hypothyroidism   - Check TSH  - On LT4 50 mcg daily, continue for now     3. Pleural effusion  - R pigtail placed  - Thoracic surgery following    4. Renal failure  - Nephrology following, renal biopsy  - On bumex IV daily  - I&Os 59F with PMH of HTN, hypothyroid, IDDM type I, chronic anemia, HFpEF (11/02/24 55%), CKD was sent in from Dr. Matson's office (pulm) b/c of pt having worsening SOB, worsening renal function, evidence of b/l pleural effusions. Patient is being evaluated for worsening kidney function.     Consulted for diabetes management  Home regimen: Lantus 19 units daily, Humalog ICR 1:10  Current a1c: 9.3%    1. Poorly controlled DM1  - Glucose in 300s yesterday still, as patient is on solumedrol 40 mg BID for lupus nephritis   - Increased lantus to 25 units daily at 1PM did not start till today, consider increasing to 30 tomorrow after evaluating the morning sugar   - Increase admelog 12 units TID and correction scale  -Please let us know when the steroid dose to be stopped to change insulin accordingly   - Diabetic diet  - Do not hold basal insulin in T1DM as it can result in DKA    2. Hypothyroidism   - Check TSH  - On LT4 50 mcg daily, continue for now     3. Pleural effusion  - R pigtail placed  - Thoracic surgery following    4. Renal failure  - Nephrology following, renal biopsy  - On bumex IV daily  - I&Os

## 2025-01-26 NOTE — DISCHARGE NOTE PROVIDER - NSDCFUSCHEDAPPT_GEN_ALL_CORE_FT
Jasmin Kirkpatrick  [facility] Physician [facility]  RHEUM [address]  Scheduled Appointment: 01/29/2025    [facility]  ENDOCRIN [address]  Scheduled Appointment: 02/24/2025     [facility]  ENDOCRIN [address]  Scheduled Appointment: 02/24/2025     [facility]  ENDOCRIN [address]  Scheduled Appointment: 02/24/2025    Magdalena Espinoza  [facility]  ENDOCRIN [address]  Scheduled Appointment: 03/05/2025    Alcides Fitch  [facility]  ENDOCRIN [address]  Scheduled Appointment: 04/29/2025

## 2025-01-26 NOTE — DISCHARGE NOTE PROVIDER - PROVIDER TOKENS
PROVIDER:[TOKEN:[[id number]],ESTABLISHEDPATIENT:[T]],PROVIDER:[TOKEN:[[id number]],ESTABLISHEDPATIENT:[T]],PROVIDER:[TOKEN:[[id number]],ESTABLISHEDPATIENT:[T]],PROVIDER:[TOKEN:[[id number]],ESTABLISHEDPATIENT:[T]] PROVIDER:[TOKEN:[[id number]],ESTABLISHEDPATIENT:[T]],PROVIDER:[TOKEN:[[id number]],ESTABLISHEDPATIENT:[T]],PROVIDER:[TOKEN:[[id number]],ESTABLISHEDPATIENT:[T]],PROVIDER:[TOKEN:[[id number]],ESTABLISHEDPATIENT:[T]],PROVIDER:[TOKEN:[[id number]],ESTABLISHEDPATIENT:[T]]

## 2025-01-26 NOTE — PROGRESS NOTE ADULT - ASSESSMENT
58 y/o F w/ PMH of HTN, hypothyroid, IDDM type I, chronic anemia, HFpEF (11/02/24 55%), CKD was sent in from Dr. Matson's office (pulm) b/c of pt having worsening SOB, worsening renal function, evidence of b/l pleural effusions w/ concern for lupus nephritis. VS stable in ED and sating well ORA.  Initial w/u significant for WBC 3.28, ANC normal, Hb 8.3 and UA w/ RBC and proteinuria.  CTSx and nephro consulted by ED. Admitted for worsening pleural effusions and r/o lupus.    Rule out Lupus nephritis  - s/p IR kidney biopsy 1/24  - MARGI positive and reports symptoms consistent w/ lupus   - Follow up biopsy results and serology  - Nephrology and Rheumatology following closely  - Continue Solumedrol 40mg IVP BID  - Continue Bumex 1mg IVP daily    B/l pleural effusions and b/l LE edema likely from third spacing due to progressively worsening renal function    - CT chest non con w/ b/l mod pleural effusion increased from prior and has b/l pulm nodules, and small mediastinal/axiallary lymphnodes and retroperitoneal/pelvic lymphadenopathy   - TTE from 11/2024 w/ preserved EF  - Right chest tube removed  - CXR showing slight improvements in vascular congestion (my read)  - Continue Bumex 1mg IVP daily  - Nephrology, CTS, and Rheumatology following  - Now on room air and saturating well, no respiratory distress      Acute on chronic anemia   - Baseline Hb 10-12 monitor  - On Venofer, folate, cyanocobalamin, Retacrit    Type II MI, likely poor renal clearance vs demand due to above   - No chest pain at this time   - EKG w/ no acute ischemic changes   - TTE with preserved EF  - Monitor on telemetry     Chronic HFpEF  HTN  -  which is improved from prior and suspect vol overload more likely from nephrotic type renal dx and less likely decompensated HF  - TTE from 11/2024 w/ LVEF 55%  - Continue amlodipine  - Continue Bumex 1mg IVP daily    Progressing CKD, r/o lupus nephropathy   - Cr remains relatively unchanged from december   - However Cr in Sept 2024 was 1.09 and has been worsening since   - Initially suspected to be cardiorenal given decompensated HF and ALBERT on prior admission but Cr never improved   - UA w/ 300protein and +RBC   - MARGI positive and reports symptoms consistent w/ lupus   - Follow up biopsy results and serology  - Nephrology following closely    DM   - Increased Lantus to 25u HS and Lispro 8u TID  - Endocrinology following  - Continue gabapentin    Constipation  - Continue senna and miralax    Anxiety  - Continue PRN Xanax     Hypothyroidism  - Continue Synthroid      DVT/GI ppx: Heparin, Protonix  Diet: DASH and renal restricted  Code Status: Full code  Dispo: Medically acute pending serology and biopsy results, on IV steroids and diuresis, likely 2-3 days

## 2025-01-26 NOTE — PROGRESS NOTE ADULT - ASSESSMENT
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

## 2025-01-26 NOTE — DISCHARGE NOTE PROVIDER - ATTENDING DISCHARGE PHYSICAL EXAMINATION:
admission Patient On (Oxygen Delivery Method): room air        CONSTITUTIONAL: NAD, well-developed, well-groomed  EYES:  EOMI, conjunctiva and sclera clear  ENMT: Moist oral mucosa  NECK: Supple, no JVD  RESPIRATORY: Normal respiratory effort; lungs are clear to auscultation bilaterally  CARDIOVASCULAR: Regular rate and rhythm, normal S1 and S2, trace edema in bilateral lower extremities  ABDOMEN: normoactive bowel sounds, soft, nontender to palpation, no distension   MUSCULOSKELETAL:  no clubbing or cyanosis of digits; no joint swelling or tenderness to palpation  PSYCH: A+O x3; affect appropriate, calm and cooperative  NEUROLOGY: CN 2-12 are intact and symmetric; no gross sensory deficits

## 2025-01-26 NOTE — DISCHARGE NOTE PROVIDER - NSDCCPCAREPLAN_GEN_ALL_CORE_FT
PRINCIPAL DISCHARGE DIAGNOSIS  Diagnosis: Bilateral pleural effusion  Assessment and Plan of Treatment:

## 2025-01-26 NOTE — DISCHARGE NOTE PROVIDER - NSDCFUADDAPPT_GEN_ALL_CORE_FT
APPTS ARE READY TO BE MADE: [X] YES    Best Family or Patient Contact (if needed):    Additional Information about above appointments (if needed):    1: Follow up with Dr. Kirkpatrick (Rheumatology) on 1/29/2025  2: Follow up with Nephrology in 1 week  3: Follow up with Pulmonology in 2 weeks    Other comments or requests:    APPTS ARE READY TO BE MADE: [X] YES    Best Family or Patient Contact (if needed):    Additional Information about above appointments (if needed):    1: Follow up with Dr. Kirkpatrick (Rheumatology) on 1/29/2025  2: Follow up with Nephrology in 1 week  3: Follow up with Pulmonology in 2 weeks  4. Follow up with Thoracic Surgery Dr. Louise in 2 weeks    Other comments or requests:    APPTS ARE READY TO BE MADE: [X] YES    Best Family or Patient Contact (if needed):    Additional Information about above appointments (if needed):    1: Follow up with Dr. Kirkpatrick (Rheumatology) on 1/29/2025  2: Follow up with Nephrology in 1 week  3: Follow up with Pulmonology in 2 weeks  4. Follow up with Thoracic Surgery Dr. Louise in 2 weeks    Other comments or requests:      Met with patient face to face and provided the patient with provider referral information, however patient prefers to schedule the appointments on their own. APPTS ARE READY TO BE MADE: [X] YES    Best Family or Patient Contact (if needed):    Additional Information about above appointments (if needed):    1: Follow up with Dr. Kirkpatrick (Rheumatology) in 1 week  2: Follow up with Nephrology in 2 weeks  3: Follow up with Pulmonology in 2 weeks  4. Follow up with Endocrinology in 1 week    Other comments or requests:      Met with patient face to face and provided the patient with provider referral information, however patient prefers to schedule the appointments on their own.

## 2025-01-26 NOTE — DISCHARGE NOTE PROVIDER - NSDCFUADDINST_GEN_ALL_CORE_FT
1: Follow up with Dr. Kirkpatrick (Rheumatology) in 1 week  2: Follow up with Nephrology in 2 weeks  3: Follow up with Pulmonology in 2 weeks  4. Follow up with Endocrinology in 1 week

## 2025-01-26 NOTE — DISCHARGE NOTE PROVIDER - NSDCMRMEDTOKEN_GEN_ALL_CORE_FT
amLODIPine 5 mg oral tablet: 1 tab(s) orally once a day  gabapentin 300 mg oral capsule: 1 cap(s) orally 2 times a day  HumaLOG KwikPen 100 UNIT/ML Subcutaneous Solution Pen-injector:   Lantus SoloStar 100 UNIT/ML Subcutaneous Solution Pen-injector: 19u qhs  Lasix 40 mg oral tablet: 1 tab(s) orally once a day  levothyroxine 50 mcg (0.05 mg) oral capsule: 1 cap(s) orally once a day   amLODIPine 5 mg oral tablet: 1 tab(s) orally once a day  cyanocobalamin 1000 mcg oral tablet: 1 tab(s) orally once a day  folic acid 1 mg oral tablet: 1 tab(s) orally once a day  gabapentin 300 mg oral capsule: 1 cap(s) orally 2 times a day  HumaLOG KwikPen 100 UNIT/ML Subcutaneous Solution Pen-injector:   Lantus SoloStar 100 UNIT/ML Subcutaneous Solution Pen-injector: 19u qhs  Lasix 40 mg oral tablet: 1 tab(s) orally once a day  levothyroxine 50 mcg (0.05 mg) oral capsule: 1 cap(s) orally once a day  pantoprazole 40 mg oral delayed release tablet: 1 tab(s) orally once a day (before a meal)   amLODIPine 5 mg oral tablet: 1 tab(s) orally once a day  bumetanide 1 mg oral tablet: 1 tab(s) orally 2 times a day  cyanocobalamin 1000 mcg oral tablet: 1 tab(s) orally once a day  folic acid 1 mg oral tablet: 1 tab(s) orally once a day  gabapentin 300 mg oral capsule: 1 cap(s) orally 2 times a day  HumaLOG KwikPen 100 UNIT/ML Subcutaneous Solution Pen-injector:   Lantus SoloStar 100 UNIT/ML Subcutaneous Solution Pen-injector: 19u qhs  levothyroxine 50 mcg (0.05 mg) oral capsule: 1 cap(s) orally once a day  losartan 25 mg oral tablet: 1 tab(s) orally once a day  mycophenolate mofetil 250 mg oral capsule: 2 cap(s) orally 2 times a day Continue mycophenolate 500mg twice daily for three days  Then continue mycophenolate 1000mg twice daily for 1 week  pantoprazole 40 mg oral delayed release tablet: 1 tab(s) orally once a day (before a meal)  predniSONE 10 mg oral tablet: 1 tab(s) orally once a day taper prednisone 60mg daily x 3 days, 40mg x 3 days, 30mg x 3 days, 20mg x 5 days, 10mg x 5 days, then remain on prednisone 5mg daily   amLODIPine 5 mg oral tablet: 1 tab(s) orally once a day  bumetanide 1 mg oral tablet: 1 tab(s) orally 2 times a day  cyanocobalamin 1000 mcg oral tablet: 1 tab(s) orally once a day  folic acid 1 mg oral tablet: 1 tab(s) orally once a day  gabapentin 300 mg oral capsule: 1 cap(s) orally 2 times a day  insulin glargine 100 units/mL subcutaneous solution: 40 unit(s) subcutaneous once a day  insulin lispro 100 units/mL injectable solution: 10 unit(s) injectable 3 times a day  levothyroxine 50 mcg (0.05 mg) oral capsule: 1 cap(s) orally once a day  losartan 25 mg oral tablet: 1 tab(s) orally once a day  mycophenolate mofetil 250 mg oral capsule: 2 cap(s) orally 2 times a day Continue mycophenolate 500mg twice daily for three days  Then continue mycophenolate 1000mg twice daily for 1 week  pantoprazole 40 mg oral delayed release tablet: 1 tab(s) orally once a day (before a meal)  predniSONE 10 mg oral tablet: 1 tab(s) orally once a day taper prednisone 60mg daily x 3 days, 40mg x 3 days, 30mg x 3 days, 20mg x 5 days, 10mg x 5 days, then remain on prednisone 5mg daily  sulfamethoxazole-trimethoprim 800 mg-160 mg oral tablet: 1 tab(s) orally once a day

## 2025-01-26 NOTE — DISCHARGE NOTE PROVIDER - CARE PROVIDER_API CALL
[facility]  [address] [place] [address]  Phone: [phone]  Fax: [phone]  Established Patient  Follow Up Time:     Jasmin Kirkpatrick  Rheumatology  [address] [place] [address]  Phone: [phone]  Fax: [phone]  Established Patient  Follow Up Time:     Chris Albrecht  Nephrology  [address] [place] [address]  Phone: [phone]  Fax: [phone]  Established Patient  Follow Up Time:     Tommy Best  Pulmonary Disease  [address] [facility] [address] [facility], [address]  [place] [address]  Phone: [phone]  Fax: [phone]  Established Patient  Follow Up Time:    [facility]  [address] [place] [address]  Phone: [phone]  Fax: [phone]  Established Patient  Follow Up Time:     Jasmin Kirkpatrick  Rheumatology  [address] [place] [address]  Phone: [phone]  Fax: [phone]  Established Patient  Follow Up Time:     Chris Albrecht  Nephrology  [address] [facility], [place] [address]  Phone: [phone]  Fax: [phone]  Established Patient  Follow Up Time:     Tommy Best  Pulmonary Disease  [address] [facility], [address]  [place] [address]  Phone: [phone]  Fax: [phone]  Established Patient  Follow Up Time:     Rohan Louise  Thoracic Surgery  [address] [place] [address]  Phone: [phone]  Fax: [phone]  Established Patient  Follow Up Time:

## 2025-01-27 LAB
ACE SERPL-CCNC: 90 U/L — HIGH (ref 14–82)
ANION GAP SERPL CALC-SCNC: 15 MMOL/L — SIGNIFICANT CHANGE UP (ref 5–17)
BUN SERPL-MCNC: 67.7 MG/DL — HIGH (ref 8–20)
CALCIUM SERPL-MCNC: 8 MG/DL — LOW (ref 8.4–10.5)
CHLORIDE SERPL-SCNC: 99 MMOL/L — SIGNIFICANT CHANGE UP (ref 96–108)
CO2 SERPL-SCNC: 21 MMOL/L — LOW (ref 22–29)
CREAT SERPL-MCNC: 2.95 MG/DL — HIGH (ref 0.5–1.3)
EGFR: 18 ML/MIN/1.73M2 — LOW
GLUCOSE BLDC GLUCOMTR-MCNC: 327 MG/DL — HIGH (ref 70–99)
GLUCOSE BLDC GLUCOMTR-MCNC: 332 MG/DL — HIGH (ref 70–99)
GLUCOSE BLDC GLUCOMTR-MCNC: 347 MG/DL — HIGH (ref 70–99)
GLUCOSE BLDC GLUCOMTR-MCNC: 397 MG/DL — HIGH (ref 70–99)
GLUCOSE SERPL-MCNC: 241 MG/DL — HIGH (ref 70–99)
HCT VFR BLD CALC: 32.3 % — LOW (ref 34.5–45)
HGB BLD-MCNC: 10.3 G/DL — LOW (ref 11.5–15.5)
MAGNESIUM SERPL-MCNC: 2.1 MG/DL — SIGNIFICANT CHANGE UP (ref 1.6–2.6)
MCHC RBC-ENTMCNC: 26.2 PG — LOW (ref 27–34)
MCHC RBC-ENTMCNC: 31.9 G/DL — LOW (ref 32–36)
MCV RBC AUTO: 82.2 FL — SIGNIFICANT CHANGE UP (ref 80–100)
NRBC # BLD: 2 /100 WBCS — HIGH (ref 0–0)
NRBC BLD-RTO: 2 /100 WBCS — HIGH (ref 0–0)
PHOSPHATE SERPL-MCNC: 2.9 MG/DL — SIGNIFICANT CHANGE UP (ref 2.4–4.7)
PLATELET # BLD AUTO: 546 K/UL — HIGH (ref 150–400)
POTASSIUM SERPL-MCNC: 4.7 MMOL/L — SIGNIFICANT CHANGE UP (ref 3.5–5.3)
POTASSIUM SERPL-SCNC: 4.7 MMOL/L — SIGNIFICANT CHANGE UP (ref 3.5–5.3)
PROCALCITONIN SERPL-MCNC: 0.3 NG/ML — HIGH (ref 0.02–0.1)
RBC # BLD: 3.93 M/UL — SIGNIFICANT CHANGE UP (ref 3.8–5.2)
RBC # FLD: 15.1 % — HIGH (ref 10.3–14.5)
SODIUM SERPL-SCNC: 135 MMOL/L — SIGNIFICANT CHANGE UP (ref 135–145)
TSH SERPL-MCNC: 5.83 UIU/ML — HIGH (ref 0.27–4.2)
WBC # BLD: 15.73 K/UL — HIGH (ref 3.8–10.5)
WBC # FLD AUTO: 15.73 K/UL — HIGH (ref 3.8–10.5)

## 2025-01-27 PROCEDURE — 99232 SBSQ HOSP IP/OBS MODERATE 35: CPT

## 2025-01-27 PROCEDURE — 99233 SBSQ HOSP IP/OBS HIGH 50: CPT

## 2025-01-27 RX ORDER — INSULIN LISPRO 100/ML
15 VIAL (ML) SUBCUTANEOUS
Refills: 0 | Status: DISCONTINUED | OUTPATIENT
Start: 2025-01-27 | End: 2025-01-28

## 2025-01-27 RX ORDER — INSULIN GLARGINE-YFGN 100 [IU]/ML
30 INJECTION, SOLUTION SUBCUTANEOUS
Refills: 0 | Status: DISCONTINUED | OUTPATIENT
Start: 2025-01-28 | End: 2025-01-28

## 2025-01-27 RX ORDER — INSULIN GLARGINE-YFGN 100 [IU]/ML
5 INJECTION, SOLUTION SUBCUTANEOUS ONCE
Refills: 0 | Status: COMPLETED | OUTPATIENT
Start: 2025-01-27 | End: 2025-01-27

## 2025-01-27 RX ADMIN — Medication 8: at 08:33

## 2025-01-27 RX ADMIN — Medication 4: at 22:16

## 2025-01-27 RX ADMIN — Medication 5000 UNIT(S): at 17:20

## 2025-01-27 RX ADMIN — SODIUM BICARBONATE 650 MILLIGRAM(S): 42 INJECTION, SOLUTION INTRAVENOUS at 17:19

## 2025-01-27 RX ADMIN — Medication 40 MILLIGRAM(S): at 05:03

## 2025-01-27 RX ADMIN — BUMETANIDE 1 MILLIGRAM(S): 2 TABLET ORAL at 05:03

## 2025-01-27 RX ADMIN — GABAPENTIN 300 MILLIGRAM(S): 800 TABLET ORAL at 17:19

## 2025-01-27 RX ADMIN — SODIUM ZIRCONIUM CYCLOSILICATE 10 GRAM(S): 5 POWDER, FOR SUSPENSION ORAL at 12:03

## 2025-01-27 RX ADMIN — INSULIN GLARGINE-YFGN 5 UNIT(S): 100 INJECTION, SOLUTION SUBCUTANEOUS at 15:54

## 2025-01-27 RX ADMIN — Medication 12 UNIT(S): at 08:34

## 2025-01-27 RX ADMIN — Medication 12 UNIT(S): at 12:04

## 2025-01-27 RX ADMIN — SODIUM BICARBONATE 650 MILLIGRAM(S): 42 INJECTION, SOLUTION INTRAVENOUS at 05:04

## 2025-01-27 RX ADMIN — Medication 5 MILLIGRAM(S): at 05:05

## 2025-01-27 RX ADMIN — IRON SUCROSE 110 MILLIGRAM(S): 20 INJECTION, SOLUTION INTRAVENOUS at 12:03

## 2025-01-27 RX ADMIN — Medication 5000 UNIT(S): at 05:03

## 2025-01-27 RX ADMIN — GABAPENTIN 300 MILLIGRAM(S): 800 TABLET ORAL at 05:04

## 2025-01-27 RX ADMIN — PANTOPRAZOLE 40 MILLIGRAM(S): 20 TABLET, DELAYED RELEASE ORAL at 05:04

## 2025-01-27 RX ADMIN — INSULIN GLARGINE-YFGN 25 UNIT(S): 100 INJECTION, SOLUTION SUBCUTANEOUS at 12:05

## 2025-01-27 RX ADMIN — Medication 1 MILLIGRAM(S): at 08:34

## 2025-01-27 RX ADMIN — LEVOTHYROXINE SODIUM 50 MICROGRAM(S): 25 TABLET ORAL at 05:04

## 2025-01-27 RX ADMIN — Medication 8: at 17:17

## 2025-01-27 RX ADMIN — Medication 10: at 12:03

## 2025-01-27 RX ADMIN — Medication 1000 MICROGRAM(S): at 08:34

## 2025-01-27 RX ADMIN — Medication 15 UNIT(S): at 17:18

## 2025-01-27 RX ADMIN — Medication 40 MILLIGRAM(S): at 17:20

## 2025-01-27 NOTE — PROGRESS NOTE ADULT - ASSESSMENT
58 y/o F w/ PMH of HTN, hypothyroid, IDDM type I, chronic anemia, HFpEF (11/02/24 55%), CKD was sent in from Dr. Matson's office (pulm) b/c of pt having worsening SOB, worsening renal function, evidence of b/l pleural effusions w/ concern for lupus nephritis. VS stable in ED and sating well ORA.  Initial w/u significant for WBC 3.28, ANC normal, Hb 8.3 and UA w/ RBC and proteinuria.  CTSx and nephro consulted by ED. Admitted for worsening pleural effusions and r/o lupus.    Rule out Lupus nephritis  - s/p IR kidney biopsy 1/24  - MARGI positive and reports symptoms consistent w/ lupus   - Follow up biopsy results and serology  - Nephrology and Rheumatology following closely  - Continue Solumedrol 40mg IVP BID (white count and glucose uptrending, expected)  - Continue Bumex 1mg IVP daily    B/l pleural effusions and b/l LE edema likely from third spacing due to progressively worsening renal function    - CT chest non con w/ b/l mod pleural effusion increased from prior and has b/l pulm nodules, and small mediastinal/axiallary lymphnodes and retroperitoneal/pelvic lymphadenopathy   - TTE from 11/2024 w/ preserved EF  - Right chest tube removed  - CXR showing slight improvements in vascular congestion (my read)  - Continue Bumex 1mg IVP daily  - Nephrology, CTS, and Rheumatology following  - Now on room air and saturating well, no respiratory distress    Acute on chronic anemia   - Baseline Hb 10-12 monitor  - On Venofer, folate, cyanocobalamin, Retacrit    Type II MI, likely poor renal clearance vs demand due to above   - No chest pain at this time   - EKG w/ no acute ischemic changes   - TTE with preserved EF  - Monitor on telemetry     Chronic HFpEF  HTN  -  which is improved from prior and suspect vol overload more likely from nephrotic type renal dx and less likely decompensated HF  - TTE from 11/2024 w/ LVEF 55%  - Continue amlodipine  - Continue Bumex 1mg IVP daily    Progressing CKD, r/o lupus nephropathy   - Cr remains relatively unchanged from december   - However Cr in Sept 2024 was 1.09 and has been worsening since   - Initially suspected to be cardiorenal given decompensated HF and ALBERT on prior admission but Cr never improved   - UA w/ 300protein and +RBC   - MARGI positive and reports symptoms consistent w/ lupus   - Follow up biopsy results and serology  - Nephrology following closely    DM   - Glucose uptrending due to steroid use  - Continue Lantus to 25u HS and Lispro 8u TID  - Endocrinology following  - Continue gabapentin    Constipation  - Continue senna and miralax    Anxiety  - Continue PRN Xanax     Hypothyroidism  - TSH elevated  - Continue Synthroid  - Endocrinology following      DVT/GI ppx: Heparin, Protonix  Diet: DASH and renal restricted  Code Status: Full code  Dispo: Medically acute pending serology and biopsy results, on IV steroids and diuresis, likely 1-2 days

## 2025-01-27 NOTE — PROGRESS NOTE ADULT - ASSESSMENT
58 y/o F w/ PMH HTN, IDDM type I (has medtronic insulin pump), hypothyroid, neuropathies presented to ED c/o 2 weeks of NIXON w/ associated worsening LE edema. 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

## 2025-01-27 NOTE — PROGRESS NOTE ADULT - ASSESSMENT
59F with PMH of HTN, hypothyroid, IDDM type I, chronic anemia, HFpEF (11/02/24 55%), CKD was sent in from Dr. Matson's office (pulm) b/c of pt having worsening SOB, worsening renal function, evidence of b/l pleural effusions. Patient is being evaluated for worsening kidney function.     Consulted for diabetes management  Home regimen: Lantus 19 units daily, Humalog ICR 1:10  Current a1c: 9.3%    1. Poorly controlled DM1  - Steroid induced hyperglycemia  - Increse admelog to 15 units TID  - Increase lantus to 30 units daily (received 25 unit today, will add 5u now and increase dose to 30 units starting tomorrow 1/28)  - Please let us know when the steroid dose is going to be decreased to adjust insulin accordingly   - Diabetic diet  - Do not hold basal insulin in T1DM as it can result in DKA    2. Hypothyroidism   - Check 5.83  - On LT4 50 mcg daily, continue for now     3. Renal failure  - S/p IR kidney biopsy 1/24  - Nephrology following  - On solumedrol 40mg q12 hr 59F with PMH of HTN, hypothyroid, IDDM type I, chronic anemia, HFpEF (11/02/24 55%), CKD was sent in from Dr. Matson's office (pulm) b/c of pt having worsening SOB, worsening renal function, evidence of b/l pleural effusions. Patient is being evaluated for worsening kidney function.     Consulted for diabetes management  Home regimen: Lantus 19 units daily, Humalog ICR 1:10  Current a1c: 9.3%    1. Poorly controlled DM1  - Steroid induced hyperglycemia  - Increse admelog to 15 units TID  - Increase lantus to 30 units daily (received 25 units today, will add 5 units now and increase dose to 30 units starting tomorrow 1/28)  - Please let us know when the steroid dose is going to be decreased to adjust insulin accordingly   - Diabetic diet  - Do not hold basal insulin in T1DM as it can result in DKA    2. Hypothyroidism   - Check 5.83  - On LT4 50 mcg daily, continue for now     3. Renal failure  - S/p IR kidney biopsy 1/24  - Nephrology following  - On solumedrol 40mg q12 hr 59F with PMH of HTN, hypothyroid, IDDM type I, chronic anemia, HFpEF (11/02/24 55%), CKD was sent in from Dr. Matson's office (pulm) b/c of pt having worsening SOB, worsening renal function, evidence of b/l pleural effusions. Patient is being evaluated for worsening kidney function.     Consulted for diabetes management  Home regimen: Lantus 19 units daily, Humalog ICR 1:10  Current a1c: 9.3%    1. Poorly controlled DM1  - Steroid induced hyperglycemia  - Increse admelog to 15 units TID  - Increase lantus to 30 units daily (received 25 units today, will add 5 units now and increase dose to 30 units starting tomorrow 1/28)  - Please let us know when the steroid dose is going to be decreased to adjust insulin accordingly   - Diabetic diet  - Do not hold basal insulin in T1DM as it can result in DKA    2. Hypothyroidism   - TSH 5.83  - On LT4 50 mcg daily, continue for now     3. Renal failure  - S/p IR kidney biopsy 1/24  - Nephrology following  - On solumedrol 40mg q12 hr

## 2025-01-28 LAB
ANION GAP SERPL CALC-SCNC: 14 MMOL/L — SIGNIFICANT CHANGE UP (ref 5–17)
BUN SERPL-MCNC: 75.7 MG/DL — HIGH (ref 8–20)
CALCIUM SERPL-MCNC: 8.1 MG/DL — LOW (ref 8.4–10.5)
CHLORIDE SERPL-SCNC: 99 MMOL/L — SIGNIFICANT CHANGE UP (ref 96–108)
CO2 SERPL-SCNC: 20 MMOL/L — LOW (ref 22–29)
CREAT SERPL-MCNC: 2.85 MG/DL — HIGH (ref 0.5–1.3)
EGFR: 18 ML/MIN/1.73M2 — LOW
GLUCOSE BLDC GLUCOMTR-MCNC: 208 MG/DL — HIGH (ref 70–99)
GLUCOSE BLDC GLUCOMTR-MCNC: 246 MG/DL — HIGH (ref 70–99)
GLUCOSE BLDC GLUCOMTR-MCNC: 311 MG/DL — HIGH (ref 70–99)
GLUCOSE BLDC GLUCOMTR-MCNC: 345 MG/DL — HIGH (ref 70–99)
GLUCOSE BLDC GLUCOMTR-MCNC: 355 MG/DL — HIGH (ref 70–99)
GLUCOSE SERPL-MCNC: 295 MG/DL — HIGH (ref 70–99)
HCT VFR BLD CALC: 31.3 % — LOW (ref 34.5–45)
HGB BLD-MCNC: 10 G/DL — LOW (ref 11.5–15.5)
MAGNESIUM SERPL-MCNC: 1.9 MG/DL — SIGNIFICANT CHANGE UP (ref 1.8–2.6)
MCHC RBC-ENTMCNC: 26.4 PG — LOW (ref 27–34)
MCHC RBC-ENTMCNC: 31.9 G/DL — LOW (ref 32–36)
MCV RBC AUTO: 82.6 FL — SIGNIFICANT CHANGE UP (ref 80–100)
NON-GYNECOLOGICAL CYTOLOGY STUDY: SIGNIFICANT CHANGE UP
NRBC # BLD: 2 /100 WBCS — HIGH (ref 0–0)
NRBC BLD-RTO: 2 /100 WBCS — HIGH (ref 0–0)
PHOSPHATE SERPL-MCNC: 3.1 MG/DL — SIGNIFICANT CHANGE UP (ref 2.4–4.7)
PLATELET # BLD AUTO: 475 K/UL — HIGH (ref 150–400)
POTASSIUM SERPL-MCNC: 4.2 MMOL/L — SIGNIFICANT CHANGE UP (ref 3.5–5.3)
POTASSIUM SERPL-SCNC: 4.2 MMOL/L — SIGNIFICANT CHANGE UP (ref 3.5–5.3)
RBC # BLD: 3.79 M/UL — LOW (ref 3.8–5.2)
RBC # FLD: 15.9 % — HIGH (ref 10.3–14.5)
SODIUM SERPL-SCNC: 133 MMOL/L — LOW (ref 135–145)
WBC # BLD: 14.79 K/UL — HIGH (ref 3.8–10.5)
WBC # FLD AUTO: 14.79 K/UL — HIGH (ref 3.8–10.5)

## 2025-01-28 PROCEDURE — 99232 SBSQ HOSP IP/OBS MODERATE 35: CPT

## 2025-01-28 PROCEDURE — 99233 SBSQ HOSP IP/OBS HIGH 50: CPT

## 2025-01-28 RX ORDER — INSULIN GLARGINE-YFGN 100 [IU]/ML
36 INJECTION, SOLUTION SUBCUTANEOUS
Refills: 0 | Status: DISCONTINUED | OUTPATIENT
Start: 2025-01-28 | End: 2025-01-29

## 2025-01-28 RX ORDER — INSULIN LISPRO 100/ML
20 VIAL (ML) SUBCUTANEOUS
Refills: 0 | Status: DISCONTINUED | OUTPATIENT
Start: 2025-01-28 | End: 2025-01-29

## 2025-01-28 RX ORDER — SODIUM BICARBONATE 42 MG/ML
1300 INJECTION, SOLUTION INTRAVENOUS THREE TIMES A DAY
Refills: 0 | Status: DISCONTINUED | OUTPATIENT
Start: 2025-01-28 | End: 2025-01-31

## 2025-01-28 RX ADMIN — SODIUM BICARBONATE 1300 MILLIGRAM(S): 42 INJECTION, SOLUTION INTRAVENOUS at 17:00

## 2025-01-28 RX ADMIN — Medication 20 UNIT(S): at 12:02

## 2025-01-28 RX ADMIN — Medication 15 UNIT(S): at 08:09

## 2025-01-28 RX ADMIN — Medication 8: at 12:03

## 2025-01-28 RX ADMIN — SODIUM BICARBONATE 650 MILLIGRAM(S): 42 INJECTION, SOLUTION INTRAVENOUS at 05:12

## 2025-01-28 RX ADMIN — Medication 5 MILLIGRAM(S): at 05:12

## 2025-01-28 RX ADMIN — LEVOTHYROXINE SODIUM 50 MICROGRAM(S): 25 TABLET ORAL at 05:12

## 2025-01-28 RX ADMIN — Medication 5000 UNIT(S): at 17:02

## 2025-01-28 RX ADMIN — PANTOPRAZOLE 40 MILLIGRAM(S): 20 TABLET, DELAYED RELEASE ORAL at 05:12

## 2025-01-28 RX ADMIN — Medication 5000 UNIT(S): at 05:11

## 2025-01-28 RX ADMIN — GABAPENTIN 300 MILLIGRAM(S): 800 TABLET ORAL at 05:12

## 2025-01-28 RX ADMIN — SODIUM BICARBONATE 1300 MILLIGRAM(S): 42 INJECTION, SOLUTION INTRAVENOUS at 21:51

## 2025-01-28 RX ADMIN — Medication 40 MILLIGRAM(S): at 17:02

## 2025-01-28 RX ADMIN — ACETAMINOPHEN 650 MILLIGRAM(S): 160 SUSPENSION ORAL at 19:52

## 2025-01-28 RX ADMIN — Medication 20 UNIT(S): at 17:01

## 2025-01-28 RX ADMIN — INSULIN GLARGINE-YFGN 36 UNIT(S): 100 INJECTION, SOLUTION SUBCUTANEOUS at 12:02

## 2025-01-28 RX ADMIN — Medication 8: at 08:08

## 2025-01-28 RX ADMIN — BUMETANIDE 1 MILLIGRAM(S): 2 TABLET ORAL at 05:11

## 2025-01-28 RX ADMIN — ACETAMINOPHEN 650 MILLIGRAM(S): 160 SUSPENSION ORAL at 05:15

## 2025-01-28 RX ADMIN — ACETAMINOPHEN 650 MILLIGRAM(S): 160 SUSPENSION ORAL at 18:46

## 2025-01-28 RX ADMIN — Medication 1 MILLIGRAM(S): at 13:37

## 2025-01-28 RX ADMIN — Medication 1000 MICROGRAM(S): at 13:37

## 2025-01-28 RX ADMIN — ACETAMINOPHEN 650 MILLIGRAM(S): 160 SUSPENSION ORAL at 06:58

## 2025-01-28 RX ADMIN — Medication 4: at 17:00

## 2025-01-28 RX ADMIN — Medication 40 MILLIGRAM(S): at 05:11

## 2025-01-28 RX ADMIN — GABAPENTIN 300 MILLIGRAM(S): 800 TABLET ORAL at 17:01

## 2025-01-28 NOTE — PROGRESS NOTE ADULT - ASSESSMENT
58 y/o F w/ PMH of HTN, hypothyroid, IDDM type I, chronic anemia, HFpEF (11/02/24 55%), CKD was sent in from Dr. Matson's office (pulm) b/c of pt having worsening SOB, worsening renal function, evidence of b/l pleural effusions w/ concern for lupus nephritis. VS stable in ED and sating well ORA.  Initial w/u significant for WBC 3.28, ANC normal, Hb 8.3 and UA w/ RBC and proteinuria.  CTSx and nephro consulted by ED. Admitted for worsening pleural effusions and r/o lupus.    Rule out Lupus nephritis  - s/p IR kidney biopsy 1/24  - MARGI positive and reports symptoms consistent w/ lupus   - Continue Solumedrol 40mg IVP BID (white count and glucose uptrending, expected)  - Continue Bumex 1mg IVP daily  - Follow up biopsy results and serology  - Nephrology and Rheumatology following closely    B/l pleural effusions and b/l LE edema likely from third spacing due to progressively worsening renal function    - CT chest non con w/ b/l mod pleural effusion increased from prior and has b/l pulm nodules, and small mediastinal/axiallary lymphnodes and retroperitoneal/pelvic lymphadenopathy   - TTE from 11/2024 w/ preserved EF  - Right chest tube removed  - CXR showing slight improvements in vascular congestion (my read)  - Continue Bumex 1mg IVP daily  - Nephrology, CTS, and Rheumatology following  - Now on room air and saturating well, no respiratory distress    Acute on chronic anemia   - Baseline Hb 10-12 monitor  - On Venofer, folate, cyanocobalamin, Retacrit    Type II MI, likely poor renal clearance vs demand due to above   - No chest pain at this time   - EKG w/ no acute ischemic changes   - TTE with preserved EF  - Monitor on telemetry     Chronic HFpEF  HTN  -  which is improved from prior and suspect vol overload more likely from nephrotic type renal dx and less likely decompensated HF  - TTE from 11/2024 w/ LVEF 55%  - Continue amlodipine  - Continue Bumex 1mg IVP daily    Progressing CKD, r/o lupus nephropathy   - Cr remains relatively unchanged from december   - However Cr in Sept 2024 was 1.09 and has been worsening since   - Initially suspected to be cardiorenal given decompensated HF and ALBERT on prior admission but Cr never improved   - UA w/ 300protein and +RBC   - MARGI positive and reports symptoms consistent w/ lupus   - Follow up biopsy results and serology  - Nephrology following closely    DM   - Glucose uptrending due to steroid use  - Continue Lantus to 25u HS and Lispro 8u TID  - Endocrinology following  - Continue gabapentin    Constipation  - Continue senna and miralax    Anxiety  - Continue PRN Xanax     Hypothyroidism  - TSH elevated  - Continue Synthroid  - Endocrinology following      DVT/GI ppx: Heparin, Protonix  Diet: DASH and renal restricted  Code Status: Full code  Dispo: Medically acute pending serology and biopsy results, on IV steroids and diuresis, likely 1-2 days

## 2025-01-28 NOTE — PROGRESS NOTE ADULT - ASSESSMENT
59F with PMH of HTN, hypothyroid, IDDM type I, chronic anemia, HFpEF (11/02/24 55%), CKD was sent in from Dr. Matson's office (pulm) b/c of pt having worsening SOB, worsening renal function, evidence of b/l pleural effusions. Patient is being evaluated for worsening kidney function.     Consulted for diabetes management  Home regimen: Lantus 19 units daily, Humalog ICR 1:10  Current a1c: 9.3%    1. Poorly controlled DM1  - Steroid induced hyperglycemia  - Increse admelog to 20 units TID  - Increase lantus to 36 units daily (scheduled at 1PM)  - Please let us know when the steroid dose is going to be decreased to adjust insulin accordingly   - Diabetic diet  - Do not hold basal insulin in T1DM as it can result in DKA    2. Hypothyroidism   - TSH 5.83  - On LT4 50 mcg daily, continue for now     3. Renal failure  - S/p IR kidney biopsy 1/24  - Nephrology following  - On solumedrol 40mg q12 hr

## 2025-01-28 NOTE — PROGRESS NOTE ADULT - ASSESSMENT
58 y/o F w/ PMH HTN, IDDM type I (has medtronic insulin pump), hypothyroid, neuropathies presented to ED c/o 2 weeks of NIXON w/ associated worsening LE edema. 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  Serum Cr 2.9--> 2.8  Serositis/fluid overload (effusions s/p drainage)  Adenopathy  Nephrotic w 4 gm proteinuria on 24 hr urine  Edema agree w Bumex 1 mg Q day  Hyperkalemia- better  - cont diuretics and accept azotemia for now  - On IV steroids --> will need to convert to PO   - will require further immunosuppression depending on lupus nephritis class  -  additional serology pending  -  possible LN biopsy  - low K+ diet, treat medically as needed  - Rheumatology eval noted    Anemia: iPTH 96  - REGGIE, Venofer   - target Hgb > 10.0    RO: serum phos ok  - monitor parameters    Monitor labs will follow

## 2025-01-29 ENCOUNTER — APPOINTMENT (OUTPATIENT)
Dept: NEPHROLOGY | Facility: CLINIC | Age: 60
End: 2025-01-29

## 2025-01-29 ENCOUNTER — APPOINTMENT (OUTPATIENT)
Dept: RHEUMATOLOGY | Facility: CLINIC | Age: 60
End: 2025-01-29

## 2025-01-29 LAB
ANION GAP SERPL CALC-SCNC: 14 MMOL/L — SIGNIFICANT CHANGE UP (ref 5–17)
BUN SERPL-MCNC: 79.1 MG/DL — HIGH (ref 8–20)
CALCIUM SERPL-MCNC: 7.9 MG/DL — LOW (ref 8.4–10.5)
CHLORIDE SERPL-SCNC: 97 MMOL/L — SIGNIFICANT CHANGE UP (ref 96–108)
CO2 SERPL-SCNC: 21 MMOL/L — LOW (ref 22–29)
CREAT SERPL-MCNC: 2.39 MG/DL — HIGH (ref 0.5–1.3)
CULTURE RESULTS: SIGNIFICANT CHANGE UP
EGFR: 23 ML/MIN/1.73M2 — LOW
GAMMA INTERFERON BACKGROUND BLD IA-ACNC: 0.03 IU/ML — SIGNIFICANT CHANGE UP
GAMMA INTERFERON BACKGROUND BLD IA-ACNC: 0.05 IU/ML — SIGNIFICANT CHANGE UP
GLUCOSE BLDC GLUCOMTR-MCNC: 188 MG/DL — HIGH (ref 70–99)
GLUCOSE BLDC GLUCOMTR-MCNC: 266 MG/DL — HIGH (ref 70–99)
GLUCOSE BLDC GLUCOMTR-MCNC: 283 MG/DL — HIGH (ref 70–99)
GLUCOSE BLDC GLUCOMTR-MCNC: 347 MG/DL — HIGH (ref 70–99)
GLUCOSE BLDC GLUCOMTR-MCNC: 356 MG/DL — HIGH (ref 70–99)
GLUCOSE BLDC GLUCOMTR-MCNC: 359 MG/DL — HIGH (ref 70–99)
GLUCOSE SERPL-MCNC: 380 MG/DL — HIGH (ref 70–99)
HCT VFR BLD CALC: 32.3 % — LOW (ref 34.5–45)
HGB BLD-MCNC: 10.2 G/DL — LOW (ref 11.5–15.5)
M TB IFN-G BLD-IMP: ABNORMAL
M TB IFN-G BLD-IMP: ABNORMAL
M TB IFN-G CD4+ BCKGRND COR BLD-ACNC: 0 IU/ML — SIGNIFICANT CHANGE UP
M TB IFN-G CD4+ BCKGRND COR BLD-ACNC: 0 IU/ML — SIGNIFICANT CHANGE UP
M TB IFN-G CD4+CD8+ BCKGRND COR BLD-ACNC: 0 IU/ML — SIGNIFICANT CHANGE UP
M TB IFN-G CD4+CD8+ BCKGRND COR BLD-ACNC: 0 IU/ML — SIGNIFICANT CHANGE UP
MCHC RBC-ENTMCNC: 26.2 PG — LOW (ref 27–34)
MCHC RBC-ENTMCNC: 31.6 G/DL — LOW (ref 32–36)
MCV RBC AUTO: 82.8 FL — SIGNIFICANT CHANGE UP (ref 80–100)
NRBC # BLD: 1 /100 WBCS — HIGH (ref 0–0)
NRBC BLD-RTO: 1 /100 WBCS — HIGH (ref 0–0)
PLATELET # BLD AUTO: 443 K/UL — HIGH (ref 150–400)
POTASSIUM SERPL-MCNC: 4.2 MMOL/L — SIGNIFICANT CHANGE UP (ref 3.5–5.3)
POTASSIUM SERPL-SCNC: 4.2 MMOL/L — SIGNIFICANT CHANGE UP (ref 3.5–5.3)
QUANT TB PLUS MITOGEN MINUS NIL: 0.02 IU/ML — SIGNIFICANT CHANGE UP
QUANT TB PLUS MITOGEN MINUS NIL: 0.03 IU/ML — SIGNIFICANT CHANGE UP
RBC # BLD: 3.9 M/UL — SIGNIFICANT CHANGE UP (ref 3.8–5.2)
RBC # FLD: 16.9 % — HIGH (ref 10.3–14.5)
SODIUM SERPL-SCNC: 132 MMOL/L — LOW (ref 135–145)
SPECIMEN SOURCE: SIGNIFICANT CHANGE UP
WBC # BLD: 15.95 K/UL — HIGH (ref 3.8–10.5)
WBC # FLD AUTO: 15.95 K/UL — HIGH (ref 3.8–10.5)

## 2025-01-29 PROCEDURE — 99232 SBSQ HOSP IP/OBS MODERATE 35: CPT

## 2025-01-29 PROCEDURE — 99233 SBSQ HOSP IP/OBS HIGH 50: CPT

## 2025-01-29 RX ORDER — BUMETANIDE 2 MG/1
1 TABLET ORAL
Refills: 0 | Status: DISCONTINUED | OUTPATIENT
Start: 2025-01-29 | End: 2025-01-31

## 2025-01-29 RX ORDER — INSULIN GLARGINE-YFGN 100 [IU]/ML
42 INJECTION, SOLUTION SUBCUTANEOUS
Refills: 0 | Status: DISCONTINUED | OUTPATIENT
Start: 2025-01-29 | End: 2025-01-30

## 2025-01-29 RX ORDER — MYCOPHENOLATE MOFETIL 200 MG/ML
500 POWDER, FOR SUSPENSION ORAL
Refills: 0 | Status: DISCONTINUED | OUTPATIENT
Start: 2025-01-29 | End: 2025-01-31

## 2025-01-29 RX ORDER — PANTOPRAZOLE 20 MG/1
1 TABLET, DELAYED RELEASE ORAL
Qty: 0 | Refills: 0 | DISCHARGE
Start: 2025-01-29

## 2025-01-29 RX ORDER — FOLIC ACID 1 MG
1 TABLET ORAL
Qty: 0 | Refills: 0 | DISCHARGE
Start: 2025-01-29

## 2025-01-29 RX ORDER — PREDNISONE 5 MG/1
60 TABLET ORAL DAILY
Refills: 0 | Status: DISCONTINUED | OUTPATIENT
Start: 2025-01-30 | End: 2025-01-31

## 2025-01-29 RX ORDER — CYANOCOBALAMIN (VITAMIN B-12) 1000MCG/ML
1 VIAL (ML) INJECTION
Qty: 0 | Refills: 0 | DISCHARGE
Start: 2025-01-29

## 2025-01-29 RX ORDER — INSULIN LISPRO 100/ML
22 VIAL (ML) SUBCUTANEOUS
Refills: 0 | Status: DISCONTINUED | OUTPATIENT
Start: 2025-01-29 | End: 2025-01-30

## 2025-01-29 RX ADMIN — OXYCODONE HYDROCHLORIDE 5 MILLIGRAM(S): 30 TABLET ORAL at 22:08

## 2025-01-29 RX ADMIN — OXYCODONE HYDROCHLORIDE 5 MILLIGRAM(S): 30 TABLET ORAL at 21:11

## 2025-01-29 RX ADMIN — BUMETANIDE 1 MILLIGRAM(S): 2 TABLET ORAL at 05:18

## 2025-01-29 RX ADMIN — Medication 1000 MICROGRAM(S): at 14:29

## 2025-01-29 RX ADMIN — LEVOTHYROXINE SODIUM 50 MICROGRAM(S): 25 TABLET ORAL at 05:18

## 2025-01-29 RX ADMIN — Medication 5000 UNIT(S): at 17:05

## 2025-01-29 RX ADMIN — SODIUM BICARBONATE 1300 MILLIGRAM(S): 42 INJECTION, SOLUTION INTRAVENOUS at 05:18

## 2025-01-29 RX ADMIN — SODIUM BICARBONATE 1300 MILLIGRAM(S): 42 INJECTION, SOLUTION INTRAVENOUS at 21:11

## 2025-01-29 RX ADMIN — Medication 10: at 05:26

## 2025-01-29 RX ADMIN — Medication 1 MILLIGRAM(S): at 14:29

## 2025-01-29 RX ADMIN — Medication 5000 UNIT(S): at 05:18

## 2025-01-29 RX ADMIN — INSULIN GLARGINE-YFGN 42 UNIT(S): 100 INJECTION, SOLUTION SUBCUTANEOUS at 14:36

## 2025-01-29 RX ADMIN — Medication 20 UNIT(S): at 08:21

## 2025-01-29 RX ADMIN — Medication 20 UNIT(S): at 12:02

## 2025-01-29 RX ADMIN — PANTOPRAZOLE 40 MILLIGRAM(S): 20 TABLET, DELAYED RELEASE ORAL at 05:19

## 2025-01-29 RX ADMIN — GABAPENTIN 300 MILLIGRAM(S): 800 TABLET ORAL at 17:05

## 2025-01-29 RX ADMIN — GABAPENTIN 300 MILLIGRAM(S): 800 TABLET ORAL at 05:18

## 2025-01-29 RX ADMIN — Medication 6: at 17:05

## 2025-01-29 RX ADMIN — Medication 22 UNIT(S): at 17:06

## 2025-01-29 RX ADMIN — SODIUM BICARBONATE 1300 MILLIGRAM(S): 42 INJECTION, SOLUTION INTRAVENOUS at 14:29

## 2025-01-29 RX ADMIN — Medication 5 MILLIGRAM(S): at 05:18

## 2025-01-29 RX ADMIN — Medication 40 MILLIGRAM(S): at 05:18

## 2025-01-29 RX ADMIN — Medication 10: at 12:02

## 2025-01-29 NOTE — PROGRESS NOTE ADULT - ASSESSMENT
CKD/SHARA: Renal biopsy 1/24/25 >>> DM nephropathy, HTN nephrosclerosis with significant atherosclerosis, tubular atrophy; + immune complexes w/o crescents and with mild-mod activity; significant fibrosis/chronicity (12/17 glomeruli sclerotic)  Serositis/fluid overload (effusions s/p drainage)  Nephrotic proteinuria  Adenopathy  Hyperkalemia resolved  MARGI 1:1280, +dsDNA, low complements  - cont diuretics and accept azotemia for now  - will transition to PO Prednisone; based on renal biopsy results, predominant injury seems to be diabetes related with immune complex component less so---> very aggressive immunotherapy may not be warranted for presumed SLE renal disease.  will need to d/w Rheum  - trend labs    Anemia: iPTH 96  - REGGIE, Venofer   - target Hgb > 10.0    RO: serum phos ok  - monitor parameters

## 2025-01-29 NOTE — DIETITIAN INITIAL EVALUATION ADULT - PERTINENT MEDS FT
MEDICATIONS  (STANDING):  cyanocobalamin 1000 MICROGram(s) Oral daily  dextrose 50% Injectable 25 Gram(s) IV Push once  epoetin mira-epbx (RETACRIT) Injectable 06287 Unit(s) SubCutaneous every 7 days  folic acid 1 milliGRAM(s) Oral daily  glucagon  Injectable 1 milliGRAM(s) IntraMuscular once  heparin   Injectable 5000 Unit(s) SubCutaneous every 12 hours  insulin glargine Injectable (LANTUS) 36 Unit(s) SubCutaneous <User Schedule>  insulin lispro Injectable (ADMELOG) 20 Unit(s) SubCutaneous three times a day before meals  levothyroxine 50 MICROGram(s) Oral daily  methylPREDNISolone sodium succinate Injectable 40 milliGRAM(s) IV Push two times a day  pantoprazole    Tablet 40 milliGRAM(s) Oral before breakfast

## 2025-01-29 NOTE — DIETITIAN INITIAL EVALUATION ADULT - PERTINENT LABORATORY DATA
01-29 01-29 Na132 mmol/L[L] Glu 380 mg/dL[H] K+ 4.2 mmol/L Cr  2.39 mg/dL[H] BUN 79.1 mg/dL[H] Phos n/a   Alb n/a   PAB n/a         Ca    7.9[L]      29 Jan 2025 04:25  Phos  3.1     01-28  Mg     1.9     01-28    POCT Blood Glucose.: 356 mg/dL (01-29-25 @ 11:26)  A1C with Estimated Average Glucose Result: 9.3 % (01-23-25 @ 04:58)  A1C with Estimated Average Glucose Result: 10.6 % (11-03-24 @ 07:21)

## 2025-01-29 NOTE — DIETITIAN INITIAL EVALUATION ADULT - ORAL INTAKE PTA/DIET HISTORY
Pt known to this department from previous admission Nov 2024, reported UBW 135lbs with good appetite/PO intake, HGb A1c 10.6% at the time. At this admission, Pt admits to decreased appetite/PO intake PTA with 3-4lb weight loss. Appetite/intake improving s/p R pigtail placement with drainage relieving abdominal pressure, completed 100% of breakfast (2 mini bagels with jelly, coffee and milk). Discussed importance of pairing protein/fat with CHO to optimize BG levels, RD to remain available for additional nutrition education as feasible/accepting.

## 2025-01-29 NOTE — PROGRESS NOTE ADULT - ASSESSMENT
58 y/o F w/ PMH of HTN, hypothyroid, IDDM type I, chronic anemia, HFpEF (11/02/24 55%), CKD was sent in from Dr. Matson's office (pulm) b/c of pt having worsening SOB, worsening renal function, evidence of b/l pleural effusions w/ concern for lupus nephritis. VS stable in ED and sating well ORA.  Initial w/u significant for WBC 3.28, ANC normal, Hb 8.3 and UA w/ RBC and proteinuria.  CTSx and nephro consulted by ED. Admitted for worsening pleural effusions and r/o lupus.    Rule out Lupus nephritis  - s/p IR kidney biopsy 1/24  - MARGI positive and reports symptoms consistent w/ lupus   - Switched Bumex and steroids to oral formulations  - Follow up biopsy results and serology  - Nephrology and Rheumatology following closely    B/l pleural effusions and b/l LE edema likely from third spacing due to progressively worsening renal function    - CT chest non con w/ b/l mod pleural effusion increased from prior and has b/l pulm nodules, and small mediastinal/axiallary lymphnodes and retroperitoneal/pelvic lymphadenopathy   - TTE from 11/2024 w/ preserved EF  - Right chest tube removed  - CXR showing slight improvements in vascular congestion (my read)  - Continue PO Bumex  - Nephrology, CTS, and Rheumatology following  - Now on room air and saturating well, no respiratory distress    Acute on chronic anemia   - Baseline Hb 10-12 monitor  - On Venofer, folate, cyanocobalamin, Retacrit    Type II MI, likely poor renal clearance vs demand due to above   - No chest pain at this time   - EKG w/ no acute ischemic changes   - TTE with preserved EF  - Monitor on telemetry     Chronic HFpEF  HTN  -  which is improved from prior and suspect vol overload more likely from nephrotic type renal dx and less likely decompensated HF  - TTE from 11/2024 w/ LVEF 55%  - Continue amlodipine  - Continue Bumex 1mg IVP daily    Progressing CKD, r/o lupus nephropathy   - Cr remains relatively unchanged from december   - However Cr in Sept 2024 was 1.09 and has been worsening since   - Initially suspected to be cardiorenal given decompensated HF and ALBERT on prior admission but Cr never improved   - UA w/ 300protein and +RBC   - MARGI positive and reports symptoms consistent w/ lupus   - Follow up biopsy results and serology  - Nephrology following closely    DM   - Glucose uptrending due to steroid use  - Continue Lantus to 25u HS and Lispro 8u TID  - Endocrinology following  - Continue gabapentin    Constipation  - Continue senna and miralax    Anxiety  - Continue PRN Xanax     Hypothyroidism  - TSH elevated  - Continue Synthroid  - Endocrinology following      DVT/GI ppx: Heparin, Protonix  Diet: DASH and renal restricted  Code Status: Full code  Dispo: Likely discharge tomorrow on PO options

## 2025-01-29 NOTE — DIETITIAN INITIAL EVALUATION ADULT - NS FNS DIET ORDER
Diet, DASH/TLC:   Sodium & Cholesterol Restricted  Consistent Carbohydrate {No Snacks} (CSTCHO) (01-23-25 @ 19:54)

## 2025-01-29 NOTE — DIETITIAN INITIAL EVALUATION ADULT - ADD RECOMMEND
Rx MVI and vit C 500mg daily, continue vit B12, folic acid supplementation; Encourage HBV protein sources

## 2025-01-29 NOTE — DIETITIAN INITIAL EVALUATION ADULT - OTHER INFO
60 y/o F w/ PMH of HTN, hypothyroid, IDDM type I, chronic anemia, HFpEF (11/02/24 55%), CKD was sent in from Dr. Matson's office (pulm) b/c of pt having worsening SOB, worsening renal function, evidence of b/l pleural effusions w/ concern for lupus nephritis. VS stable in ED and sating well ORA.  Initial w/u significant for WBC 3.28, ANC normal, Hb 8.3 and UA w/ RBC and proteinuria.  CTSx and nephro consulted by ED. Admitted for worsening pleural effusions and r/o lupus.

## 2025-01-29 NOTE — CHART NOTE - NSCHARTNOTEFT_GEN_A_CORE
Rheumatology Follow-up s/p Renal Bx results    Renal bx results obtained, reviewed w/ pathology  Pt remains hyperglycemic on high dose steroids  No other new changes.    Labs, imaging, pathology reviewed in detail from all inpatient and outpatient sources        A/P: 59y F with in-house dx of SLE (+MARGI, dsDNA, SSA/SSB, chromatin, cent B, low C3/4, anemia, nephrotic range proteinuria 4.3g, elevated Cr, anemia, polyarthralgia, rash) admitted with bilateral pleural effusions and SHARA on CKD with proteinuria suspected for lupus nephritis.     Changes on renal biopsy with extensive chronic glomerular sclerosis >71%, rare immune complex deposits, IFTA indicative of chronicity and prior injury accrued. Although some intact glomeruli with moderate mesangial matrix and cellularity, the presence of immune deposits might suggest some residual activity, but the predominant feature is the extensive glomerular sclerosis, aligning most with either prior LN damage (class VI changes) and diabetic nephropathy. There are no crescents, necrosis, or endothelial hypercellularity further supporting the chronicity on biopsy from prior ?LN damage +/- diabetic nephropathy. This indicates less need for aggressive immunosuppression and steroid use, with preferred renal optimization to prevent ESRD progression and moderate SLE immunosuppression for serositis and renal control. I would recommend quickly tapering steroids down over the next 2 weeks and immediately commencing MMF immunosuppression w/o the need for Benlysta or CNI based solely on renal findings.    Pt having severe glucocorticoid difficulty w/ due to Type 1 DM and steroid-induced hyperglycemia. Plan to quickly taper down steroids to acceptable glucose levels.    - rapid taper prednisone 60mg daily x 3 days, 40mg x 3 days, 30mg x 3 days, 20mg x 5 days, 10mg x 5 days, then remain on prednisone 5mg daily and plan to D/C based on response to MMF  - start MMF (mycophenolate mofetil) 500mg BID (ordered) x 3 days, then increase to 1000mg BID.  Would then increase to 1000mg TID vs 1500mg BID based on tolerance outpatient to max induction  (If unable to tolerate MMF due to GI upset, diarrhea, nausea, would switch to Myfortic 360mg BID x 7 days then Myfortic 720mg BID thereafter.)  May consider Benylsta outpatient based on response and if steroid taper tolerated for serositis, if any pulmonary changes   - start HCQ 200mg daily outpatient  - adjust insulin requirements as needed for steroid taper, will need careful monitoring w/ endo to avoid hypoglycemia    Follow up with Dr. Kirkpatrick or Dr. Argueta outpatient within 7-14 days, pt provided information at consult  [phone]  [place] [address] [place] [address]        Khadar Argueta MD

## 2025-01-29 NOTE — DIETITIAN INITIAL EVALUATION ADULT - ETIOLOGY
related to inability to meet sufficient protein-energy needs in setting of worsening SOB 2/2 B/L pleural effusion,

## 2025-01-29 NOTE — PROGRESS NOTE ADULT - ASSESSMENT
59F with PMH of HTN, hypothyroid, IDDM type I, chronic anemia, HFpEF (11/02/24 55%), CKD was sent in from Dr. Matson's office (pulm) b/c of pt having worsening SOB, worsening renal function, evidence of b/l pleural effusions. Patient is being evaluated for worsening kidney function.     Consulted for diabetes management  Home regimen: Lantus 19 units daily, Humalog ICR 1:10  Current a1c: 9.3%  Outpatient Endocrinologist: Dr Fitch    1. Poorly controlled DM1- steroid induced hyperglycemia  - Increse admelog to 22 units TID  - Increase lantus to 42 units daily (scheduled at 1PM)  - Currently on IV solumedrol 40mg q12 hr, will be changed to Prednisone 60mg daily (will be discharged on this dose)  - Will arrange outpatient follow up appt with our office --> will need to be closely monitored with steroid regimen  - Do not hold basal insulin in T1DM as it can result in DKA    2. Hypothyroidism   - TSH 5.83  - On LT4 50 mcg daily, continue for now     3. Renal failure  - S/p IR kidney biopsy 1/24  - Nephrology following  - On steroids     59F with PMH of HTN, hypothyroid, IDDM type I, chronic anemia, HFpEF (11/02/24 55%), CKD was sent in from Dr. Matson's office (pulm) b/c of pt having worsening SOB, worsening renal function, evidence of b/l pleural effusions. Patient is being evaluated for worsening kidney function.     Consulted for diabetes management  Home regimen: Lantus 19 units daily, Humalog ICR 1:10  Current a1c: 9.3%  Outpatient Endocrinologist: Dr Fitch    1. Poorly controlled DM1- steroid induced hyperglycemia  - Increse admelog to 22 units TID  - Increase lantus to 42 units daily (scheduled at 1PM)  - Currently on IV solumedrol 40mg q12 hr, will be changed to Prednisone 60mg daily (will be discharged on this dose)  - Follow up appt: 3/5 @ 2:00PM ([address])  --> will need to be closely monitored with steroid regimen  - Do not hold basal insulin in T1DM as it can result in DKA    2. Hypothyroidism   - TSH 5.83  - On LT4 50 mcg daily, continue for now     3. Renal failure  - S/p IR kidney biopsy 1/24  - Nephrology following  - On steroids

## 2025-01-30 LAB
ANION GAP SERPL CALC-SCNC: 14 MMOL/L — SIGNIFICANT CHANGE UP (ref 5–17)
BUN SERPL-MCNC: 80.6 MG/DL — HIGH (ref 8–20)
CALCIUM SERPL-MCNC: 8.1 MG/DL — LOW (ref 8.4–10.5)
CHLORIDE SERPL-SCNC: 102 MMOL/L — SIGNIFICANT CHANGE UP (ref 96–108)
CO2 SERPL-SCNC: 22 MMOL/L — SIGNIFICANT CHANGE UP (ref 22–29)
CREAT SERPL-MCNC: 2.21 MG/DL — HIGH (ref 0.5–1.3)
EGFR: 25 ML/MIN/1.73M2 — LOW
GLUCOSE BLDC GLUCOMTR-MCNC: 224 MG/DL — HIGH (ref 70–99)
GLUCOSE BLDC GLUCOMTR-MCNC: 243 MG/DL — HIGH (ref 70–99)
GLUCOSE BLDC GLUCOMTR-MCNC: 309 MG/DL — HIGH (ref 70–99)
GLUCOSE BLDC GLUCOMTR-MCNC: 84 MG/DL — SIGNIFICANT CHANGE UP (ref 70–99)
GLUCOSE SERPL-MCNC: 94 MG/DL — SIGNIFICANT CHANGE UP (ref 70–99)
POTASSIUM SERPL-MCNC: 3.9 MMOL/L — SIGNIFICANT CHANGE UP (ref 3.5–5.3)
POTASSIUM SERPL-SCNC: 3.9 MMOL/L — SIGNIFICANT CHANGE UP (ref 3.5–5.3)
SODIUM SERPL-SCNC: 138 MMOL/L — SIGNIFICANT CHANGE UP (ref 135–145)

## 2025-01-30 PROCEDURE — 99232 SBSQ HOSP IP/OBS MODERATE 35: CPT

## 2025-01-30 RX ORDER — LOSARTAN POTASSIUM 100 MG
1 TABLET ORAL
Qty: 30 | Refills: 0
Start: 2025-01-30 | End: 2025-02-28

## 2025-01-30 RX ORDER — LEVOTHYROXINE SODIUM 25 UG/1
1 TABLET ORAL
Qty: 30 | Refills: 0
Start: 2025-01-30 | End: 2025-02-28

## 2025-01-30 RX ORDER — INSULIN GLARGINE-YFGN 100 [IU]/ML
40 INJECTION, SOLUTION SUBCUTANEOUS
Qty: 0 | Refills: 0 | DISCHARGE
Start: 2025-01-30

## 2025-01-30 RX ORDER — SULFAMETHOXAZOLE AND TRIMETHOPRIM 400; 80 MG/1; MG/1
1 TABLET ORAL
Qty: 14 | Refills: 0
Start: 2025-01-30 | End: 2025-02-12

## 2025-01-30 RX ORDER — PANTOPRAZOLE 20 MG/1
1 TABLET, DELAYED RELEASE ORAL
Qty: 30 | Refills: 0
Start: 2025-01-30 | End: 2025-02-28

## 2025-01-30 RX ORDER — INSULIN LISPRO 100/ML
10 VIAL (ML) SUBCUTANEOUS
Refills: 0 | Status: DISCONTINUED | OUTPATIENT
Start: 2025-01-30 | End: 2025-01-31

## 2025-01-30 RX ORDER — LOSARTAN POTASSIUM 100 MG
25 TABLET ORAL DAILY
Refills: 0 | Status: DISCONTINUED | OUTPATIENT
Start: 2025-01-30 | End: 2025-01-31

## 2025-01-30 RX ORDER — SULFAMETHOXAZOLE AND TRIMETHOPRIM 400; 80 MG/1; MG/1
1 TABLET ORAL DAILY
Refills: 0 | Status: DISCONTINUED | OUTPATIENT
Start: 2025-01-30 | End: 2025-01-31

## 2025-01-30 RX ORDER — BUMETANIDE 2 MG/1
1 TABLET ORAL
Qty: 60 | Refills: 0
Start: 2025-01-30 | End: 2025-02-28

## 2025-01-30 RX ORDER — MYCOPHENOLATE MOFETIL 200 MG/ML
2 POWDER, FOR SUSPENSION ORAL
Qty: 68 | Refills: 0
Start: 2025-01-30 | End: 2025-02-08

## 2025-01-30 RX ORDER — PREDNISONE 5 MG/1
1 TABLET ORAL
Qty: 60 | Refills: 0
Start: 2025-01-30 | End: 2025-02-28

## 2025-01-30 RX ORDER — CYANOCOBALAMIN (VITAMIN B-12) 1000MCG/ML
1 VIAL (ML) INJECTION
Qty: 30 | Refills: 0
Start: 2025-01-30 | End: 2025-02-28

## 2025-01-30 RX ORDER — INSULIN GLARGINE-YFGN 100 [IU]/ML
40 INJECTION, SOLUTION SUBCUTANEOUS
Refills: 0 | Status: DISCONTINUED | OUTPATIENT
Start: 2025-01-31 | End: 2025-01-31

## 2025-01-30 RX ORDER — GABAPENTIN 800 MG/1
1 TABLET ORAL
Qty: 60 | Refills: 0
Start: 2025-01-30 | End: 2025-02-28

## 2025-01-30 RX ORDER — INSULIN LISPRO 100/ML
10 VIAL (ML) SUBCUTANEOUS
Qty: 0 | Refills: 0 | DISCHARGE
Start: 2025-01-30

## 2025-01-30 RX ORDER — AMLODIPINE BESYLATE 5 MG
1 TABLET ORAL
Qty: 30 | Refills: 0
Start: 2025-01-30 | End: 2025-02-28

## 2025-01-30 RX ORDER — LEVOTHYROXINE SODIUM 25 UG/1
1 TABLET ORAL
Refills: 0 | DISCHARGE

## 2025-01-30 RX ORDER — FOLIC ACID 1 MG
1 TABLET ORAL
Qty: 30 | Refills: 0
Start: 2025-01-30 | End: 2025-02-28

## 2025-01-30 RX ORDER — LOSARTAN POTASSIUM 100 MG
1 TABLET ORAL
Qty: 0 | Refills: 0 | DISCHARGE
Start: 2025-01-30

## 2025-01-30 RX ORDER — SULFAMETHOXAZOLE AND TRIMETHOPRIM 400; 80 MG/1; MG/1
1 TABLET ORAL
Refills: 0 | Status: DISCONTINUED | OUTPATIENT
Start: 2025-01-30 | End: 2025-01-30

## 2025-01-30 RX ADMIN — Medication 1000 MICROGRAM(S): at 13:51

## 2025-01-30 RX ADMIN — Medication 8: at 12:35

## 2025-01-30 RX ADMIN — SODIUM BICARBONATE 1300 MILLIGRAM(S): 42 INJECTION, SOLUTION INTRAVENOUS at 21:05

## 2025-01-30 RX ADMIN — LEVOTHYROXINE SODIUM 50 MICROGRAM(S): 25 TABLET ORAL at 05:09

## 2025-01-30 RX ADMIN — OXYCODONE HYDROCHLORIDE 5 MILLIGRAM(S): 30 TABLET ORAL at 21:55

## 2025-01-30 RX ADMIN — Medication 22 UNIT(S): at 12:36

## 2025-01-30 RX ADMIN — SODIUM BICARBONATE 1300 MILLIGRAM(S): 42 INJECTION, SOLUTION INTRAVENOUS at 05:09

## 2025-01-30 RX ADMIN — Medication 5 MILLIGRAM(S): at 05:09

## 2025-01-30 RX ADMIN — GABAPENTIN 300 MILLIGRAM(S): 800 TABLET ORAL at 05:10

## 2025-01-30 RX ADMIN — Medication 4: at 17:08

## 2025-01-30 RX ADMIN — Medication 1 MILLIGRAM(S): at 13:51

## 2025-01-30 RX ADMIN — Medication 5000 UNIT(S): at 17:07

## 2025-01-30 RX ADMIN — GABAPENTIN 300 MILLIGRAM(S): 800 TABLET ORAL at 17:07

## 2025-01-30 RX ADMIN — OXYCODONE HYDROCHLORIDE 5 MILLIGRAM(S): 30 TABLET ORAL at 22:51

## 2025-01-30 RX ADMIN — PANTOPRAZOLE 40 MILLIGRAM(S): 20 TABLET, DELAYED RELEASE ORAL at 05:09

## 2025-01-30 RX ADMIN — MYCOPHENOLATE MOFETIL 500 MILLIGRAM(S): 200 POWDER, FOR SUSPENSION ORAL at 05:09

## 2025-01-30 RX ADMIN — SODIUM BICARBONATE 1300 MILLIGRAM(S): 42 INJECTION, SOLUTION INTRAVENOUS at 13:51

## 2025-01-30 RX ADMIN — PREDNISONE 60 MILLIGRAM(S): 5 TABLET ORAL at 05:08

## 2025-01-30 RX ADMIN — BUMETANIDE 1 MILLIGRAM(S): 2 TABLET ORAL at 13:51

## 2025-01-30 RX ADMIN — BUMETANIDE 1 MILLIGRAM(S): 2 TABLET ORAL at 05:09

## 2025-01-30 RX ADMIN — Medication 10 UNIT(S): at 17:08

## 2025-01-30 RX ADMIN — Medication 5000 UNIT(S): at 05:10

## 2025-01-30 RX ADMIN — MYCOPHENOLATE MOFETIL 500 MILLIGRAM(S): 200 POWDER, FOR SUSPENSION ORAL at 17:07

## 2025-01-30 NOTE — PROGRESS NOTE ADULT - ASSESSMENT
59F with PMH of HTN, hypothyroid, IDDM type I, chronic anemia, HFpEF (11/02/24 55%), CKD was sent in from Dr. Matson's office (pulm) b/c of pt having worsening SOB, worsening renal function, evidence of b/l pleural effusions. Patient is being evaluated for worsening kidney function.     Consulted for diabetes management  Home regimen: Lantus 19 units daily, Humalog ICR 1:10  Current a1c: 9.3%  Outpatient Endocrinologist: Dr Fitch    1. Poorly controlled DM1- steroid induced hyperglycemia  - BG low normal this AM  - admelog to *** units TID  -  lantus to *** units daily (scheduled at 1PM)  - s/p IV solumedrol, now on PO Prednisone 60 mg daily (will be discharged on this dose)  - Follow up appt: 3/5 @ 2:00PM ([address])  --> will need to be closely monitored with steroid regimen  - Do not hold basal insulin in T1DM as it can result in DKA    2. Hypothyroidism   - TSH 5.83  - On LT4 50 mcg daily, continue for now     3. Renal failure  - S/p IR kidney biopsy 1/24  - Nephrology following  - On steroids   59F with PMH of HTN, hypothyroid, IDDM type I, chronic anemia, HFpEF (11/02/24 55%), CKD was sent in from Dr. Matson's office (pulm) b/c of pt having worsening SOB, worsening renal function, evidence of b/l pleural effusions. Patient is being evaluated for worsening kidney function.     Consulted for diabetes management  Home regimen: Lantus 19 units daily, Humalog ICR 1:10  Current a1c: 9.3%  Outpatient Endocrinologist: Dr Fitch    1. Poorly controlled DM1- steroid induced hyperglycemia  - BG low normal this AM  - Decrease lantus to 40 units daily (scheduled at 1PM)  - Post prandial hyperglycemia, pts dosing changed yesterday, now on oral steroids-> will continue admelog 10 units TID today  - s/p IV solumedrol, now on PO Prednisone 60 mg daily (will be discharged on this dose)  - Follow up appt: 3/5 @ 2:00PM ([address])  --> will need to be closely monitored with steroid regimen  - Do not hold basal insulin in T1DM as it can result in DKA    2. Hypothyroidism   - TSH 5.83  - On LT4 50 mcg daily, continue for now     3. Renal failure  - S/p IR kidney biopsy 1/24  - Nephrology following  - On steroids

## 2025-01-30 NOTE — PROGRESS NOTE ADULT - ASSESSMENT
CKD/SHARA: Renal biopsy 1/24/25 >>> DM nephropathy, HTN nephrosclerosis with significant atherosclerosis, tubular atrophy; + immune complexes w/o crescents and with mild activity; significant fibrosis/chronicity (12/17 glomeruli sclerotic)  Serositis/fluid overload (effusions s/p drainage)  Nephrotic proteinuria  Adenopathy  Hyperkalemia resolved  MARGI 1:1280, +dsDNA, low complements  - cont diuretics and accept azotemia for now  - agree with Rheum --> predominant injury seems to be diabetes related with immune complex component less so---> very aggressive immunotherapy not  warranted for presumed SLE renal disease component  - will focus on DM and HTN control, but given extent of chronicity it is likely she will eventually progress to ESRD  - cont PO Prednisone with quick taper; MMF added  - will add Farxiga and ARB and trend labs closely  - ambulate/mobilize    Anemia: iPTH 96  - REGGIE, Venofer   - target Hgb > 10.0    RO: serum phos ok  - monitor parameters CKD/SHARA: Renal biopsy 1/24/25 >>> DM nephropathy, HTN nephrosclerosis with significant atherosclerosis, tubular atrophy; + immune complexes w/o crescents and with mild activity; significant fibrosis/chronicity (12/17 glomeruli sclerotic)  Serositis/fluid overload (effusions s/p drainage)  Nephrotic proteinuria  Adenopathy  Hyperkalemia resolved  MARGI 1:1280, +dsDNA, low complements  - cont diuretics and accept azotemia for now  - agree with Rheum --> predominant injury seems to be diabetes related with immune complex component less so---> very aggressive immunotherapy not  warranted for presumed SLE renal disease component  - will focus on DM and HTN control, but given extent of chronicity it is likely she will eventually progress to ESRD  - cont PO Prednisone with quick taper; MMF added  - will add ARB and trend labs closely  - (?) [id number] --> will confer with Endocrine  - ambulate/mobilize    Anemia: iPTH 96  - REGGIE, Venofer   - target Hgb > 10.0    RO: serum phos ok  - monitor parameters

## 2025-01-30 NOTE — PROGRESS NOTE ADULT - ASSESSMENT
58 y/o F w/ PMH of HTN, hypothyroid, IDDM type I, chronic anemia, HFpEF (11/02/24 55%), CKD was sent in from Dr. Matson's office (pulm) b/c of pt having worsening SOB, worsening renal function, evidence of b/l pleural effusions w/ concern for lupus nephritis. VS stable in ED and sating well ORA.  Initial w/u significant for WBC 3.28, ANC normal, Hb 8.3 and UA w/ RBC and proteinuria.  CTSx and nephro consulted by ED. Admitted for worsening pleural effusions and r/o lupus.    Rule out Lupus nephritis  - s/p IR kidney biopsy 1/24  - MARGI positive and reports symptoms consistent w/ lupus   - Switched Bumex and steroids to oral formulations  - Biopsy results in; Final Rheumatology recommendations given  - Started on mycophenolate today   - Started on Bactrim ppx  - Nephrology and Rheumatology following     B/l pleural effusions and b/l LE edema likely from third spacing due to progressively worsening renal function    - CT chest non con w/ b/l mod pleural effusion increased from prior and has b/l pulm nodules, and small mediastinal/axiallary lymphnodes and retroperitoneal/pelvic lymphadenopathy   - TTE from 11/2024 w/ preserved EF  - Right chest tube removed  - CXR showing slight improvements in vascular congestion (my read)  - Continue PO Bumex  - Nephrology, CTS, and Rheumatology following  - Now on room air and saturating well, no respiratory distress    Acute on chronic anemia   - Baseline Hb 10-12 monitor  - On Venofer, folate, cyanocobalamin, Retacrit    Type II MI, likely poor renal clearance vs demand due to above   - No chest pain at this time   - EKG w/ no acute ischemic changes   - TTE with preserved EF  - Monitor on telemetry     Chronic HFpEF  HTN  -  which is improved from prior and suspect vol overload more likely from nephrotic type renal dx and less likely decompensated HF  - TTE from 11/2024 w/ LVEF 55%  - Continue amlodipine  - Continue Bumex 1mg PO    Progressing CKD, r/o lupus nephropathy   - Cr remains relatively unchanged from december   - However Cr in Sept 2024 was 1.09 and has been worsening since   - Initially suspected to be cardiorenal given decompensated HF and ALBERT on prior admission but Cr never improved   - UA w/ 300protein and +RBC   - MARGI positive and reports symptoms consistent w/ lupus     DM   - Glucose uptrending due to steroid use  - Insulin regimen being adjusted by Endo  - Endocrinology following  - Continue gabapentin    Constipation  - Continue senna and miralax    Anxiety  - Continue PRN Xanax     Hypothyroidism  - TSH elevated  - Continue Synthroid  - Endocrinology following      DVT/GI ppx: Heparin, Protonix  Diet: DASH and renal restricted  Code Status: Full code  Dispo: Discharge planning

## 2025-01-30 NOTE — PROGRESS NOTE ADULT - TIME BILLING
Time spent reviewing patient's chart, labwork, orders, documenting care, coordinating care with consultants, and discussing patient's care with family members.

## 2025-01-31 ENCOUNTER — TRANSCRIPTION ENCOUNTER (OUTPATIENT)
Age: 60
End: 2025-01-31

## 2025-01-31 VITALS
SYSTOLIC BLOOD PRESSURE: 117 MMHG | OXYGEN SATURATION: 95 % | HEART RATE: 103 BPM | TEMPERATURE: 98 F | DIASTOLIC BLOOD PRESSURE: 72 MMHG | RESPIRATION RATE: 18 BRPM

## 2025-01-31 LAB
ANION GAP SERPL CALC-SCNC: 11 MMOL/L — SIGNIFICANT CHANGE UP (ref 5–17)
BUN SERPL-MCNC: 83.9 MG/DL — HIGH (ref 8–20)
CALCIUM SERPL-MCNC: 8.1 MG/DL — LOW (ref 8.4–10.5)
CHLORIDE SERPL-SCNC: 102 MMOL/L — SIGNIFICANT CHANGE UP (ref 96–108)
CO2 SERPL-SCNC: 26 MMOL/L — SIGNIFICANT CHANGE UP (ref 22–29)
CREAT SERPL-MCNC: 2.37 MG/DL — HIGH (ref 0.5–1.3)
EGFR: 23 ML/MIN/1.73M2 — LOW
GLUCOSE BLDC GLUCOMTR-MCNC: 162 MG/DL — HIGH (ref 70–99)
GLUCOSE BLDC GLUCOMTR-MCNC: 239 MG/DL — HIGH (ref 70–99)
GLUCOSE SERPL-MCNC: 168 MG/DL — HIGH (ref 70–99)
POTASSIUM SERPL-MCNC: 4.7 MMOL/L — SIGNIFICANT CHANGE UP (ref 3.5–5.3)
POTASSIUM SERPL-SCNC: 4.7 MMOL/L — SIGNIFICANT CHANGE UP (ref 3.5–5.3)
RAPID RVP RESULT: SIGNIFICANT CHANGE UP
SARS-COV-2 RNA SPEC QL NAA+PROBE: SIGNIFICANT CHANGE UP
SODIUM SERPL-SCNC: 139 MMOL/L — SIGNIFICANT CHANGE UP (ref 135–145)
THIOPURINE METHYLTRANSFERASE (TPMT) ENZY: 23.3 — SIGNIFICANT CHANGE UP
TPMT ENZYME METHODOLOGY: SIGNIFICANT CHANGE UP
TPMT GENE PROD MET ACT IMP BLD/T-IMP: SIGNIFICANT CHANGE UP

## 2025-01-31 PROCEDURE — 97163 PT EVAL HIGH COMPLEX 45 MIN: CPT

## 2025-01-31 PROCEDURE — 82306 VITAMIN D 25 HYDROXY: CPT

## 2025-01-31 PROCEDURE — 87070 CULTURE OTHR SPECIMN AEROBIC: CPT

## 2025-01-31 PROCEDURE — 84100 ASSAY OF PHOSPHORUS: CPT

## 2025-01-31 PROCEDURE — 85018 HEMOGLOBIN: CPT

## 2025-01-31 PROCEDURE — 88348 ELECTRON MICROSCOPY DX: CPT

## 2025-01-31 PROCEDURE — 82728 ASSAY OF FERRITIN: CPT

## 2025-01-31 PROCEDURE — 96374 THER/PROPH/DIAG INJ IV PUSH: CPT

## 2025-01-31 PROCEDURE — 81001 URINALYSIS AUTO W/SCOPE: CPT

## 2025-01-31 PROCEDURE — 99285 EMERGENCY DEPT VISIT HI MDM: CPT

## 2025-01-31 PROCEDURE — 87086 URINE CULTURE/COLONY COUNT: CPT

## 2025-01-31 PROCEDURE — 77012 CT SCAN FOR NEEDLE BIOPSY: CPT

## 2025-01-31 PROCEDURE — 88185 FLOWCYTOMETRY/TC ADD-ON: CPT

## 2025-01-31 PROCEDURE — 71045 X-RAY EXAM CHEST 1 VIEW: CPT

## 2025-01-31 PROCEDURE — 88184 FLOWCYTOMETRY/ TC 1 MARKER: CPT

## 2025-01-31 PROCEDURE — 82607 VITAMIN B-12: CPT

## 2025-01-31 PROCEDURE — 86255 FLUORESCENT ANTIBODY SCREEN: CPT

## 2025-01-31 PROCEDURE — 82042 OTHER SOURCE ALBUMIN QUAN EA: CPT

## 2025-01-31 PROCEDURE — 99232 SBSQ HOSP IP/OBS MODERATE 35: CPT

## 2025-01-31 PROCEDURE — 86200 CCP ANTIBODY: CPT

## 2025-01-31 PROCEDURE — 36415 COLL VENOUS BLD VENIPUNCTURE: CPT

## 2025-01-31 PROCEDURE — 86880 COOMBS TEST DIRECT: CPT

## 2025-01-31 PROCEDURE — 86704 HEP B CORE ANTIBODY TOTAL: CPT

## 2025-01-31 PROCEDURE — 85025 COMPLETE CBC W/AUTO DIFF WBC: CPT

## 2025-01-31 PROCEDURE — 88305 TISSUE EXAM BY PATHOLOGIST: CPT

## 2025-01-31 PROCEDURE — 84433 ASY THIOPURIN S-MTHYLTRNSFRS: CPT

## 2025-01-31 PROCEDURE — 86039 ANTINUCLEAR ANTIBODIES (ANA): CPT

## 2025-01-31 PROCEDURE — 86901 BLOOD TYPING SEROLOGIC RH(D): CPT

## 2025-01-31 PROCEDURE — 82652 VIT D 1 25-DIHYDROXY: CPT

## 2025-01-31 PROCEDURE — 80048 BASIC METABOLIC PNL TOTAL CA: CPT

## 2025-01-31 PROCEDURE — 84157 ASSAY OF PROTEIN OTHER: CPT

## 2025-01-31 PROCEDURE — 87102 FUNGUS ISOLATION CULTURE: CPT

## 2025-01-31 PROCEDURE — 82009 KETONE BODYS QUAL: CPT

## 2025-01-31 PROCEDURE — 86225 DNA ANTIBODY NATIVE: CPT

## 2025-01-31 PROCEDURE — 84295 ASSAY OF SERUM SODIUM: CPT

## 2025-01-31 PROCEDURE — 93306 TTE W/DOPPLER COMPLETE: CPT

## 2025-01-31 PROCEDURE — 82962 GLUCOSE BLOOD TEST: CPT

## 2025-01-31 PROCEDURE — 86038 ANTINUCLEAR ANTIBODIES: CPT

## 2025-01-31 PROCEDURE — 83880 ASSAY OF NATRIURETIC PEPTIDE: CPT

## 2025-01-31 PROCEDURE — 83516 IMMUNOASSAY NONANTIBODY: CPT

## 2025-01-31 PROCEDURE — 83550 IRON BINDING TEST: CPT

## 2025-01-31 PROCEDURE — 83605 ASSAY OF LACTIC ACID: CPT

## 2025-01-31 PROCEDURE — 88350 IMFLUOR EA ADDL 1ANTB STN PX: CPT

## 2025-01-31 PROCEDURE — 82010 KETONE BODYS QUAN: CPT

## 2025-01-31 PROCEDURE — 84466 ASSAY OF TRANSFERRIN: CPT

## 2025-01-31 PROCEDURE — 87075 CULTR BACTERIA EXCEPT BLOOD: CPT

## 2025-01-31 PROCEDURE — 85610 PROTHROMBIN TIME: CPT

## 2025-01-31 PROCEDURE — 93970 EXTREMITY STUDY: CPT

## 2025-01-31 PROCEDURE — 85027 COMPLETE CBC AUTOMATED: CPT

## 2025-01-31 PROCEDURE — 86148 ANTI-PHOSPHOLIPID ANTIBODY: CPT

## 2025-01-31 PROCEDURE — 88187 FLOWCYTOMETRY/READ 2-8: CPT

## 2025-01-31 PROCEDURE — 84443 ASSAY THYROID STIM HORMONE: CPT

## 2025-01-31 PROCEDURE — 83986 ASSAY PH BODY FLUID NOS: CPT

## 2025-01-31 PROCEDURE — 86235 NUCLEAR ANTIGEN ANTIBODY: CPT

## 2025-01-31 PROCEDURE — 86160 COMPLEMENT ANTIGEN: CPT

## 2025-01-31 PROCEDURE — C1729: CPT

## 2025-01-31 PROCEDURE — 85730 THROMBOPLASTIN TIME PARTIAL: CPT

## 2025-01-31 PROCEDURE — 82570 ASSAY OF URINE CREATININE: CPT

## 2025-01-31 PROCEDURE — 86480 TB TEST CELL IMMUN MEASURE: CPT

## 2025-01-31 PROCEDURE — 82550 ASSAY OF CK (CPK): CPT

## 2025-01-31 PROCEDURE — 85014 HEMATOCRIT: CPT

## 2025-01-31 PROCEDURE — 84484 ASSAY OF TROPONIN QUANT: CPT

## 2025-01-31 PROCEDURE — 0225U NFCT DS DNA&RNA 21 SARSCOV2: CPT

## 2025-01-31 PROCEDURE — 99239 HOSP IP/OBS DSCHRG MGMT >30: CPT

## 2025-01-31 PROCEDURE — 74176 CT ABD & PELVIS W/O CONTRAST: CPT | Mod: MC

## 2025-01-31 PROCEDURE — 83615 LACTATE (LD) (LDH) ENZYME: CPT

## 2025-01-31 PROCEDURE — 86140 C-REACTIVE PROTEIN: CPT

## 2025-01-31 PROCEDURE — 89051 BODY FLUID CELL COUNT: CPT

## 2025-01-31 PROCEDURE — 93005 ELECTROCARDIOGRAM TRACING: CPT

## 2025-01-31 PROCEDURE — 84156 ASSAY OF PROTEIN URINE: CPT

## 2025-01-31 PROCEDURE — 83036 HEMOGLOBIN GLYCOSYLATED A1C: CPT

## 2025-01-31 PROCEDURE — 88313 SPECIAL STAINS GROUP 2: CPT

## 2025-01-31 PROCEDURE — 82330 ASSAY OF CALCIUM: CPT

## 2025-01-31 PROCEDURE — 82435 ASSAY OF BLOOD CHLORIDE: CPT

## 2025-01-31 PROCEDURE — 82803 BLOOD GASES ANY COMBINATION: CPT

## 2025-01-31 PROCEDURE — 71046 X-RAY EXAM CHEST 2 VIEWS: CPT

## 2025-01-31 PROCEDURE — 82947 ASSAY GLUCOSE BLOOD QUANT: CPT

## 2025-01-31 PROCEDURE — 88112 CYTOPATH CELL ENHANCE TECH: CPT

## 2025-01-31 PROCEDURE — 82310 ASSAY OF CALCIUM: CPT

## 2025-01-31 PROCEDURE — 82945 GLUCOSE OTHER FLUID: CPT

## 2025-01-31 PROCEDURE — 83540 ASSAY OF IRON: CPT

## 2025-01-31 PROCEDURE — 80053 COMPREHEN METABOLIC PANEL: CPT

## 2025-01-31 PROCEDURE — 83735 ASSAY OF MAGNESIUM: CPT

## 2025-01-31 PROCEDURE — 71250 CT THORAX DX C-: CPT | Mod: MC

## 2025-01-31 PROCEDURE — 86147 CARDIOLIPIN ANTIBODY EA IG: CPT

## 2025-01-31 PROCEDURE — 87015 SPECIMEN INFECT AGNT CONCNTJ: CPT

## 2025-01-31 PROCEDURE — 88346 IMFLUOR 1ST 1ANTB STAIN PX: CPT

## 2025-01-31 PROCEDURE — 83970 ASSAY OF PARATHORMONE: CPT

## 2025-01-31 PROCEDURE — 84145 PROCALCITONIN (PCT): CPT

## 2025-01-31 PROCEDURE — 84182 PROTEIN WESTERN BLOT TEST: CPT

## 2025-01-31 PROCEDURE — 87081 CULTURE SCREEN ONLY: CPT

## 2025-01-31 PROCEDURE — 86850 RBC ANTIBODY SCREEN: CPT

## 2025-01-31 PROCEDURE — 84132 ASSAY OF SERUM POTASSIUM: CPT

## 2025-01-31 PROCEDURE — 83520 IMMUNOASSAY QUANT NOS NONAB: CPT

## 2025-01-31 PROCEDURE — 83010 ASSAY OF HAPTOGLOBIN QUANT: CPT

## 2025-01-31 PROCEDURE — 85045 AUTOMATED RETICULOCYTE COUNT: CPT

## 2025-01-31 PROCEDURE — 86900 BLOOD TYPING SEROLOGIC ABO: CPT

## 2025-01-31 PROCEDURE — 82164 ANGIOTENSIN I ENZYME TEST: CPT

## 2025-01-31 PROCEDURE — 86146 BETA-2 GLYCOPROTEIN ANTIBODY: CPT

## 2025-01-31 PROCEDURE — 85652 RBC SED RATE AUTOMATED: CPT

## 2025-01-31 PROCEDURE — 87205 SMEAR GRAM STAIN: CPT

## 2025-01-31 PROCEDURE — 84702 CHORIONIC GONADOTROPIN TEST: CPT

## 2025-01-31 RX ORDER — OXYCODONE HYDROCHLORIDE 30 MG/1
1 TABLET ORAL
Qty: 12 | Refills: 0
Start: 2025-01-31 | End: 2025-02-02

## 2025-01-31 RX ORDER — PHENOL 1.4 %
1 AEROSOL, SPRAY (ML) MUCOUS MEMBRANE THREE TIMES A DAY
Refills: 0 | Status: DISCONTINUED | OUTPATIENT
Start: 2025-01-31 | End: 2025-01-31

## 2025-01-31 RX ADMIN — PREDNISONE 60 MILLIGRAM(S): 5 TABLET ORAL at 05:20

## 2025-01-31 RX ADMIN — SULFAMETHOXAZOLE AND TRIMETHOPRIM 1 TABLET(S): 400; 80 TABLET ORAL at 13:08

## 2025-01-31 RX ADMIN — Medication 10 UNIT(S): at 12:12

## 2025-01-31 RX ADMIN — Medication 10 UNIT(S): at 07:49

## 2025-01-31 RX ADMIN — SODIUM BICARBONATE 1300 MILLIGRAM(S): 42 INJECTION, SOLUTION INTRAVENOUS at 05:20

## 2025-01-31 RX ADMIN — Medication 25 MILLIGRAM(S): at 05:21

## 2025-01-31 RX ADMIN — GABAPENTIN 300 MILLIGRAM(S): 800 TABLET ORAL at 05:21

## 2025-01-31 RX ADMIN — Medication 1 MILLIGRAM(S): at 12:15

## 2025-01-31 RX ADMIN — Medication 1000 MICROGRAM(S): at 12:15

## 2025-01-31 RX ADMIN — MYCOPHENOLATE MOFETIL 500 MILLIGRAM(S): 200 POWDER, FOR SUSPENSION ORAL at 05:21

## 2025-01-31 RX ADMIN — INSULIN GLARGINE-YFGN 40 UNIT(S): 100 INJECTION, SOLUTION SUBCUTANEOUS at 13:08

## 2025-01-31 RX ADMIN — Medication 5000 UNIT(S): at 05:21

## 2025-01-31 RX ADMIN — Medication 1 LOZENGE: at 05:20

## 2025-01-31 RX ADMIN — Medication 4: at 12:11

## 2025-01-31 RX ADMIN — Medication 2: at 07:48

## 2025-01-31 RX ADMIN — PANTOPRAZOLE 40 MILLIGRAM(S): 20 TABLET, DELAYED RELEASE ORAL at 05:22

## 2025-01-31 RX ADMIN — Medication 5 MILLIGRAM(S): at 05:21

## 2025-01-31 RX ADMIN — BUMETANIDE 1 MILLIGRAM(S): 2 TABLET ORAL at 07:00

## 2025-01-31 RX ADMIN — LEVOTHYROXINE SODIUM 50 MICROGRAM(S): 25 TABLET ORAL at 05:20

## 2025-01-31 NOTE — PROGRESS NOTE ADULT - NS ATTEND AMEND GEN_ALL_CORE FT
I have seen and examined patient with NP, agree with above assessment and plan.
Did not received the lantus yesterday, please adminster stat today. Don't skip the lantus as patient has type 1
I have seen and examined patient with NP, agree with above assessment and plan
I have seen and examined patient with NP, agree with above assessment and plan.
I have seen and examined patient with NP agree with above assessment and plan.
I have seen and examined patient with NP, agree with above assessment and plan.

## 2025-01-31 NOTE — CHART NOTE - NSCHARTNOTESELECT_GEN_ALL_CORE
Event Note
Rheumatology/Event Note
d/c appt/Event Note
thoracic surgery/Event Note
Critical Value/Event Note

## 2025-01-31 NOTE — PROGRESS NOTE ADULT - PROVIDER SPECIALTY LIST ADULT
Endocrinology
Internal Medicine
Nephrology
Nephrology
Endocrinology
Internal Medicine
Nephrology
Thoracic Surgery
Endocrinology
Internal Medicine
Nephrology
Endocrinology
Nephrology
Nephrology
Internal Medicine
Thoracic Surgery
Thoracic Surgery

## 2025-01-31 NOTE — DISCHARGE NOTE NURSING/CASE MANAGEMENT/SOCIAL WORK - NSDCFUADDAPPT_GEN_ALL_CORE_FT
APPTS ARE READY TO BE MADE: [X] YES    Best Family or Patient Contact (if needed):    Additional Information about above appointments (if needed):    1: Follow up with Dr. Kirkpatrick (Rheumatology) in 1 week  2: Follow up with Nephrology in 2 weeks  3: Follow up with Pulmonology in 2 weeks  4. Follow up with Endocrinology in 1 week    Other comments or requests:      Met with patient face to face and provided the patient with provider referral information, however patient prefers to schedule the appointments on their own.

## 2025-01-31 NOTE — PROGRESS NOTE ADULT - REASON FOR ADMISSION
sob/patel
Shortness of breath
sob/patel

## 2025-01-31 NOTE — PROGRESS NOTE ADULT - ASSESSMENT
59F with PMH of HTN, hypothyroid, IDDM type I, chronic anemia, HFpEF (11/02/24 55%), CKD was sent in from Dr. Matson's office (pulm) b/c of pt having worsening SOB, worsening renal function, evidence of b/l pleural effusions. Patient is being evaluated for worsening kidney function.     Consulted for diabetes management  Home regimen: Lantus 19 units daily, Humalog ICR 1:10  Current a1c: 9.3%  Outpatient Endocrinologist: Dr Fitch    1. Poorly controlled DM1- steroid induced hyperglycemia  - Continue lantus 40 units daily (scheduled at 1PM) - she did not receive lantus yesterday  - Continue admelog 10 units TID with meals  - S/p IV solumedrol, now on PO Prednisone 60 mg daily (will be discharged on this dose)  - Follow up appt: 3/5 @ 2:00PM ([address])  --> will need to be closely monitored with steroid regimen  - Pt instructed to call office if experiencing hypoglycemia or consistent hyperglycemia (FS <80, or FS>200)  - Do not hold basal insulin in T1DM as it can result in DKA    2. Hypothyroidism   - TSH 5.83  - On LT4 50 mcg daily, continue for now     3. Renal failure  - S/p IR kidney biopsy 1/24  - Nephrology following  - On steroids

## 2025-01-31 NOTE — PROGRESS NOTE ADULT - NS ATTEND BILL GEN_ALL_CORE
Attending to bill
REGIONAL

## 2025-01-31 NOTE — DISCHARGE NOTE NURSING/CASE MANAGEMENT/SOCIAL WORK - FINANCIAL ASSISTANCE
[facility] provides services at a reduced cost to those who are determined to be eligible through [facility]’s financial assistance program. Information regarding [facility]’s financial assistance program can be found by going to [email or web address] or by calling [phone].

## 2025-01-31 NOTE — DISCHARGE NOTE NURSING/CASE MANAGEMENT/SOCIAL WORK - PATIENT PORTAL LINK FT
You can access the FollowMyHealth Patient Portal offered by [facility] by registering at the following website: [email or web address]. By joining [facility]’s FollowMyHealth portal, you will also be able to view your health information using other applications (apps) compatible with our system.

## 2025-01-31 NOTE — PROGRESS NOTE ADULT - SUBJECTIVE AND OBJECTIVE BOX
INTERVAL EVENTS:  Follow up diabetes management.  Glucose elevated in 500s yesterday- never received lantus dose that was ordered.     MEDICATIONS  (STANDING):  amLODIPine   Tablet 5 milliGRAM(s) Oral daily  buMETAnide Injectable 1 milliGRAM(s) IV Push daily  cyanocobalamin 1000 MICROGram(s) Oral daily  dextrose 5%. 1000 milliLiter(s) (50 mL/Hr) IV Continuous <Continuous>  dextrose 5%. 1000 milliLiter(s) (100 mL/Hr) IV Continuous <Continuous>  dextrose 5%. 1000 milliLiter(s) (50 mL/Hr) IV Continuous <Continuous>  dextrose 50% Injectable 25 Gram(s) IV Push once  dextrose 50% Injectable 12.5 Gram(s) IV Push once  dextrose 50% Injectable 25 Gram(s) IV Push once  epoetin mira-epbx (RETACRIT) Injectable 16705 Unit(s) SubCutaneous every 7 days  folic acid 1 milliGRAM(s) Oral daily  gabapentin 300 milliGRAM(s) Oral two times a day  glucagon  Injectable 1 milliGRAM(s) IntraMuscular once  influenza   Vaccine 0.5 milliLiter(s) IntraMuscular once  insulin lispro (ADMELOG) corrective regimen sliding scale   SubCutaneous three times a day before meals  insulin lispro Injectable (ADMELOG) 4 Unit(s) SubCutaneous three times a day before meals  iron sucrose IVPB 200 milliGRAM(s) IV Intermittent every 24 hours  levothyroxine 50 MICROGram(s) Oral daily  methylPREDNISolone sodium succinate Injectable 40 milliGRAM(s) IV Push two times a day  sodium bicarbonate 650 milliGRAM(s) Oral two times a day    MEDICATIONS  (PRN):  acetaminophen     Tablet .. 650 milliGRAM(s) Oral every 6 hours PRN Temp greater or equal to 38C (100.4F), Mild Pain (1 - 3)  ALPRAZolam 0.25 milliGRAM(s) Oral every 12 hours PRN anxiety  aluminum hydroxide/magnesium hydroxide/simethicone Suspension 30 milliLiter(s) Oral every 4 hours PRN Dyspepsia  dextrose Oral Gel 15 Gram(s) Oral once PRN Blood Glucose LESS THAN 70 milliGRAM(s)/deciliter  melatonin 3 milliGRAM(s) Oral at bedtime PRN Insomnia  ondansetron Injectable 4 milliGRAM(s) IV Push every 8 hours PRN Nausea and/or Vomiting  oxyCODONE    IR 5 milliGRAM(s) Oral every 4 hours PRN Severe Pain (7 - 10)    Allergies  No Known Allergies    Vital Signs Last 24 Hrs  T(C): 36.5 (24 Jan 2025 09:52), Max: 36.5 (23 Jan 2025 19:29)  T(F): 97.7 (24 Jan 2025 09:52), Max: 97.7 (23 Jan 2025 19:29)  HR: 82 (24 Jan 2025 09:52) (80 - 86)  BP: 130/75 (24 Jan 2025 09:52) (130/75 - 145/79)  BP(mean): --  RR: 18 (24 Jan 2025 09:52) (18 - 18)  SpO2: 100% (24 Jan 2025 09:52) (92% - 100%)    Parameters below as of 24 Jan 2025 07:27  Patient On (Oxygen Delivery Method): room air    PHYSICAL EXAM:  General: No apparent distress  Respiratory: Lungs clear bilaterally  Cardiac: +S1, S2, no m/r/g  GI: +BS, soft, non tender, non distended  Extremities: No peripheral edema  Neuro: A+O X3    LABS:                        9.8    3.38  )-----------( 517      ( 24 Jan 2025 03:25 )             31.5     01-24    137  |  104  |  37.9[H]  ----------------------------<  236[H]  5.3   |  20.0[L]  |  2.63[H]    Ca    8.3[L]      24 Jan 2025 03:25  Phos  3.6     01-24  Mg     1.8     01-24    TPro  5.9[L]  /  Alb  2.3[L]  /  TBili  <0.2[L]  /  DBili  x   /  AST  7   /  ALT  <5  /  AlkPhos  76  01-24    Urinalysis Basic - ( 24 Jan 2025 03:25 )    Color: x / Appearance: x / SG: x / pH: x  Gluc: 236 mg/dL / Ketone: x  / Bili: x / Urobili: x   Blood: x / Protein: x / Nitrite: x   Leuk Esterase: x / RBC: x / WBC x   Sq Epi: x / Non Sq Epi: x / Bacteria: x    POCT Blood Glucose.: 286 mg/dL (01-24-25 @ 11:51)  POCT Blood Glucose.: 297 mg/dL (01-24-25 @ 07:25)  POCT Blood Glucose.: 279 mg/dL (01-24-25 @ 00:33)  POCT Blood Glucose.: 329 mg/dL (01-23-25 @ 22:36)  POCT Blood Glucose.: 475 mg/dL (01-23-25 @ 19:50)  POCT Blood Glucose.: 564 mg/dL (01-23-25 @ 18:08)  POCT Blood Glucose.: 550 mg/dL (01-23-25 @ 18:07)  
INTERVAL EVENTS:  Follow up diabetes management. Glucoses remain elevated due to steroids despite insulin dose adjustments.  Pt offers no acute complaints at time of exam.     MEDICATIONS  (STANDING):  amLODIPine   Tablet 5 milliGRAM(s) Oral daily  buMETAnide Injectable 1 milliGRAM(s) IV Push daily  cyanocobalamin 1000 MICROGram(s) Oral daily  dextrose 5%. 1000 milliLiter(s) (100 mL/Hr) IV Continuous <Continuous>  dextrose 5%. 1000 milliLiter(s) (50 mL/Hr) IV Continuous <Continuous>  dextrose 50% Injectable 25 Gram(s) IV Push once  dextrose 50% Injectable 12.5 Gram(s) IV Push once  dextrose 50% Injectable 25 Gram(s) IV Push once  epoetin mira-epbx (RETACRIT) Injectable 99538 Unit(s) SubCutaneous every 7 days  folic acid 1 milliGRAM(s) Oral daily  gabapentin 300 milliGRAM(s) Oral two times a day  glucagon  Injectable 1 milliGRAM(s) IntraMuscular once  heparin   Injectable 5000 Unit(s) SubCutaneous every 12 hours  influenza   Vaccine 0.5 milliLiter(s) IntraMuscular once  insulin glargine Injectable (LANTUS) 36 Unit(s) SubCutaneous <User Schedule>  insulin lispro (ADMELOG) corrective regimen sliding scale   SubCutaneous at bedtime  insulin lispro (ADMELOG) corrective regimen sliding scale   SubCutaneous three times a day before meals  insulin lispro Injectable (ADMELOG) 20 Unit(s) SubCutaneous three times a day before meals  levothyroxine 50 MICROGram(s) Oral daily  methylPREDNISolone sodium succinate Injectable 40 milliGRAM(s) IV Push two times a day  pantoprazole    Tablet 40 milliGRAM(s) Oral before breakfast  polyethylene glycol 3350 17 Gram(s) Oral at bedtime  sodium bicarbonate 650 milliGRAM(s) Oral two times a day    MEDICATIONS  (PRN):  acetaminophen     Tablet .. 650 milliGRAM(s) Oral every 6 hours PRN Temp greater or equal to 38C (100.4F), Mild Pain (1 - 3)  ALPRAZolam 0.25 milliGRAM(s) Oral every 12 hours PRN anxiety  aluminum hydroxide/magnesium hydroxide/simethicone Suspension 30 milliLiter(s) Oral every 4 hours PRN Dyspepsia  dextrose Oral Gel 15 Gram(s) Oral once PRN Blood Glucose LESS THAN 70 milliGRAM(s)/deciliter  melatonin 3 milliGRAM(s) Oral at bedtime PRN Insomnia  ondansetron Injectable 4 milliGRAM(s) IV Push every 8 hours PRN Nausea and/or Vomiting  oxyCODONE    IR 5 milliGRAM(s) Oral every 4 hours PRN Severe Pain (7 - 10)  senna 2 Tablet(s) Oral at bedtime PRN Constipation    Allergies  No Known Allergies    Vital Signs Last 24 Hrs  T(C): 36.5 (28 Jan 2025 09:29), Max: 36.6 (27 Jan 2025 11:35)  T(F): 97.7 (28 Jan 2025 09:29), Max: 97.9 (27 Jan 2025 11:35)  HR: 77 (28 Jan 2025 09:29) (77 - 89)  BP: 132/76 (28 Jan 2025 09:29) (105/63 - 138/73)  BP(mean): --  RR: 18 (28 Jan 2025 09:29) (18 - 18)  SpO2: 96% (28 Jan 2025 09:29) (93% - 96%)    Parameters below as of 28 Jan 2025 09:29  Patient On (Oxygen Delivery Method): room air    PHYSICAL EXAM:  General: No apparent distress  Respiratory: Lungs clear bilaterally  Cardiac: +S1, S2, no m/r/g  GI: +BS, soft, non tender, non distended  Extremities: No peripheral edema  Neuro: A+O X3    LABS:                        10.0   14.79 )-----------( 475      ( 28 Jan 2025 04:26 )             31.3     01-28    133[L]  |  99  |  75.7[H]  ----------------------------<  295[H]  4.2   |  20.0[L]  |  2.85[H]    Ca    8.1[L]      28 Jan 2025 04:26  Phos  3.1     01-28  Mg     1.9     01-28      Urinalysis Basic - ( 28 Jan 2025 04:26 )    Color: x / Appearance: x / SG: x / pH: x  Gluc: 295 mg/dL / Ketone: x  / Bili: x / Urobili: x   Blood: x / Protein: x / Nitrite: x   Leuk Esterase: x / RBC: x / WBC x   Sq Epi: x / Non Sq Epi: x / Bacteria: x    POCT Blood Glucose.: 311 mg/dL (01-28-25 @ 07:55)  POCT Blood Glucose.: 347 mg/dL (01-27-25 @ 22:08)  POCT Blood Glucose.: 327 mg/dL (01-27-25 @ 17:13)  POCT Blood Glucose.: 355 mg/dL (01-27-25 @ 15:43)  POCT Blood Glucose.: 397 mg/dL (01-27-25 @ 11:32)    Thyroid Stimulating Hormone, Serum: 5.83 uIU/mL (01-27-25 @ 04:30)  
NEPHROLOGY INTERVAL HPI/OVERNIGHT EVENTS:    Examined earlier  Feeling better  Denies HA CP no SOB    MEDICATIONS  (STANDING):  amLODIPine   Tablet 5 milliGRAM(s) Oral daily  buMETAnide Injectable 1 milliGRAM(s) IV Push daily  cyanocobalamin 1000 MICROGram(s) Oral daily  dextrose 5%. 1000 milliLiter(s) (100 mL/Hr) IV Continuous <Continuous>  dextrose 5%. 1000 milliLiter(s) (50 mL/Hr) IV Continuous <Continuous>  dextrose 50% Injectable 25 Gram(s) IV Push once  dextrose 50% Injectable 12.5 Gram(s) IV Push once  dextrose 50% Injectable 25 Gram(s) IV Push once  epoetin mira-epbx (RETACRIT) Injectable 53045 Unit(s) SubCutaneous every 7 days  folic acid 1 milliGRAM(s) Oral daily  gabapentin 300 milliGRAM(s) Oral two times a day  glucagon  Injectable 1 milliGRAM(s) IntraMuscular once  heparin   Injectable 5000 Unit(s) SubCutaneous every 12 hours  influenza   Vaccine 0.5 milliLiter(s) IntraMuscular once  insulin glargine Injectable (LANTUS) 36 Unit(s) SubCutaneous <User Schedule>  insulin lispro (ADMELOG) corrective regimen sliding scale   SubCutaneous at bedtime  insulin lispro (ADMELOG) corrective regimen sliding scale   SubCutaneous three times a day before meals  insulin lispro Injectable (ADMELOG) 20 Unit(s) SubCutaneous three times a day before meals  levothyroxine 50 MICROGram(s) Oral daily  methylPREDNISolone sodium succinate Injectable 40 milliGRAM(s) IV Push two times a day  pantoprazole    Tablet 40 milliGRAM(s) Oral before breakfast  polyethylene glycol 3350 17 Gram(s) Oral at bedtime  sodium bicarbonate 1300 milliGRAM(s) Oral three times a day    MEDICATIONS  (PRN):  acetaminophen     Tablet .. 650 milliGRAM(s) Oral every 6 hours PRN Temp greater or equal to 38C (100.4F), Mild Pain (1 - 3)  ALPRAZolam 0.25 milliGRAM(s) Oral every 12 hours PRN anxiety  aluminum hydroxide/magnesium hydroxide/simethicone Suspension 30 milliLiter(s) Oral every 4 hours PRN Dyspepsia  dextrose Oral Gel 15 Gram(s) Oral once PRN Blood Glucose LESS THAN 70 milliGRAM(s)/deciliter  melatonin 3 milliGRAM(s) Oral at bedtime PRN Insomnia  ondansetron Injectable 4 milliGRAM(s) IV Push every 8 hours PRN Nausea and/or Vomiting  oxyCODONE    IR 5 milliGRAM(s) Oral every 4 hours PRN Severe Pain (7 - 10)  senna 2 Tablet(s) Oral at bedtime PRN Constipation      Allergies    No Known Allergies    Intolerances        Vital Signs Last 24 Hrs  T(C): 36.5 (2025 09:29), Max: 36.5 (2025 17:28)  T(F): 97.7 (2025 09:29), Max: 97.7 (2025 17:28)  HR: 77 (:) (77 - 88)  BP: 132/76 (:) (108/66 - 138/73)  BP(mean): --  RR: 18 (:29) (18 - 18)  SpO2: 96% (:29) (93% - 96%)    Parameters below as of 2025 09:29  Patient On (Oxygen Delivery Method): room air      Daily     Daily Weight in k.1 (2025 00:23)    PHYSICAL EXAM:  GENERAL: Weak, deconditioned  HEENT: Moist MMs  NECK: Supple, No JVD  NERVOUS SYSTEM:  Alert & Oriented X3  CHEST/LUNG: Diminished bibasilar BS  HEART: Regular rate and rhythm; No rub   ABDOMEN: Soft, Nontender, +BS  EXTREMITIES: + dependent B/L LE edema    LABS:                        10.0   14.79 )-----------( 475      ( 2025 04:26 )             31.3         133[L]  |  99  |  75.7[H]  ----------------------------<  295[H]  4.2   |  20.0[L]  |  2.85[H]    Ca    8.1[L]      2025 04:26  Phos  3.1       Mg     1.9             Urinalysis Basic - ( 2025 04:26 )    Color: x / Appearance: x / SG: x / pH: x  Gluc: 295 mg/dL / Ketone: x  / Bili: x / Urobili: x   Blood: x / Protein: x / Nitrite: x   Leuk Esterase: x / RBC: x / WBC x   Sq Epi: x / Non Sq Epi: x / Bacteria: x      Magnesium: 1.9 mg/dL ( @ 04:26)  Phosphorus: 3.1 mg/dL ( @ 04:26)          RADIOLOGY & ADDITIONAL TESTS:  
NEPHROLOGY INTERVAL HPI/OVERNIGHT EVENTS:  clinically unchanged  uneventful overnight    MEDICATIONS  (STANDING):  amLODIPine   Tablet 5 milliGRAM(s) Oral daily  buMETAnide Injectable 1 milliGRAM(s) IV Push daily  cyanocobalamin 1000 MICROGram(s) Oral daily  dextrose 5%. 1000 milliLiter(s) (100 mL/Hr) IV Continuous <Continuous>  dextrose 5%. 1000 milliLiter(s) (50 mL/Hr) IV Continuous <Continuous>  dextrose 50% Injectable 25 Gram(s) IV Push once  dextrose 50% Injectable 12.5 Gram(s) IV Push once  dextrose 50% Injectable 25 Gram(s) IV Push once  epoetin mira-epbx (RETACRIT) Injectable 02483 Unit(s) SubCutaneous every 7 days  folic acid 1 milliGRAM(s) Oral daily  gabapentin 300 milliGRAM(s) Oral two times a day  glucagon  Injectable 1 milliGRAM(s) IntraMuscular once  influenza   Vaccine 0.5 milliLiter(s) IntraMuscular once  insulin glargine Injectable (LANTUS) 25 Unit(s) SubCutaneous <User Schedule>  insulin lispro (ADMELOG) corrective regimen sliding scale   SubCutaneous three times a day before meals  insulin lispro Injectable (ADMELOG) 8 Unit(s) SubCutaneous three times a day before meals  iron sucrose IVPB 200 milliGRAM(s) IV Intermittent every 24 hours  levothyroxine 50 MICROGram(s) Oral daily  methylPREDNISolone sodium succinate Injectable 40 milliGRAM(s) IV Push two times a day  pantoprazole    Tablet 40 milliGRAM(s) Oral before breakfast  polyethylene glycol 3350 17 Gram(s) Oral at bedtime  sodium bicarbonate 650 milliGRAM(s) Oral two times a day    MEDICATIONS  (PRN):  acetaminophen     Tablet .. 650 milliGRAM(s) Oral every 6 hours PRN Temp greater or equal to 38C (100.4F), Mild Pain (1 - 3)  ALPRAZolam 0.25 milliGRAM(s) Oral every 12 hours PRN anxiety  aluminum hydroxide/magnesium hydroxide/simethicone Suspension 30 milliLiter(s) Oral every 4 hours PRN Dyspepsia  dextrose Oral Gel 15 Gram(s) Oral once PRN Blood Glucose LESS THAN 70 milliGRAM(s)/deciliter  melatonin 3 milliGRAM(s) Oral at bedtime PRN Insomnia  ondansetron Injectable 4 milliGRAM(s) IV Push every 8 hours PRN Nausea and/or Vomiting  oxyCODONE    IR 5 milliGRAM(s) Oral every 4 hours PRN Severe Pain (7 - 10)  senna 2 Tablet(s) Oral at bedtime PRN Constipation      Allergies    No Known Allergies          Vital Signs Last 24 Hrs  T(C): 36.4 (26 Jan 2025 04:34), Max: 36.4 (25 Jan 2025 08:58)  T(F): 97.6 (26 Jan 2025 04:34), Max: 97.6 (26 Jan 2025 04:34)  HR: 81 (26 Jan 2025 04:34) (81 - 88)  BP: 154/78 (26 Jan 2025 04:34) (112/67 - 154/78)  BP(mean): --  RR: 18 (26 Jan 2025 04:34) (18 - 18)  SpO2: 93% (26 Jan 2025 04:34) (93% - 96%)    Parameters below as of 26 Jan 2025 04:34  Patient On (Oxygen Delivery Method): room air        PHYSICAL EXAM:  GENERAL: Weak, deconditioned  HEENT: Moist MMs  NECK: Supple, No JVD  NERVOUS SYSTEM:  Alert & Oriented X3  CHEST/LUNG: Diminished bibasilar BS  HEART: Regular rate and rhythm; No rub   ABDOMEN: Soft, Nontender, +BS  EXTREMITIES: + dependent B/L LE edema    LABS:                        10.3   7.64  )-----------( 496      ( 25 Jan 2025 04:30 )             33.2     01-25    132[L]  |  99  |  49.7[H]  ----------------------------<  399[H]  5.6[H]   |  20.0[L]  |  3.01[H]    Ca    8.0[L]      25 Jan 2025 04:30  Phos  5.2     01-25  Mg     1.9     01-25        Urinalysis Basic - ( 25 Jan 2025 04:30 )    Color: x / Appearance: x / SG: x / pH: x  Gluc: 399 mg/dL / Ketone: x  / Bili: x / Urobili: x   Blood: x / Protein: x / Nitrite: x   Leuk Esterase: x / RBC: x / WBC x   Sq Epi: x / Non Sq Epi: x / Bacteria: x          RADIOLOGY & ADDITIONAL TESTS:  < from: Xray Chest 1 View- PORTABLE-Urgent (Xray Chest 1 View- PORTABLE-Urgent .) (01.24.25 @ 14:33) >  ACC: [id number] EXAM:  XR CHEST PORTABLE URGENT 1V   ORDERED BY: RENETTA PITT     PROCEDURE DATE:  01/24/2025          INTERPRETATION:  XR CHEST URGENT dated 1/24/2025 2:33 PM    CLINICAL INFORMATION: Female, 59 years old.  s/p RT CT removal.    PRIOR STUDIES: 1/24/2025    FINDINGS/  IMPRESSION: There has been interval removal of a right-sided pigtail   catheter. No pneumothorax. There is a persistent, and unchanged small to   moderate-sized left-sided pleural effusion. The remainder the lung zones   are clear. No pneumothorax.    < end of copied text >  
[facility] Division of Hospital Medicine  Krishna Esparza DO  I'm reachable on [employer]    Patient is a 59y old  Female who presents with a chief complaint of sob/patel (26 Jan 2025 06:13)      SUBJECTIVE / OVERNIGHT EVENTS:  Patient seen and examined at bedside. Denies chest pain, shortness of breath at this time, abd pain, or any other acute symptoms. She understands that she is pending biopsy results and serology results. Offers no complaints at this time. Daughter updated.     MEDICATIONS  (STANDING):  amLODIPine   Tablet 5 milliGRAM(s) Oral daily  buMETAnide Injectable 1 milliGRAM(s) IV Push daily  cyanocobalamin 1000 MICROGram(s) Oral daily  dextrose 5%. 1000 milliLiter(s) (100 mL/Hr) IV Continuous <Continuous>  dextrose 5%. 1000 milliLiter(s) (50 mL/Hr) IV Continuous <Continuous>  dextrose 50% Injectable 25 Gram(s) IV Push once  dextrose 50% Injectable 12.5 Gram(s) IV Push once  dextrose 50% Injectable 25 Gram(s) IV Push once  epoetin mira-epbx (RETACRIT) Injectable 90993 Unit(s) SubCutaneous every 7 days  folic acid 1 milliGRAM(s) Oral daily  gabapentin 300 milliGRAM(s) Oral two times a day  glucagon  Injectable 1 milliGRAM(s) IntraMuscular once  influenza   Vaccine 0.5 milliLiter(s) IntraMuscular once  insulin glargine Injectable (LANTUS) 25 Unit(s) SubCutaneous <User Schedule>  insulin lispro (ADMELOG) corrective regimen sliding scale   SubCutaneous three times a day before meals  insulin lispro Injectable (ADMELOG) 8 Unit(s) SubCutaneous three times a day before meals  iron sucrose IVPB 200 milliGRAM(s) IV Intermittent every 24 hours  levothyroxine 50 MICROGram(s) Oral daily  methylPREDNISolone sodium succinate Injectable 40 milliGRAM(s) IV Push two times a day  pantoprazole    Tablet 40 milliGRAM(s) Oral before breakfast  polyethylene glycol 3350 17 Gram(s) Oral at bedtime  sodium bicarbonate 650 milliGRAM(s) Oral two times a day  sodium zirconium cyclosilicate 10 Gram(s) Oral daily    MEDICATIONS  (PRN):  acetaminophen     Tablet .. 650 milliGRAM(s) Oral every 6 hours PRN Temp greater or equal to 38C (100.4F), Mild Pain (1 - 3)  ALPRAZolam 0.25 milliGRAM(s) Oral every 12 hours PRN anxiety  aluminum hydroxide/magnesium hydroxide/simethicone Suspension 30 milliLiter(s) Oral every 4 hours PRN Dyspepsia  dextrose Oral Gel 15 Gram(s) Oral once PRN Blood Glucose LESS THAN 70 milliGRAM(s)/deciliter  melatonin 3 milliGRAM(s) Oral at bedtime PRN Insomnia  ondansetron Injectable 4 milliGRAM(s) IV Push every 8 hours PRN Nausea and/or Vomiting  oxyCODONE    IR 5 milliGRAM(s) Oral every 4 hours PRN Severe Pain (7 - 10)  senna 2 Tablet(s) Oral at bedtime PRN Constipation    CAPILLARY BLOOD GLUCOSE      POCT Blood Glucose.: 348 mg/dL (26 Jan 2025 12:04)  POCT Blood Glucose.: 364 mg/dL (26 Jan 2025 08:08)  POCT Blood Glucose.: 267 mg/dL (25 Jan 2025 22:52)  POCT Blood Glucose.: 324 mg/dL (25 Jan 2025 18:15)  POCT Blood Glucose.: 293 mg/dL (25 Jan 2025 16:25)    I&O's Summary    25 Jan 2025 07:01  -  26 Jan 2025 07:00  --------------------------------------------------------  IN: 100 mL / OUT: 0 mL / NET: 100 mL        PHYSICAL EXAM:  Vital Signs Last 24 Hrs  T(C): 36.4 (26 Jan 2025 10:58), Max: 36.4 (25 Jan 2025 15:59)  T(F): 97.5 (26 Jan 2025 10:58), Max: 97.6 (26 Jan 2025 04:34)  HR: 85 (26 Jan 2025 10:58) (81 - 88)  BP: 127/71 (26 Jan 2025 10:58) (116/70 - 154/78)  BP(mean): --  RR: 18 (26 Jan 2025 10:58) (18 - 18)  SpO2: 95% (26 Jan 2025 10:58) (93% - 95%)    Parameters below as of 26 Jan 2025 10:58  Patient On (Oxygen Delivery Method): room air        CONSTITUTIONAL: NAD, well-developed, well-groomed  EYES:  EOMI, conjunctiva and sclera clear  ENMT: Moist oral mucosa  NECK: Supple, no JVD  RESPIRATORY: Normal respiratory effort; lungs are clear to auscultation bilaterally  CARDIOVASCULAR: Regular rate and rhythm, normal S1 and S2, trace edema in bilateral lower extremities  ABDOMEN: normoactive bowel sounds, soft, nontender to palpation, no distension   MUSCULOSKELETAL:  no clubbing or cyanosis of digits; no joint swelling or tenderness to palpation  PSYCH: A+O x3; affect appropriate, calm and cooperative  NEUROLOGY: CN 2-12 are intact and symmetric; no gross sensory deficits     LABS:                        10.3   7.64  )-----------( 496      ( 25 Jan 2025 04:30 )             33.2     01-25    132[L]  |  99  |  49.7[H]  ----------------------------<  399[H]  5.6[H]   |  20.0[L]  |  3.01[H]    Ca    8.0[L]      25 Jan 2025 04:30  Phos  5.2     01-25  Mg     1.9     01-25            Urinalysis Basic - ( 25 Jan 2025 04:30 )    Color: x / Appearance: x / SG: x / pH: x  Gluc: 399 mg/dL / Ketone: x  / Bili: x / Urobili: x   Blood: x / Protein: x / Nitrite: x   Leuk Esterase: x / RBC: x / WBC x   Sq Epi: x / Non Sq Epi: x / Bacteria: x        Culture - Fungal, Body Fluid (collected 23 Jan 2025 15:30)  Source: Pleural Fl  Preliminary Report (26 Jan 2025 08:23):    No growth    Culture - Body Fluid with Gram Stain (collected 23 Jan 2025 15:30)  Source: Pleural Fl  Gram Stain (24 Jan 2025 06:05):    polymorphonuclear leukocytes seen    No organisms seen    by cytocentrifuge  Preliminary Report (25 Jan 2025 09:35):    No growth to date.    
INTERVAL EVENTS:  Follow up diabetes management. Glucoses remain elevated, on IV steroids. Denies acute complaints at time of exam.     MEDICATIONS  (STANDING):  amLODIPine   Tablet 5 milliGRAM(s) Oral daily  buMETAnide Injectable 1 milliGRAM(s) IV Push daily  cyanocobalamin 1000 MICROGram(s) Oral daily  dextrose 5%. 1000 milliLiter(s) (100 mL/Hr) IV Continuous <Continuous>  dextrose 5%. 1000 milliLiter(s) (50 mL/Hr) IV Continuous <Continuous>  dextrose 50% Injectable 25 Gram(s) IV Push once  dextrose 50% Injectable 12.5 Gram(s) IV Push once  dextrose 50% Injectable 25 Gram(s) IV Push once  epoetin mira-epbx (RETACRIT) Injectable 35849 Unit(s) SubCutaneous every 7 days  folic acid 1 milliGRAM(s) Oral daily  gabapentin 300 milliGRAM(s) Oral two times a day  glucagon  Injectable 1 milliGRAM(s) IntraMuscular once  heparin   Injectable 5000 Unit(s) SubCutaneous every 12 hours  influenza   Vaccine 0.5 milliLiter(s) IntraMuscular once  insulin glargine Injectable (LANTUS) 36 Unit(s) SubCutaneous <User Schedule>  insulin lispro (ADMELOG) corrective regimen sliding scale   SubCutaneous at bedtime  insulin lispro (ADMELOG) corrective regimen sliding scale   SubCutaneous three times a day before meals  insulin lispro Injectable (ADMELOG) 20 Unit(s) SubCutaneous three times a day before meals  levothyroxine 50 MICROGram(s) Oral daily  methylPREDNISolone sodium succinate Injectable 40 milliGRAM(s) IV Push two times a day  pantoprazole    Tablet 40 milliGRAM(s) Oral before breakfast  sodium bicarbonate 1300 milliGRAM(s) Oral three times a day    MEDICATIONS  (PRN):  acetaminophen     Tablet .. 650 milliGRAM(s) Oral every 6 hours PRN Temp greater or equal to 38C (100.4F), Mild Pain (1 - 3)  ALPRAZolam 0.25 milliGRAM(s) Oral every 12 hours PRN anxiety  aluminum hydroxide/magnesium hydroxide/simethicone Suspension 30 milliLiter(s) Oral every 4 hours PRN Dyspepsia  dextrose Oral Gel 15 Gram(s) Oral once PRN Blood Glucose LESS THAN 70 milliGRAM(s)/deciliter  melatonin 3 milliGRAM(s) Oral at bedtime PRN Insomnia  ondansetron Injectable 4 milliGRAM(s) IV Push every 8 hours PRN Nausea and/or Vomiting  oxyCODONE    IR 5 milliGRAM(s) Oral every 4 hours PRN Severe Pain (7 - 10)  senna 2 Tablet(s) Oral at bedtime PRN Constipation    Allergies  No Known Allergies    Vital Signs Last 24 Hrs  T(C): 36.3 (29 Jan 2025 04:28), Max: 36.4 (28 Jan 2025 15:52)  T(F): 97.4 (29 Jan 2025 04:28), Max: 97.6 (28 Jan 2025 20:18)  HR: 84 (29 Jan 2025 04:28) (84 - 90)  BP: 130/83 (29 Jan 2025 04:28) (119/69 - 130/83)  BP(mean): --  RR: 18 (29 Jan 2025 04:28) (18 - 18)  SpO2: 93% (29 Jan 2025 04:28) (93% - 94%)    Parameters below as of 29 Jan 2025 04:28  Patient On (Oxygen Delivery Method): room air    PHYSICAL EXAM:  General: No apparent distress  Respiratory: Lungs clear bilaterally  Cardiac: +S1, S2, no m/r/g  GI: +BS, soft, non tender, non distended  Extremities: No peripheral edema  Neuro: A+O X3    LABS:                        10.2   15.95 )-----------( 443      ( 29 Jan 2025 04:25 )             32.3     01-29    132[L]  |  97  |  79.1[H]  ----------------------------<  380[H]  4.2   |  21.0[L]  |  2.39[H]    Ca    7.9[L]      29 Jan 2025 04:25  Phos  3.1     01-28  Mg     1.9     01-28      Urinalysis Basic - ( 29 Jan 2025 04:25 )    Color: x / Appearance: x / SG: x / pH: x  Gluc: 380 mg/dL / Ketone: x  / Bili: x / Urobili: x   Blood: x / Protein: x / Nitrite: x   Leuk Esterase: x / RBC: x / WBC x   Sq Epi: x / Non Sq Epi: x / Bacteria: x    POCT Blood Glucose.: 283 mg/dL (01-29-25 @ 08:19)  POCT Blood Glucose.: 359 mg/dL (01-29-25 @ 05:14)  POCT Blood Glucose.: 208 mg/dL (01-28-25 @ 21:49)  POCT Blood Glucose.: 246 mg/dL (01-28-25 @ 16:59)  POCT Blood Glucose.: 345 mg/dL (01-28-25 @ 12:00)    Thyroid Stimulating Hormone, Serum: 5.83 uIU/mL (01-27-25 @ 04:30)  
Interval Events:  Follow up diabetes management.  Sugars are extremely high as patient was put on steroids     ROS as noted above    No Known Allergies      MEDICATIONS  (STANDING):  amLODIPine   Tablet 5 milliGRAM(s) Oral daily  buMETAnide Injectable 1 milliGRAM(s) IV Push daily  cyanocobalamin 1000 MICROGram(s) Oral daily  dextrose 5%. 1000 milliLiter(s) (100 mL/Hr) IV Continuous <Continuous>  dextrose 5%. 1000 milliLiter(s) (50 mL/Hr) IV Continuous <Continuous>  dextrose 50% Injectable 25 Gram(s) IV Push once  dextrose 50% Injectable 12.5 Gram(s) IV Push once  dextrose 50% Injectable 25 Gram(s) IV Push once  epoetin mira-epbx (RETACRIT) Injectable 50290 Unit(s) SubCutaneous every 7 days  folic acid 1 milliGRAM(s) Oral daily  gabapentin 300 milliGRAM(s) Oral two times a day  glucagon  Injectable 1 milliGRAM(s) IntraMuscular once  influenza   Vaccine 0.5 milliLiter(s) IntraMuscular once  insulin glargine Injectable (LANTUS) 25 Unit(s) SubCutaneous <User Schedule>  insulin lispro (ADMELOG) corrective regimen sliding scale   SubCutaneous three times a day before meals  insulin lispro Injectable (ADMELOG) 12 Unit(s) SubCutaneous three times a day before meals  iron sucrose IVPB 200 milliGRAM(s) IV Intermittent every 24 hours  levothyroxine 50 MICROGram(s) Oral daily  methylPREDNISolone sodium succinate Injectable 40 milliGRAM(s) IV Push two times a day  pantoprazole    Tablet 40 milliGRAM(s) Oral before breakfast  polyethylene glycol 3350 17 Gram(s) Oral at bedtime  sodium bicarbonate 650 milliGRAM(s) Oral two times a day  sodium zirconium cyclosilicate 10 Gram(s) Oral daily    MEDICATIONS  (PRN):  acetaminophen     Tablet .. 650 milliGRAM(s) Oral every 6 hours PRN Temp greater or equal to 38C (100.4F), Mild Pain (1 - 3)  ALPRAZolam 0.25 milliGRAM(s) Oral every 12 hours PRN anxiety  aluminum hydroxide/magnesium hydroxide/simethicone Suspension 30 milliLiter(s) Oral every 4 hours PRN Dyspepsia  dextrose Oral Gel 15 Gram(s) Oral once PRN Blood Glucose LESS THAN 70 milliGRAM(s)/deciliter  melatonin 3 milliGRAM(s) Oral at bedtime PRN Insomnia  ondansetron Injectable 4 milliGRAM(s) IV Push every 8 hours PRN Nausea and/or Vomiting  oxyCODONE    IR 5 milliGRAM(s) Oral every 4 hours PRN Severe Pain (7 - 10)  senna 2 Tablet(s) Oral at bedtime PRN Constipation      Vital Signs Last 24 Hrs  T(C): 36.4 (26 Jan 2025 10:58), Max: 36.4 (25 Jan 2025 15:59)  T(F): 97.5 (26 Jan 2025 10:58), Max: 97.6 (26 Jan 2025 04:34)  HR: 85 (26 Jan 2025 10:58) (81 - 88)  BP: 127/71 (26 Jan 2025 10:58) (116/70 - 154/78)  BP(mean): --  RR: 18 (26 Jan 2025 10:58) (18 - 18)  SpO2: 95% (26 Jan 2025 10:58) (93% - 95%)    Parameters below as of 26 Jan 2025 10:58  Patient On (Oxygen Delivery Method): room air      Weight (kg): 60.4 (01-24-25 @ 13:30)    Physical Exam:    Constitutional: NAD, well-developed  Respiratory: CTAB, normal respirations  Cardiovascular: S1 and S2, RRR  Extremities: No peripheral edema  Neurological: AOx3, no focal deficits      LABS  01-25    132[L]  |  99  |  49.7[H]  ----------------------------<  399[H]  5.6[H]   |  20.0[L]  |  3.01[H]    Ca    8.0[L]      25 Jan 2025 04:30  Phos  5.2     01-25  Mg     1.9     01-25                            10.3   7.64  )-----------( 496      ( 25 Jan 2025 04:30 )             33.2       A1C with Estimated Average Glucose Result: 9.3 % (01-23-25 @ 04:58)  CT Abdomen and Pelvis No Cont:   ACC: [id number] EXAM:  CT ABDOMEN AND PELVIS   ORDERED BY: RJ ARZATE     ACC: [id number] EXAM:  CT CHEST   ORDERED BY: RJ ARZATE     PROCEDURE DATE:  01/22/2025            CAPILLARY BLOOD GLUCOSE      POCT Blood Glucose.: 348 mg/dL (26 Jan 2025 12:04)  POCT Blood Glucose.: 364 mg/dL (26 Jan 2025 08:08)  POCT Blood Glucose.: 267 mg/dL (25 Jan 2025 22:52)  POCT Blood Glucose.: 324 mg/dL (25 Jan 2025 18:15)  POCT Blood Glucose.: 293 mg/dL (25 Jan 2025 16:25)  
NEPHROLOGY INTERVAL HPI/OVERNIGHT EVENTS:    Examined earlier  Had renal biopsy uneventful  Feels ok   Denies HA CP no SOB  Dtr visiting    MEDICATIONS  (STANDING):  amLODIPine   Tablet 5 milliGRAM(s) Oral daily  buMETAnide Injectable 1 milliGRAM(s) IV Push daily  cyanocobalamin 1000 MICROGram(s) Oral daily  dextrose 5%. 1000 milliLiter(s) (100 mL/Hr) IV Continuous <Continuous>  dextrose 5%. 1000 milliLiter(s) (50 mL/Hr) IV Continuous <Continuous>  dextrose 50% Injectable 25 Gram(s) IV Push once  dextrose 50% Injectable 12.5 Gram(s) IV Push once  dextrose 50% Injectable 25 Gram(s) IV Push once  epoetin mira-epbx (RETACRIT) Injectable 04697 Unit(s) SubCutaneous every 7 days  folic acid 1 milliGRAM(s) Oral daily  gabapentin 300 milliGRAM(s) Oral two times a day  glucagon  Injectable 1 milliGRAM(s) IntraMuscular once  influenza   Vaccine 0.5 milliLiter(s) IntraMuscular once  insulin lispro (ADMELOG) corrective regimen sliding scale   SubCutaneous three times a day before meals  insulin lispro Injectable (ADMELOG) 4 Unit(s) SubCutaneous three times a day before meals  iron sucrose IVPB 200 milliGRAM(s) IV Intermittent every 24 hours  levothyroxine 50 MICROGram(s) Oral daily  methylPREDNISolone sodium succinate Injectable 40 milliGRAM(s) IV Push two times a day  sodium bicarbonate 650 milliGRAM(s) Oral two times a day    MEDICATIONS  (PRN):  acetaminophen     Tablet .. 650 milliGRAM(s) Oral every 6 hours PRN Temp greater or equal to 38C (100.4F), Mild Pain (1 - 3)  ALPRAZolam 0.25 milliGRAM(s) Oral every 12 hours PRN anxiety  aluminum hydroxide/magnesium hydroxide/simethicone Suspension 30 milliLiter(s) Oral every 4 hours PRN Dyspepsia  dextrose Oral Gel 15 Gram(s) Oral once PRN Blood Glucose LESS THAN 70 milliGRAM(s)/deciliter  melatonin 3 milliGRAM(s) Oral at bedtime PRN Insomnia  ondansetron Injectable 4 milliGRAM(s) IV Push every 8 hours PRN Nausea and/or Vomiting  oxyCODONE    IR 5 milliGRAM(s) Oral every 4 hours PRN Severe Pain (7 - 10)      Allergies    No Known Allergies    Intolerances        Vital Signs Last 24 Hrs  T(C): 36.5 (24 Jan 2025 09:52), Max: 36.5 (23 Jan 2025 19:29)  T(F): 97.7 (24 Jan 2025 09:52), Max: 97.7 (23 Jan 2025 19:29)  HR: 82 (24 Jan 2025 09:52) (80 - 86)  BP: 130/75 (24 Jan 2025 09:52) (130/75 - 145/79)  BP(mean): --  RR: 18 (24 Jan 2025 09:52) (18 - 18)  SpO2: 100% (24 Jan 2025 09:52) (92% - 100%)    Parameters below as of 24 Jan 2025 07:27  Patient On (Oxygen Delivery Method): room air    PHYSICAL EXAM:  GENERAL: NAD,  HEAD:  Atraumatic, No facial edema   NECK: Supple, No JVD,   NERVOUS SYSTEM:  Alert & Oriented X3,   CHEST/LUNG: EAE , Dec BS at bases   HEART: Regular rate and rhythm; No gallop or rub   ABDOMEN: Soft, Nontender, Nondistended; Bowel sounds present  EXTREMITIES:  ++ edema , no cords , no joint deformity     LABS:                        9.8    3.38  )-----------( 517      ( 24 Jan 2025 03:25 )             31.5     01-24    137  |  104  |  37.9[H]  ----------------------------<  236[H]  5.3   |  20.0[L]  |  2.63[H]    Ca    8.3[L]      24 Jan 2025 03:25  Phos  3.6     01-24  Mg     1.8     01-24    TPro  5.9[L]  /  Alb  2.3[L]  /  TBili  <0.2[L]  /  DBili  x   /  AST  7   /  ALT  <5  /  AlkPhos  76  01-24    PT/INR - ( 22 Jan 2025 14:30 )   PT: 10.6 sec;   INR: 0.94 ratio         PTT - ( 22 Jan 2025 14:30 )  PTT:26.8 sec  Urinalysis Basic - ( 24 Jan 2025 03:25 )    Color: x / Appearance: x / SG: x / pH: x  Gluc: 236 mg/dL / Ketone: x  / Bili: x / Urobili: x   Blood: x / Protein: x / Nitrite: x   Leuk Esterase: x / RBC: x / WBC x   Sq Epi: x / Non Sq Epi: x / Bacteria: x      Magnesium: 1.8 mg/dL (01-24 @ 03:25)  Phosphorus: 3.6 mg/dL (01-24 @ 03:25)          RADIOLOGY & ADDITIONAL TESTS:  
NEPHROLOGY INTERVAL HPI/OVERNIGHT EVENTS:    feels better   Endo noted   I spoke with IR this am , they will try to get renal biopsy done tomorrow     MEDICATIONS  (STANDING):  amLODIPine   Tablet 5 milliGRAM(s) Oral daily  buMETAnide Injectable 1 milliGRAM(s) IV Push daily  dextrose 5%. 1000 milliLiter(s) (100 mL/Hr) IV Continuous <Continuous>  dextrose 5%. 1000 milliLiter(s) (50 mL/Hr) IV Continuous <Continuous>  dextrose 50% Injectable 25 Gram(s) IV Push once  dextrose 50% Injectable 12.5 Gram(s) IV Push once  dextrose 50% Injectable 25 Gram(s) IV Push once  epoetin mira-epbx (RETACRIT) Injectable 01102 Unit(s) SubCutaneous every 7 days  folic acid 1 milliGRAM(s) Oral daily  gabapentin 300 milliGRAM(s) Oral two times a day  glucagon  Injectable 1 milliGRAM(s) IntraMuscular once  influenza   Vaccine 0.5 milliLiter(s) IntraMuscular once  insulin glargine Injectable (LANTUS) 19 Unit(s) SubCutaneous at bedtime  insulin lispro (ADMELOG) corrective regimen sliding scale   SubCutaneous three times a day before meals  insulin lispro Injectable (ADMELOG) 2 Unit(s) SubCutaneous three times a day before meals  iron sucrose IVPB 200 milliGRAM(s) IV Intermittent every 24 hours  levothyroxine 50 MICROGram(s) Oral daily  methylPREDNISolone sodium succinate Injectable 40 milliGRAM(s) IV Push two times a day  sodium bicarbonate 650 milliGRAM(s) Oral two times a day    MEDICATIONS  (PRN):  acetaminophen     Tablet .. 650 milliGRAM(s) Oral every 6 hours PRN Temp greater or equal to 38C (100.4F), Mild Pain (1 - 3)  aluminum hydroxide/magnesium hydroxide/simethicone Suspension 30 milliLiter(s) Oral every 4 hours PRN Dyspepsia  dextrose Oral Gel 15 Gram(s) Oral once PRN Blood Glucose LESS THAN 70 milliGRAM(s)/deciliter  melatonin 3 milliGRAM(s) Oral at bedtime PRN Insomnia  ondansetron Injectable 4 milliGRAM(s) IV Push every 8 hours PRN Nausea and/or Vomiting      Allergies    No Known Allergies    Intolerances        Vital Signs Last 24 Hrs  T(C): 37 (23 Jan 2025 11:38), Max: 37 (23 Jan 2025 07:56)  T(F): 98.6 (23 Jan 2025 11:38), Max: 98.6 (23 Jan 2025 07:56)  HR: 100 (23 Jan 2025 11:38) (88 - 100)  BP: 112/68 (23 Jan 2025 11:38) (112/68 - 149/77)  BP(mean): --  RR: 18 (23 Jan 2025 11:38) (16 - 19)  SpO2: 94% (23 Jan 2025 11:38) (90% - 98%)    Parameters below as of 23 Jan 2025 11:38  Patient On (Oxygen Delivery Method): room air      Daily     Daily   I&O's Detail    I&O's Summary      PHYSICAL EXAM:        GENERAL: NAD,  HEAD:  Atraumatic, No facial edema   NECK: Supple, No JVD,   NERVOUS SYSTEM:  Alert & Oriented X3,   CHEST/LUNG: EAE , Dec BS at bases   HEART: Regular rate and rhythm; No gallop or rub   ABDOMEN: Soft, Nontender, Nondistended; Bowel sounds present  EXTREMITIES:  ++ edema , no cords , no joint deformity   LABS:                        9.6    4.52  )-----------( 430      ( 23 Jan 2025 04:58 )             31.3     01-23    136  |  105  |  32.0[H]  ----------------------------<  310[H]  4.5   |  19.0[L]  |  2.62[H]    Ca    7.9[L]      23 Jan 2025 04:58  Phos  3.4     01-23  Mg     1.9     01-23    TPro  5.8[L]  /  Alb  2.1[L]  /  TBili  <0.2[L]  /  DBili  x   /  AST  7   /  ALT  <5  /  AlkPhos  78  01-23    PT/INR - ( 22 Jan 2025 14:30 )   PT: 10.6 sec;   INR: 0.94 ratio         PTT - ( 22 Jan 2025 14:30 )  PTT:26.8 sec  Urinalysis Basic - ( 23 Jan 2025 04:58 )    Color: x / Appearance: x / SG: x / pH: x  Gluc: 310 mg/dL / Ketone: x  / Bili: x / Urobili: x   Blood: x / Protein: x / Nitrite: x   Leuk Esterase: x / RBC: x / WBC x   Sq Epi: x / Non Sq Epi: x / Bacteria: x      Magnesium: 1.9 mg/dL (01-23 @ 04:58)  Phosphorus: 3.4 mg/dL (01-23 @ 04:58)          < from: US Duplex Venous Lower Ext Complete, Bilateral (01.23.25 @ 10:32) >    ACC: [id number] EXAM:  US DPLX LWR EXT VEINS COMPL BI   ORDERED BY:   PAMELA BRADLEY     PROCEDURE DATE:  01/23/2025          INTERPRETATION:  CLINICAL INFORMATION: Leg swelling and worsening   shortness of breath    COMPARISON: 11/4/2024    TECHNIQUE: Duplex sonography of the BILATERAL LOWER extremity veins with   color and spectral Doppler, with and without compression.    FINDINGS:    RIGHT:  Normal compressibility of the RIGHT common femoral, femoral and popliteal   veins.  Doppler examination shows normal spontaneous and phasic flow.  No RIGHT calf vein thrombosis is detected.    There is a right popliteal fossa cyst measuring 3.5 x 0.7 x 2.1 cm.    LEFT:  Normal compressibility of the LEFT common femoral, femoral and popliteal   veins.  Doppler examination shows normal spontaneous and phasic flow.  No LEFT calf vein thrombosis is detected.    There is a left popliteal fossa cyst measuring 1.6 x 0.9 x 1.5 cm.    IMPRESSION:    < end of copied text >  RADIOLOGY & ADDITIONAL TESTS:  
NEPHROLOGY INTERVAL HPI/OVERNIGHT EVENTS:  pt clinically stable  no acute distress noted    MEDICATIONS  (STANDING):  amLODIPine   Tablet 5 milliGRAM(s) Oral daily  buMETAnide Injectable 1 milliGRAM(s) IV Push daily  cyanocobalamin 1000 MICROGram(s) Oral daily  dextrose 5%. 1000 milliLiter(s) (100 mL/Hr) IV Continuous <Continuous>  dextrose 5%. 1000 milliLiter(s) (50 mL/Hr) IV Continuous <Continuous>  dextrose 50% Injectable 25 Gram(s) IV Push once  dextrose 50% Injectable 12.5 Gram(s) IV Push once  dextrose 50% Injectable 25 Gram(s) IV Push once  epoetin mira-epbx (RETACRIT) Injectable 18193 Unit(s) SubCutaneous every 7 days  folic acid 1 milliGRAM(s) Oral daily  gabapentin 300 milliGRAM(s) Oral two times a day  glucagon  Injectable 1 milliGRAM(s) IntraMuscular once  heparin   Injectable 5000 Unit(s) SubCutaneous every 12 hours  influenza   Vaccine 0.5 milliLiter(s) IntraMuscular once  insulin glargine Injectable (LANTUS) 42 Unit(s) SubCutaneous <User Schedule>  insulin lispro (ADMELOG) corrective regimen sliding scale   SubCutaneous at bedtime  insulin lispro (ADMELOG) corrective regimen sliding scale   SubCutaneous three times a day before meals  insulin lispro Injectable (ADMELOG) 22 Unit(s) SubCutaneous three times a day before meals  levothyroxine 50 MICROGram(s) Oral daily  methylPREDNISolone sodium succinate Injectable 40 milliGRAM(s) IV Push two times a day  pantoprazole    Tablet 40 milliGRAM(s) Oral before breakfast  sodium bicarbonate 1300 milliGRAM(s) Oral three times a day    MEDICATIONS  (PRN):  acetaminophen     Tablet .. 650 milliGRAM(s) Oral every 6 hours PRN Temp greater or equal to 38C (100.4F), Mild Pain (1 - 3)  ALPRAZolam 0.25 milliGRAM(s) Oral every 12 hours PRN anxiety  aluminum hydroxide/magnesium hydroxide/simethicone Suspension 30 milliLiter(s) Oral every 4 hours PRN Dyspepsia  dextrose Oral Gel 15 Gram(s) Oral once PRN Blood Glucose LESS THAN 70 milliGRAM(s)/deciliter  melatonin 3 milliGRAM(s) Oral at bedtime PRN Insomnia  ondansetron Injectable 4 milliGRAM(s) IV Push every 8 hours PRN Nausea and/or Vomiting  oxyCODONE    IR 5 milliGRAM(s) Oral every 4 hours PRN Severe Pain (7 - 10)  senna 2 Tablet(s) Oral at bedtime PRN Constipation      Allergies    No Known Allergies          Vital Signs Last 24 Hrs  T(C): 36.4 (29 Jan 2025 10:30), Max: 36.4 (28 Jan 2025 15:52)  T(F): 97.5 (29 Jan 2025 10:30), Max: 97.6 (28 Jan 2025 20:18)  HR: 95 (29 Jan 2025 10:30) (84 - 95)  BP: 155/73 (29 Jan 2025 10:30) (119/69 - 155/73)  BP(mean): --  RR: 18 (29 Jan 2025 10:30) (18 - 18)  SpO2: 96% (29 Jan 2025 10:30) (93% - 96%)    Parameters below as of 29 Jan 2025 10:30  Patient On (Oxygen Delivery Method): room air        PHYSICAL EXAM:  GENERAL: Fatigued  HEENT: No periorbital edema  NECK: Supple, No JVD  NERVOUS SYSTEM:  Alert & Oriented X3  CHEST/LUNG: Clear, diminished BS  HEART: Regular rate and rhythm; No rub   ABDOMEN: Soft, Nontender, +BS  EXTREMITIES: + dependent B/L LE edema      LABS:                        10.2   15.95 )-----------( 443      ( 29 Jan 2025 04:25 )             32.3     01-29    132[L]  |  97  |  79.1[H]  ----------------------------<  380[H]  4.2   |  21.0[L]  |  2.39[H]    Ca    7.9[L]      29 Jan 2025 04:25  Phos  3.1     01-28  Mg     1.9     01-28        Urinalysis Basic - ( 29 Jan 2025 04:25 )    Color: x / Appearance: x / SG: x / pH: x  Gluc: 380 mg/dL / Ketone: x  / Bili: x / Urobili: x   Blood: x / Protein: x / Nitrite: x   Leuk Esterase: x / RBC: x / WBC x   Sq Epi: x / Non Sq Epi: x / Bacteria: x          RADIOLOGY & ADDITIONAL TESTS:  < from: Xray Chest 1 View- PORTABLE-Routine (Xray Chest 1 View- PORTABLE-Routine in AM.) (01.26.25 @ 04:45) >  ACC: [id number] EXAM:  XR CHEST PORTABLE ROUTINE 1V   ORDERED BY: ELIEL WARNER     PROCEDURE DATE:  01/26/2025          INTERPRETATION:  XR CHEST dated 1/26/2025 4:45 AM    CLINICAL INFORMATION: Female, 59 years old.  Effusions.    PRIOR STUDIES: 1/24/2025    FINDINGS/  IMPRESSION: Heart size, mediastinal and hilar contours are unchanged and   remain within normal limits. There is an increasing left-sided pleural   effusion. Underlying left lower lobe pneumonia and/or atelectasis cannot   beexcluded. The remainder the lung zones are clear.    < end of copied text >  
INTERVAL EVENTS:  Follow up diabetes management, glucoses overall improved. Pt denies acute complaints.    MEDICATIONS  (STANDING):  amLODIPine   Tablet 5 milliGRAM(s) Oral daily  buMETAnide 1 milliGRAM(s) Oral two times a day  cyanocobalamin 1000 MICROGram(s) Oral daily  dextrose 5%. 1000 milliLiter(s) (100 mL/Hr) IV Continuous <Continuous>  dextrose 5%. 1000 milliLiter(s) (50 mL/Hr) IV Continuous <Continuous>  dextrose 50% Injectable 25 Gram(s) IV Push once  dextrose 50% Injectable 12.5 Gram(s) IV Push once  dextrose 50% Injectable 25 Gram(s) IV Push once  epoetin mira-epbx (RETACRIT) Injectable 08247 Unit(s) SubCutaneous every 7 days  folic acid 1 milliGRAM(s) Oral daily  gabapentin 300 milliGRAM(s) Oral two times a day  glucagon  Injectable 1 milliGRAM(s) IntraMuscular once  heparin   Injectable 5000 Unit(s) SubCutaneous every 12 hours  insulin glargine Injectable (LANTUS) 40 Unit(s) SubCutaneous <User Schedule>  insulin lispro (ADMELOG) corrective regimen sliding scale   SubCutaneous at bedtime  insulin lispro (ADMELOG) corrective regimen sliding scale   SubCutaneous three times a day before meals  insulin lispro Injectable (ADMELOG) 10 Unit(s) SubCutaneous three times a day before meals  levothyroxine 50 MICROGram(s) Oral daily  losartan 25 milliGRAM(s) Oral daily  mycophenolate mofetil 500 milliGRAM(s) Oral two times a day  pantoprazole    Tablet 40 milliGRAM(s) Oral before breakfast  predniSONE   Tablet 60 milliGRAM(s) Oral daily  sodium bicarbonate 1300 milliGRAM(s) Oral three times a day  trimethoprim  160 mG/sulfamethoxazole 800 mG 1 Tablet(s) Oral daily    MEDICATIONS  (PRN):  acetaminophen     Tablet .. 650 milliGRAM(s) Oral every 6 hours PRN Temp greater or equal to 38C (100.4F), Mild Pain (1 - 3)  aluminum hydroxide/magnesium hydroxide/simethicone Suspension 30 milliLiter(s) Oral every 4 hours PRN Dyspepsia  benzocaine/menthol Lozenge 1 Lozenge Oral three times a day PRN Sore Throat  dextrose Oral Gel 15 Gram(s) Oral once PRN Blood Glucose LESS THAN 70 milliGRAM(s)/deciliter  melatonin 3 milliGRAM(s) Oral at bedtime PRN Insomnia  ondansetron Injectable 4 milliGRAM(s) IV Push every 8 hours PRN Nausea and/or Vomiting  senna 2 Tablet(s) Oral at bedtime PRN Constipation    Allergies  No Known Allergies    Vital Signs Last 24 Hrs  T(C): 36.6 (31 Jan 2025 13:15), Max: 36.7 (30 Jan 2025 16:18)  T(F): 97.9 (31 Jan 2025 13:15), Max: 98.1 (31 Jan 2025 11:03)  HR: 103 (31 Jan 2025 13:15) (86 - 103)  BP: 117/72 (31 Jan 2025 13:15) (101/64 - 152/67)  BP(mean): 87 (31 Jan 2025 13:15) (76 - 87)  RR: 18 (31 Jan 2025 13:15) (18 - 18)  SpO2: 95% (31 Jan 2025 13:15) (93% - 97%)    Parameters below as of 31 Jan 2025 13:15  Patient On (Oxygen Delivery Method): room air    PHYSICAL EXAM:  General: No apparent distress  Respiratory: Lungs clear bilaterally  Cardiac: +S1, S2, no m/r/g  GI: +BS, soft, non tender, non distended  Extremities: No peripheral edema  Neuro: A+O X3    LABS:    01-31    139  |  102  |  83.9[H]  ----------------------------<  168[H]  4.7   |  26.0  |  2.37[H]    Ca    8.1[L]      31 Jan 2025 04:48      Urinalysis Basic - ( 31 Jan 2025 04:48 )    Color: x / Appearance: x / SG: x / pH: x  Gluc: 168 mg/dL / Ketone: x  / Bili: x / Urobili: x   Blood: x / Protein: x / Nitrite: x   Leuk Esterase: x / RBC: x / WBC x   Sq Epi: x / Non Sq Epi: x / Bacteria: x    POCT Blood Glucose.: 239 mg/dL (01-31-25 @ 12:09)  POCT Blood Glucose.: 162 mg/dL (01-31-25 @ 07:45)  POCT Blood Glucose.: 243 mg/dL (01-30-25 @ 21:04)  POCT Blood Glucose.: 224 mg/dL (01-30-25 @ 16:16)    Thyroid Stimulating Hormone, Serum: 5.83 uIU/mL (01-27-25 @ 04:30)
INTERVAL EVENTS:  Follow up diabetes management. Glucoses are elevated secondary to steroids.     MEDICATIONS  (STANDING):  amLODIPine   Tablet 5 milliGRAM(s) Oral daily  buMETAnide Injectable 1 milliGRAM(s) IV Push daily  cyanocobalamin 1000 MICROGram(s) Oral daily  dextrose 5%. 1000 milliLiter(s) (100 mL/Hr) IV Continuous <Continuous>  dextrose 5%. 1000 milliLiter(s) (50 mL/Hr) IV Continuous <Continuous>  dextrose 50% Injectable 25 Gram(s) IV Push once  dextrose 50% Injectable 12.5 Gram(s) IV Push once  dextrose 50% Injectable 25 Gram(s) IV Push once  epoetin mira-epbx (RETACRIT) Injectable 75443 Unit(s) SubCutaneous every 7 days  folic acid 1 milliGRAM(s) Oral daily  gabapentin 300 milliGRAM(s) Oral two times a day  glucagon  Injectable 1 milliGRAM(s) IntraMuscular once  heparin   Injectable 5000 Unit(s) SubCutaneous every 12 hours  influenza   Vaccine 0.5 milliLiter(s) IntraMuscular once  insulin glargine Injectable (LANTUS) 25 Unit(s) SubCutaneous <User Schedule>  insulin lispro (ADMELOG) corrective regimen sliding scale   SubCutaneous at bedtime  insulin lispro (ADMELOG) corrective regimen sliding scale   SubCutaneous three times a day before meals  insulin lispro Injectable (ADMELOG) 12 Unit(s) SubCutaneous three times a day before meals  iron sucrose IVPB 200 milliGRAM(s) IV Intermittent every 24 hours  levothyroxine 50 MICROGram(s) Oral daily  methylPREDNISolone sodium succinate Injectable 40 milliGRAM(s) IV Push two times a day  pantoprazole    Tablet 40 milliGRAM(s) Oral before breakfast  polyethylene glycol 3350 17 Gram(s) Oral at bedtime  sodium bicarbonate 650 milliGRAM(s) Oral two times a day  sodium zirconium cyclosilicate 10 Gram(s) Oral daily    MEDICATIONS  (PRN):  acetaminophen     Tablet .. 650 milliGRAM(s) Oral every 6 hours PRN Temp greater or equal to 38C (100.4F), Mild Pain (1 - 3)  ALPRAZolam 0.25 milliGRAM(s) Oral every 12 hours PRN anxiety  aluminum hydroxide/magnesium hydroxide/simethicone Suspension 30 milliLiter(s) Oral every 4 hours PRN Dyspepsia  dextrose Oral Gel 15 Gram(s) Oral once PRN Blood Glucose LESS THAN 70 milliGRAM(s)/deciliter  melatonin 3 milliGRAM(s) Oral at bedtime PRN Insomnia  ondansetron Injectable 4 milliGRAM(s) IV Push every 8 hours PRN Nausea and/or Vomiting  oxyCODONE    IR 5 milliGRAM(s) Oral every 4 hours PRN Severe Pain (7 - 10)  senna 2 Tablet(s) Oral at bedtime PRN Constipation    Allergies  No Known Allergies    Vital Signs Last 24 Hrs  T(C): 36.4 (27 Jan 2025 04:46), Max: 36.7 (26 Jan 2025 16:00)  T(F): 97.5 (27 Jan 2025 04:46), Max: 98 (26 Jan 2025 16:00)  HR: 78 (27 Jan 2025 04:46) (78 - 87)  BP: 138/77 (27 Jan 2025 04:46) (126/73 - 138/77)  BP(mean): --  RR: 18 (27 Jan 2025 04:46) (18 - 18)  SpO2: 93% (27 Jan 2025 04:46) (91% - 93%)    Parameters below as of 27 Jan 2025 04:46  Patient On (Oxygen Delivery Method): room air    PHYSICAL EXAM:  General: No apparent distress  Respiratory: Lungs clear bilaterally  Cardiac: +S1, S2, no m/r/g  GI: +BS, soft, non tender, non distended  Extremities: No peripheral edema  Neuro: A+O X3    LABS:                        10.3   15.73 )-----------( 546      ( 27 Jan 2025 04:30 )             32.3     01-27    135  |  99  |  67.7[H]  ----------------------------<  241[H]  4.7   |  21.0[L]  |  2.95[H]    Ca    8.0[L]      27 Jan 2025 04:30  Phos  2.9     01-27  Mg     2.1     01-27      Urinalysis Basic - ( 27 Jan 2025 04:30 )    Color: x / Appearance: x / SG: x / pH: x  Gluc: 241 mg/dL / Ketone: x  / Bili: x / Urobili: x   Blood: x / Protein: x / Nitrite: x   Leuk Esterase: x / RBC: x / WBC x   Sq Epi: x / Non Sq Epi: x / Bacteria: x    POCT Blood Glucose.: 332 mg/dL (01-27-25 @ 07:39)  POCT Blood Glucose.: 291 mg/dL (01-26-25 @ 22:55)  POCT Blood Glucose.: 313 mg/dL (01-26-25 @ 21:47)  POCT Blood Glucose.: 349 mg/dL (01-26-25 @ 16:30)  POCT Blood Glucose.: 348 mg/dL (01-26-25 @ 12:04)    Thyroid Stimulating Hormone, Serum: 5.83 uIU/mL (01-27-25 @ 04:30)  
Interval Events:  Follow up diabetes management.    Sugars are extremely high as patient was put on steroids     No Known Allergies      MEDICATIONS  (STANDING):  amLODIPine   Tablet 5 milliGRAM(s) Oral daily  buMETAnide Injectable 1 milliGRAM(s) IV Push daily  cyanocobalamin 1000 MICROGram(s) Oral daily  dextrose 5%. 1000 milliLiter(s) (100 mL/Hr) IV Continuous <Continuous>  dextrose 5%. 1000 milliLiter(s) (50 mL/Hr) IV Continuous <Continuous>  dextrose 50% Injectable 25 Gram(s) IV Push once  dextrose 50% Injectable 12.5 Gram(s) IV Push once  dextrose 50% Injectable 25 Gram(s) IV Push once  epoetin mira-epbx (RETACRIT) Injectable 87785 Unit(s) SubCutaneous every 7 days  folic acid 1 milliGRAM(s) Oral daily  gabapentin 300 milliGRAM(s) Oral two times a day  glucagon  Injectable 1 milliGRAM(s) IntraMuscular once  influenza   Vaccine 0.5 milliLiter(s) IntraMuscular once  insulin glargine Injectable (LANTUS) 25 Unit(s) SubCutaneous <User Schedule>  insulin lispro (ADMELOG) corrective regimen sliding scale   SubCutaneous three times a day before meals  insulin lispro Injectable (ADMELOG) 8 Unit(s) SubCutaneous three times a day before meals  iron sucrose IVPB 200 milliGRAM(s) IV Intermittent every 24 hours  levothyroxine 50 MICROGram(s) Oral daily  methylPREDNISolone sodium succinate Injectable 40 milliGRAM(s) IV Push two times a day  pantoprazole    Tablet 40 milliGRAM(s) Oral before breakfast  polyethylene glycol 3350 17 Gram(s) Oral at bedtime  sodium bicarbonate 650 milliGRAM(s) Oral two times a day    MEDICATIONS  (PRN):  acetaminophen     Tablet .. 650 milliGRAM(s) Oral every 6 hours PRN Temp greater or equal to 38C (100.4F), Mild Pain (1 - 3)  ALPRAZolam 0.25 milliGRAM(s) Oral every 12 hours PRN anxiety  aluminum hydroxide/magnesium hydroxide/simethicone Suspension 30 milliLiter(s) Oral every 4 hours PRN Dyspepsia  dextrose Oral Gel 15 Gram(s) Oral once PRN Blood Glucose LESS THAN 70 milliGRAM(s)/deciliter  melatonin 3 milliGRAM(s) Oral at bedtime PRN Insomnia  ondansetron Injectable 4 milliGRAM(s) IV Push every 8 hours PRN Nausea and/or Vomiting  oxyCODONE    IR 5 milliGRAM(s) Oral every 4 hours PRN Severe Pain (7 - 10)  senna 2 Tablet(s) Oral at bedtime PRN Constipation      Vital Signs Last 24 Hrs  T(C): 36.3 (25 Jan 2025 11:50), Max: 36.4 (25 Jan 2025 08:58)  T(F): 97.4 (25 Jan 2025 11:50), Max: 97.5 (25 Jan 2025 08:58)  HR: 82 (25 Jan 2025 11:50) (80 - 84)  BP: 112/67 (25 Jan 2025 11:50) (112/67 - 138/72)  BP(mean): --  RR: 18 (25 Jan 2025 11:50) (17 - 18)  SpO2: 96% (25 Jan 2025 11:50) (93% - 96%)    Parameters below as of 25 Jan 2025 11:50  Patient On (Oxygen Delivery Method): room air      Weight (kg): 60.4 (01-24-25 @ 13:30)    Physical Exam:    Constitutional: NAD, well-developed  Respiratory: CTAB, normal respirations  Cardiovascular: S1 and S2, RRR  Extremities: No peripheral edema  Skin: no rashes, no acanthosis    LABS  01-25    132[L]  |  99  |  49.7[H]  ----------------------------<  399[H]  5.6[H]   |  20.0[L]  |  3.01[H]    Ca    8.0[L]      25 Jan 2025 04:30  Phos  5.2     01-25  Mg     1.9     01-25    TPro  5.9[L]  /  Alb  2.3[L]  /  TBili  <0.2[L]  /  DBili  x   /  AST  7   /  ALT  <5  /  AlkPhos  76  01-24                          10.3   7.64  )-----------( 496      ( 25 Jan 2025 04:30 )             33.2       A1C with Estimated Average Glucose Result: 9.3 % (01-23-25 @ 04:58)  CT Abdomen and Pelvis No Cont:   ACC: [id number] EXAM:  CT ABDOMEN AND PELVIS   ORDERED BY: RJ ARZATE     ACC: [id number] EXAM:  CT CHEST   ORDERED BY: RJ ARZATE     PROCEDURE DATE:  01/22/2025          INTERPRETATION:  CLINICAL INFORMATION: Left upper quadrant abdominal   pain. Worsening pleural effusions.    COMPARISON: CT scan of the chest from 12/19/2024 and CT portion of PET/CT   from 12/12/2024.    CONTRAST/COMPLICATIONS:  IV Contrast: None  Oral Contrast: None  .    PROCEDURE:  CT of the Chest, Abdomen and Pelvis was performed.  Sagittal and coronal reformats were performed.    FINDINGS:  CHEST:  LUNGS AND LARGE AIRWAYS: Patent central airways. Atelectatic changes.   Scattered nodular densities do not appear significantly changed. For   example, 6 mm nodule in the right upper lobe on series 3 image 23 and a   1.1 cm nodule adjacent to the left major fissure on series 3 image 54.   Pleural-based nodularity is grossly unchanged. Mild interlobular septal   thickening is stable..  PLEURA: Moderate pleural effusions, right greater than left appear mildly   increased.  VESSELS: Within normal limits.  HEART: Heart size is normal. No pericardial effusion.  MEDIASTINUM AND MAVIS: Multiple small mediastinal lymph nodes, some of   which are mildly enlarged. A few ofthese appear mildly decreased. For   example, lymph node to the right of the roldan on series 3 image 32   measuring up to 1.1 cm in greatest short axis, previously 1.3 cm and   anterior mediastinal lymph node on series 3 image 30 measuring 9 mm in   greatest short axis, previously 1.1 cm.  CHEST WALL AND LOWER NECK: Multiple bilateral axillary lymph nodes which   do not appear significantly enlarged. These appear grossly stable.    ABDOMEN AND PELVIS:  LIVER: Within normal limits.  BILE DUCTS: Normal caliber.  GALLBLADDER: Within normal limits.  SPLEEN: Within normal limits.  PANCREAS: Within normal limits.  ADRENALS: Within normal limits.  KIDNEYS/URETERS: Multiple bilateral nonobstructing stones are grossly   unchanged.    BLADDER: Within normal limits.  REPRODUCTIVE ORGANS: Grossly unremarkable.    BOWEL: No bowel obstruction. Appendix within normal limits.  PERITONEUM/RETROPERITONEUM: No ascites.  VESSELS: Mild vascular calcifications.  LYMPH NODES: Retroperitoneal and pelvic lymphadenopathy. For example:    Left common iliac lymph node measuring 1.5 x 1.2 cm on series 3 image   128, previously 1.3 x 1.2 cm.    Right internal iliac lymph node on series 3 image 139 measuring 1.8 x 1.4   cm, previously 1.7 x 1.2 cm.  2.3 x 1.4 cm left external iliac lymph node on series 3 image 163,   previously 2.1 x 1.7 cm.    ABDOMINAL WALL: Postsurgical changes. In the anterior abdominal wall.  BONES: Within normal limits.    IMPRESSION:  Bilateral moderate pleural effusions, slightly increased when compared   with 12/19/2024. Bilateral pulmonary nodules do not appear significantly   changed. Multiple small mediastinal and axillary lymph nodes, some of   which have mildly decreased in size. Retroperitoneal and pelvic   lymphadenopathy which has mildly increased in size.    Bilateral nonobstructing renal stones.        --- End of Report ---            TIMUR CASTILLO MD; Attending Radiologist  This document has been electronically signed. Jan 22 2025  3:26PM (01-22-25 @ 15:06)    Alanine Aminotransferase (ALT/SGPT): <5 U/L (01-24-25 @ 03:25)  Alkaline Phosphatase: 76 U/L (01-24-25 @ 03:25)  Albumin: 2.3 g/dL (01-24-25 @ 03:25)  Aspartate Aminotransferase (AST/SGOT): 7 U/L (01-24-25 @ 03:25)          CAPILLARY BLOOD GLUCOSE      POCT Blood Glucose.: 364 mg/dL (25 Jan 2025 11:38)  POCT Blood Glucose.: 440 mg/dL (25 Jan 2025 08:26)  POCT Blood Glucose.: 493 mg/dL (25 Jan 2025 08:24)  POCT Blood Glucose.: 254 mg/dL (24 Jan 2025 21:42)  POCT Blood Glucose.: 228 mg/dL (24 Jan 2025 17:47)  
NEPHROLOGY INTERVAL HPI/OVERNIGHT EVENTS:  clinically stable overnight  no adverse events noted    MEDICATIONS  (STANDING):  amLODIPine   Tablet 5 milliGRAM(s) Oral daily  buMETAnide Injectable 1 milliGRAM(s) IV Push daily  cyanocobalamin 1000 MICROGram(s) Oral daily  dextrose 5%. 1000 milliLiter(s) (100 mL/Hr) IV Continuous <Continuous>  dextrose 5%. 1000 milliLiter(s) (50 mL/Hr) IV Continuous <Continuous>  dextrose 50% Injectable 25 Gram(s) IV Push once  dextrose 50% Injectable 12.5 Gram(s) IV Push once  dextrose 50% Injectable 25 Gram(s) IV Push once  epoetin mira-epbx (RETACRIT) Injectable 55004 Unit(s) SubCutaneous every 7 days  folic acid 1 milliGRAM(s) Oral daily  gabapentin 300 milliGRAM(s) Oral two times a day  glucagon  Injectable 1 milliGRAM(s) IntraMuscular once  influenza   Vaccine 0.5 milliLiter(s) IntraMuscular once  insulin glargine Injectable (LANTUS) 20 Unit(s) SubCutaneous <User Schedule>  insulin lispro (ADMELOG) corrective regimen sliding scale   SubCutaneous three times a day before meals  insulin lispro Injectable (ADMELOG) 4 Unit(s) SubCutaneous three times a day before meals  iron sucrose IVPB 200 milliGRAM(s) IV Intermittent every 24 hours  levothyroxine 50 MICROGram(s) Oral daily  methylPREDNISolone sodium succinate Injectable 40 milliGRAM(s) IV Push two times a day  pantoprazole    Tablet 40 milliGRAM(s) Oral before breakfast  polyethylene glycol 3350 17 Gram(s) Oral at bedtime  sodium bicarbonate 650 milliGRAM(s) Oral two times a day    MEDICATIONS  (PRN):  acetaminophen     Tablet .. 650 milliGRAM(s) Oral every 6 hours PRN Temp greater or equal to 38C (100.4F), Mild Pain (1 - 3)  ALPRAZolam 0.25 milliGRAM(s) Oral every 12 hours PRN anxiety  aluminum hydroxide/magnesium hydroxide/simethicone Suspension 30 milliLiter(s) Oral every 4 hours PRN Dyspepsia  dextrose Oral Gel 15 Gram(s) Oral once PRN Blood Glucose LESS THAN 70 milliGRAM(s)/deciliter  melatonin 3 milliGRAM(s) Oral at bedtime PRN Insomnia  ondansetron Injectable 4 milliGRAM(s) IV Push every 8 hours PRN Nausea and/or Vomiting  oxyCODONE    IR 5 milliGRAM(s) Oral every 4 hours PRN Severe Pain (7 - 10)  senna 2 Tablet(s) Oral at bedtime PRN Constipation      Allergies    No Known Allergies        Vital Signs Last 24 Hrs  T(C): 36.3 (25 Jan 2025 04:59), Max: 36.5 (24 Jan 2025 07:27)  T(F): 97.4 (25 Jan 2025 04:59), Max: 97.7 (24 Jan 2025 07:27)  HR: 80 (25 Jan 2025 04:59) (80 - 86)  BP: 127/66 (25 Jan 2025 04:59) (127/66 - 138/72)  BP(mean): --  RR: 18 (25 Jan 2025 04:59) (17 - 18)  SpO2: 96% (25 Jan 2025 04:59) (92% - 100%)    Parameters below as of 24 Jan 2025 17:59  Patient On (Oxygen Delivery Method): room air        PHYSICAL EXAM:  GENERAL: Fatigued  HEENT: No periorbital edema  NECK: Supple, No JVD  NERVOUS SYSTEM:  Alert & Oriented X3  CHEST/LUNG: Diminished bibasilar BS  HEART: Regular rate and rhythm; No rub   ABDOMEN: Soft, Nontender, +BS  EXTREMITIES: + dependent B/L LE edema    LABS:                        10.3   7.64  )-----------( 496      ( 25 Jan 2025 04:30 )             33.2     01-24    137  |  104  |  37.9[H]  ----------------------------<  236[H]  5.3   |  20.0[L]  |  2.63[H]    Ca    8.3[L]      24 Jan 2025 03:25  Phos  3.6     01-24  Mg     1.8     01-24    TPro  5.9[L]  /  Alb  2.3[L]  /  TBili  <0.2[L]  /  DBili  x   /  AST  7   /  ALT  <5  /  AlkPhos  76  01-24      Urinalysis Basic - ( 24 Jan 2025 03:25 )    Color: x / Appearance: x / SG: x / pH: x  Gluc: 236 mg/dL / Ketone: x  / Bili: x / Urobili: x   Blood: x / Protein: x / Nitrite: x   Leuk Esterase: x / RBC: x / WBC x   Sq Epi: x / Non Sq Epi: x / Bacteria: x          RADIOLOGY & ADDITIONAL TESTS:  < from: CT Abdomen and Pelvis No Cont (01.22.25 @ 15:06) >  ACC: [id number] EXAM:  CT ABDOMEN AND PELVIS   ORDERED BY: RJ ARZATE     ACC: [id number] EXAM:  CT CHEST   ORDERED BY: RJ ARZATE     PROCEDURE DATE:  01/22/2025          INTERPRETATION:  CLINICAL INFORMATION: Left upper quadrant abdominal   pain. Worsening pleural effusions.    COMPARISON: CT scan of the chest from 12/19/2024 and CT portion of PET/CT   from 12/12/2024.    CONTRAST/COMPLICATIONS:  IV Contrast: None  Oral Contrast: None  .    PROCEDURE:  CT of the Chest, Abdomen and Pelvis was performed.  Sagittal and coronal reformats were performed.    FINDINGS:  CHEST:  LUNGS AND LARGE AIRWAYS: Patent central airways. Atelectatic changes.   Scattered nodular densities do not appear significantly changed. For   example, 6 mm nodule in the right upper lobe on series 3 image 23 and a   1.1 cm nodule adjacent to the left major fissure on series 3 image 54.   Pleural-based nodularity is grossly unchanged. Mild interlobular septal   thickening is stable..  PLEURA: Moderate pleural effusions, right greater than left appear mildly   increased.  VESSELS: Within normal limits.  HEART: Heart size is normal. No pericardial effusion.  MEDIASTINUM AND MAVIS: Multiple small mediastinal lymph nodes, some of   which are mildly enlarged. A few ofthese appear mildly decreased. For   example, lymph node to the right of the roldan on series 3 image 32   measuring up to 1.1 cm in greatest short axis, previously 1.3 cm and   anterior mediastinal lymph node on series 3 image 30 measuring 9 mm in   greatest short axis, previously 1.1 cm.  CHEST WALL AND LOWER NECK: Multiple bilateral axillary lymph nodes which   do not appear significantly enlarged. These appear grossly stable.    ABDOMEN AND PELVIS:  LIVER: Within normal limits.  BILE DUCTS: Normal caliber.  GALLBLADDER: Within normal limits.  SPLEEN: Within normal limits.  PANCREAS: Within normal limits.  ADRENALS: Within normal limits.  KIDNEYS/URETERS: Multiple bilateral nonobstructing stones are grossly   unchanged.    BLADDER: Within normal limits.  REPRODUCTIVE ORGANS: Grossly unremarkable.    BOWEL: No bowel obstruction. Appendix within normal limits.  PERITONEUM/RETROPERITONEUM: No ascites.  VESSELS: Mild vascular calcifications.  LYMPH NODES: Retroperitoneal and pelvic lymphadenopathy. For example:    Left common iliac lymph node measuring 1.5 x 1.2 cm on series 3 image   128, previously 1.3 x 1.2 cm.    Right internal iliac lymph node on series 3 image 139 measuring 1.8 x 1.4   cm, previously 1.7 x 1.2 cm.  2.3 x 1.4 cm left external iliac lymph node on series 3 image 163,   previously 2.1 x 1.7 cm.    ABDOMINAL WALL: Postsurgical changes. In the anterior abdominal wall.  BONES: Within normal limits.    IMPRESSION:  Bilateral moderate pleural effusions, slightly increased when compared   with 12/19/2024. Bilateral pulmonary nodules do not appear significantly   changed. Multiple small mediastinal and axillary lymph nodes, some of   which have mildly decreased in size. Retroperitoneal and pelvic   lymphadenopathy which has mildly increased in size.    Bilateral nonobstructing renal stones.    < end of copied text >  
NEPHROLOGY INTERVAL HPI/OVERNIGHT EVENTS:  pt comfortable overnight  no acute distress noted    MEDICATIONS  (STANDING):  amLODIPine   Tablet 5 milliGRAM(s) Oral daily  buMETAnide 1 milliGRAM(s) Oral two times a day  cyanocobalamin 1000 MICROGram(s) Oral daily  dextrose 5%. 1000 milliLiter(s) (100 mL/Hr) IV Continuous <Continuous>  dextrose 5%. 1000 milliLiter(s) (50 mL/Hr) IV Continuous <Continuous>  dextrose 50% Injectable 25 Gram(s) IV Push once  dextrose 50% Injectable 12.5 Gram(s) IV Push once  dextrose 50% Injectable 25 Gram(s) IV Push once  epoetin mira-epbx (RETACRIT) Injectable 18624 Unit(s) SubCutaneous every 7 days  folic acid 1 milliGRAM(s) Oral daily  gabapentin 300 milliGRAM(s) Oral two times a day  glucagon  Injectable 1 milliGRAM(s) IntraMuscular once  heparin   Injectable 5000 Unit(s) SubCutaneous every 12 hours  influenza   Vaccine 0.5 milliLiter(s) IntraMuscular once  insulin glargine Injectable (LANTUS) 42 Unit(s) SubCutaneous <User Schedule>  insulin lispro (ADMELOG) corrective regimen sliding scale   SubCutaneous at bedtime  insulin lispro (ADMELOG) corrective regimen sliding scale   SubCutaneous three times a day before meals  insulin lispro Injectable (ADMELOG) 22 Unit(s) SubCutaneous three times a day before meals  levothyroxine 50 MICROGram(s) Oral daily  mycophenolate mofetil 500 milliGRAM(s) Oral two times a day  pantoprazole    Tablet 40 milliGRAM(s) Oral before breakfast  predniSONE   Tablet 60 milliGRAM(s) Oral daily  sodium bicarbonate 1300 milliGRAM(s) Oral three times a day    MEDICATIONS  (PRN):  acetaminophen     Tablet .. 650 milliGRAM(s) Oral every 6 hours PRN Temp greater or equal to 38C (100.4F), Mild Pain (1 - 3)  ALPRAZolam 0.25 milliGRAM(s) Oral every 12 hours PRN anxiety  aluminum hydroxide/magnesium hydroxide/simethicone Suspension 30 milliLiter(s) Oral every 4 hours PRN Dyspepsia  dextrose Oral Gel 15 Gram(s) Oral once PRN Blood Glucose LESS THAN 70 milliGRAM(s)/deciliter  melatonin 3 milliGRAM(s) Oral at bedtime PRN Insomnia  ondansetron Injectable 4 milliGRAM(s) IV Push every 8 hours PRN Nausea and/or Vomiting  oxyCODONE    IR 5 milliGRAM(s) Oral every 4 hours PRN Severe Pain (7 - 10)  senna 2 Tablet(s) Oral at bedtime PRN Constipation      Allergies    No Known Allergies    Intolerances            Vital Signs Last 24 Hrs  T(C): 36.3 (30 Jan 2025 04:47), Max: 36.7 (29 Jan 2025 16:35)  T(F): 97.3 (30 Jan 2025 04:47), Max: 98 (29 Jan 2025 16:35)  HR: 88 (30 Jan 2025 04:47) (88 - 97)  BP: 126/74 (30 Jan 2025 04:47) (112/67 - 155/73)  BP(mean): --  RR: 18 (30 Jan 2025 04:47) (18 - 18)  SpO2: 94% (30 Jan 2025 04:47) (94% - 97%)    Parameters below as of 30 Jan 2025 04:47  Patient On (Oxygen Delivery Method): room air        PHYSICAL EXAM:  GENERAL: Weak  HEENT: Moist MMs  NECK: Supple, No JVD  NERVOUS SYSTEM:  Alert & Oriented X3  CHEST/LUNG: Clear, diminished BS  HEART: Regular rate and rhythm; No rub   ABDOMEN: Soft, Nontender, +BS  EXTREMITIES: + dependent B/L LE edema improved    LABS:                        10.2   15.95 )-----------( 443      ( 29 Jan 2025 04:25 )             32.3     01-30    138  |  102  |  80.6[H]  ----------------------------<  94  3.9   |  22.0  |  2.21[H]    Ca    8.1[L]      30 Jan 2025 04:48        Urinalysis Basic - ( 30 Jan 2025 04:48 )    Color: x / Appearance: x / SG: x / pH: x  Gluc: 94 mg/dL / Ketone: x  / Bili: x / Urobili: x   Blood: x / Protein: x / Nitrite: x   Leuk Esterase: x / RBC: x / WBC x   Sq Epi: x / Non Sq Epi: x / Bacteria: x          RADIOLOGY & ADDITIONAL TESTS:  
    seen for suspected lupus nephritis    feels better post chest tube  still in er holding area  family at bedside    MEDICATIONS  (STANDING):  amLODIPine   Tablet 5 milliGRAM(s) Oral daily  buMETAnide Injectable 1 milliGRAM(s) IV Push daily  cyanocobalamin 1000 MICROGram(s) Oral daily  dextrose 5%. 1000 milliLiter(s) (100 mL/Hr) IV Continuous <Continuous>  dextrose 5%. 1000 milliLiter(s) (50 mL/Hr) IV Continuous <Continuous>  dextrose 50% Injectable 25 Gram(s) IV Push once  dextrose 50% Injectable 12.5 Gram(s) IV Push once  dextrose 50% Injectable 25 Gram(s) IV Push once  epoetin mira-epbx (RETACRIT) Injectable 97943 Unit(s) SubCutaneous every 7 days  folic acid 1 milliGRAM(s) Oral daily  gabapentin 300 milliGRAM(s) Oral two times a day  glucagon  Injectable 1 milliGRAM(s) IntraMuscular once  influenza   Vaccine 0.5 milliLiter(s) IntraMuscular once  insulin lispro (ADMELOG) corrective regimen sliding scale   SubCutaneous three times a day before meals  insulin lispro Injectable (ADMELOG) 4 Unit(s) SubCutaneous three times a day before meals  iron sucrose IVPB 200 milliGRAM(s) IV Intermittent every 24 hours  levothyroxine 50 MICROGram(s) Oral daily  methylPREDNISolone sodium succinate Injectable 40 milliGRAM(s) IV Push two times a day  sodium bicarbonate 650 milliGRAM(s) Oral two times a day    MEDICATIONS  (PRN):  acetaminophen     Tablet .. 650 milliGRAM(s) Oral every 6 hours PRN Temp greater or equal to 38C (100.4F), Mild Pain (1 - 3)  ALPRAZolam 0.25 milliGRAM(s) Oral every 12 hours PRN anxiety  aluminum hydroxide/magnesium hydroxide/simethicone Suspension 30 milliLiter(s) Oral every 4 hours PRN Dyspepsia  dextrose Oral Gel 15 Gram(s) Oral once PRN Blood Glucose LESS THAN 70 milliGRAM(s)/deciliter  melatonin 3 milliGRAM(s) Oral at bedtime PRN Insomnia  ondansetron Injectable 4 milliGRAM(s) IV Push every 8 hours PRN Nausea and/or Vomiting  oxyCODONE    IR 5 milliGRAM(s) Oral every 4 hours PRN Severe Pain (7 - 10)      Allergies    No Known Allergies      Vital Signs Last 24 Hrs  T(C): 36.5 (24 Jan 2025 09:52), Max: 36.5 (23 Jan 2025 19:29)  T(F): 97.7 (24 Jan 2025 09:52), Max: 97.7 (23 Jan 2025 19:29)  HR: 82 (24 Jan 2025 09:52) (80 - 86)  BP: 130/75 (24 Jan 2025 09:52) (130/75 - 145/79)  BP(mean): --  RR: 18 (24 Jan 2025 09:52) (18 - 18)  SpO2: 100% (24 Jan 2025 09:52) (92% - 100%)    Parameters below as of 24 Jan 2025 07:27  Patient On (Oxygen Delivery Method): room air        PHYSICAL EXAM:    GENERAL: NAD  CHEST/LUNG: Clear to ausculation bilaterally  HEART: Regular rate and rhythm; S1 S2;   ABDOMEN: Soft, Nontender, ; Bowel sounds present  EXTREMITIES: trace edema   NERVOUS SYSTEM:  Alert & Oriented X3,  Motor Strength 5/5 B/L upper and lower extremities  PSYCH: normal mood, appropriate response.    LABS:                        9.8    3.38  )-----------( 517      ( 24 Jan 2025 03:25 )             31.5     01-24    137  |  104  |  37.9[H]  ----------------------------<  236[H]  5.3   |  20.0[L]  |  2.63[H]    Ca    8.3[L]      24 Jan 2025 03:25  Phos  3.6     01-24  Mg     1.8     01-24    TPro  5.9[L]  /  Alb  2.3[L]  /  TBili  <0.2[L]  /  DBili  x   /  AST  7   /  ALT  <5  /  AlkPhos  76  01-24    PT/INR - ( 22 Jan 2025 14:30 )   PT: 10.6 sec;   INR: 0.94 ratio         PTT - ( 22 Jan 2025 14:30 )  PTT:26.8 sec  Urinalysis Basic - ( 24 Jan 2025 03:25 )    Color: x / Appearance: x / SG: x / pH: x  Gluc: 236 mg/dL / Ketone: x  / Bili: x / Urobili: x   Blood: x / Protein: x / Nitrite: x   Leuk Esterase: x / RBC: x / WBC x   Sq Epi: x / Non Sq Epi: x / Bacteria: x        CAPILLARY BLOOD GLUCOSE      POCT Blood Glucose.: 286 mg/dL (24 Jan 2025 11:51)  POCT Blood Glucose.: 297 mg/dL (24 Jan 2025 07:25)  POCT Blood Glucose.: 279 mg/dL (24 Jan 2025 00:33)  POCT Blood Glucose.: 329 mg/dL (23 Jan 2025 22:36)  POCT Blood Glucose.: 475 mg/dL (23 Jan 2025 19:50)  POCT Blood Glucose.: 564 mg/dL (23 Jan 2025 18:08)  POCT Blood Glucose.: 550 mg/dL (23 Jan 2025 18:07)  POCT Blood Glucose.: 595 mg/dL (23 Jan 2025 16:59)        RADIOLOGY & ADDITIONAL TESTS:  
[facility] Division of Hospital Medicine  Krishna Esparza DO  I'm reachable on [employer]    Patient is a 59y old  Female who presents with a chief complaint of sob/patel (26 Jan 2025 06:13)      SUBJECTIVE / OVERNIGHT EVENTS:  Patient seen and examined at bedside. Denies chest pain, shortness of breath at this time, abd pain, or any other acute symptoms. She understands that she is pending biopsy results and serology results. Offers no complaints at this time.     MEDICATIONS  (STANDING):  amLODIPine   Tablet 5 milliGRAM(s) Oral daily  buMETAnide Injectable 1 milliGRAM(s) IV Push daily  cyanocobalamin 1000 MICROGram(s) Oral daily  dextrose 5%. 1000 milliLiter(s) (100 mL/Hr) IV Continuous <Continuous>  dextrose 5%. 1000 milliLiter(s) (50 mL/Hr) IV Continuous <Continuous>  dextrose 50% Injectable 25 Gram(s) IV Push once  dextrose 50% Injectable 12.5 Gram(s) IV Push once  dextrose 50% Injectable 25 Gram(s) IV Push once  epoetin mira-epbx (RETACRIT) Injectable 20860 Unit(s) SubCutaneous every 7 days  folic acid 1 milliGRAM(s) Oral daily  gabapentin 300 milliGRAM(s) Oral two times a day  glucagon  Injectable 1 milliGRAM(s) IntraMuscular once  influenza   Vaccine 0.5 milliLiter(s) IntraMuscular once  insulin glargine Injectable (LANTUS) 25 Unit(s) SubCutaneous <User Schedule>  insulin lispro (ADMELOG) corrective regimen sliding scale   SubCutaneous three times a day before meals  insulin lispro Injectable (ADMELOG) 8 Unit(s) SubCutaneous three times a day before meals  iron sucrose IVPB 200 milliGRAM(s) IV Intermittent every 24 hours  levothyroxine 50 MICROGram(s) Oral daily  methylPREDNISolone sodium succinate Injectable 40 milliGRAM(s) IV Push two times a day  pantoprazole    Tablet 40 milliGRAM(s) Oral before breakfast  polyethylene glycol 3350 17 Gram(s) Oral at bedtime  sodium bicarbonate 650 milliGRAM(s) Oral two times a day  sodium zirconium cyclosilicate 10 Gram(s) Oral daily    MEDICATIONS  (PRN):  acetaminophen     Tablet .. 650 milliGRAM(s) Oral every 6 hours PRN Temp greater or equal to 38C (100.4F), Mild Pain (1 - 3)  ALPRAZolam 0.25 milliGRAM(s) Oral every 12 hours PRN anxiety  aluminum hydroxide/magnesium hydroxide/simethicone Suspension 30 milliLiter(s) Oral every 4 hours PRN Dyspepsia  dextrose Oral Gel 15 Gram(s) Oral once PRN Blood Glucose LESS THAN 70 milliGRAM(s)/deciliter  melatonin 3 milliGRAM(s) Oral at bedtime PRN Insomnia  ondansetron Injectable 4 milliGRAM(s) IV Push every 8 hours PRN Nausea and/or Vomiting  oxyCODONE    IR 5 milliGRAM(s) Oral every 4 hours PRN Severe Pain (7 - 10)  senna 2 Tablet(s) Oral at bedtime PRN Constipation    CAPILLARY BLOOD GLUCOSE      POCT Blood Glucose.: 348 mg/dL (26 Jan 2025 12:04)  POCT Blood Glucose.: 364 mg/dL (26 Jan 2025 08:08)  POCT Blood Glucose.: 267 mg/dL (25 Jan 2025 22:52)  POCT Blood Glucose.: 324 mg/dL (25 Jan 2025 18:15)  POCT Blood Glucose.: 293 mg/dL (25 Jan 2025 16:25)    I&O's Summary    25 Jan 2025 07:01  -  26 Jan 2025 07:00  --------------------------------------------------------  IN: 100 mL / OUT: 0 mL / NET: 100 mL        PHYSICAL EXAM:  Vital Signs Last 24 Hrs  T(C): 36.4 (26 Jan 2025 10:58), Max: 36.4 (25 Jan 2025 15:59)  T(F): 97.5 (26 Jan 2025 10:58), Max: 97.6 (26 Jan 2025 04:34)  HR: 85 (26 Jan 2025 10:58) (81 - 88)  BP: 127/71 (26 Jan 2025 10:58) (116/70 - 154/78)  BP(mean): --  RR: 18 (26 Jan 2025 10:58) (18 - 18)  SpO2: 95% (26 Jan 2025 10:58) (93% - 95%)    Parameters below as of 26 Jan 2025 10:58  Patient On (Oxygen Delivery Method): room air        CONSTITUTIONAL: NAD, well-developed, well-groomed  EYES:  EOMI, conjunctiva and sclera clear  ENMT: Moist oral mucosa  NECK: Supple, no JVD  RESPIRATORY: Normal respiratory effort; lungs are clear to auscultation bilaterally  CARDIOVASCULAR: Regular rate and rhythm, normal S1 and S2, trace edema in bilateral lower extremities  ABDOMEN: normoactive bowel sounds, soft, nontender to palpation, no distension   MUSCULOSKELETAL:  no clubbing or cyanosis of digits; no joint swelling or tenderness to palpation  PSYCH: A+O x3; affect appropriate, calm and cooperative  NEUROLOGY: CN 2-12 are intact and symmetric; no gross sensory deficits     LABS:                        10.3   7.64  )-----------( 496      ( 25 Jan 2025 04:30 )             33.2     01-25    132[L]  |  99  |  49.7[H]  ----------------------------<  399[H]  5.6[H]   |  20.0[L]  |  3.01[H]    Ca    8.0[L]      25 Jan 2025 04:30  Phos  5.2     01-25  Mg     1.9     01-25            Urinalysis Basic - ( 25 Jan 2025 04:30 )    Color: x / Appearance: x / SG: x / pH: x  Gluc: 399 mg/dL / Ketone: x  / Bili: x / Urobili: x   Blood: x / Protein: x / Nitrite: x   Leuk Esterase: x / RBC: x / WBC x   Sq Epi: x / Non Sq Epi: x / Bacteria: x        Culture - Fungal, Body Fluid (collected 23 Jan 2025 15:30)  Source: Pleural Fl  Preliminary Report (26 Jan 2025 08:23):    No growth    Culture - Body Fluid with Gram Stain (collected 23 Jan 2025 15:30)  Source: Pleural Fl  Gram Stain (24 Jan 2025 06:05):    polymorphonuclear leukocytes seen    No organisms seen    by cytocentrifuge  Preliminary Report (25 Jan 2025 09:35):    No growth to date.    
GILMAR KING    [id number]    59y      Female    INTERVAL HPI/OVERNIGHT EVENTS:  patient being seen for renal failure. patient is for chest tube by ct surgery and evenutal ir renal bx.       REVIEW OF SYSTEMS:    CONSTITUTIONAL: No fever, weight loss, or fatigue  RESPIRATORY: No cough, wheezing, hemoptysis; No shortness of breath  CARDIOVASCULAR: No chest pain, palpitations  GASTROINTESTINAL: No abdominal or epigastric pain. No nausea, vomiting  NEUROLOGICAL: No headaches, memory loss, loss of strength.  MISCELLANEOUS:      Vital Signs Last 24 Hrs  T(C): 37 (23 Jan 2025 11:38), Max: 37 (23 Jan 2025 07:56)  T(F): 98.6 (23 Jan 2025 11:38), Max: 98.6 (23 Jan 2025 07:56)  HR: 100 (23 Jan 2025 11:38) (88 - 100)  BP: 112/68 (23 Jan 2025 11:38) (112/68 - 149/77)  BP(mean): --  RR: 18 (23 Jan 2025 11:38) (16 - 19)  SpO2: 94% (23 Jan 2025 11:38) (90% - 98%)    Parameters below as of 23 Jan 2025 11:38  Patient On (Oxygen Delivery Method): room air        PHYSICAL EXAM:  Physical Exam: GENERAL: pt examined bedside, in NAD  HEENT: NC/AT, moist oral mucosa, clear conjunctiva, sclera nonicteric  RESPIRATORY: poor inspiratory effort, decreased bs at bases  CARDIOVASCULAR: RRR, normal S1 and S2, no murmur/rub/gallop  ABDOMEN: soft, NT/ND, normoactive bowel sounds, no rebound/guarding  MSK: No joint deformities, edema, erythema  EXTREMITIES: No cynaosis, no clubbing, pitting edema   PSYCH: affect appropriate and cooperative  NEUROLOGY: A+O to person, place, and time, no focal neurologic deficits appreciated  SKIN: erythmatous rash on face            LABS:                        9.6    4.52  )-----------( 430      ( 23 Jan 2025 04:58 )             31.3     01-23    136  |  105  |  32.0[H]  ----------------------------<  310[H]  4.5   |  19.0[L]  |  2.62[H]    Ca    7.9[L]      23 Jan 2025 04:58  Phos  3.4     01-23  Mg     1.9     01-23    TPro  5.8[L]  /  Alb  2.1[L]  /  TBili  <0.2[L]  /  DBili  x   /  AST  7   /  ALT  <5  /  AlkPhos  78  01-23    PT/INR - ( 22 Jan 2025 14:30 )   PT: 10.6 sec;   INR: 0.94 ratio         PTT - ( 22 Jan 2025 14:30 )  PTT:26.8 sec  Urinalysis Basic - ( 23 Jan 2025 04:58 )    Color: x / Appearance: x / SG: x / pH: x  Gluc: 310 mg/dL / Ketone: x  / Bili: x / Urobili: x   Blood: x / Protein: x / Nitrite: x   Leuk Esterase: x / RBC: x / WBC x   Sq Epi: x / Non Sq Epi: x / Bacteria: x          MEDICATIONS  (STANDING):  amLODIPine   Tablet 5 milliGRAM(s) Oral daily  buMETAnide Injectable 1 milliGRAM(s) IV Push daily  dextrose 5%. 1000 milliLiter(s) (100 mL/Hr) IV Continuous <Continuous>  dextrose 5%. 1000 milliLiter(s) (50 mL/Hr) IV Continuous <Continuous>  dextrose 5%. 1000 milliLiter(s) (50 mL/Hr) IV Continuous <Continuous>  dextrose 50% Injectable 25 Gram(s) IV Push once  dextrose 50% Injectable 12.5 Gram(s) IV Push once  dextrose 50% Injectable 25 Gram(s) IV Push once  epoetin mira-epbx (RETACRIT) Injectable 00784 Unit(s) SubCutaneous every 7 days  folic acid 1 milliGRAM(s) Oral daily  gabapentin 300 milliGRAM(s) Oral two times a day  glucagon  Injectable 1 milliGRAM(s) IntraMuscular once  influenza   Vaccine 0.5 milliLiter(s) IntraMuscular once  insulin glargine Injectable (LANTUS) 19 Unit(s) SubCutaneous at bedtime  insulin lispro (ADMELOG) corrective regimen sliding scale   SubCutaneous three times a day before meals  insulin lispro Injectable (ADMELOG) 2 Unit(s) SubCutaneous three times a day before meals  iron sucrose IVPB 200 milliGRAM(s) IV Intermittent every 24 hours  levothyroxine 50 MICROGram(s) Oral daily  methylPREDNISolone sodium succinate Injectable 40 milliGRAM(s) IV Push two times a day  sodium bicarbonate 650 milliGRAM(s) Oral two times a day    MEDICATIONS  (PRN):  acetaminophen     Tablet .. 650 milliGRAM(s) Oral every 6 hours PRN Temp greater or equal to 38C (100.4F), Mild Pain (1 - 3)  aluminum hydroxide/magnesium hydroxide/simethicone Suspension 30 milliLiter(s) Oral every 4 hours PRN Dyspepsia  dextrose Oral Gel 15 Gram(s) Oral once PRN Blood Glucose LESS THAN 70 milliGRAM(s)/deciliter  melatonin 3 milliGRAM(s) Oral at bedtime PRN Insomnia  ondansetron Injectable 4 milliGRAM(s) IV Push every 8 hours PRN Nausea and/or Vomiting      RADIOLOGY & ADDITIONAL TESTS:  
INTERVAL HPI/OVERNIGHT EVENTS:  follow up diabetes management  s/p IV steroids, now on PO Prednisone starting today  Hyperglycemia all day yesterday- now with low normal BG this AM    MEDICATIONS  (STANDING):  amLODIPine   Tablet 5 milliGRAM(s) Oral daily  buMETAnide 1 milliGRAM(s) Oral two times a day  cyanocobalamin 1000 MICROGram(s) Oral daily  dextrose 5%. 1000 milliLiter(s) (100 mL/Hr) IV Continuous <Continuous>  dextrose 5%. 1000 milliLiter(s) (50 mL/Hr) IV Continuous <Continuous>  dextrose 50% Injectable 25 Gram(s) IV Push once  dextrose 50% Injectable 12.5 Gram(s) IV Push once  dextrose 50% Injectable 25 Gram(s) IV Push once  epoetin mira-epbx (RETACRIT) Injectable 80324 Unit(s) SubCutaneous every 7 days  folic acid 1 milliGRAM(s) Oral daily  gabapentin 300 milliGRAM(s) Oral two times a day  glucagon  Injectable 1 milliGRAM(s) IntraMuscular once  heparin   Injectable 5000 Unit(s) SubCutaneous every 12 hours  influenza   Vaccine 0.5 milliLiter(s) IntraMuscular once  insulin glargine Injectable (LANTUS) 42 Unit(s) SubCutaneous <User Schedule>  insulin lispro (ADMELOG) corrective regimen sliding scale   SubCutaneous at bedtime  insulin lispro (ADMELOG) corrective regimen sliding scale   SubCutaneous three times a day before meals  insulin lispro Injectable (ADMELOG) 22 Unit(s) SubCutaneous three times a day before meals  levothyroxine 50 MICROGram(s) Oral daily  losartan 25 milliGRAM(s) Oral daily  mycophenolate mofetil 500 milliGRAM(s) Oral two times a day  pantoprazole    Tablet 40 milliGRAM(s) Oral before breakfast  predniSONE   Tablet 60 milliGRAM(s) Oral daily  sodium bicarbonate 1300 milliGRAM(s) Oral three times a day  trimethoprim  160 mG/sulfamethoxazole 800 mG 1 Tablet(s) Oral <User Schedule>    MEDICATIONS  (PRN):  acetaminophen     Tablet .. 650 milliGRAM(s) Oral every 6 hours PRN Temp greater or equal to 38C (100.4F), Mild Pain (1 - 3)  ALPRAZolam 0.25 milliGRAM(s) Oral every 12 hours PRN anxiety  aluminum hydroxide/magnesium hydroxide/simethicone Suspension 30 milliLiter(s) Oral every 4 hours PRN Dyspepsia  dextrose Oral Gel 15 Gram(s) Oral once PRN Blood Glucose LESS THAN 70 milliGRAM(s)/deciliter  melatonin 3 milliGRAM(s) Oral at bedtime PRN Insomnia  ondansetron Injectable 4 milliGRAM(s) IV Push every 8 hours PRN Nausea and/or Vomiting  oxyCODONE    IR 5 milliGRAM(s) Oral every 4 hours PRN Severe Pain (7 - 10)  senna 2 Tablet(s) Oral at bedtime PRN Constipation      Allergies  No Known Allergies      Vital Signs Last 24 Hrs  T(C): 36.3 (30 Jan 2025 04:47), Max: 36.7 (29 Jan 2025 16:35)  T(F): 97.3 (30 Jan 2025 04:47), Max: 98 (29 Jan 2025 16:35)  HR: 88 (30 Jan 2025 04:47) (88 - 97)  BP: 126/74 (30 Jan 2025 04:47) (112/67 - 132/76)  BP(mean): --  RR: 18 (30 Jan 2025 04:47) (18 - 18)  SpO2: 94% (30 Jan 2025 04:47) (94% - 97%)    Parameters below as of 30 Jan 2025 04:47  Patient On (Oxygen Delivery Method): room air        PHYSICAL EXAM:  GENERAL: NAD, comfortable  NECK: Supple; trachea midline  CHEST/LUNG: Clear to auscultation bilaterally; No wheezes  HEART: Regular rate and rhythm  ABDOMEN: Soft, Nontender, Nondistended  NEURO: AAOx3  EXTREMITIES: no edema      LABS:                        10.2   15.95 )-----------( 443      ( 29 Jan 2025 04:25 )             32.3     01-30    138  |  102  |  80.6[H]  ----------------------------<  94  3.9   |  22.0  |  2.21[H]    Ca    8.1[L]      30 Jan 2025 04:48      Urinalysis Basic - ( 30 Jan 2025 04:48 )    Color: x / Appearance: x / SG: x / pH: x  Gluc: 94 mg/dL / Ketone: x  / Bili: x / Urobili: x   Blood: x / Protein: x / Nitrite: x   Leuk Esterase: x / RBC: x / WBC x   Sq Epi: x / Non Sq Epi: x / Bacteria: x          POCT Blood Glucose.: 84 mg/dL (01-30-25 @ 07:31)  POCT Blood Glucose.: 188 mg/dL (01-29-25 @ 21:08)  POCT Blood Glucose.: 266 mg/dL (01-29-25 @ 16:59)  POCT Blood Glucose.: 347 mg/dL (01-29-25 @ 14:28)  POCT Blood Glucose.: 356 mg/dL (01-29-25 @ 11:26)        Thyroid Stimulating Hormone, Serum: 5.83 uIU/mL (01-27-25 @ 04:30)  
NEPHROLOGY INTERVAL HPI/OVERNIGHT EVENTS:    Examined earlier events noted no distress    MEDICATIONS  (STANDING):  amLODIPine   Tablet 5 milliGRAM(s) Oral daily  buMETAnide Injectable 1 milliGRAM(s) IV Push daily  cyanocobalamin 1000 MICROGram(s) Oral daily  dextrose 5%. 1000 milliLiter(s) (100 mL/Hr) IV Continuous <Continuous>  dextrose 5%. 1000 milliLiter(s) (50 mL/Hr) IV Continuous <Continuous>  dextrose 50% Injectable 25 Gram(s) IV Push once  dextrose 50% Injectable 12.5 Gram(s) IV Push once  dextrose 50% Injectable 25 Gram(s) IV Push once  epoetin mira-epbx (RETACRIT) Injectable 29694 Unit(s) SubCutaneous every 7 days  folic acid 1 milliGRAM(s) Oral daily  gabapentin 300 milliGRAM(s) Oral two times a day  glucagon  Injectable 1 milliGRAM(s) IntraMuscular once  heparin   Injectable 5000 Unit(s) SubCutaneous every 12 hours  influenza   Vaccine 0.5 milliLiter(s) IntraMuscular once  insulin glargine Injectable (LANTUS) 5 Unit(s) SubCutaneous once  insulin lispro (ADMELOG) corrective regimen sliding scale   SubCutaneous at bedtime  insulin lispro (ADMELOG) corrective regimen sliding scale   SubCutaneous three times a day before meals  insulin lispro Injectable (ADMELOG) 15 Unit(s) SubCutaneous three times a day before meals  levothyroxine 50 MICROGram(s) Oral daily  methylPREDNISolone sodium succinate Injectable 40 milliGRAM(s) IV Push two times a day  pantoprazole    Tablet 40 milliGRAM(s) Oral before breakfast  polyethylene glycol 3350 17 Gram(s) Oral at bedtime  sodium bicarbonate 650 milliGRAM(s) Oral two times a day    MEDICATIONS  (PRN):  acetaminophen     Tablet .. 650 milliGRAM(s) Oral every 6 hours PRN Temp greater or equal to 38C (100.4F), Mild Pain (1 - 3)  ALPRAZolam 0.25 milliGRAM(s) Oral every 12 hours PRN anxiety  aluminum hydroxide/magnesium hydroxide/simethicone Suspension 30 milliLiter(s) Oral every 4 hours PRN Dyspepsia  dextrose Oral Gel 15 Gram(s) Oral once PRN Blood Glucose LESS THAN 70 milliGRAM(s)/deciliter  melatonin 3 milliGRAM(s) Oral at bedtime PRN Insomnia  ondansetron Injectable 4 milliGRAM(s) IV Push every 8 hours PRN Nausea and/or Vomiting  oxyCODONE    IR 5 milliGRAM(s) Oral every 4 hours PRN Severe Pain (7 - 10)  senna 2 Tablet(s) Oral at bedtime PRN Constipation      Allergies    No Known Allergies    Intolerances        Vital Signs Last 24 Hrs  T(C): 36.6 (27 Jan 2025 11:35), Max: 36.7 (26 Jan 2025 16:00)  T(F): 97.9 (27 Jan 2025 11:35), Max: 98 (26 Jan 2025 16:00)  HR: 89 (27 Jan 2025 11:35) (78 - 89)  BP: 105/63 (27 Jan 2025 11:35) (105/63 - 138/77)  BP(mean): --  RR: 18 (27 Jan 2025 11:35) (18 - 18)  SpO2: 94% (27 Jan 2025 11:35) (91% - 94%)    Parameters below as of 27 Jan 2025 04:46  Patient On (Oxygen Delivery Method): room air    GENERAL: Weak, deconditioned  HEENT: Moist MMs  NECK: Supple, No JVD  NERVOUS SYSTEM:  Alert & Oriented X3  CHEST/LUNG: Diminished bibasilar BS  HEART: Regular rate and rhythm; No rub   ABDOMEN: Soft, Nontender, +BS  EXTREMITIES: + dependent B/L LE edema    LABS:                        10.3   15.73 )-----------( 546      ( 27 Jan 2025 04:30 )             32.3     01-27    135  |  99  |  67.7[H]  ----------------------------<  241[H]  4.7   |  21.0[L]  |  2.95[H]    Ca    8.0[L]      27 Jan 2025 04:30  Phos  2.9     01-27  Mg     2.1     01-27        Urinalysis Basic - ( 27 Jan 2025 04:30 )    Color: x / Appearance: x / SG: x / pH: x  Gluc: 241 mg/dL / Ketone: x  / Bili: x / Urobili: x   Blood: x / Protein: x / Nitrite: x   Leuk Esterase: x / RBC: x / WBC x   Sq Epi: x / Non Sq Epi: x / Bacteria: x      Magnesium: 2.1 mg/dL (01-27 @ 04:30)  Phosphorus: 2.9 mg/dL (01-27 @ 04:30)          RADIOLOGY & ADDITIONAL TESTS:  
[facility] Division of Hospital Medicine  Krishna Esparza DO  I'm reachable on [employer]    Patient is a 59y old  Female who presents with a chief complaint of sob/patel (26 Jan 2025 06:13)      SUBJECTIVE / OVERNIGHT EVENTS:  Patient seen and examined at bedside. Denies chest pain, shortness of breath at this time, abd pain, or any other acute symptoms. She understands that she is pending biopsy results and serology results. Offers no complaints at this time.     MEDICATIONS  (STANDING):  amLODIPine   Tablet 5 milliGRAM(s) Oral daily  buMETAnide Injectable 1 milliGRAM(s) IV Push daily  cyanocobalamin 1000 MICROGram(s) Oral daily  dextrose 5%. 1000 milliLiter(s) (100 mL/Hr) IV Continuous <Continuous>  dextrose 5%. 1000 milliLiter(s) (50 mL/Hr) IV Continuous <Continuous>  dextrose 50% Injectable 25 Gram(s) IV Push once  dextrose 50% Injectable 12.5 Gram(s) IV Push once  dextrose 50% Injectable 25 Gram(s) IV Push once  epoetin mira-epbx (RETACRIT) Injectable 89660 Unit(s) SubCutaneous every 7 days  folic acid 1 milliGRAM(s) Oral daily  gabapentin 300 milliGRAM(s) Oral two times a day  glucagon  Injectable 1 milliGRAM(s) IntraMuscular once  influenza   Vaccine 0.5 milliLiter(s) IntraMuscular once  insulin glargine Injectable (LANTUS) 25 Unit(s) SubCutaneous <User Schedule>  insulin lispro (ADMELOG) corrective regimen sliding scale   SubCutaneous three times a day before meals  insulin lispro Injectable (ADMELOG) 8 Unit(s) SubCutaneous three times a day before meals  iron sucrose IVPB 200 milliGRAM(s) IV Intermittent every 24 hours  levothyroxine 50 MICROGram(s) Oral daily  methylPREDNISolone sodium succinate Injectable 40 milliGRAM(s) IV Push two times a day  pantoprazole    Tablet 40 milliGRAM(s) Oral before breakfast  polyethylene glycol 3350 17 Gram(s) Oral at bedtime  sodium bicarbonate 650 milliGRAM(s) Oral two times a day  sodium zirconium cyclosilicate 10 Gram(s) Oral daily    MEDICATIONS  (PRN):  acetaminophen     Tablet .. 650 milliGRAM(s) Oral every 6 hours PRN Temp greater or equal to 38C (100.4F), Mild Pain (1 - 3)  ALPRAZolam 0.25 milliGRAM(s) Oral every 12 hours PRN anxiety  aluminum hydroxide/magnesium hydroxide/simethicone Suspension 30 milliLiter(s) Oral every 4 hours PRN Dyspepsia  dextrose Oral Gel 15 Gram(s) Oral once PRN Blood Glucose LESS THAN 70 milliGRAM(s)/deciliter  melatonin 3 milliGRAM(s) Oral at bedtime PRN Insomnia  ondansetron Injectable 4 milliGRAM(s) IV Push every 8 hours PRN Nausea and/or Vomiting  oxyCODONE    IR 5 milliGRAM(s) Oral every 4 hours PRN Severe Pain (7 - 10)  senna 2 Tablet(s) Oral at bedtime PRN Constipation    CAPILLARY BLOOD GLUCOSE      POCT Blood Glucose.: 348 mg/dL (26 Jan 2025 12:04)  POCT Blood Glucose.: 364 mg/dL (26 Jan 2025 08:08)  POCT Blood Glucose.: 267 mg/dL (25 Jan 2025 22:52)  POCT Blood Glucose.: 324 mg/dL (25 Jan 2025 18:15)  POCT Blood Glucose.: 293 mg/dL (25 Jan 2025 16:25)    I&O's Summary    25 Jan 2025 07:01  -  26 Jan 2025 07:00  --------------------------------------------------------  IN: 100 mL / OUT: 0 mL / NET: 100 mL        PHYSICAL EXAM:  Vital Signs Last 24 Hrs  T(C): 36.4 (26 Jan 2025 10:58), Max: 36.4 (25 Jan 2025 15:59)  T(F): 97.5 (26 Jan 2025 10:58), Max: 97.6 (26 Jan 2025 04:34)  HR: 85 (26 Jan 2025 10:58) (81 - 88)  BP: 127/71 (26 Jan 2025 10:58) (116/70 - 154/78)  BP(mean): --  RR: 18 (26 Jan 2025 10:58) (18 - 18)  SpO2: 95% (26 Jan 2025 10:58) (93% - 95%)    Parameters below as of 26 Jan 2025 10:58  Patient On (Oxygen Delivery Method): room air        CONSTITUTIONAL: NAD, well-developed, well-groomed  EYES:  EOMI, conjunctiva and sclera clear  ENMT: Moist oral mucosa  NECK: Supple, no JVD  RESPIRATORY: Normal respiratory effort; lungs are clear to auscultation bilaterally  CARDIOVASCULAR: Regular rate and rhythm, normal S1 and S2, trace edema in bilateral lower extremities  ABDOMEN: normoactive bowel sounds, soft, nontender to palpation, no distension   MUSCULOSKELETAL:  no clubbing or cyanosis of digits; no joint swelling or tenderness to palpation  PSYCH: A+O x3; affect appropriate, calm and cooperative  NEUROLOGY: CN 2-12 are intact and symmetric; no gross sensory deficits     LABS:                        10.3   7.64  )-----------( 496      ( 25 Jan 2025 04:30 )             33.2     01-25    132[L]  |  99  |  49.7[H]  ----------------------------<  399[H]  5.6[H]   |  20.0[L]  |  3.01[H]    Ca    8.0[L]      25 Jan 2025 04:30  Phos  5.2     01-25  Mg     1.9     01-25            Urinalysis Basic - ( 25 Jan 2025 04:30 )    Color: x / Appearance: x / SG: x / pH: x  Gluc: 399 mg/dL / Ketone: x  / Bili: x / Urobili: x   Blood: x / Protein: x / Nitrite: x   Leuk Esterase: x / RBC: x / WBC x   Sq Epi: x / Non Sq Epi: x / Bacteria: x        Culture - Fungal, Body Fluid (collected 23 Jan 2025 15:30)  Source: Pleural Fl  Preliminary Report (26 Jan 2025 08:23):    No growth    Culture - Body Fluid with Gram Stain (collected 23 Jan 2025 15:30)  Source: Pleural Fl  Gram Stain (24 Jan 2025 06:05):    polymorphonuclear leukocytes seen    No organisms seen    by cytocentrifuge  Preliminary Report (25 Jan 2025 09:35):    No growth to date.    
Subjective - patient seen and evaluated bedside. Sitting comfortably in bed. Denies CP, SOB, HA, dizziness, n/v/d    Review of Systems: negative x 10 systems except as noted above    Brief summary:  59yFemale with b/l pleural effusion s/p RT PTC    Significant/[id number] events: right chest tube removal      PAST MEDICAL & SURGICAL HISTORY:  Diabetes      Hypertension      No significant past surgical history      No significant past surgical history            acetaminophen     Tablet .. 650 milliGRAM(s) Oral every 6 hours PRN  ALPRAZolam 0.25 milliGRAM(s) Oral every 12 hours PRN  aluminum hydroxide/magnesium hydroxide/simethicone Suspension 30 milliLiter(s) Oral every 4 hours PRN  amLODIPine   Tablet 5 milliGRAM(s) Oral daily  buMETAnide Injectable 1 milliGRAM(s) IV Push daily  cyanocobalamin 1000 MICROGram(s) Oral daily  dextrose 5%. 1000 milliLiter(s) IV Continuous <Continuous>  dextrose 5%. 1000 milliLiter(s) IV Continuous <Continuous>  dextrose 50% Injectable 25 Gram(s) IV Push once  dextrose 50% Injectable 12.5 Gram(s) IV Push once  dextrose 50% Injectable 25 Gram(s) IV Push once  dextrose Oral Gel 15 Gram(s) Oral once PRN  epoetin mira-epbx (RETACRIT) Injectable 22851 Unit(s) SubCutaneous every 7 days  folic acid 1 milliGRAM(s) Oral daily  gabapentin 300 milliGRAM(s) Oral two times a day  glucagon  Injectable 1 milliGRAM(s) IntraMuscular once  influenza   Vaccine 0.5 milliLiter(s) IntraMuscular once  insulin lispro (ADMELOG) corrective regimen sliding scale   SubCutaneous three times a day before meals  insulin lispro Injectable (ADMELOG) 4 Unit(s) SubCutaneous three times a day before meals  iron sucrose IVPB 200 milliGRAM(s) IV Intermittent every 24 hours  levothyroxine 50 MICROGram(s) Oral daily  melatonin 3 milliGRAM(s) Oral at bedtime PRN  methylPREDNISolone sodium succinate Injectable 40 milliGRAM(s) IV Push two times a day  ondansetron Injectable 4 milliGRAM(s) IV Push every 8 hours PRN  oxyCODONE    IR 5 milliGRAM(s) Oral every 4 hours PRN  pantoprazole    Tablet 40 milliGRAM(s) Oral before breakfast  polyethylene glycol 3350 17 Gram(s) Oral at bedtime  senna 2 Tablet(s) Oral once  senna 2 Tablet(s) Oral at bedtime PRN  sodium bicarbonate 650 milliGRAM(s) Oral two times a day  MEDICATIONS  (PRN):  acetaminophen     Tablet .. 650 milliGRAM(s) Oral every 6 hours PRN Temp greater or equal to 38C (100.4F), Mild Pain (1 - 3)  ALPRAZolam 0.25 milliGRAM(s) Oral every 12 hours PRN anxiety  aluminum hydroxide/magnesium hydroxide/simethicone Suspension 30 milliLiter(s) Oral every 4 hours PRN Dyspepsia  dextrose Oral Gel 15 Gram(s) Oral once PRN Blood Glucose LESS THAN 70 milliGRAM(s)/deciliter  melatonin 3 milliGRAM(s) Oral at bedtime PRN Insomnia  ondansetron Injectable 4 milliGRAM(s) IV Push every 8 hours PRN Nausea and/or Vomiting  oxyCODONE    IR 5 milliGRAM(s) Oral every 4 hours PRN Severe Pain (7 - 10)  senna 2 Tablet(s) Oral at bedtime PRN Constipation      Daily     Daily                               9.8    3.38  )-----------( 517      ( 24 Jan 2025 03:25 )             31.5   01-24    137  |  104  |  37.9[H]  ----------------------------<  236[H]  5.3   |  20.0[L]  |  2.63[H]    Ca    8.3[L]      24 Jan 2025 03:25  Phos  3.6     01-24  Mg     1.8     01-24    TPro  5.9[L]  /  Alb  2.3[L]  /  TBili  <0.2[L]  /  DBili  x   /  AST  7   /  ALT  <5  /  AlkPhos  76  01-24      PT/INR - ( 22 Jan 2025 14:30 )   PT: 10.6 sec;   INR: 0.94 ratio         PTT - ( 22 Jan 2025 14:30 )  PTT:26.8 sec      Objective:  T(C): 36.5 (01-24-25 @ 09:52), Max: 36.5 (01-23-25 @ 19:29)  HR: 82 (01-24-25 @ 09:52) (80 - 86)  BP: 130/75 (01-24-25 @ 09:52) (130/75 - 145/79)  RR: 18 (01-24-25 @ 09:52) (18 - 18)  SpO2: 100% (01-24-25 @ 09:52) (92% - 100%)  Wt(kg): --CAPILLARY BLOOD GLUCOSE      POCT Blood Glucose.: 286 mg/dL (24 Jan 2025 11:51)  POCT Blood Glucose.: 297 mg/dL (24 Jan 2025 07:25)  POCT Blood Glucose.: 279 mg/dL (24 Jan 2025 00:33)  POCT Blood Glucose.: 329 mg/dL (23 Jan 2025 22:36)  POCT Blood Glucose.: 475 mg/dL (23 Jan 2025 19:50)  POCT Blood Glucose.: 564 mg/dL (23 Jan 2025 18:08)  POCT Blood Glucose.: 550 mg/dL (23 Jan 2025 18:07)  POCT Blood Glucose.: 595 mg/dL (23 Jan 2025 16:59)  I&O's Summary    23 Jan 2025 07:01  -  24 Jan 2025 07:00  --------------------------------------------------------  IN: 0 mL / OUT: 550 mL / NET: -550 mL        Physical Exam  General: NAD  Neuro: A+O x 3, non-focal, speech clear and intact  Psych: Appropriate affect  HEENT:  NCAT, No conjuctival edema or icterus, no thrush.  Pulm: CTA, equal bilaterally  CV: RRR,  +S1S2  Abd: soft, NT, ND, +BS  Ext: +DP Pulses b/l, no edema  Skin: Warm, dry, intact          Imaging:  < from: Xray Chest 1 View- PORTABLE-Urgent (Xray Chest 1 View- PORTABLE-Urgent .) (01.23.25 @ 16:29) >  CLINICAL HISTORY: s/p R pigtail    FINDINGS:    Single frontal view of the chest demonstrates right lower lobe pigtail   catheter. No pneumothorax. Small left-sided pleural effusion.. The   cardiomediastinal silhouette is unremarkable. No acute osseous   abnormalities. Overlying EKGleads and wires are noted    IMPRESSION: Status post evacuation of a small right-sided effusion. Small   left-sided effusion.    < end of copied text >                
HPI:  60 y/o F w/ PMH of HTN, hypothyroid, IDDM type I, chronic anemia, HFpEF (11/02/24 55%), CKD was sent in from Dr. Matson's office (pulm) b/c of pt having worsening SOB, worsening renal function, evidence of b/l pleural effusions.  Pt states her symptoms have been worsening over the past few months and she now has been having difficult laying flat or on left side bc of worsening sob and nonproductive cough.  Pt also reports worsening b/l LE edema.   She also c/o left upper abd pain worse w/ deep inspiration.  Pt has had worsening renal function since september she also has had rashes and mylagias and elevated MARGI.  Concern is for lupus nephritis.  Dr. Matson would like renal consulted for renal biopsy and thoracic consult for pleural effusions.   (22 Jan 2025 17:48)    Patient saturating well on RA, but NIXON and uncomfortable laying flat. CT with B/L pleural effusions. On POCUS R>L with good window.      PAST MEDICAL & SURGICAL HISTORY:  Diabetes      Hypertension      No significant past surgical history          REVIEW OF SYSTEMS      General: fatigue    Skin/Breast: No Rashes/ Lesions/ Masses  	  Ophthalmologic: No Blurry vision/ Glaucoma/ Blindness  	  ENMT: No Hearing loss/ Drainage/ Lesions	    Respiratory and Thorax: SOB  	  Cardiovascular: Pleuritic chest pain     Gastrointestinal: No Nausea/ Vomiting/ Constipation/ Appetite Change	    Genitourinary: No Hematuria/ Dysuria/ Frequency change	    Musculoskeletal: No Pain/ Weakness/ Claudication	    Neurological: No Seizures/ TIA/CVA/ Paresthesias    Psychiatric: No Dementia/ Depression/ SI/HI	    Hematology/Lymphatics: No hx of bleeding/ Edema	    Endocrine:	No Hyperglycemia/ Hypoglycemia    Allergic/Immunologic:	 No Anaphylaxis/ Intolerance/ Recent illnesses    MEDICATIONS  (STANDING):  amLODIPine   Tablet 5 milliGRAM(s) Oral daily  buMETAnide Injectable 1 milliGRAM(s) IV Push daily  dextrose 5%. 1000 milliLiter(s) (100 mL/Hr) IV Continuous <Continuous>  dextrose 5%. 1000 milliLiter(s) (50 mL/Hr) IV Continuous <Continuous>  dextrose 50% Injectable 25 Gram(s) IV Push once  dextrose 50% Injectable 12.5 Gram(s) IV Push once  dextrose 50% Injectable 25 Gram(s) IV Push once  epoetin mira-epbx (RETACRIT) Injectable 53101 Unit(s) SubCutaneous every 7 days  folic acid 1 milliGRAM(s) Oral daily  gabapentin 300 milliGRAM(s) Oral two times a day  glucagon  Injectable 1 milliGRAM(s) IntraMuscular once  insulin glargine Injectable (LANTUS) 19 Unit(s) SubCutaneous at bedtime  insulin lispro (ADMELOG) corrective regimen sliding scale   SubCutaneous three times a day before meals  insulin lispro Injectable (ADMELOG) 2 Unit(s) SubCutaneous three times a day before meals  levothyroxine 50 MICROGram(s) Oral daily  methylPREDNISolone sodium succinate Injectable 40 milliGRAM(s) IV Push two times a day  sodium bicarbonate 650 milliGRAM(s) Oral two times a day    MEDICATIONS  (PRN):  acetaminophen     Tablet .. 650 milliGRAM(s) Oral every 6 hours PRN Temp greater or equal to 38C (100.4F), Mild Pain (1 - 3)  aluminum hydroxide/magnesium hydroxide/simethicone Suspension 30 milliLiter(s) Oral every 4 hours PRN Dyspepsia  dextrose Oral Gel 15 Gram(s) Oral once PRN Blood Glucose LESS THAN 70 milliGRAM(s)/deciliter  melatonin 3 milliGRAM(s) Oral at bedtime PRN Insomnia  ondansetron Injectable 4 milliGRAM(s) IV Push every 8 hours PRN Nausea and/or Vomiting      Allergies    No Known Allergies            SOCIAL HISTORY:  Smoking Hx: denies  Etoh Hx: denies  IVDA Hx: denies    FAMILY HISTORY:  FH: non-Hodgkin's lymphoma (Mother)        Vital Signs Last 24 Hrs  T(C): 36.7 (22 Jan 2025 15:40), Max: 37.2 (22 Jan 2025 10:04)  T(F): 98.1 (22 Jan 2025 15:40), Max: 98.9 (22 Jan 2025 10:04)  HR: 88 (22 Jan 2025 15:40) (88 - 105)  BP: 118/74 (22 Jan 2025 15:40) (118/74 - 150/80)  BP(mean): --  RR: 19 (22 Jan 2025 15:40) (18 - 20)  SpO2: 98% (22 Jan 2025 15:40) (95% - 98%)    Parameters below as of 22 Jan 2025 15:40  Patient On (Oxygen Delivery Method): room air        General: NAD  Neurology: Awake, nonfocal, MAYER x 4  Eyes: Scleras clear, EOMI, Gross vision intact  ENT: Gross hearing intact, grossly patent pharynx, no stridor  Neck: Neck supple, trachea midline, No JVD  Respiratory: CTA B/L  CV: S1S2, no murmurs, rubs or gallops  Abdominal: Soft  Extremities: B/L LE pitting edema   Skin: No Rashes, Hematoma, Ecchymosis        LABS:                        8.3    3.28  )-----------( 317      ( 22 Jan 2025 12:50 )             27.0     01-22    135  |  106  |  32.7[H]  ----------------------------<  258[H]  5.3   |  19.0[L]  |  2.71[H]    Ca    8.0[L]      22 Jan 2025 12:50    TPro  6.0[L]  /  Alb  2.1[L]  /  TBili  <0.2[L]  /  DBili  x   /  AST  17  /  ALT  8   /  AlkPhos  76  01-22    PT/INR - ( 22 Jan 2025 14:30 )   PT: 10.6 sec;   INR: 0.94 ratio         PTT - ( 22 Jan 2025 14:30 )  PTT:26.8 sec  Urinalysis Basic - ( 22 Jan 2025 12:50 )    Color: x / Appearance: x / SG: x / pH: x  Gluc: 258 mg/dL / Ketone: x  / Bili: x / Urobili: x   Blood: x / Protein: x / Nitrite: x   Leuk Esterase: x / RBC: x / WBC x   Sq Epi: x / Non Sq Epi: x / Bacteria: x        RADIOLOGY & ADDITIONAL STUDIES:  < from: CT Chest No Cont (01.22.25 @ 15:06) >  Bilateral moderate pleural effusions, slightly increased when compared   with 12/19/2024. Bilateral pulmonary nodules do not appear significantly   changed. Multiple small mediastinal and axillary lymph nodes, some of   which have mildly decreased in size. Retroperitoneal and pelvic   lymphadenopathy which has mildly increased in size.    Bilateral nonobstructing renal stones.    < end of copied text >      ASSESSMENT:   59yFemalePAST MEDICAL & SURGICAL HISTORY:  Diabetes      Hypertension      No significant past surgical history    
[facility] Division of Hospital Medicine  Krishna Esparza DO  I'm reachable on [employer] Teams    Patient is a 59y old  Female who presents with a chief complaint of sob/patel (26 Jan 2025 06:13)      SUBJECTIVE / OVERNIGHT EVENTS:  Patient seen and examined at bedside. Denies chest pain, shortness of breath at this time, abd pain, or any other acute symptoms. Discharge planning.    MEDICATIONS  (STANDING):  amLODIPine   Tablet 5 milliGRAM(s) Oral daily  buMETAnide Injectable 1 milliGRAM(s) IV Push daily  cyanocobalamin 1000 MICROGram(s) Oral daily  dextrose 5%. 1000 milliLiter(s) (100 mL/Hr) IV Continuous <Continuous>  dextrose 5%. 1000 milliLiter(s) (50 mL/Hr) IV Continuous <Continuous>  dextrose 50% Injectable 25 Gram(s) IV Push once  dextrose 50% Injectable 12.5 Gram(s) IV Push once  dextrose 50% Injectable 25 Gram(s) IV Push once  epoetin mira-epbx (RETACRIT) Injectable 35304 Unit(s) SubCutaneous every 7 days  folic acid 1 milliGRAM(s) Oral daily  gabapentin 300 milliGRAM(s) Oral two times a day  glucagon  Injectable 1 milliGRAM(s) IntraMuscular once  influenza   Vaccine 0.5 milliLiter(s) IntraMuscular once  insulin glargine Injectable (LANTUS) 25 Unit(s) SubCutaneous <User Schedule>  insulin lispro (ADMELOG) corrective regimen sliding scale   SubCutaneous three times a day before meals  insulin lispro Injectable (ADMELOG) 8 Unit(s) SubCutaneous three times a day before meals  iron sucrose IVPB 200 milliGRAM(s) IV Intermittent every 24 hours  levothyroxine 50 MICROGram(s) Oral daily  methylPREDNISolone sodium succinate Injectable 40 milliGRAM(s) IV Push two times a day  pantoprazole    Tablet 40 milliGRAM(s) Oral before breakfast  polyethylene glycol 3350 17 Gram(s) Oral at bedtime  sodium bicarbonate 650 milliGRAM(s) Oral two times a day  sodium zirconium cyclosilicate 10 Gram(s) Oral daily    MEDICATIONS  (PRN):  acetaminophen     Tablet .. 650 milliGRAM(s) Oral every 6 hours PRN Temp greater or equal to 38C (100.4F), Mild Pain (1 - 3)  ALPRAZolam 0.25 milliGRAM(s) Oral every 12 hours PRN anxiety  aluminum hydroxide/magnesium hydroxide/simethicone Suspension 30 milliLiter(s) Oral every 4 hours PRN Dyspepsia  dextrose Oral Gel 15 Gram(s) Oral once PRN Blood Glucose LESS THAN 70 milliGRAM(s)/deciliter  melatonin 3 milliGRAM(s) Oral at bedtime PRN Insomnia  ondansetron Injectable 4 milliGRAM(s) IV Push every 8 hours PRN Nausea and/or Vomiting  oxyCODONE    IR 5 milliGRAM(s) Oral every 4 hours PRN Severe Pain (7 - 10)  senna 2 Tablet(s) Oral at bedtime PRN Constipation    CAPILLARY BLOOD GLUCOSE      POCT Blood Glucose.: 348 mg/dL (26 Jan 2025 12:04)  POCT Blood Glucose.: 364 mg/dL (26 Jan 2025 08:08)  POCT Blood Glucose.: 267 mg/dL (25 Jan 2025 22:52)  POCT Blood Glucose.: 324 mg/dL (25 Jan 2025 18:15)  POCT Blood Glucose.: 293 mg/dL (25 Jan 2025 16:25)    I&O's Summary    25 Jan 2025 07:01  -  26 Jan 2025 07:00  --------------------------------------------------------  IN: 100 mL / OUT: 0 mL / NET: 100 mL        PHYSICAL EXAM:  Vital Signs Last 24 Hrs  T(C): 36.4 (26 Jan 2025 10:58), Max: 36.4 (25 Jan 2025 15:59)  T(F): 97.5 (26 Jan 2025 10:58), Max: 97.6 (26 Jan 2025 04:34)  HR: 85 (26 Jan 2025 10:58) (81 - 88)  BP: 127/71 (26 Jan 2025 10:58) (116/70 - 154/78)  BP(mean): --  RR: 18 (26 Jan 2025 10:58) (18 - 18)  SpO2: 95% (26 Jan 2025 10:58) (93% - 95%)    Parameters below as of 26 Jan 2025 10:58  Patient On (Oxygen Delivery Method): room air        CONSTITUTIONAL: NAD, well-developed, well-groomed  EYES:  EOMI, conjunctiva and sclera clear  ENMT: Moist oral mucosa  NECK: Supple, no JVD  RESPIRATORY: Normal respiratory effort; lungs are clear to auscultation bilaterally  CARDIOVASCULAR: Regular rate and rhythm, normal S1 and S2, trace edema in bilateral lower extremities  ABDOMEN: normoactive bowel sounds, soft, nontender to palpation, no distension   MUSCULOSKELETAL:  no clubbing or cyanosis of digits; no joint swelling or tenderness to palpation  PSYCH: A+O x3; affect appropriate, calm and cooperative  NEUROLOGY: CN 2-12 are intact and symmetric; no gross sensory deficits     LABS:                        10.3   7.64  )-----------( 496      ( 25 Jan 2025 04:30 )             33.2     01-25    132[L]  |  99  |  49.7[H]  ----------------------------<  399[H]  5.6[H]   |  20.0[L]  |  3.01[H]    Ca    8.0[L]      25 Jan 2025 04:30  Phos  5.2     01-25  Mg     1.9     01-25            Urinalysis Basic - ( 25 Jan 2025 04:30 )    Color: x / Appearance: x / SG: x / pH: x  Gluc: 399 mg/dL / Ketone: x  / Bili: x / Urobili: x   Blood: x / Protein: x / Nitrite: x   Leuk Esterase: x / RBC: x / WBC x   Sq Epi: x / Non Sq Epi: x / Bacteria: x        Culture - Fungal, Body Fluid (collected 23 Jan 2025 15:30)  Source: Pleural Fl  Preliminary Report (26 Jan 2025 08:23):    No growth    Culture - Body Fluid with Gram Stain (collected 23 Jan 2025 15:30)  Source: Pleural Fl  Gram Stain (24 Jan 2025 06:05):    polymorphonuclear leukocytes seen    No organisms seen    by cytocentrifuge  Preliminary Report (25 Jan 2025 09:35):    No growth to date.    
  Subjective: c/o generalized lower back pain L side worse than right denies CP, palpitations, SOB, cough, fever, chills, itchiness/rash, diaphoresis, vision changes, HA, dizziness/lightheadedness, numbness/tingling, abd pain, N/V     T(C): 36.5 (01-23-25 @ 19:29), Max: 37 (01-23-25 @ 07:56)  HR: 80 (01-23-25 @ 19:29) (80 - 100)  BP: 145/75 (01-23-25 @ 19:29) (112/68 - 149/77)  RR: 18 (01-23-25 @ 19:29) (16 - 18)  SpO2: 93% (01-23-25 @ 19:29) (90% - 98%)    01-23    136  |  105  |  32.0[H]  ----------------------------<  310[H]  4.5   |  19.0[L]  |  2.62[H]    Ca    7.9[L]      23 Jan 2025 04:58  Phos  3.4     01-23  Mg     1.9     01-23    TPro  5.8[L]  /  Alb  2.1[L]  /  TBili  <0.2[L]  /  DBili  x   /  AST  7   /  ALT  <5  /  AlkPhos  78  01-23                               9.6    4.52  )-----------( 430      ( 23 Jan 2025 04:58 )             31.3        PT/INR - ( 22 Jan 2025 14:30 )   PT: 10.6 sec;   INR: 0.94 ratio   PTT - ( 22 Jan 2025 14:30 )  PTT:26.8 sec              CAPILLARY BLOOD GLUCOSE  POCT Blood Glucose.: 475 mg/dL (23 Jan 2025 19:50)  POCT Blood Glucose.: 564 mg/dL (23 Jan 2025 18:08)  POCT Blood Glucose.: 550 mg/dL (23 Jan 2025 18:07)  POCT Blood Glucose.: 595 mg/dL (23 Jan 2025 16:59)  POCT Blood Glucose.: 326 mg/dL (23 Jan 2025 11:44)  POCT Blood Glucose.: 248 mg/dL (23 Jan 2025 09:01)    I&O's Detail    23 Jan 2025 07:01  -  23 Jan 2025 19:57  --------------------------------------------------------  IN:  Total IN: 0 mL    OUT:    Chest Tube (mL): 550 mL  Total OUT: 550 mL  Total NET: -550 mL    Drug Dosing Weight  Height (cm): 152.4 (22 Jan 2025 10:04)  Weight (kg): 60.4 (22 Jan 2025 10:04)  BMI (kg/m2): 26 (22 Jan 2025 10:04)  BSA (m2): 1.57 (22 Jan 2025 10:04)    MEDICATIONS  (STANDING):  amLODIPine   Tablet 5 milliGRAM(s) Oral daily  buMETAnide Injectable 1 milliGRAM(s) IV Push daily  cyanocobalamin 1000 MICROGram(s) Oral daily  dextrose 5%. 1000 milliLiter(s) (50 mL/Hr) IV Continuous <Continuous>  dextrose 5%. 1000 milliLiter(s) (100 mL/Hr) IV Continuous <Continuous>  dextrose 5%. 1000 milliLiter(s) (50 mL/Hr) IV Continuous <Continuous>  dextrose 50% Injectable 25 Gram(s) IV Push once  dextrose 50% Injectable 12.5 Gram(s) IV Push once  dextrose 50% Injectable 25 Gram(s) IV Push once  epoetin mira-epbx (RETACRIT) Injectable 31725 Unit(s) SubCutaneous every 7 days  folic acid 1 milliGRAM(s) Oral daily  gabapentin 300 milliGRAM(s) Oral two times a day  glucagon  Injectable 1 milliGRAM(s) IntraMuscular once  influenza   Vaccine 0.5 milliLiter(s) IntraMuscular once  insulin glargine Injectable (LANTUS) 19 Unit(s) SubCutaneous once  insulin glargine Injectable (LANTUS) 20 Unit(s) SubCutaneous <User Schedule>  insulin lispro (ADMELOG) corrective regimen sliding scale   SubCutaneous three times a day before meals  insulin lispro Injectable (ADMELOG) 4 Unit(s) SubCutaneous three times a day before meals  iron sucrose IVPB 200 milliGRAM(s) IV Intermittent every 24 hours  levothyroxine 50 MICROGram(s) Oral daily  methylPREDNISolone sodium succinate Injectable 40 milliGRAM(s) IV Push two times a day  sodium bicarbonate 650 milliGRAM(s) Oral two times a day    MEDICATIONS  (PRN):  acetaminophen     Tablet .. 650 milliGRAM(s) Oral every 6 hours PRN Temp greater or equal to 38C (100.4F), Mild Pain (1 - 3)  ALPRAZolam 0.25 milliGRAM(s) Oral every 12 hours PRN anxiety  aluminum hydroxide/magnesium hydroxide/simethicone Suspension 30 milliLiter(s) Oral every 4 hours PRN Dyspepsia  dextrose Oral Gel 15 Gram(s) Oral once PRN Blood Glucose LESS THAN 70 milliGRAM(s)/deciliter  melatonin 3 milliGRAM(s) Oral at bedtime PRN Insomnia  ondansetron Injectable 4 milliGRAM(s) IV Push every 8 hours PRN Nausea and/or Vomiting  oxyCODONE    IR 5 milliGRAM(s) Oral every 4 hours PRN Severe Pain (7 - 10)    Physical Exam  Gen: NAD  Neuro: A&Ox3 non focal speech clear and intact  Pulm: decreased BS b/l no wheezing  CV: S1S2  Abd: soft NT ND  Extrem/MS: mild b/l LE edema, no cyanosis MORENO BALLESTEROS      
[facility] Division of Hospital Medicine  Krishna Esparza DO  I'm reachable on [facility] Teams    Patient is a 59y old  Female who presents with a chief complaint of sob/patel (26 Jan 2025 06:13)      SUBJECTIVE / OVERNIGHT EVENTS:  Patient seen and examined at bedside. Denies chest pain, shortness of breath at this time, abd pain, or any other acute symptoms. She understands that she is pending biopsy results and serology results. Offers no complaints at this time.     MEDICATIONS  (STANDING):  amLODIPine   Tablet 5 milliGRAM(s) Oral daily  buMETAnide Injectable 1 milliGRAM(s) IV Push daily  cyanocobalamin 1000 MICROGram(s) Oral daily  dextrose 5%. 1000 milliLiter(s) (100 mL/Hr) IV Continuous <Continuous>  dextrose 5%. 1000 milliLiter(s) (50 mL/Hr) IV Continuous <Continuous>  dextrose 50% Injectable 25 Gram(s) IV Push once  dextrose 50% Injectable 12.5 Gram(s) IV Push once  dextrose 50% Injectable 25 Gram(s) IV Push once  epoetin mira-epbx (RETACRIT) Injectable 77300 Unit(s) SubCutaneous every 7 days  folic acid 1 milliGRAM(s) Oral daily  gabapentin 300 milliGRAM(s) Oral two times a day  glucagon  Injectable 1 milliGRAM(s) IntraMuscular once  influenza   Vaccine 0.5 milliLiter(s) IntraMuscular once  insulin glargine Injectable (LANTUS) 25 Unit(s) SubCutaneous <User Schedule>  insulin lispro (ADMELOG) corrective regimen sliding scale   SubCutaneous three times a day before meals  insulin lispro Injectable (ADMELOG) 8 Unit(s) SubCutaneous three times a day before meals  iron sucrose IVPB 200 milliGRAM(s) IV Intermittent every 24 hours  levothyroxine 50 MICROGram(s) Oral daily  methylPREDNISolone sodium succinate Injectable 40 milliGRAM(s) IV Push two times a day  pantoprazole    Tablet 40 milliGRAM(s) Oral before breakfast  polyethylene glycol 3350 17 Gram(s) Oral at bedtime  sodium bicarbonate 650 milliGRAM(s) Oral two times a day  sodium zirconium cyclosilicate 10 Gram(s) Oral daily    MEDICATIONS  (PRN):  acetaminophen     Tablet .. 650 milliGRAM(s) Oral every 6 hours PRN Temp greater or equal to 38C (100.4F), Mild Pain (1 - 3)  ALPRAZolam 0.25 milliGRAM(s) Oral every 12 hours PRN anxiety  aluminum hydroxide/magnesium hydroxide/simethicone Suspension 30 milliLiter(s) Oral every 4 hours PRN Dyspepsia  dextrose Oral Gel 15 Gram(s) Oral once PRN Blood Glucose LESS THAN 70 milliGRAM(s)/deciliter  melatonin 3 milliGRAM(s) Oral at bedtime PRN Insomnia  ondansetron Injectable 4 milliGRAM(s) IV Push every 8 hours PRN Nausea and/or Vomiting  oxyCODONE    IR 5 milliGRAM(s) Oral every 4 hours PRN Severe Pain (7 - 10)  senna 2 Tablet(s) Oral at bedtime PRN Constipation    CAPILLARY BLOOD GLUCOSE      POCT Blood Glucose.: 348 mg/dL (26 Jan 2025 12:04)  POCT Blood Glucose.: 364 mg/dL (26 Jan 2025 08:08)  POCT Blood Glucose.: 267 mg/dL (25 Jan 2025 22:52)  POCT Blood Glucose.: 324 mg/dL (25 Jan 2025 18:15)  POCT Blood Glucose.: 293 mg/dL (25 Jan 2025 16:25)    I&O's Summary    25 Jan 2025 07:01  -  26 Jan 2025 07:00  --------------------------------------------------------  IN: 100 mL / OUT: 0 mL / NET: 100 mL        PHYSICAL EXAM:  Vital Signs Last 24 Hrs  T(C): 36.4 (26 Jan 2025 10:58), Max: 36.4 (25 Jan 2025 15:59)  T(F): 97.5 (26 Jan 2025 10:58), Max: 97.6 (26 Jan 2025 04:34)  HR: 85 (26 Jan 2025 10:58) (81 - 88)  BP: 127/71 (26 Jan 2025 10:58) (116/70 - 154/78)  BP(mean): --  RR: 18 (26 Jan 2025 10:58) (18 - 18)  SpO2: 95% (26 Jan 2025 10:58) (93% - 95%)    Parameters below as of 26 Jan 2025 10:58  Patient On (Oxygen Delivery Method): room air        CONSTITUTIONAL: NAD, well-developed, well-groomed  EYES:  EOMI, conjunctiva and sclera clear  ENMT: Moist oral mucosa  NECK: Supple, no JVD  RESPIRATORY: Normal respiratory effort; lungs are clear to auscultation bilaterally  CARDIOVASCULAR: Regular rate and rhythm, normal S1 and S2, trace edema in bilateral lower extremities  ABDOMEN: normoactive bowel sounds, soft, nontender to palpation, no distension   MUSCULOSKELETAL:  no clubbing or cyanosis of digits; no joint swelling or tenderness to palpation  PSYCH: A+O x3; affect appropriate, calm and cooperative  NEUROLOGY: CN 2-12 are intact and symmetric; no gross sensory deficits     LABS:                        10.3   7.64  )-----------( 496      ( 25 Jan 2025 04:30 )             33.2     01-25    132[L]  |  99  |  49.7[H]  ----------------------------<  399[H]  5.6[H]   |  20.0[L]  |  3.01[H]    Ca    8.0[L]      25 Jan 2025 04:30  Phos  5.2     01-25  Mg     1.9     01-25            Urinalysis Basic - ( 25 Jan 2025 04:30 )    Color: x / Appearance: x / SG: x / pH: x  Gluc: 399 mg/dL / Ketone: x  / Bili: x / Urobili: x   Blood: x / Protein: x / Nitrite: x   Leuk Esterase: x / RBC: x / WBC x   Sq Epi: x / Non Sq Epi: x / Bacteria: x        Culture - Fungal, Body Fluid (collected 23 Jan 2025 15:30)  Source: Pleural Fl  Preliminary Report (26 Jan 2025 08:23):    No growth    Culture - Body Fluid with Gram Stain (collected 23 Jan 2025 15:30)  Source: Pleural Fl  Gram Stain (24 Jan 2025 06:05):    polymorphonuclear leukocytes seen    No organisms seen    by cytocentrifuge  Preliminary Report (25 Jan 2025 09:35):    No growth to date.    
[facility] Division of Hospital Medicine  Krishna Esparza DO  I'm reachable on [employer] Teams    Patient is a 59y old  Female who presents with a chief complaint of sob/patel (25 Jan 2025 05:29)      SUBJECTIVE / OVERNIGHT EVENTS:      MEDICATIONS  (STANDING):  amLODIPine   Tablet 5 milliGRAM(s) Oral daily  buMETAnide Injectable 1 milliGRAM(s) IV Push daily  cyanocobalamin 1000 MICROGram(s) Oral daily  dextrose 5%. 1000 milliLiter(s) (100 mL/Hr) IV Continuous <Continuous>  dextrose 5%. 1000 milliLiter(s) (50 mL/Hr) IV Continuous <Continuous>  dextrose 50% Injectable 25 Gram(s) IV Push once  dextrose 50% Injectable 12.5 Gram(s) IV Push once  dextrose 50% Injectable 25 Gram(s) IV Push once  epoetin mira-epbx (RETACRIT) Injectable 05919 Unit(s) SubCutaneous every 7 days  folic acid 1 milliGRAM(s) Oral daily  gabapentin 300 milliGRAM(s) Oral two times a day  glucagon  Injectable 1 milliGRAM(s) IntraMuscular once  influenza   Vaccine 0.5 milliLiter(s) IntraMuscular once  insulin glargine Injectable (LANTUS) 25 Unit(s) SubCutaneous <User Schedule>  insulin lispro (ADMELOG) corrective regimen sliding scale   SubCutaneous three times a day before meals  insulin lispro Injectable (ADMELOG) 6 Unit(s) SubCutaneous three times a day before meals  iron sucrose IVPB 200 milliGRAM(s) IV Intermittent every 24 hours  levothyroxine 50 MICROGram(s) Oral daily  methylPREDNISolone sodium succinate Injectable 40 milliGRAM(s) IV Push two times a day  pantoprazole    Tablet 40 milliGRAM(s) Oral before breakfast  polyethylene glycol 3350 17 Gram(s) Oral at bedtime  sodium bicarbonate 650 milliGRAM(s) Oral two times a day    MEDICATIONS  (PRN):  acetaminophen     Tablet .. 650 milliGRAM(s) Oral every 6 hours PRN Temp greater or equal to 38C (100.4F), Mild Pain (1 - 3)  ALPRAZolam 0.25 milliGRAM(s) Oral every 12 hours PRN anxiety  aluminum hydroxide/magnesium hydroxide/simethicone Suspension 30 milliLiter(s) Oral every 4 hours PRN Dyspepsia  dextrose Oral Gel 15 Gram(s) Oral once PRN Blood Glucose LESS THAN 70 milliGRAM(s)/deciliter  melatonin 3 milliGRAM(s) Oral at bedtime PRN Insomnia  ondansetron Injectable 4 milliGRAM(s) IV Push every 8 hours PRN Nausea and/or Vomiting  oxyCODONE    IR 5 milliGRAM(s) Oral every 4 hours PRN Severe Pain (7 - 10)  senna 2 Tablet(s) Oral at bedtime PRN Constipation    CAPILLARY BLOOD GLUCOSE      POCT Blood Glucose.: 364 mg/dL (25 Jan 2025 11:38)  POCT Blood Glucose.: 440 mg/dL (25 Jan 2025 08:26)  POCT Blood Glucose.: 493 mg/dL (25 Jan 2025 08:24)  POCT Blood Glucose.: 254 mg/dL (24 Jan 2025 21:42)  POCT Blood Glucose.: 228 mg/dL (24 Jan 2025 17:47)  POCT Blood Glucose.: 196 mg/dL (24 Jan 2025 13:45)    I&O's Summary      PHYSICAL EXAM:  Vital Signs Last 24 Hrs  T(C): 36.3 (25 Jan 2025 11:50), Max: 36.4 (25 Jan 2025 08:58)  T(F): 97.4 (25 Jan 2025 11:50), Max: 97.5 (25 Jan 2025 08:58)  HR: 82 (25 Jan 2025 11:50) (80 - 84)  BP: 112/67 (25 Jan 2025 11:50) (112/67 - 138/72)  BP(mean): --  RR: 18 (25 Jan 2025 11:50) (17 - 18)  SpO2: 96% (25 Jan 2025 11:50) (93% - 96%)    Parameters below as of 25 Jan 2025 11:50  Patient On (Oxygen Delivery Method): room air        CONSTITUTIONAL: NAD, well-developed, well-groomed  EYES:  EOMI, conjunctiva and sclera clear  ENMT: Moist oral mucosa  NECK: Supple, no JVD  RESPIRATORY: Normal respiratory effort; lungs are clear to auscultation bilaterally  CARDIOVASCULAR: Regular rate and rhythm, normal S1 and S2, no murmur/rub/gallop; No lower extremity edema  ABDOMEN: normoactive bowel sounds, soft, nontender to palpation, no distension   MUSCULOSKELETAL:  no clubbing or cyanosis of digits; no joint swelling or tenderness to palpation  PSYCH: A+O x3; affect appropriate, calm and cooperative  NEUROLOGY: CN 2-12 are intact and symmetric; no gross sensory deficits     LABS:                        10.3   7.64  )-----------( 496      ( 25 Jan 2025 04:30 )             33.2     01-25    132[L]  |  99  |  49.7[H]  ----------------------------<  399[H]  5.6[H]   |  20.0[L]  |  3.01[H]    Ca    8.0[L]      25 Jan 2025 04:30  Phos  5.2     01-25  Mg     1.9     01-25    TPro  5.9[L]  /  Alb  2.3[L]  /  TBili  <0.2[L]  /  DBili  x   /  AST  7   /  ALT  <5  /  AlkPhos  76  01-24          Urinalysis Basic - ( 25 Jan 2025 04:30 )    Color: x / Appearance: x / SG: x / pH: x  Gluc: 399 mg/dL / Ketone: x  / Bili: x / Urobili: x   Blood: x / Protein: x / Nitrite: x   Leuk Esterase: x / RBC: x / WBC x   Sq Epi: x / Non Sq Epi: x / Bacteria: x        Culture - Body Fluid with Gram Stain (collected 23 Jan 2025 15:30)  Source: Pleural Fl  Gram Stain (24 Jan 2025 06:05):    polymorphonuclear leukocytes seen    No organisms seen    by cytocentrifuge  Preliminary Report (25 Jan 2025 09:35):    No growth to date.    Culture - Fungal, Body Fluid (collected 23 Jan 2025 15:30)  Source: Pleural Fl  Preliminary Report (25 Jan 2025 07:47):    No growth    Culture - Urine (collected 22 Jan 2025 12:20)  Source: Clean Catch Clean Catch (Midstream)  Final Report (23 Jan 2025 14:13):    <10,000 CFU/mL Normal Urogenital Megan

## 2025-01-31 NOTE — CHART NOTE - NSCHARTNOTEFT_GEN_A_CORE
Medicine PA-  Pt c/o sore throat and runny nose x1 day, afebrile.    Vital Signs Last 24 Hrs  T(C): 36.4 (31 Jan 2025 04:45), Max: 36.7 (30 Jan 2025 11:18)  T(F): 97.5 (31 Jan 2025 04:45), Max: 98 (30 Jan 2025 11:18)  HR: 86 (31 Jan 2025 04:45) (86 - 102)  BP: 133/74 (31 Jan 2025 04:45) (113/72 - 152/67)  BP(mean): --  RR: 18 (31 Jan 2025 04:45) (18 - 18)  SpO2: 96% (31 Jan 2025 04:45) (93% - 97%)  Parameters below as of 31 Jan 2025 04:45  Patient On (Oxygen Delivery Method): nasal cannula  O2 Flow (L/min): 2    PE:  HEENT- + mild erythema pharynx, mild clear rhinorrhea  Neck- no adenopathy  S1S2 ausc  Lungs- clear b/l    >URI  -cepacol lozenge PRN  -throat cx/ RVP  -day team to f/u

## 2025-02-01 LAB
CENP-A: 152 SI — HIGH
CENP-B: 139 SI — HIGH
CULTURE RESULTS: SIGNIFICANT CHANGE UP
FIBRILLARIN: <11 SI — SIGNIFICANT CHANGE UP
PM/SCL-100: <11 SI — SIGNIFICANT CHANGE UP
PM/SCL-75: <11 SI — SIGNIFICANT CHANGE UP
RP11: <11 SI — SIGNIFICANT CHANGE UP
RP155: <11 SI — SIGNIFICANT CHANGE UP
SCL-70: <11 SI — SIGNIFICANT CHANGE UP
SPECIMEN SOURCE: SIGNIFICANT CHANGE UP
TH/TO: <11 SI — SIGNIFICANT CHANGE UP
U1-SNRNP RNP A: <11 SI — SIGNIFICANT CHANGE UP
U1-SNRNP RNP C: <11 SI — SIGNIFICANT CHANGE UP
U1-SNRNP RNP-70KD: <11 SI — SIGNIFICANT CHANGE UP

## 2025-02-03 LAB
ANA PAT SER IF-IMP: SIGNIFICANT CHANGE UP
ANA TITR SER HEP2 SUBST: HIGH
ANTI-DOUBLE-STRANDED DNA IGG CONCT CIA: 41.3 IU/ML — HIGH
AVISE LUPUS INDEX: -2 — SIGNIFICANT CHANGE UP
AVISE LUPUS RESULT: NEGATIVE — SIGNIFICANT CHANGE UP
B-LYMPHOCYTE-BOUND C4D (BC4D) LEVEL (FC): 23 — SIGNIFICANT CHANGE UP
B2 GLYCOPROT1 IGA SERPL IA-ACNC: 2.4 U/ML — SIGNIFICANT CHANGE UP
B2 GLYCOPROT1 IGG SERPL IA-ACNC: 1.4 U/ML — SIGNIFICANT CHANGE UP
B2 GLYCOPROT1 IGM SERPL IA-ACNC: <2.4 U/ML — SIGNIFICANT CHANGE UP
C1Q AB SER-ACNC: 61.2 UNITS — HIGH
C1Q AB SER-ACNC: 61.2 UNITS — HIGH
CARDIOLIPIN IGA SER IA-ACNC: 1.4 APL — SIGNIFICANT CHANGE UP
CARDIOLIPIN IGG SER IA-ACNC: 1.2 GPL — SIGNIFICANT CHANGE UP
CARDIOLIPIN IGM SER IA-ACNC: 1 MPL — SIGNIFICANT CHANGE UP
CCP IGG SERPL-ACNC: 1.3 U/ML — SIGNIFICANT CHANGE UP
CENTROMERE B IGG SER QL LINE BLOT: >240 U/ML — HIGH
COMPLEMENT C3C CONCENTRATION: 29.68 MG/DL — LOW
COMPLEMENT C4 CONCENTRATION: 0.71 MG/DL — LOW
CYTOPLASMIC AB PATTERN SER IF-IMP: SIGNIFICANT CHANGE UP
DSDNA IGG SERPL IA-ACNC: 40.27 IU/ML — SIGNIFICANT CHANGE UP
ENA JO1 AB SER QL IA: <0.3 U/ML — SIGNIFICANT CHANGE UP
ENA SCL70 IGG SER IA-ACNC: <0.6 U/ML — SIGNIFICANT CHANGE UP
ENA SM IGG SER IA-ACNC: 0.8 U/ML — SIGNIFICANT CHANGE UP
ENA SS-B IGG SER IA-ACNC: 157 U/ML — HIGH
ERYTHROCYTE-BOUND C4D (EC4D) LEVEL (FC): 30 — HIGH
NUCLEAR IGG SER QL IA: >150 UNITS — HIGH
PS IGG SER IA-ACNC: 5.93 UNITS — SIGNIFICANT CHANGE UP
PS IGM SER IA-ACNC: 12.26 UNITS — SIGNIFICANT CHANGE UP
RIBOSOMAL P IGG SER-ACNC: <0.5 U/ML — SIGNIFICANT CHANGE UP

## 2025-02-05 ENCOUNTER — APPOINTMENT (OUTPATIENT)
Dept: RHEUMATOLOGY | Facility: CLINIC | Age: 60
End: 2025-02-05
Payer: COMMERCIAL

## 2025-02-05 VITALS
BODY MASS INDEX: 26.97 KG/M2 | DIASTOLIC BLOOD PRESSURE: 64 MMHG | SYSTOLIC BLOOD PRESSURE: 140 MMHG | OXYGEN SATURATION: 98 % | HEIGHT: 60 IN | HEART RATE: 101 BPM | TEMPERATURE: 97.8 F | RESPIRATION RATE: 17 BRPM | WEIGHT: 137.35 LBS

## 2025-02-05 DIAGNOSIS — R59.0 LOCALIZED ENLARGED LYMPH NODES: ICD-10-CM

## 2025-02-05 DIAGNOSIS — Z79.899 OTHER LONG TERM (CURRENT) DRUG THERAPY: ICD-10-CM

## 2025-02-05 DIAGNOSIS — R76.8 OTHER SPECIFIED ABNORMAL IMMUNOLOGICAL FINDINGS IN SERUM: ICD-10-CM

## 2025-02-05 DIAGNOSIS — M32.19 OTHER ORGAN OR SYSTEM INVOLVEMENT IN SYSTEMIC LUPUS ERYTHEMATOSUS: ICD-10-CM

## 2025-02-05 DIAGNOSIS — M47.816 SPONDYLOSIS W/OUT MYELOPATHY OR RADICULOPATHY, LUMBAR REGION: ICD-10-CM

## 2025-02-05 DIAGNOSIS — R76.0 RAISED ANTIBODY TITER: ICD-10-CM

## 2025-02-05 PROCEDURE — 99215 OFFICE O/P EST HI 40 MIN: CPT

## 2025-02-05 PROCEDURE — G2211 COMPLEX E/M VISIT ADD ON: CPT | Mod: NC

## 2025-02-05 RX ORDER — PREDNISONE 10 MG/1
10 TABLET ORAL
Refills: 0 | Status: ACTIVE | COMMUNITY

## 2025-02-05 RX ORDER — LOSARTAN POTASSIUM 25 MG/1
25 TABLET, FILM COATED ORAL
Refills: 0 | Status: ACTIVE | COMMUNITY

## 2025-02-05 RX ORDER — CYANOCOBALAMIN (VITAMIN B-12) 1000 MCG
1000 TABLET ORAL
Refills: 0 | Status: ACTIVE | COMMUNITY

## 2025-02-05 RX ORDER — BUMETANIDE 1 MG/1
1 TABLET ORAL TWICE DAILY
Refills: 0 | Status: ACTIVE | COMMUNITY

## 2025-02-05 RX ORDER — MYCOPHENOLATE MOFETIL 250 MG/1
250 CAPSULE ORAL
Refills: 0 | Status: ACTIVE | COMMUNITY

## 2025-02-05 RX ORDER — FOLIC ACID 1 MG/1
1 TABLET ORAL
Refills: 0 | Status: ACTIVE | COMMUNITY

## 2025-02-05 RX ORDER — INSULIN GLARGINE 100 [IU]/ML
100 INJECTION, SOLUTION SUBCUTANEOUS
Refills: 0 | Status: ACTIVE | COMMUNITY

## 2025-02-05 RX ORDER — INSULIN LISPRO 100 [IU]/ML
100 INJECTION, SOLUTION INTRAVENOUS; SUBCUTANEOUS
Refills: 0 | Status: ACTIVE | COMMUNITY

## 2025-02-05 RX ORDER — SULFAMETHOXAZOLE AND TRIMETHOPRIM 800; 160 MG/1; MG/1
800-160 TABLET ORAL
Refills: 0 | Status: ACTIVE | COMMUNITY

## 2025-02-05 RX ORDER — OXYCODONE 5 MG/1
5 TABLET ORAL
Refills: 0 | Status: ACTIVE | COMMUNITY

## 2025-02-05 RX ORDER — HYDROXYCHLOROQUINE SULFATE 200 MG/1
200 TABLET, FILM COATED ORAL TWICE DAILY
Qty: 60 | Refills: 2 | Status: ACTIVE | COMMUNITY
Start: 2025-02-05 | End: 1900-01-01

## 2025-02-05 RX ORDER — PANTOPRAZOLE SODIUM 40 MG/1
40 GRANULE, DELAYED RELEASE ORAL
Refills: 0 | Status: ACTIVE | COMMUNITY

## 2025-02-10 ENCOUNTER — EMERGENCY (EMERGENCY)
Facility: HOSPITAL | Age: 60
LOS: 1 days | Discharge: HOME | End: 2025-02-10
Attending: STUDENT IN AN ORGANIZED HEALTH CARE EDUCATION/TRAINING PROGRAM
Payer: COMMERCIAL

## 2025-02-10 VITALS
SYSTOLIC BLOOD PRESSURE: 92 MMHG | HEART RATE: 90 BPM | WEIGHT: 130.95 LBS | HEIGHT: 60 IN | TEMPERATURE: 98 F | RESPIRATION RATE: 17 BRPM | OXYGEN SATURATION: 96 % | DIASTOLIC BLOOD PRESSURE: 56 MMHG

## 2025-02-10 VITALS
SYSTOLIC BLOOD PRESSURE: 130 MMHG | TEMPERATURE: 98 F | DIASTOLIC BLOOD PRESSURE: 77 MMHG | RESPIRATION RATE: 18 BRPM | OXYGEN SATURATION: 96 % | HEART RATE: 78 BPM

## 2025-02-10 LAB
ALBUMIN SERPL ELPH-MCNC: 2.7 G/DL — LOW (ref 3.3–5.2)
ALP SERPL-CCNC: 104 U/L — SIGNIFICANT CHANGE UP (ref 40–120)
ALT FLD-CCNC: 21 U/L — SIGNIFICANT CHANGE UP
ANION GAP SERPL CALC-SCNC: 18 MMOL/L — HIGH (ref 5–17)
ANISOCYTOSIS BLD QL: SLIGHT — SIGNIFICANT CHANGE UP
AST SERPL-CCNC: 10 U/L — SIGNIFICANT CHANGE UP
BASOPHILS # BLD AUTO: 0 K/UL — SIGNIFICANT CHANGE UP (ref 0–0.2)
BASOPHILS NFR BLD AUTO: 0 % — SIGNIFICANT CHANGE UP (ref 0–2)
BILIRUB SERPL-MCNC: <0.2 MG/DL — LOW (ref 0.4–2)
BUN SERPL-MCNC: 105.9 MG/DL — HIGH (ref 8–20)
CALCIUM SERPL-MCNC: 8.7 MG/DL — SIGNIFICANT CHANGE UP (ref 8.4–10.5)
CHLORIDE SERPL-SCNC: 96 MMOL/L — SIGNIFICANT CHANGE UP (ref 96–108)
CO2 SERPL-SCNC: 20 MMOL/L — LOW (ref 22–29)
CREAT SERPL-MCNC: 2.26 MG/DL — HIGH (ref 0.5–1.3)
EGFR: 24 ML/MIN/1.73M2 — LOW
EOSINOPHIL # BLD AUTO: 0 K/UL — SIGNIFICANT CHANGE UP (ref 0–0.5)
EOSINOPHIL NFR BLD AUTO: 0 % — SIGNIFICANT CHANGE UP (ref 0–6)
GLUCOSE SERPL-MCNC: 151 MG/DL — HIGH (ref 70–99)
HCT VFR BLD CALC: 39.4 % — SIGNIFICANT CHANGE UP (ref 34.5–45)
HGB BLD-MCNC: 12.3 G/DL — SIGNIFICANT CHANGE UP (ref 11.5–15.5)
LYMPHOCYTES # BLD AUTO: 0.08 K/UL — LOW (ref 1–3.3)
LYMPHOCYTES # BLD AUTO: 0.9 % — LOW (ref 13–44)
MANUAL SMEAR VERIFICATION: SIGNIFICANT CHANGE UP
MCHC RBC-ENTMCNC: 27.3 PG — SIGNIFICANT CHANGE UP (ref 27–34)
MCHC RBC-ENTMCNC: 31.2 G/DL — LOW (ref 32–36)
MCV RBC AUTO: 87.6 FL — SIGNIFICANT CHANGE UP (ref 80–100)
MONOCYTES # BLD AUTO: 0 K/UL — SIGNIFICANT CHANGE UP (ref 0–0.9)
MONOCYTES NFR BLD AUTO: 0 % — LOW (ref 2–14)
NEUTROPHILS # BLD AUTO: 8.74 K/UL — HIGH (ref 1.8–7.4)
NEUTROPHILS NFR BLD AUTO: 99.1 % — HIGH (ref 43–77)
PLAT MORPH BLD: NORMAL — SIGNIFICANT CHANGE UP
PLATELET # BLD AUTO: 293 K/UL — SIGNIFICANT CHANGE UP (ref 150–400)
POLYCHROMASIA BLD QL SMEAR: SLIGHT — SIGNIFICANT CHANGE UP
POTASSIUM SERPL-MCNC: 4.4 MMOL/L — SIGNIFICANT CHANGE UP (ref 3.5–5.3)
POTASSIUM SERPL-SCNC: 4.4 MMOL/L — SIGNIFICANT CHANGE UP (ref 3.5–5.3)
PROT SERPL-MCNC: 5.7 G/DL — LOW (ref 6.6–8.7)
RBC # BLD: 4.5 M/UL — SIGNIFICANT CHANGE UP (ref 3.8–5.2)
RBC # FLD: 19.6 % — HIGH (ref 10.3–14.5)
RBC BLD AUTO: ABNORMAL
SMUDGE CELLS # BLD: PRESENT — SIGNIFICANT CHANGE UP
SODIUM SERPL-SCNC: 134 MMOL/L — LOW (ref 135–145)
WBC # BLD: 8.82 K/UL — SIGNIFICANT CHANGE UP (ref 3.8–10.5)
WBC # FLD AUTO: 8.82 K/UL — SIGNIFICANT CHANGE UP (ref 3.8–10.5)

## 2025-02-10 PROCEDURE — 70450 CT HEAD/BRAIN W/O DYE: CPT | Mod: 26

## 2025-02-10 PROCEDURE — 96374 THER/PROPH/DIAG INJ IV PUSH: CPT

## 2025-02-10 PROCEDURE — 96375 TX/PRO/DX INJ NEW DRUG ADDON: CPT

## 2025-02-10 PROCEDURE — 99284 EMERGENCY DEPT VISIT MOD MDM: CPT

## 2025-02-10 PROCEDURE — 70450 CT HEAD/BRAIN W/O DYE: CPT | Mod: MC

## 2025-02-10 PROCEDURE — 99284 EMERGENCY DEPT VISIT MOD MDM: CPT | Mod: 25

## 2025-02-10 PROCEDURE — 80053 COMPREHEN METABOLIC PANEL: CPT

## 2025-02-10 PROCEDURE — 36415 COLL VENOUS BLD VENIPUNCTURE: CPT

## 2025-02-10 PROCEDURE — 85025 COMPLETE CBC W/AUTO DIFF WBC: CPT

## 2025-02-10 RX ORDER — ACETAMINOPHEN 160 MG/5ML
1000 SUSPENSION ORAL ONCE
Refills: 0 | Status: COMPLETED | OUTPATIENT
Start: 2025-02-10 | End: 2025-02-10

## 2025-02-10 RX ORDER — METOCLOPRAMIDE 10 MG/1
10 TABLET ORAL ONCE
Refills: 0 | Status: COMPLETED | OUTPATIENT
Start: 2025-02-10 | End: 2025-02-10

## 2025-02-10 RX ORDER — BACTERIOSTATIC SODIUM CHLORIDE 0.9 %
500 VIAL (ML) INJECTION ONCE
Refills: 0 | Status: COMPLETED | OUTPATIENT
Start: 2025-02-10 | End: 2025-02-10

## 2025-02-10 RX ADMIN — Medication 500 MILLILITER(S): at 16:57

## 2025-02-10 RX ADMIN — ACETAMINOPHEN 400 MILLIGRAM(S): 160 SUSPENSION ORAL at 15:23

## 2025-02-10 RX ADMIN — METOCLOPRAMIDE 10 MILLIGRAM(S): 10 TABLET ORAL at 15:23

## 2025-02-10 NOTE — ED PROVIDER NOTE - PROGRESS NOTE DETAILS
Portillo: patient states headache is already resolving. will order meds and imaging. Portillo: patient has no complaints. HA relieved. CT negative. 500cc bolus for dehydration. Portillo: patient feeling much better after fluid bolus. comfortable with plan for dc.

## 2025-02-10 NOTE — ED PROVIDER NOTE - OBJECTIVE STATEMENT
59-year-old female hx of HTN, hypothyroidism, IDDM, lupus, CHF, presenting with a rt sided headache. Patient states headache started yesterday, called her primary care doctor and advised to come in. Patient was recently admitted here for 10 days, found to be in HF, had chest tube placed, now on diuretic. Patient denies any weakness, slurred speech, difficulty ambulating, chest pain, SOB, N/V/D, dizziness, syncope, falls.

## 2025-02-10 NOTE — ED PROVIDER NOTE - PHYSICAL EXAMINATION
VITAL SIGNS: I have reviewed nursing notes and confirm.  CONSTITUTIONAL:  in no acute distress.  SKIN: Skin exam is warm and dry, no acute rash.  HEAD: Normocephalic; atraumatic.  EYES: PERRL, EOM intact; conjunctiva and sclera clear.  ENT: No nasal discharge; airway clear. Throat clear.  NECK: Supple; non tender.    CARD: Regular rate and rhythm.  RESP: No wheezes,  no rales or rhonchi.   ABD:  soft; non-distended; non-tender;   EXT: Normal ROM. No clubbing, cyanosis. B/l pitting edema.   NEURO: Alert, oriented. Grossly unremarkable. No focal deficits.  moves all extremities. motor 5/5 bl ue. motor 5/5 lt le. motor 3/5 rt le (since hospital discharge). sensation intact ble. CN2-12 intact.   PSYCH: Cooperative, appropriate.

## 2025-02-10 NOTE — ED PROVIDER NOTE - NSFOLLOWUPINSTRUCTIONS_ED_ALL_ED_FT
You were evaluated in the emergency department for a headache. Your labs and imaging were normal. Please follow up with your kidney specialist.     A headache is pain or discomfort felt around the head or neck area. The specific cause of a headache may not be found as there are many types including tension headaches, migraine headaches, and cluster headaches. Watch your condition for any changes. Things you can do to manage your pain include taking over the counter and prescription medications as instructed by your health care provider, lying down in a dark quiet room, limiting stress, getting regular sleep, and refraining from alcohol and tobacco products.    SEEK IMMEDIATE MEDICAL CARE IF YOU HAVE ANY OF THE FOLLOWING SYMPTOMS: fever, vomiting, stiff neck, loss of vision, problems with speech, muscle weakness, loss of balance, trouble walking, passing out, or confusion.

## 2025-02-10 NOTE — ED PROVIDER NOTE - ATTENDING CONTRIBUTION TO CARE
Pt recently admitted here for HF and had thoracentesis and was diuresed. Pt with one day of unilateral headache. no numbness/weakness. no cp. no sob.    physical - rrr. ctab. abd - soft, nt. no edema. no rash. neuro - strength 5/5 x4. sensation intact x4. CN II-XII intact.    plan - labs and imaging reviewed. Pt with elevated bun c/w prerenal intravascular depletion. Pt given IVF.  will d/c to home with f/up to nephro. given return instructions.

## 2025-02-10 NOTE — ED PROVIDER NOTE - PATIENT PORTAL LINK FT
You can access the FollowMyHealth Patient Portal offered by [facility] by registering at the following website: [email or web address]. By joining [employer]’s FollowMyHealth portal, you will also be able to view your health information using other applications (apps) compatible with our system.

## 2025-02-10 NOTE — ED ADULT NURSE NOTE - OBJECTIVE STATEMENT
Pt is a 59y F, AOx4, c/o headache. Pt states headache began last night behind her R eye, radiating to the back of the head. Pt spoke to her MD and was informed to come in for evaluation. Pt was thought to have CHF but dx is currently uncertain, noted pitting edema +3 B/L lower legs. Denies chest pain, SOB, other pain, nvd, and other sx at this time. PMHx CKF(III), Lupus, DM, and HTN. Resp even and unlabored. Bed locked and in lowest position.

## 2025-02-18 ENCOUNTER — APPOINTMENT (OUTPATIENT)
Dept: RHEUMATOLOGY | Facility: CLINIC | Age: 60
End: 2025-02-18
Payer: COMMERCIAL

## 2025-02-18 ENCOUNTER — LABORATORY RESULT (OUTPATIENT)
Age: 60
End: 2025-02-18

## 2025-02-18 VITALS
DIASTOLIC BLOOD PRESSURE: 60 MMHG | HEIGHT: 60 IN | SYSTOLIC BLOOD PRESSURE: 158 MMHG | HEART RATE: 94 BPM | TEMPERATURE: 97.5 F | OXYGEN SATURATION: 92 %

## 2025-02-18 DIAGNOSIS — R76.8 OTHER SPECIFIED ABNORMAL IMMUNOLOGICAL FINDINGS IN SERUM: ICD-10-CM

## 2025-02-18 DIAGNOSIS — M32.19 OTHER ORGAN OR SYSTEM INVOLVEMENT IN SYSTEMIC LUPUS ERYTHEMATOSUS: ICD-10-CM

## 2025-02-18 DIAGNOSIS — Z79.899 OTHER LONG TERM (CURRENT) DRUG THERAPY: ICD-10-CM

## 2025-02-18 DIAGNOSIS — R59.0 LOCALIZED ENLARGED LYMPH NODES: ICD-10-CM

## 2025-02-18 DIAGNOSIS — M47.816 SPONDYLOSIS W/OUT MYELOPATHY OR RADICULOPATHY, LUMBAR REGION: ICD-10-CM

## 2025-02-18 DIAGNOSIS — R76.0 RAISED ANTIBODY TITER: ICD-10-CM

## 2025-02-18 PROCEDURE — 99215 OFFICE O/P EST HI 40 MIN: CPT

## 2025-02-18 PROCEDURE — G2211 COMPLEX E/M VISIT ADD ON: CPT | Mod: NC

## 2025-02-18 RX ORDER — MYCOPHENOLATE MOFETIL 500 MG/1
500 TABLET ORAL
Qty: 180 | Refills: 2 | Status: ACTIVE | COMMUNITY
Start: 2025-02-18 | End: 1900-01-01

## 2025-02-19 LAB
ALBUMIN SERPL ELPH-MCNC: 3.2 G/DL
ALP BLD-CCNC: 111 U/L
ALT SERPL-CCNC: 11 U/L
ANION GAP SERPL CALC-SCNC: 18 MMOL/L
APPEARANCE: CLEAR
AST SERPL-CCNC: 9 U/L
BASOPHILS # BLD AUTO: 0.03 K/UL
BASOPHILS NFR BLD AUTO: 0.4 %
BILIRUB SERPL-MCNC: <0.2 MG/DL
BILIRUBIN URINE: NEGATIVE
BLOOD URINE: ABNORMAL
BUN SERPL-MCNC: 108 MG/DL
C3 SERPL-MCNC: 74 MG/DL
C4 SERPL-MCNC: 8 MG/DL
CALCIUM SERPL-MCNC: 9.1 MG/DL
CHLORIDE SERPL-SCNC: 99 MMOL/L
CK SERPL-CCNC: 31 U/L
CO2 SERPL-SCNC: 16 MMOL/L
COLOR: YELLOW
CREAT SERPL-MCNC: 2.6 MG/DL
CREAT SPEC-SCNC: 25 MG/DL
CREAT/PROT UR: 8.1 RATIO
CRP SERPL-MCNC: 45 MG/L
DSDNA AB SER-ACNC: 43 IU/ML
EGFR: 20 ML/MIN/1.73M2
EOSINOPHIL # BLD AUTO: 0.02 K/UL
EOSINOPHIL NFR BLD AUTO: 0.3 %
ERYTHROCYTE [SEDIMENTATION RATE] IN BLOOD BY WESTERGREN METHOD: 62 MM/HR
GLUCOSE QUALITATIVE U: 250 MG/DL
GLUCOSE SERPL-MCNC: 328 MG/DL
HCT VFR BLD CALC: 38.2 %
HGB BLD-MCNC: 11.7 G/DL
IMM GRANULOCYTES NFR BLD AUTO: 0.4 %
KETONES URINE: NEGATIVE MG/DL
LEUKOCYTE ESTERASE URINE: NEGATIVE
LYMPHOCYTES # BLD AUTO: 0.22 K/UL
LYMPHOCYTES NFR BLD AUTO: 3.2 %
MAN DIFF?: NORMAL
MCHC RBC-ENTMCNC: 27.7 PG
MCHC RBC-ENTMCNC: 30.6 G/DL
MCV RBC AUTO: 90.5 FL
MONOCYTES # BLD AUTO: 0.1 K/UL
MONOCYTES NFR BLD AUTO: 1.5 %
NEUTROPHILS # BLD AUTO: 6.37 K/UL
NEUTROPHILS NFR BLD AUTO: 94.2 %
NITRITE URINE: NEGATIVE
PH URINE: 6
PLATELET # BLD AUTO: 380 K/UL
POTASSIUM SERPL-SCNC: 5.2 MMOL/L
PROT SERPL-MCNC: 6 G/DL
PROT UR-MCNC: 204 MG/DL
PROTEIN URINE: 300 MG/DL
RBC # BLD: 4.22 M/UL
RBC # FLD: 19.3 %
SODIUM SERPL-SCNC: 133 MMOL/L
SPECIFIC GRAVITY URINE: 1.01
TSH SERPL-ACNC: 77.4 UIU/ML
UROBILINOGEN URINE: 0.2 MG/DL
WBC # FLD AUTO: 6.77 K/UL

## 2025-02-21 ENCOUNTER — RX RENEWAL (OUTPATIENT)
Age: 60
End: 2025-02-21

## 2025-02-21 ENCOUNTER — NON-APPOINTMENT (OUTPATIENT)
Age: 60
End: 2025-02-21

## 2025-02-22 ENCOUNTER — INPATIENT (INPATIENT)
Facility: HOSPITAL | Age: 60
LOS: 2 days | Discharge: HOME | DRG: 639 | End: 2025-02-25
Attending: STUDENT IN AN ORGANIZED HEALTH CARE EDUCATION/TRAINING PROGRAM | Admitting: STUDENT IN AN ORGANIZED HEALTH CARE EDUCATION/TRAINING PROGRAM
Payer: COMMERCIAL

## 2025-02-22 VITALS
TEMPERATURE: 98 F | SYSTOLIC BLOOD PRESSURE: 150 MMHG | WEIGHT: 134.48 LBS | RESPIRATION RATE: 18 BRPM | DIASTOLIC BLOOD PRESSURE: 84 MMHG | HEART RATE: 86 BPM | OXYGEN SATURATION: 99 % | HEIGHT: 60 IN

## 2025-02-22 DIAGNOSIS — E11.10 TYPE 2 DIABETES MELLITUS WITH KETOACIDOSIS WITHOUT COMA: ICD-10-CM

## 2025-02-22 LAB
ACETONE SERPL-MCNC: ABNORMAL
ALBUMIN SERPL ELPH-MCNC: 2.5 G/DL — LOW (ref 3.3–5.2)
ALBUMIN SERPL ELPH-MCNC: 2.8 G/DL — LOW (ref 3.3–5.2)
ALBUMIN SERPL ELPH-MCNC: 2.8 G/DL — LOW (ref 3.3–5.2)
ALP SERPL-CCNC: 100 U/L — SIGNIFICANT CHANGE UP (ref 40–120)
ALP SERPL-CCNC: 85 U/L — SIGNIFICANT CHANGE UP (ref 40–120)
ALP SERPL-CCNC: 98 U/L — SIGNIFICANT CHANGE UP (ref 40–120)
ALT FLD-CCNC: 11 U/L — SIGNIFICANT CHANGE UP
ALT FLD-CCNC: 9 U/L — SIGNIFICANT CHANGE UP
ALT FLD-CCNC: 9 U/L — SIGNIFICANT CHANGE UP
ANION GAP SERPL CALC-SCNC: 16 MMOL/L — SIGNIFICANT CHANGE UP (ref 5–17)
ANION GAP SERPL CALC-SCNC: 16 MMOL/L — SIGNIFICANT CHANGE UP (ref 5–17)
ANION GAP SERPL CALC-SCNC: 18 MMOL/L — HIGH (ref 5–17)
ANION GAP SERPL CALC-SCNC: 18 MMOL/L — HIGH (ref 5–17)
APPEARANCE UR: CLEAR — SIGNIFICANT CHANGE UP
AST SERPL-CCNC: 11 U/L — SIGNIFICANT CHANGE UP
AST SERPL-CCNC: 11 U/L — SIGNIFICANT CHANGE UP
AST SERPL-CCNC: 9 U/L — SIGNIFICANT CHANGE UP
BACTERIA # UR AUTO: NEGATIVE /HPF — SIGNIFICANT CHANGE UP
BASOPHILS # BLD AUTO: 0.01 K/UL — SIGNIFICANT CHANGE UP (ref 0–0.2)
BASOPHILS NFR BLD AUTO: 0.2 % — SIGNIFICANT CHANGE UP (ref 0–2)
BILIRUB SERPL-MCNC: <0.2 MG/DL — LOW (ref 0.4–2)
BILIRUB UR-MCNC: NEGATIVE — SIGNIFICANT CHANGE UP
BUN SERPL-MCNC: 79.8 MG/DL — HIGH (ref 8–20)
BUN SERPL-MCNC: 84.6 MG/DL — HIGH (ref 8–20)
BUN SERPL-MCNC: 88.9 MG/DL — HIGH (ref 8–20)
BUN SERPL-MCNC: 89.1 MG/DL — HIGH (ref 8–20)
CALCIUM SERPL-MCNC: 8.2 MG/DL — LOW (ref 8.4–10.5)
CALCIUM SERPL-MCNC: 8.3 MG/DL — LOW (ref 8.4–10.5)
CALCIUM SERPL-MCNC: 8.6 MG/DL — SIGNIFICANT CHANGE UP (ref 8.4–10.5)
CALCIUM SERPL-MCNC: 9 MG/DL — SIGNIFICANT CHANGE UP (ref 8.4–10.5)
CAST: 2 /LPF — SIGNIFICANT CHANGE UP (ref 0–4)
CHLORIDE SERPL-SCNC: 100 MMOL/L — SIGNIFICANT CHANGE UP (ref 96–108)
CHLORIDE SERPL-SCNC: 102 MMOL/L — SIGNIFICANT CHANGE UP (ref 96–108)
CHLORIDE SERPL-SCNC: 102 MMOL/L — SIGNIFICANT CHANGE UP (ref 96–108)
CHLORIDE SERPL-SCNC: 98 MMOL/L — SIGNIFICANT CHANGE UP (ref 96–108)
CO2 SERPL-SCNC: 15 MMOL/L — LOW (ref 22–29)
CO2 SERPL-SCNC: 16 MMOL/L — LOW (ref 22–29)
COLOR SPEC: YELLOW — SIGNIFICANT CHANGE UP
CREAT SERPL-MCNC: 2.27 MG/DL — HIGH (ref 0.5–1.3)
CREAT SERPL-MCNC: 2.45 MG/DL — HIGH (ref 0.5–1.3)
CREAT SERPL-MCNC: 2.46 MG/DL — HIGH (ref 0.5–1.3)
CREAT SERPL-MCNC: 2.56 MG/DL — HIGH (ref 0.5–1.3)
CULTURE RESULTS: SIGNIFICANT CHANGE UP
DIFF PNL FLD: ABNORMAL
EGFR: 21 ML/MIN/1.73M2 — LOW
EGFR: 22 ML/MIN/1.73M2 — LOW
EGFR: 22 ML/MIN/1.73M2 — LOW
EGFR: 24 ML/MIN/1.73M2 — LOW
EOSINOPHIL # BLD AUTO: 0.06 K/UL — SIGNIFICANT CHANGE UP (ref 0–0.5)
EOSINOPHIL NFR BLD AUTO: 1.3 % — SIGNIFICANT CHANGE UP (ref 0–6)
GAS PNL BLDV: SIGNIFICANT CHANGE UP
GAS PNL BLDV: SIGNIFICANT CHANGE UP
GLUCOSE BLDC GLUCOMTR-MCNC: 128 MG/DL — HIGH (ref 70–99)
GLUCOSE BLDC GLUCOMTR-MCNC: 133 MG/DL — HIGH (ref 70–99)
GLUCOSE BLDC GLUCOMTR-MCNC: 139 MG/DL — HIGH (ref 70–99)
GLUCOSE BLDC GLUCOMTR-MCNC: 142 MG/DL — HIGH (ref 70–99)
GLUCOSE BLDC GLUCOMTR-MCNC: 149 MG/DL — HIGH (ref 70–99)
GLUCOSE BLDC GLUCOMTR-MCNC: 180 MG/DL — HIGH (ref 70–99)
GLUCOSE BLDC GLUCOMTR-MCNC: 183 MG/DL — HIGH (ref 70–99)
GLUCOSE BLDC GLUCOMTR-MCNC: 196 MG/DL — HIGH (ref 70–99)
GLUCOSE BLDC GLUCOMTR-MCNC: 202 MG/DL — HIGH (ref 70–99)
GLUCOSE BLDC GLUCOMTR-MCNC: 243 MG/DL — HIGH (ref 70–99)
GLUCOSE BLDC GLUCOMTR-MCNC: 265 MG/DL — HIGH (ref 70–99)
GLUCOSE BLDC GLUCOMTR-MCNC: 269 MG/DL — HIGH (ref 70–99)
GLUCOSE BLDC GLUCOMTR-MCNC: 297 MG/DL — HIGH (ref 70–99)
GLUCOSE BLDC GLUCOMTR-MCNC: 97 MG/DL — SIGNIFICANT CHANGE UP (ref 70–99)
GLUCOSE SERPL-MCNC: 113 MG/DL — HIGH (ref 70–99)
GLUCOSE SERPL-MCNC: 130 MG/DL — HIGH (ref 70–99)
GLUCOSE SERPL-MCNC: 244 MG/DL — HIGH (ref 70–99)
GLUCOSE SERPL-MCNC: 264 MG/DL — HIGH (ref 70–99)
GLUCOSE UR QL: 250 MG/DL
HCT VFR BLD CALC: 31.7 % — LOW (ref 34.5–45)
HCT VFR BLD CALC: 32.1 % — LOW (ref 34.5–45)
HGB BLD-MCNC: 10.1 G/DL — LOW (ref 11.5–15.5)
HGB BLD-MCNC: 10.2 G/DL — LOW (ref 11.5–15.5)
IMM GRANULOCYTES # BLD AUTO: 0.02 K/UL — SIGNIFICANT CHANGE UP (ref 0–0.07)
IMM GRANULOCYTES NFR BLD AUTO: 0.4 % — SIGNIFICANT CHANGE UP (ref 0–0.9)
KETONES UR-MCNC: NEGATIVE MG/DL — SIGNIFICANT CHANGE UP
LACTATE SERPL-SCNC: 1.8 MMOL/L — SIGNIFICANT CHANGE UP (ref 0.5–2)
LEUKOCYTE ESTERASE UR-ACNC: NEGATIVE — SIGNIFICANT CHANGE UP
LIDOCAIN IGE QN: 28 U/L — SIGNIFICANT CHANGE UP (ref 22–51)
LYMPHOCYTES # BLD AUTO: 0.65 K/UL — LOW (ref 1–3.3)
LYMPHOCYTES NFR BLD AUTO: 13.6 % — SIGNIFICANT CHANGE UP (ref 13–44)
MAGNESIUM SERPL-MCNC: 1.8 MG/DL — SIGNIFICANT CHANGE UP (ref 1.6–2.6)
MAGNESIUM SERPL-MCNC: 1.9 MG/DL — SIGNIFICANT CHANGE UP (ref 1.6–2.6)
MCHC RBC-ENTMCNC: 27.5 PG — SIGNIFICANT CHANGE UP (ref 27–34)
MCHC RBC-ENTMCNC: 27.7 PG — SIGNIFICANT CHANGE UP (ref 27–34)
MCHC RBC-ENTMCNC: 31.5 G/DL — LOW (ref 32–36)
MCHC RBC-ENTMCNC: 32.2 G/DL — SIGNIFICANT CHANGE UP (ref 32–36)
MCV RBC AUTO: 86.1 FL — SIGNIFICANT CHANGE UP (ref 80–100)
MCV RBC AUTO: 87.5 FL — SIGNIFICANT CHANGE UP (ref 80–100)
MONOCYTES # BLD AUTO: 0.14 K/UL — SIGNIFICANT CHANGE UP (ref 0–0.9)
MONOCYTES NFR BLD AUTO: 2.9 % — SIGNIFICANT CHANGE UP (ref 2–14)
MRSA PCR RESULT.: SIGNIFICANT CHANGE UP
NEUTROPHILS # BLD AUTO: 3.9 K/UL — SIGNIFICANT CHANGE UP (ref 1.8–7.4)
NEUTROPHILS NFR BLD AUTO: 81.6 % — HIGH (ref 43–77)
NITRITE UR-MCNC: NEGATIVE — SIGNIFICANT CHANGE UP
NRBC # BLD AUTO: 0 K/UL — SIGNIFICANT CHANGE UP (ref 0–0)
NRBC # BLD AUTO: 0 K/UL — SIGNIFICANT CHANGE UP (ref 0–0)
NRBC # FLD: 0 K/UL — SIGNIFICANT CHANGE UP (ref 0–0)
NRBC # FLD: 0 K/UL — SIGNIFICANT CHANGE UP (ref 0–0)
NRBC BLD AUTO-RTO: 0 /100 WBCS — SIGNIFICANT CHANGE UP (ref 0–0)
NRBC BLD AUTO-RTO: 0 /100 WBCS — SIGNIFICANT CHANGE UP (ref 0–0)
OSMOLALITY SERPL: 318 MOSMOL/KG — HIGH (ref 280–301)
PH UR: 6 — SIGNIFICANT CHANGE UP (ref 5–8)
PHOSPHATE SERPL-MCNC: 3.5 MG/DL — SIGNIFICANT CHANGE UP (ref 2.4–4.7)
PHOSPHATE SERPL-MCNC: 3.5 MG/DL — SIGNIFICANT CHANGE UP (ref 2.4–4.7)
PLATELET # BLD AUTO: 273 K/UL — SIGNIFICANT CHANGE UP (ref 150–400)
PLATELET # BLD AUTO: 308 K/UL — SIGNIFICANT CHANGE UP (ref 150–400)
PMV BLD: 9.2 FL — SIGNIFICANT CHANGE UP (ref 7–13)
PMV BLD: 9.3 FL — SIGNIFICANT CHANGE UP (ref 7–13)
POTASSIUM SERPL-MCNC: 4.5 MMOL/L — SIGNIFICANT CHANGE UP (ref 3.5–5.3)
POTASSIUM SERPL-MCNC: 4.7 MMOL/L — SIGNIFICANT CHANGE UP (ref 3.5–5.3)
POTASSIUM SERPL-MCNC: 4.7 MMOL/L — SIGNIFICANT CHANGE UP (ref 3.5–5.3)
POTASSIUM SERPL-MCNC: 4.9 MMOL/L — SIGNIFICANT CHANGE UP (ref 3.5–5.3)
POTASSIUM SERPL-SCNC: 4.5 MMOL/L — SIGNIFICANT CHANGE UP (ref 3.5–5.3)
POTASSIUM SERPL-SCNC: 4.7 MMOL/L — SIGNIFICANT CHANGE UP (ref 3.5–5.3)
POTASSIUM SERPL-SCNC: 4.7 MMOL/L — SIGNIFICANT CHANGE UP (ref 3.5–5.3)
POTASSIUM SERPL-SCNC: 4.9 MMOL/L — SIGNIFICANT CHANGE UP (ref 3.5–5.3)
PROCALCITONIN SERPL-MCNC: 0.41 NG/ML — HIGH (ref 0.02–0.1)
PROT SERPL-MCNC: 5 G/DL — LOW (ref 6.6–8.7)
PROT SERPL-MCNC: 5.6 G/DL — LOW (ref 6.6–8.7)
PROT SERPL-MCNC: 5.8 G/DL — LOW (ref 6.6–8.7)
PROT UR-MCNC: 300 MG/DL
RBC # BLD: 3.67 M/UL — LOW (ref 3.8–5.2)
RBC # BLD: 3.68 M/UL — LOW (ref 3.8–5.2)
RBC # FLD: 18.5 % — HIGH (ref 10.3–14.5)
RBC # FLD: 18.6 % — HIGH (ref 10.3–14.5)
RBC CASTS # UR COMP ASSIST: 23 /HPF — HIGH (ref 0–4)
S AUREUS DNA NOSE QL NAA+PROBE: SIGNIFICANT CHANGE UP
SODIUM SERPL-SCNC: 132 MMOL/L — LOW (ref 135–145)
SODIUM SERPL-SCNC: 133 MMOL/L — LOW (ref 135–145)
SP GR SPEC: 1.01 — SIGNIFICANT CHANGE UP (ref 1–1.03)
SPECIMEN SOURCE: SIGNIFICANT CHANGE UP
SQUAMOUS # UR AUTO: 0 /HPF — SIGNIFICANT CHANGE UP (ref 0–5)
TSH SERPL-MCNC: 63.15 UIU/ML — HIGH (ref 0.27–4.2)
UROBILINOGEN FLD QL: 0.2 MG/DL — SIGNIFICANT CHANGE UP (ref 0.2–1)
WBC # BLD: 4.78 K/UL — SIGNIFICANT CHANGE UP (ref 3.8–10.5)
WBC # BLD: 5.04 K/UL — SIGNIFICANT CHANGE UP (ref 3.8–10.5)
WBC # FLD AUTO: 4.78 K/UL — SIGNIFICANT CHANGE UP (ref 3.8–10.5)
WBC # FLD AUTO: 5.04 K/UL — SIGNIFICANT CHANGE UP (ref 3.8–10.5)
WBC UR QL: 5 /HPF — SIGNIFICANT CHANGE UP (ref 0–5)

## 2025-02-22 PROCEDURE — 71045 X-RAY EXAM CHEST 1 VIEW: CPT | Mod: 26

## 2025-02-22 PROCEDURE — 99223 1ST HOSP IP/OBS HIGH 75: CPT

## 2025-02-22 PROCEDURE — 93010 ELECTROCARDIOGRAM REPORT: CPT

## 2025-02-22 PROCEDURE — 93971 EXTREMITY STUDY: CPT | Mod: 26,RT

## 2025-02-22 PROCEDURE — 99291 CRITICAL CARE FIRST HOUR: CPT

## 2025-02-22 RX ORDER — SULFAMETHOXAZOLE/TRIMETHOPRIM 800-160 MG
1 TABLET ORAL
Refills: 0 | Status: DISCONTINUED | OUTPATIENT
Start: 2025-02-22 | End: 2025-02-22

## 2025-02-22 RX ORDER — PREDNISONE 20 MG/1
10 TABLET ORAL DAILY
Refills: 0 | Status: DISCONTINUED | OUTPATIENT
Start: 2025-02-22 | End: 2025-02-25

## 2025-02-22 RX ORDER — INSULIN LISPRO 100 U/ML
5 INJECTION, SOLUTION INTRAVENOUS; SUBCUTANEOUS
Refills: 0 | Status: DISCONTINUED | OUTPATIENT
Start: 2025-02-22 | End: 2025-02-23

## 2025-02-22 RX ORDER — AMLODIPINE BESYLATE 10 MG/1
5 TABLET ORAL DAILY
Refills: 0 | Status: DISCONTINUED | OUTPATIENT
Start: 2025-02-22 | End: 2025-02-25

## 2025-02-22 RX ORDER — SODIUM CHLORIDE 9 G/1000ML
1000 INJECTION, SOLUTION INTRAVENOUS
Refills: 0 | Status: DISCONTINUED | OUTPATIENT
Start: 2025-02-22 | End: 2025-02-25

## 2025-02-22 RX ORDER — BUMETANIDE 1 MG/1
1 TABLET ORAL ONCE
Refills: 0 | Status: COMPLETED | OUTPATIENT
Start: 2025-02-22 | End: 2025-02-22

## 2025-02-22 RX ORDER — INSULIN GLARGINE-YFGN 100 [IU]/ML
10 INJECTION, SOLUTION SUBCUTANEOUS AT BEDTIME
Refills: 0 | Status: DISCONTINUED | OUTPATIENT
Start: 2025-02-22 | End: 2025-02-23

## 2025-02-22 RX ORDER — GLUCAGON 3 MG/1
1 POWDER NASAL ONCE
Refills: 0 | Status: DISCONTINUED | OUTPATIENT
Start: 2025-02-22 | End: 2025-02-25

## 2025-02-22 RX ORDER — SODIUM CHLORIDE 9 G/1000ML
1000 INJECTION, SOLUTION INTRAVENOUS
Refills: 0 | Status: DISCONTINUED | OUTPATIENT
Start: 2025-02-22 | End: 2025-02-23

## 2025-02-22 RX ORDER — INSULIN LISPRO 100 U/ML
INJECTION, SOLUTION INTRAVENOUS; SUBCUTANEOUS AT BEDTIME
Refills: 0 | Status: DISCONTINUED | OUTPATIENT
Start: 2025-02-22 | End: 2025-02-23

## 2025-02-22 RX ORDER — HYDROXYCHLOROQUINE SULFATE 200 MG/1
200 TABLET, FILM COATED ORAL EVERY 12 HOURS
Refills: 0 | Status: DISCONTINUED | OUTPATIENT
Start: 2025-02-22 | End: 2025-02-25

## 2025-02-22 RX ORDER — DEXTROSE 50 % IN WATER 50 %
25 SYRINGE (ML) INTRAVENOUS ONCE
Refills: 0 | Status: DISCONTINUED | OUTPATIENT
Start: 2025-02-22 | End: 2025-02-25

## 2025-02-22 RX ORDER — LEVOTHYROXINE SODIUM 300 MCG
50 TABLET ORAL DAILY
Refills: 0 | Status: DISCONTINUED | OUTPATIENT
Start: 2025-02-22 | End: 2025-02-23

## 2025-02-22 RX ORDER — LOSARTAN POTASSIUM 100 MG/1
25 TABLET, FILM COATED ORAL DAILY
Refills: 0 | Status: DISCONTINUED | OUTPATIENT
Start: 2025-02-22 | End: 2025-02-25

## 2025-02-22 RX ORDER — INSULIN GLARGINE-YFGN 100 [IU]/ML
20 INJECTION, SOLUTION SUBCUTANEOUS EVERY MORNING
Refills: 0 | Status: DISCONTINUED | OUTPATIENT
Start: 2025-02-23 | End: 2025-02-23

## 2025-02-22 RX ORDER — SULFAMETHOXAZOLE/TRIMETHOPRIM 800-160 MG
1 TABLET ORAL
Refills: 0 | Status: DISCONTINUED | OUTPATIENT
Start: 2025-02-22 | End: 2025-02-25

## 2025-02-22 RX ORDER — INFLUENZA A VIRUS A/IDAHO/07/2018 (H1N1) ANTIGEN (MDCK CELL DERIVED, PROPIOLACTONE INACTIVATED, INFLUENZA A VIRUS A/INDIANA/08/2018 (H3N2) ANTIGEN (MDCK CELL DERIVED, PROPIOLACTONE INACTIVATED), INFLUENZA B VIRUS B/SINGAPORE/INFTT-16-0610/2016 ANTIGEN (MDCK CELL DERIVED, PROPIOLACTONE INACTIVATED), INFLUENZA B VIRUS B/IOWA/06/2017 ANTIGEN (MDCK CELL DERIVED, PROPIOLACTONE INACTIVATED) 15; 15; 15; 15 UG/.5ML; UG/.5ML; UG/.5ML; UG/.5ML
0.5 INJECTION, SUSPENSION INTRAMUSCULAR ONCE
Refills: 0 | Status: DISCONTINUED | OUTPATIENT
Start: 2025-02-22 | End: 2025-02-25

## 2025-02-22 RX ORDER — DEXTROSE 50 % IN WATER 50 %
12.5 SYRINGE (ML) INTRAVENOUS ONCE
Refills: 0 | Status: DISCONTINUED | OUTPATIENT
Start: 2025-02-22 | End: 2025-02-25

## 2025-02-22 RX ORDER — DEXTROSE 50 % IN WATER 50 %
15 SYRINGE (ML) INTRAVENOUS ONCE
Refills: 0 | Status: DISCONTINUED | OUTPATIENT
Start: 2025-02-22 | End: 2025-02-25

## 2025-02-22 RX ORDER — HEPARIN SODIUM 1000 [USP'U]/ML
5000 INJECTION INTRAVENOUS; SUBCUTANEOUS EVERY 8 HOURS
Refills: 0 | Status: DISCONTINUED | OUTPATIENT
Start: 2025-02-22 | End: 2025-02-25

## 2025-02-22 RX ORDER — GABAPENTIN 400 MG/1
300 CAPSULE ORAL EVERY 12 HOURS
Refills: 0 | Status: DISCONTINUED | OUTPATIENT
Start: 2025-02-22 | End: 2025-02-25

## 2025-02-22 RX ORDER — INSULIN LISPRO 100 U/ML
INJECTION, SOLUTION INTRAVENOUS; SUBCUTANEOUS
Refills: 0 | Status: DISCONTINUED | OUTPATIENT
Start: 2025-02-22 | End: 2025-02-23

## 2025-02-22 RX ORDER — SODIUM CHLORIDE 9 G/1000ML
1000 INJECTION, SOLUTION INTRAVENOUS ONCE
Refills: 0 | Status: COMPLETED | OUTPATIENT
Start: 2025-02-22 | End: 2025-02-22

## 2025-02-22 RX ORDER — INSULIN LISPRO 100 U/ML
INJECTION, SOLUTION INTRAVENOUS; SUBCUTANEOUS AT BEDTIME
Refills: 0 | Status: DISCONTINUED | OUTPATIENT
Start: 2025-02-22 | End: 2025-02-22

## 2025-02-22 RX ORDER — INSULIN GLARGINE-YFGN 100 [IU]/ML
20 INJECTION, SOLUTION SUBCUTANEOUS ONCE
Refills: 0 | Status: COMPLETED | OUTPATIENT
Start: 2025-02-22 | End: 2025-02-22

## 2025-02-22 RX ORDER — SODIUM CHLORIDE 9 G/1000ML
1000 INJECTION, SOLUTION INTRAVENOUS ONCE
Refills: 0 | Status: DISCONTINUED | OUTPATIENT
Start: 2025-02-22 | End: 2025-02-22

## 2025-02-22 RX ORDER — FOLIC ACID 1 MG/1
1 TABLET ORAL DAILY
Refills: 0 | Status: DISCONTINUED | OUTPATIENT
Start: 2025-02-22 | End: 2025-02-25

## 2025-02-22 RX ORDER — INSULIN LISPRO 100 U/ML
INJECTION, SOLUTION INTRAVENOUS; SUBCUTANEOUS
Refills: 0 | Status: DISCONTINUED | OUTPATIENT
Start: 2025-02-22 | End: 2025-02-22

## 2025-02-22 RX ORDER — CYANOCOBALAMIN 1000 UG/ML
1000 INJECTION INTRAMUSCULAR; SUBCUTANEOUS DAILY
Refills: 0 | Status: DISCONTINUED | OUTPATIENT
Start: 2025-02-22 | End: 2025-02-25

## 2025-02-22 RX ADMIN — Medication 6 UNIT(S)/HR: at 06:04

## 2025-02-22 RX ADMIN — SODIUM CHLORIDE 100 MILLILITER(S): 9 INJECTION, SOLUTION INTRAVENOUS at 07:29

## 2025-02-22 RX ADMIN — HYDROXYCHLOROQUINE SULFATE 200 MILLIGRAM(S): 200 TABLET, FILM COATED ORAL at 16:48

## 2025-02-22 RX ADMIN — Medication 500 MILLILITER(S): at 04:00

## 2025-02-22 RX ADMIN — INSULIN LISPRO 1: 100 INJECTION, SOLUTION INTRAVENOUS; SUBCUTANEOUS at 11:23

## 2025-02-22 RX ADMIN — INSULIN GLARGINE-YFGN 20 UNIT(S): 100 INJECTION, SOLUTION SUBCUTANEOUS at 10:40

## 2025-02-22 RX ADMIN — AMLODIPINE BESYLATE 5 MILLIGRAM(S): 10 TABLET ORAL at 09:12

## 2025-02-22 RX ADMIN — INSULIN GLARGINE-YFGN 10 UNIT(S): 100 INJECTION, SOLUTION SUBCUTANEOUS at 23:47

## 2025-02-22 RX ADMIN — GABAPENTIN 300 MILLIGRAM(S): 400 CAPSULE ORAL at 16:49

## 2025-02-22 RX ADMIN — Medication 3 UNIT(S)/HR: at 07:20

## 2025-02-22 RX ADMIN — HEPARIN SODIUM 5000 UNIT(S): 1000 INJECTION INTRAVENOUS; SUBCUTANEOUS at 16:48

## 2025-02-22 RX ADMIN — Medication 50 MICROGRAM(S): at 09:13

## 2025-02-22 RX ADMIN — Medication 40 MILLIGRAM(S): at 09:13

## 2025-02-22 RX ADMIN — Medication 1 APPLICATION(S): at 16:49

## 2025-02-22 RX ADMIN — HEPARIN SODIUM 5000 UNIT(S): 1000 INJECTION INTRAVENOUS; SUBCUTANEOUS at 21:58

## 2025-02-22 RX ADMIN — PREDNISONE 10 MILLIGRAM(S): 20 TABLET ORAL at 09:12

## 2025-02-22 RX ADMIN — INSULIN LISPRO 4: 100 INJECTION, SOLUTION INTRAVENOUS; SUBCUTANEOUS at 23:46

## 2025-02-22 RX ADMIN — CYANOCOBALAMIN 1000 MICROGRAM(S): 1000 INJECTION INTRAMUSCULAR; SUBCUTANEOUS at 16:49

## 2025-02-22 RX ADMIN — BUMETANIDE 1 MILLIGRAM(S): 1 TABLET ORAL at 04:41

## 2025-02-22 RX ADMIN — SODIUM CHLORIDE 100 MILLILITER(S): 9 INJECTION, SOLUTION INTRAVENOUS at 16:48

## 2025-02-22 RX ADMIN — GABAPENTIN 300 MILLIGRAM(S): 400 CAPSULE ORAL at 09:13

## 2025-02-22 RX ADMIN — FOLIC ACID 1 MILLIGRAM(S): 1 TABLET ORAL at 09:13

## 2025-02-22 RX ADMIN — SODIUM CHLORIDE 1000 MILLILITER(S): 9 INJECTION, SOLUTION INTRAVENOUS at 16:48

## 2025-02-22 RX ADMIN — LOSARTAN POTASSIUM 25 MILLIGRAM(S): 100 TABLET, FILM COATED ORAL at 09:12

## 2025-02-22 RX ADMIN — Medication 1000 MILLILITER(S): at 01:45

## 2025-02-22 NOTE — H&P ADULT - TIME BILLING
review of chart notes, labs, independent review of imaging, bedside assessment, coordination of care with MICU NP/RN and documentation

## 2025-02-22 NOTE — ED ADULT NURSE REASSESSMENT NOTE - NS ED NURSE REASSESS COMMENT FT1
Assumed care of patient from RNNikky. Patient is A&Ox4, resting comfortably in bed. Offering no complaints at this time. NAD. Respirations even and unlabored. NSR on th monitor. Secondary PIV instered in the left wrist and Insulin drip started as per MD orders. Patient's daughter at th bedside. Plan of care ongoing. Pt. safety maintained.
Report received from offgoing RN, charting as noted. Patient is resting comfortably in stretcher, sinus on cardiac monitor. on insulin gtt at 3units/hour.

## 2025-02-22 NOTE — ED PROVIDER NOTE - CLINICAL SUMMARY MEDICAL DECISION MAKING FREE TEXT BOX
59-year-old female hx of HTN, hypothyroidism, IDDM, lupus, CKD presenting w/ hyperglycemia. Labs, urine, CXR, gentle fluids ordered. 59-year-old female hx of HTN, hypothyroidism, IDDM, lupus, CKD presenting w/ hyperglycemia. Labs, urine, CXR, gentle fluids ordered.  ---------  Jany Garza MD, Attending  59-year-old F hx  HTN, hypothyroidism, IDDM, lupus, CKD presenting w/ hyperglycemia. Per daughter at bedside, pt lives by self and manages her own meds. Daughter reports pt not compliant with insulin, states she often states she is "scared to crash", so deliberately underdosages her insulin or skips doses altogether. IN the ED, pt with signs of fluid overload with b/l pitting edema +3. Daughter also notes R leg swelling > left. Pt also has gen, weakness but has difficulty repositioning herself in bed due to weakness and leg swelling.   Gen: NAD   Head: NC/AT  Neck: trachea midline  Resp:  No distress, no rales, rhonchi  card: RRR,S1, S2  abd: non tender, non distended. no R/G  Ext: no deformities + b/l pitting edema +3, R leg swelling >  L leg.   Neuro:  A&O appears non focal  Skin:  Warm and dry as visualized  Psych: anxious mood.   ddx includes, but is not limited to the following: hyperglycemia vs DKA, CKD vs CHF, UTI, PNA  plan: DKA labs, CXR, reassess for disposition. Low threshold for insulin as pt not insulin naive and pt has pitting LE edema and likely has low ability to tolerate IVF.   update: Pt in mild DKA, given fluid overload, will start insulin infusion without further IVF.

## 2025-02-22 NOTE — H&P ADULT - ASSESSMENT
59F with hx of DM, HTN, hypothyroidism, chronic anemia, HFpEF, CKD, recently diagnosed lupus/possible overlap w/ systemic sclerosis, recent admission 1/22-1/31 for progressive dyspnea and worsening renal failure found to have bilateral effusions s/p R pigtail placement and underwent renal biopsy consistent w/ either class VI LN changes vs diabetic nephropathy and was started on steroid taper and MMF presents to the ED 2/22 due to hyperglycemia and generalized malaise found to be in DKA     CNS  c/w gabapentin   monitor neuro checks     CV  hemodynamically stable   c/w amlodipine for now; unclear if LE edema may be related so may need to transition to alternate agent  c/w losartan  prior Echo 1/25 w/ normal EF, normal RV  no acute ischemic changes noted     Endo   DKA  likely secondary to poor compliance and recent prednisone use   c/w insulin gtt   monitor q1h FS   check chem 8 q4-6h   replete electrolytes aggressively  c/w D5LR@100cc/hr      Hypothyroidism   c/w synthroid     ID  no obvious infectious trigger but given immunocompromise, will check cultures   f/u blood cx   UA neg   CXR without consolidation   c/w PCP ppx w/ bactrim     Rheum   Hx of SLE w/ possible flare recently   c/w prednisone 10mg for now; will consult w/ rheum regarding further taper   hold MMF for now pending cx   c/w HCQ    Full code   DVT ppx

## 2025-02-22 NOTE — H&P ADULT - TIME-BASED BILLING (NON-CRITICAL CARE)
Time-based billing (NON-critical care) Pt walk in complaining of cough congestion body aches and subj fevers for the last two weeks. In the last two days pt endorses "leg swelling and heaviness" with dark urine. Denies recent workouts or strenuous activity.

## 2025-02-22 NOTE — H&P ADULT - NSHPPHYSICALEXAM_GEN_ALL_CORE
GEN: awake, alert, in NAD   HEENT: NC/AT, anicteric, MMM  CV: S1S1, RRR  GI: normoactive, soft, NT/ND   Ext: +2 edema b/l LE, warm   Neuro: no focal deficits   Psychiatry: appropriate affect

## 2025-02-22 NOTE — PATIENT PROFILE ADULT - NSTOBACCONEVERSMOKERY/N_GEN_A
DISCHARGE REPORT    Progress reporting period is from 2/1/21 to 2/8/21.     SUBJECTIVE  Subjective: No change in neck tension. No dizzy episodes this week. Unable to do prone scap exercises.    Current Pain level: 1/10   Initial Pain level: 1/10            Patient cancelled an appointment on 2/15 and 2/22/21 and has failed to return to therapy so current objective findings are unknown.  The subjective and objective information are from the last SOAP note on this patient.    OBJECTIVE  Objective: CROM Flexion 100%, extension 50%, rotation 70% B, SB 40% B. Minimal B suboccipital and UT tone. Minimal to moderate R and minimal L masseter and temporalis tone. Fair scap control with fair to poor endurance.        ASSESSMENT/PLAN  Updated problem list and treatment plan: Diagnosis 1:  Neck pain  Pain -  home program  Decreased ROM/flexibility - home program  Decreased strength - home program  Impaired muscle performance - home program  Decreased function - home program  Impaired posture - home program  STG/LTGs have been met or progress has been made towards goals:  Yes (See Goal flow sheet completed today.)  Assessment of Progress: The patient has not returned to therapy. Current status is unknown.  Self Management Plans:  Patient has been instructed in a home treatment program.    Lindsey continues to require the following intervention to meet STG and LTG's: PT intervention is no longer required to meet STG/LTG.  The patient failed to complete scheduled/ordered appointments so current information is unknown.  We will discharge this patient from PT.    Recommendations:      Please refer to the daily flowsheet for treatment today, total treatment time and time spent performing 1:1 timed codes.   No

## 2025-02-22 NOTE — H&P ADULT - HISTORY OF PRESENT ILLNESS
59F with hx of DM, 59F with hx of DM, HTN, hypothyroidism, chronic anemia, HFpEF, CKD, recently diagnosed lupus/possible overlap w/ systemic sclerosis, recent admission 1/22-1/31 for progressive dyspnea and worsening renal failure found to have bilateral effusions s/p R pigtail placement and underwent renal biopsy consistent w/ either class VI LN changes vs diabetic nephropathy and was started on steroid taper and MMF.   59F with hx of DM, HTN, hypothyroidism, chronic anemia, HFpEF, CKD, recently diagnosed lupus/possible overlap w/ systemic sclerosis, recent admission 1/22-1/31 for progressive dyspnea and worsening renal failure found to have bilateral effusions s/p R pigtail placement and underwent renal biopsy consistent w/ either class VI LN changes vs diabetic nephropathy and was started on steroid taper and MMF presents to the ED 2/22 due to hyperglycemia noted at home, weakness and nausea. Per daughter, pt has still been tapering prednisone now down to 10mg and was planned to f/u with rheumatology for further decrease. She has not been taking her insulin as directed due to fear of having hypoglycemia. She denies any recent associated fevers/chills/sick contacts. Denies chest pain/shortness of breath. Denies vomiting/abdominal pain. Denies diarrhea/dysuria. She has had LE edema which has worsened since starting the prednisone.  In the ED, pt was afebrile, hemodynamically stable. Labs consistent with DKA w/ HCO3 16, AG 18, VBG 7.23/37 with Cr grossly stable.

## 2025-02-22 NOTE — ED ADULT NURSE NOTE - OBJECTIVE STATEMENT
59-year-old female hx of HTN, hypothyroidism, IDDM, lupus, CKD presenting w/ hyperglycemia. Pt also reports generalized weakness. She denies fever, chills, abd pain, N/V/D, chest pain, SOB, cough, urinary sx, sick contacts, or any other complaints. On Continues cardiac monitor.

## 2025-02-22 NOTE — ED PROVIDER NOTE - PHYSICAL EXAMINATION
Gen: Well appearing in NAD  Head: NC/AT  Neck: trachea midline  Card: regular rate and rhythm, 1+ pitting edema b/l LE  Resp:  CTAB  Abd: soft, non-distended, non-tender  Ext: no deformities  Neuro: A&Ox3, MAYER spontaneously, sensation intact and symmetrical b/l extremities, no facial droop, no slurred speech, EOMI  Skin:  Warm and dry as visualized  Psych:  Normal affect and mood

## 2025-02-22 NOTE — ED PROVIDER NOTE - OBJECTIVE STATEMENT
59-year-old female hx of HTN, hypothyroidism, IDDM, lupus, CKD presenting w/ hyperglycemia. Pt also reports generalized weakness. She denies fever, chills, abd pain, N/V/D, chest pain, SOB, cough, urinary sx, sick contacts, or any other complaints.

## 2025-02-22 NOTE — H&P ADULT - NSHPLABSRESULTS_GEN_ALL_CORE
10.2   4.78  )-----------( 308      ( 22 Feb 2025 01:45 )             31.7       02-22    132[L]  |  98  |  88.9[H]  ----------------------------<  244[H]  4.7   |  16.0[L]  |  2.56[H]    Ca    8.6      22 Feb 2025 01:45  Phos  3.5     02-22  Mg     1.8     02-22    TPro  5.6[L]  /  Alb  2.8[L]  /  TBili  <0.2[L]  /  DBili  x   /  AST  9   /  ALT  11  /  AlkPhos  100  02-22

## 2025-02-22 NOTE — ED PROVIDER NOTE - ATTENDING CONTRIBUTION TO CARE
Dr. Garza: I personally saw the patient with the RESIDENT and performed a substantive portion of the visit. I performed a face to face bedside interview with patient regarding history of present illness, review of symptoms and past medical history. I completed an independent physical exam and all aspects of medical decision making. I have discussed patient's plan of care with resident. I agree with note as stated above, having amended the EMR as needed to reflect my findings. This includes HISTORY OF PRESENT ILLNESS, HIV, PAST MEDICAL/SURGICAL/FAMILY/SOCIAL HISTORY, ALLERGIES AND HOME MEDICATIONS, ROS, PHYSICAL EXAM, MEDICAL DECISION MAKING and any PROGRESS NOTES during the time I functioned as the attending physician for this patient.  SEE MDM  Patient was critically ill with a high probability of imminent or life threatening deterioration.  I have performed direct patient care (not related to procedure), additional history taking, interpretation of diagnostic studies, documentation, consultation with other physicians, telephone consultation with the patient's family.   I have personally and independently provided the amount of critical care time documented below excluding time spent on separate procedures.  critical care time: 35 mins

## 2025-02-22 NOTE — ED ADULT TRIAGE NOTE - CHIEF COMPLAINT QUOTE
pt presents to ED for hyperglycemia. Per family member pt glucose was 512 at home, after 2 hours was then 404. Pt endocrinologist advised she come into ED for evaluation.  Pt family member reports pt c/o b/l leg weakness and nausea. Hx of lupus and ESKD. . Self administered 5 units of Humalog @8pm.

## 2025-02-23 LAB
ALBUMIN SERPL ELPH-MCNC: 2.3 G/DL — LOW (ref 3.3–5.2)
ALBUMIN SERPL ELPH-MCNC: 2.4 G/DL — LOW (ref 3.3–5.2)
ALP SERPL-CCNC: 83 U/L — SIGNIFICANT CHANGE UP (ref 40–120)
ALP SERPL-CCNC: 84 U/L — SIGNIFICANT CHANGE UP (ref 40–120)
ALT FLD-CCNC: 10 U/L — SIGNIFICANT CHANGE UP
ALT FLD-CCNC: 9 U/L — SIGNIFICANT CHANGE UP
ANION GAP SERPL CALC-SCNC: 13 MMOL/L — SIGNIFICANT CHANGE UP (ref 5–17)
ANION GAP SERPL CALC-SCNC: 14 MMOL/L — SIGNIFICANT CHANGE UP (ref 5–17)
APPEARANCE UR: CLEAR — SIGNIFICANT CHANGE UP
AST SERPL-CCNC: 22 U/L — SIGNIFICANT CHANGE UP
AST SERPL-CCNC: 7 U/L — SIGNIFICANT CHANGE UP
BACTERIA # UR AUTO: NEGATIVE /HPF — SIGNIFICANT CHANGE UP
BASOPHILS # BLD AUTO: 0 K/UL — SIGNIFICANT CHANGE UP (ref 0–0.2)
BASOPHILS NFR BLD AUTO: 0 % — SIGNIFICANT CHANGE UP (ref 0–2)
BILIRUB SERPL-MCNC: <0.2 MG/DL — LOW (ref 0.4–2)
BILIRUB SERPL-MCNC: <0.2 MG/DL — LOW (ref 0.4–2)
BILIRUB UR-MCNC: NEGATIVE — SIGNIFICANT CHANGE UP
BUN SERPL-MCNC: 73.9 MG/DL — HIGH (ref 8–20)
BUN SERPL-MCNC: 76.9 MG/DL — HIGH (ref 8–20)
CALCIUM SERPL-MCNC: 8.3 MG/DL — LOW (ref 8.4–10.5)
CALCIUM SERPL-MCNC: 8.3 MG/DL — LOW (ref 8.4–10.5)
CAST: 0 /LPF — SIGNIFICANT CHANGE UP (ref 0–4)
CHLORIDE SERPL-SCNC: 104 MMOL/L — SIGNIFICANT CHANGE UP (ref 96–108)
CHLORIDE SERPL-SCNC: 104 MMOL/L — SIGNIFICANT CHANGE UP (ref 96–108)
CO2 SERPL-SCNC: 15 MMOL/L — LOW (ref 22–29)
CO2 SERPL-SCNC: 15 MMOL/L — LOW (ref 22–29)
COLOR SPEC: YELLOW — SIGNIFICANT CHANGE UP
CREAT SERPL-MCNC: 2.23 MG/DL — HIGH (ref 0.5–1.3)
CREAT SERPL-MCNC: 2.26 MG/DL — HIGH (ref 0.5–1.3)
CULTURE RESULTS: SIGNIFICANT CHANGE UP
DIFF PNL FLD: ABNORMAL
EGFR: 24 ML/MIN/1.73M2 — LOW
EGFR: 25 ML/MIN/1.73M2 — LOW
EOSINOPHIL # BLD AUTO: 0.13 K/UL — SIGNIFICANT CHANGE UP (ref 0–0.5)
EOSINOPHIL NFR BLD AUTO: 3 % — SIGNIFICANT CHANGE UP (ref 0–6)
GAS PNL BLDV: SIGNIFICANT CHANGE UP
GLUCOSE BLDC GLUCOMTR-MCNC: 114 MG/DL — HIGH (ref 70–99)
GLUCOSE BLDC GLUCOMTR-MCNC: 189 MG/DL — HIGH (ref 70–99)
GLUCOSE BLDC GLUCOMTR-MCNC: 192 MG/DL — HIGH (ref 70–99)
GLUCOSE BLDC GLUCOMTR-MCNC: 255 MG/DL — HIGH (ref 70–99)
GLUCOSE BLDC GLUCOMTR-MCNC: 274 MG/DL — HIGH (ref 70–99)
GLUCOSE BLDC GLUCOMTR-MCNC: 52 MG/DL — CRITICAL LOW (ref 70–99)
GLUCOSE BLDC GLUCOMTR-MCNC: 53 MG/DL — CRITICAL LOW (ref 70–99)
GLUCOSE SERPL-MCNC: 193 MG/DL — HIGH (ref 70–99)
GLUCOSE SERPL-MCNC: 54 MG/DL — CRITICAL LOW (ref 70–99)
GLUCOSE UR QL: 100 MG/DL
HCT VFR BLD CALC: 31 % — LOW (ref 34.5–45)
HGB BLD-MCNC: 9.6 G/DL — LOW (ref 11.5–15.5)
IMM GRANULOCYTES # BLD AUTO: 0.03 K/UL — SIGNIFICANT CHANGE UP (ref 0–0.07)
IMM GRANULOCYTES NFR BLD AUTO: 0.7 % — SIGNIFICANT CHANGE UP (ref 0–0.9)
KETONES UR-MCNC: NEGATIVE MG/DL — SIGNIFICANT CHANGE UP
LACTATE SERPL-SCNC: 1.3 MMOL/L — SIGNIFICANT CHANGE UP (ref 0.5–2)
LEUKOCYTE ESTERASE UR-ACNC: NEGATIVE — SIGNIFICANT CHANGE UP
LYMPHOCYTES # BLD AUTO: 0.9 K/UL — LOW (ref 1–3.3)
LYMPHOCYTES NFR BLD AUTO: 20.6 % — SIGNIFICANT CHANGE UP (ref 13–44)
MAGNESIUM SERPL-MCNC: 1.6 MG/DL — SIGNIFICANT CHANGE UP (ref 1.6–2.6)
MCHC RBC-ENTMCNC: 26.9 PG — LOW (ref 27–34)
MCHC RBC-ENTMCNC: 31 G/DL — LOW (ref 32–36)
MCV RBC AUTO: 86.8 FL — SIGNIFICANT CHANGE UP (ref 80–100)
MONOCYTES # BLD AUTO: 0.12 K/UL — SIGNIFICANT CHANGE UP (ref 0–0.9)
MONOCYTES NFR BLD AUTO: 2.7 % — SIGNIFICANT CHANGE UP (ref 2–14)
NEUTROPHILS # BLD AUTO: 3.19 K/UL — SIGNIFICANT CHANGE UP (ref 1.8–7.4)
NEUTROPHILS NFR BLD AUTO: 73 % — SIGNIFICANT CHANGE UP (ref 43–77)
NITRITE UR-MCNC: NEGATIVE — SIGNIFICANT CHANGE UP
NRBC # BLD AUTO: 0 K/UL — SIGNIFICANT CHANGE UP (ref 0–0)
NRBC # FLD: 0 K/UL — SIGNIFICANT CHANGE UP (ref 0–0)
NRBC BLD AUTO-RTO: 0 /100 WBCS — SIGNIFICANT CHANGE UP (ref 0–0)
PH UR: 6 — SIGNIFICANT CHANGE UP (ref 5–8)
PHOSPHATE SERPL-MCNC: 2.9 MG/DL — SIGNIFICANT CHANGE UP (ref 2.4–4.7)
PLATELET # BLD AUTO: 256 K/UL — SIGNIFICANT CHANGE UP (ref 150–400)
PMV BLD: 9.1 FL — SIGNIFICANT CHANGE UP (ref 7–13)
POTASSIUM SERPL-MCNC: 4.2 MMOL/L — SIGNIFICANT CHANGE UP (ref 3.5–5.3)
POTASSIUM SERPL-MCNC: 5.3 MMOL/L — SIGNIFICANT CHANGE UP (ref 3.5–5.3)
POTASSIUM SERPL-SCNC: 4.2 MMOL/L — SIGNIFICANT CHANGE UP (ref 3.5–5.3)
POTASSIUM SERPL-SCNC: 5.3 MMOL/L — SIGNIFICANT CHANGE UP (ref 3.5–5.3)
PROT SERPL-MCNC: 4.7 G/DL — LOW (ref 6.6–8.7)
PROT SERPL-MCNC: 5.2 G/DL — LOW (ref 6.6–8.7)
PROT UR-MCNC: 300 MG/DL
RBC # BLD: 3.57 M/UL — LOW (ref 3.8–5.2)
RBC # FLD: 18.5 % — HIGH (ref 10.3–14.5)
RBC CASTS # UR COMP ASSIST: 19 /HPF — HIGH (ref 0–4)
SODIUM SERPL-SCNC: 132 MMOL/L — LOW (ref 135–145)
SODIUM SERPL-SCNC: 133 MMOL/L — LOW (ref 135–145)
SP GR SPEC: 1.01 — SIGNIFICANT CHANGE UP (ref 1–1.03)
SPECIMEN SOURCE: SIGNIFICANT CHANGE UP
SQUAMOUS # UR AUTO: 0 /HPF — SIGNIFICANT CHANGE UP (ref 0–5)
T3 SERPL-MCNC: <40 NG/DL — LOW (ref 80–200)
T4 AB SER-ACNC: 2.1 UG/DL — LOW (ref 4.5–12)
TSH SERPL-MCNC: 98.7 UIU/ML — HIGH (ref 0.27–4.2)
UROBILINOGEN FLD QL: 0.2 MG/DL — SIGNIFICANT CHANGE UP (ref 0.2–1)
WBC # BLD: 4.37 K/UL — SIGNIFICANT CHANGE UP (ref 3.8–10.5)
WBC # FLD AUTO: 4.37 K/UL — SIGNIFICANT CHANGE UP (ref 3.8–10.5)
WBC UR QL: 2 /HPF — SIGNIFICANT CHANGE UP (ref 0–5)

## 2025-02-23 PROCEDURE — 99233 SBSQ HOSP IP/OBS HIGH 50: CPT

## 2025-02-23 PROCEDURE — 99223 1ST HOSP IP/OBS HIGH 75: CPT

## 2025-02-23 PROCEDURE — 99233 SBSQ HOSP IP/OBS HIGH 50: CPT | Mod: GC

## 2025-02-23 RX ORDER — INSULIN GLARGINE-YFGN 100 [IU]/ML
8 INJECTION, SOLUTION SUBCUTANEOUS AT BEDTIME
Refills: 0 | Status: DISCONTINUED | OUTPATIENT
Start: 2025-02-23 | End: 2025-02-25

## 2025-02-23 RX ORDER — MAGNESIUM SULFATE 500 MG/ML
1 SYRINGE (ML) INJECTION ONCE
Refills: 0 | Status: COMPLETED | OUTPATIENT
Start: 2025-02-23 | End: 2025-02-23

## 2025-02-23 RX ORDER — INSULIN LISPRO 100 U/ML
INJECTION, SOLUTION INTRAVENOUS; SUBCUTANEOUS
Refills: 0 | Status: DISCONTINUED | OUTPATIENT
Start: 2025-02-23 | End: 2025-02-25

## 2025-02-23 RX ORDER — ACETAMINOPHEN 500 MG/5ML
650 LIQUID (ML) ORAL EVERY 6 HOURS
Refills: 0 | Status: DISCONTINUED | OUTPATIENT
Start: 2025-02-23 | End: 2025-02-25

## 2025-02-23 RX ORDER — INSULIN GLARGINE-YFGN 100 [IU]/ML
20 INJECTION, SOLUTION SUBCUTANEOUS AT BEDTIME
Refills: 0 | Status: DISCONTINUED | OUTPATIENT
Start: 2025-02-23 | End: 2025-02-23

## 2025-02-23 RX ORDER — INSULIN LISPRO 100 U/ML
4 INJECTION, SOLUTION INTRAVENOUS; SUBCUTANEOUS
Refills: 0 | Status: DISCONTINUED | OUTPATIENT
Start: 2025-02-23 | End: 2025-02-25

## 2025-02-23 RX ORDER — INSULIN GLARGINE-YFGN 100 [IU]/ML
10 INJECTION, SOLUTION SUBCUTANEOUS AT BEDTIME
Refills: 0 | Status: DISCONTINUED | OUTPATIENT
Start: 2025-02-23 | End: 2025-02-23

## 2025-02-23 RX ORDER — LEVOTHYROXINE SODIUM 300 MCG
75 TABLET ORAL DAILY
Refills: 0 | Status: DISCONTINUED | OUTPATIENT
Start: 2025-02-23 | End: 2025-02-25

## 2025-02-23 RX ORDER — SODIUM BICARBONATE 1 MEQ/ML
650 SYRINGE (ML) INTRAVENOUS THREE TIMES A DAY
Refills: 0 | Status: DISCONTINUED | OUTPATIENT
Start: 2025-02-23 | End: 2025-02-25

## 2025-02-23 RX ORDER — MYCOPHENOLATE MOFETIL 500 MG/1
500 TABLET, FILM COATED ORAL THREE TIMES A DAY
Refills: 0 | Status: DISCONTINUED | OUTPATIENT
Start: 2025-02-23 | End: 2025-02-23

## 2025-02-23 RX ORDER — LEVOTHYROXINE SODIUM 300 MCG
100 TABLET ORAL ONCE
Refills: 0 | Status: DISCONTINUED | OUTPATIENT
Start: 2025-02-23 | End: 2025-02-25

## 2025-02-23 RX ORDER — MYCOPHENOLATE MOFETIL 500 MG/1
500 TABLET, FILM COATED ORAL
Refills: 0 | Status: DISCONTINUED | OUTPATIENT
Start: 2025-02-23 | End: 2025-02-25

## 2025-02-23 RX ADMIN — FOLIC ACID 1 MILLIGRAM(S): 1 TABLET ORAL at 11:31

## 2025-02-23 RX ADMIN — HYDROXYCHLOROQUINE SULFATE 200 MILLIGRAM(S): 200 TABLET, FILM COATED ORAL at 17:14

## 2025-02-23 RX ADMIN — Medication 650 MILLIGRAM(S): at 21:42

## 2025-02-23 RX ADMIN — HEPARIN SODIUM 5000 UNIT(S): 1000 INJECTION INTRAVENOUS; SUBCUTANEOUS at 21:46

## 2025-02-23 RX ADMIN — MYCOPHENOLATE MOFETIL 500 MILLIGRAM(S): 500 TABLET, FILM COATED ORAL at 17:14

## 2025-02-23 RX ADMIN — GABAPENTIN 300 MILLIGRAM(S): 400 CAPSULE ORAL at 17:14

## 2025-02-23 RX ADMIN — Medication 650 MILLIGRAM(S): at 13:12

## 2025-02-23 RX ADMIN — Medication 650 MILLIGRAM(S): at 08:23

## 2025-02-23 RX ADMIN — AMLODIPINE BESYLATE 5 MILLIGRAM(S): 10 TABLET ORAL at 06:08

## 2025-02-23 RX ADMIN — GABAPENTIN 300 MILLIGRAM(S): 400 CAPSULE ORAL at 06:08

## 2025-02-23 RX ADMIN — INSULIN LISPRO 4 UNIT(S): 100 INJECTION, SOLUTION INTRAVENOUS; SUBCUTANEOUS at 17:14

## 2025-02-23 RX ADMIN — LOSARTAN POTASSIUM 25 MILLIGRAM(S): 100 TABLET, FILM COATED ORAL at 06:08

## 2025-02-23 RX ADMIN — INSULIN GLARGINE-YFGN 8 UNIT(S): 100 INJECTION, SOLUTION SUBCUTANEOUS at 21:41

## 2025-02-23 RX ADMIN — PREDNISONE 10 MILLIGRAM(S): 20 TABLET ORAL at 06:07

## 2025-02-23 RX ADMIN — HYDROXYCHLOROQUINE SULFATE 200 MILLIGRAM(S): 200 TABLET, FILM COATED ORAL at 06:08

## 2025-02-23 RX ADMIN — Medication 40 MILLIGRAM(S): at 08:24

## 2025-02-23 RX ADMIN — INSULIN LISPRO 3: 100 INJECTION, SOLUTION INTRAVENOUS; SUBCUTANEOUS at 17:15

## 2025-02-23 RX ADMIN — HEPARIN SODIUM 5000 UNIT(S): 1000 INJECTION INTRAVENOUS; SUBCUTANEOUS at 13:12

## 2025-02-23 RX ADMIN — HEPARIN SODIUM 5000 UNIT(S): 1000 INJECTION INTRAVENOUS; SUBCUTANEOUS at 06:07

## 2025-02-23 RX ADMIN — Medication 100 GRAM(S): at 08:24

## 2025-02-23 RX ADMIN — Medication 650 MILLIGRAM(S): at 21:41

## 2025-02-23 RX ADMIN — Medication 650 MILLIGRAM(S): at 02:39

## 2025-02-23 RX ADMIN — Medication 650 MILLIGRAM(S): at 03:23

## 2025-02-23 RX ADMIN — Medication 50 MICROGRAM(S): at 06:08

## 2025-02-23 RX ADMIN — INSULIN LISPRO 6: 100 INJECTION, SOLUTION INTRAVENOUS; SUBCUTANEOUS at 11:32

## 2025-02-23 RX ADMIN — CYANOCOBALAMIN 1000 MICROGRAM(S): 1000 INJECTION INTRAMUSCULAR; SUBCUTANEOUS at 11:31

## 2025-02-23 NOTE — PROGRESS NOTE ADULT - ATTENDING COMMENTS
.    59F PMH SLE, systemic sclerosis on Prednisone 10mg, HFpEF, chronic anemia, hypothyroidism, HTN, IDDM who presented 2/22/25 with generalized malaise, found to be in DKA. Recently admitted for renal failure, B/L pleural effusions, had L renal biopsy done, which showed immune-complex mediated glomerulonephritis, global glomerulosclerosis and focal segmental glomerular sclerosis. There were also immune deposits in the tubular basement membranes. Has been transitioned to Lantus 20U; notably at home she was previously on 40U Lantus but reports becoming hypoglycemic in the morning. Her FSG was low this morning but improved after she initiated diet; D/C'ed premeal Admelog. Consulted endocrine. She has improved and is otherwise stable for downgrade from the ICU.      Leon Johnson MD, [facility]  , Pulmonary & Critical Care Medicine  [facility]  Pulmonary and Sleep Medicine at [place]  [address]  [place] [address]  T: [phone]  F: [phone]

## 2025-02-23 NOTE — PROGRESS NOTE ADULT - SUBJECTIVE AND OBJECTIVE BOX
Hospitalist Medicine - MICU DG Progress Note    59F with PMHX DM1, HTN, HLD, HFpEF, Hypothyroidism, Neuropathy, DM Nephropathy, SLE, Lupus Nephritis, CKD4 presented to ED with hyperglycemia and AGMA. Patient noncompliant with home insulin due to fear of hypoglycemia. Admitted to MICU for Insulin gtt. Now titrated off. AG closed. Transitioned to Basal Bolus Regimen with Lantus 20 units x1. Tolerating PO intake. Seen by Endocrinology who adjusted to Lispro 4 ACHS and Lantus 8 units qHS. Immunosuppressive agents for SLE initially held due to possible infection but no obvious source identified and likely cause of DKA is noncompliance. Plaquenil/Mycophenolate/Prednisone restarted. TSH noted to be 100. Levothyroxine dose increased. Rheum consulted reccs appreciated.     ROS negative unless mentioned.    Vital Signs Last 24 Hrs  T(C): 36.8 (23 Feb 2025 14:00), Max: 36.9 (22 Feb 2025 16:13)  T(F): 98.2 (23 Feb 2025 14:00), Max: 98.5 (23 Feb 2025 12:06)  HR: 90 (23 Feb 2025 14:00) (79 - 97)  BP: 128/62 (23 Feb 2025 14:00) (108/56 - 150/56)  BP(mean): 80 (23 Feb 2025 14:00) (71 - 104)  RR: 15 (23 Feb 2025 14:00) (12 - 21)  SpO2: 99% (23 Feb 2025 14:00) (95% - 100%)    Parameters below as of 23 Feb 2025 12:00  Patient On (Oxygen Delivery Method): room air    Constitutional: NAD, VSS  Head: NC/AT  Eyes: PERRL, EOMI, anicteric sclera, conjunctiva WNL  ENT: Normal Pharynx, MMM, No tonsillar exudate/erythema  Neck: Supple, Non-tender  Chest: Non-tender, no rashes  Cardio: RRR, s1/s2, no appreciable murmurs/rubs/gallops  Resp: BS CTA bilaterally, no wheezing/rhonchi/rales  Abd: Soft, Non-tender, Non-distended, no rebound/guarding/rigidity  : not examined  Rectal: not examined  MSK: moving all extremities, no motor weakness, full ROM x4  Ext: palpable distal pulses, good capillary refill  Psych: appropriate, cooperative  Neuro: CN II-XII grossly intact, no focal deficits  Skin: Warm/Dry. No rashes.

## 2025-02-23 NOTE — CONSULT NOTE ADULT - ASSESSMENT
59F with hx of DM, HTN, hypothyroidism, chronic anemia, HFpEF, CKD, recently diagnosed lupus/possible overlap w/ systemic sclerosis, recent admission 1/22-1/31 for progressive dyspnea and worsening renal failure found to have bilateral effusions s/p R pigtail placement and underwent renal biopsy consistent w/ either class VI LN changes vs diabetic nephropathy and was started on steroid taper was hospitalized for hyperglycemia Patient is on steroid taper prednisone now down to 10mg and was planned to f/u with rheumatology for further decrease.  She has not been taking her insulin as directed due to fear of having hypoglycemia.     Patient was found to be in DKA and she also has hypothyroidism Endocrinology was consulted for evaluation     DM with hyperglycemia  S/p DKA    Home Medication:      A1c 9.3  Patient was on lantus 10 units then sugars dropped this morning in the 50s. Has prandial hyperglycemia   Lower Lantus to 8 nightly   Start Admelog 4 units TID before meals, hold of NPO   Lower to low dose Admelog sliding scale TID. Stop bedtime scale   Monitor POCT   Diabetes education   Diabetic Diet     Hypothyroidism  TSH 98, T4 2.1  Give one loading dose of levothyroxine 100 mcg po once, then increase to 75 mcg maintenance starting tomorrow   Repeat TSH and FT4 in 4 weeks     Steroid dependence   On chronic steroids with prednisone 10 mg   Bp stable   Prednisone preserves some mineralocorticoid activity and this dose is equivalent to HC 40 mg which is already supraphysiologic dose, so continue for now especially for moderate stress as in this situation      59F with hx of DM, HTN, hypothyroidism, chronic anemia, HFpEF, CKD, recently diagnosed lupus/possible overlap w/ systemic sclerosis, recent admission 1/22-1/31 for progressive dyspnea and worsening renal failure found to have bilateral effusions s/p R pigtail placement and underwent renal biopsy consistent w/ either class VI LN changes vs diabetic nephropathy and was started on steroid taper was hospitalized for hyperglycemia Patient is on steroid taper prednisone now down to 10mg and was planned to f/u with rheumatology for further decrease.  She has not been taking her insulin as directed due to fear of having hypoglycemia.     Patient was found to be in DKA and she also has hypothyroidism Endocrinology was consulted for evaluation     Type 1 DM with hyperglycemia  S/p DKA    Home Medication:  Lantus 40 units at home, always low in the morning   Humalog ICR 1:15, but was very high with lunch an dinner       A1c 9.3  Patient was on lantus 10 units then sugars dropped this morning in the 50s. Has prandial hyperglycemia   Lower Lantus to 8 nightly   Start Admelog 4 units TID before meals, hold of NPO   Lower to low dose Admelog sliding scale TID. Stop bedtime scale   Monitor POCT   Diabetes education   Diabetic Diet     On discharge:   I discussed with her and her daughter on the phone about the following   Sugar goals for now are 100-200  Intensify ICR at home to 1:10 with lunch and dinner, will continue ICR 1:15 with breakfast. Those settings will help us once we are able to put back on the pump  Lantus will be decided on discharge, but generally speaking, because she was on 40 units and now she is on 8 which is a big a difference. To avoid going back to the hospital, if she was discharged on lantus 8 units, can increase her lantus by 4 units daily if sugars are above 200 in the morning to keep her sugars 100-200 all times   Needs to reschedule appointment with CDE in 2 weeks, our team to follow   Will see her again in March     Hypothyroidism  TSH 98, T4 2.1  Give one loading dose of levothyroxine 100 mcg po once, then increase to 75 mcg maintenance starting tomorrow   Repeat TSH and FT4 in 4 weeks     Steroid dependence   On chronic steroids with prednisone 10 mg   Bp stable   Prednisone preserves some mineralocorticoid activity and this dose is equivalent to HC 40 mg which is already supraphysiologic dose, so continue for now especially for moderate stress as in this situation

## 2025-02-23 NOTE — CONSULT NOTE ADULT - SUBJECTIVE AND OBJECTIVE BOX
Patient is a 59y old  Female who presents with a chief complaint of DKA (23 Feb 2025 07:46)    HPI:  59F with hx of DM, HTN, hypothyroidism, chronic anemia, HFpEF, CKD, recently diagnosed lupus/possible overlap w/ systemic sclerosis, recent admission 1/22-1/31 for progressive dyspnea and worsening renal failure found to have bilateral effusions s/p R pigtail placement and underwent renal biopsy consistent w/ either class VI LN changes vs diabetic nephropathy and was started on steroid taper was hospitalized for hyperglycemia Patient is on steroid taper prednisone now down to 10mg and was planned to f/u with rheumatology for further decrease.  She has not been taking her insulin as directed due to fear of having hypoglycemia.     Patient was found to be in DKA and she also has hypothyroidism Endocrinology was consulted for evaluation     PAST MEDICAL & SURGICAL HISTORY:  Diabetes    Hypertension    No significant past surgical history        Social History:      FAMILY HISTORY:  FH: non-Hodgkin's lymphoma (Mother)          Allergies    No Known Allergies    Intolerances        ROS as noted in the HPI    MEDICATIONS  (STANDING):  amLODIPine   Tablet 5 milliGRAM(s) Oral daily  chlorhexidine 2% Cloths 1 Application(s) Topical <User Schedule>  cyanocobalamin 1000 MICROGram(s) Oral daily  dextrose 5%. 1000 milliLiter(s) (100 mL/Hr) IV Continuous <Continuous>  dextrose 5%. 1000 milliLiter(s) (50 mL/Hr) IV Continuous <Continuous>  dextrose 50% Injectable 25 Gram(s) IV Push once  dextrose 50% Injectable 12.5 Gram(s) IV Push once  dextrose 50% Injectable 25 Gram(s) IV Push once  dextrose Oral Gel 15 Gram(s) Oral once  folic acid 1 milliGRAM(s) Oral daily  gabapentin 300 milliGRAM(s) Oral every 12 hours  glucagon  Injectable 1 milliGRAM(s) IntraMuscular once  heparin   Injectable 5000 Unit(s) SubCutaneous every 8 hours  hydroxychloroquine 200 milliGRAM(s) Oral every 12 hours  influenza   Vaccine 0.5 milliLiter(s) IntraMuscular once  insulin glargine Injectable (LANTUS) 20 Unit(s) SubCutaneous at bedtime  insulin lispro (ADMELOG) corrective regimen sliding scale   SubCutaneous three times a day before meals  insulin lispro (ADMELOG) corrective regimen sliding scale   SubCutaneous at bedtime  levothyroxine 50 MICROGram(s) Oral daily  losartan 25 milliGRAM(s) Oral daily  mycophenolate mofetil 500 milliGRAM(s) Oral two times a day  pantoprazole    Tablet 40 milliGRAM(s) Oral before breakfast  predniSONE   Tablet 10 milliGRAM(s) Oral daily  sodium bicarbonate 650 milliGRAM(s) Oral three times a day  trimethoprim   80 mG/sulfamethoxazole 400 mG 1 Tablet(s) Oral <User Schedule>    MEDICATIONS  (PRN):  acetaminophen     Tablet .. 650 milliGRAM(s) Oral every 6 hours PRN Mild Pain (1 - 3)      Vital Signs Last 24 Hrs  T(C): 36.9 (23 Feb 2025 12:06), Max: 36.9 (22 Feb 2025 16:13)  T(F): 98.5 (23 Feb 2025 12:06), Max: 98.5 (23 Feb 2025 12:06)  HR: 95 (23 Feb 2025 13:00) (79 - 97)  BP: 130/66 (23 Feb 2025 13:00) (108/56 - 150/56)  BP(mean): 86 (23 Feb 2025 13:00) (71 - 104)  RR: 21 (23 Feb 2025 13:00) (12 - 21)  SpO2: 100% (23 Feb 2025 13:00) (95% - 100%)    Parameters below as of 23 Feb 2025 12:00  Patient On (Oxygen Delivery Method): room air          Physical Exam:    Constitutional: NAD, well-developed  HEENT: EOMI, no exophalmos  Respiratory: CTAB, normal respirations  Cardiovascular: S1 and S2, RRR  Extremities: No peripheral edema  Neurological: AOx3, no focal deficits    LABS  02-23    132[L]  |  104  |  73.9[H]  ----------------------------<  54[LL]  5.3   |  15.0[L]  |  2.23[H]    Ca    8.3[L]      23 Feb 2025 07:55  Phos  2.9     02-23  Mg     1.6     02-23    TPro  5.2[L]  /  Alb  2.4[L]  /  TBili  <0.2[L]  /  DBili  x   /  AST  22  /  ALT  10  /  AlkPhos  84  02-23                          9.6    4.37  )-----------( 256      ( 23 Feb 2025 02:15 )             31.0             Ketone - Urine: Negative mg/dL (02-23 @ 02:30)  Ketone - Urine: Negative mg/dL (02-22 @ 05:20)    Albumin: 2.4 g/dL (02-23-25 @ 07:55)  Aspartate Aminotransferase (AST/SGOT): 22 U/L (02-23-25 @ 07:55)  Alanine Aminotransferase (ALT/SGPT): 10 U/L (02-23-25 @ 07:55)  Alkaline Phosphatase: 84 U/L (02-23-25 @ 07:55)  Albumin: 2.3 g/dL (02-23-25 @ 02:15)  Aspartate Aminotransferase (AST/SGOT): 7 U/L (02-23-25 @ 02:15)  Alanine Aminotransferase (ALT/SGPT): 9 U/L (02-23-25 @ 02:15)  Alkaline Phosphatase: 83 U/L (02-23-25 @ 02:15)  Albumin: 2.5 g/dL (02-22-25 @ 20:10)  Aspartate Aminotransferase (AST/SGOT): 11 U/L (02-22-25 @ 20:10)  Alanine Aminotransferase (ALT/SGPT): 9 U/L (02-22-25 @ 20:10)  Alkaline Phosphatase: 85 U/L (02-22-25 @ 20:10)  Alkaline Phosphatase: 98 U/L (02-22-25 @ 09:20)  Albumin: 2.8 g/dL (02-22-25 @ 09:20)  Aspartate Aminotransferase (AST/SGOT): 11 U/L (02-22-25 @ 09:20)  Alanine Aminotransferase (ALT/SGPT): 9 U/L (02-22-25 @ 09:20)  Alkaline Phosphatase: 100 U/L (02-22-25 @ 01:45)  Albumin: 2.8 g/dL (02-22-25 @ 01:45)  Aspartate Aminotransferase (AST/SGOT): 9 U/L (02-22-25 @ 01:45)  Alanine Aminotransferase (ALT/SGPT): 11 U/L (02-22-25 @ 01:45)    T4, Serum: 2.1 ug/dL (02-23-25 @ 07:55)  Thyroid Stimulating Hormone, Serum: 63.15 uIU/mL (02-22-25 @ 15:24)    Lipase: 28 U/L (02-22-25 @ 01:45)      CAPILLARY BLOOD GLUCOSE      POCT Blood Glucose.: 255 mg/dL (23 Feb 2025 11:30)  POCT Blood Glucose.: 189 mg/dL (23 Feb 2025 09:37)  POCT Blood Glucose.: 114 mg/dL (23 Feb 2025 08:25)  POCT Blood Glucose.: 52 mg/dL (23 Feb 2025 07:43)  POCT Blood Glucose.: 53 mg/dL (23 Feb 2025 07:42)  POCT Blood Glucose.: 202 mg/dL (22 Feb 2025 23:23)  POCT Blood Glucose.: 180 mg/dL (22 Feb 2025 21:57)  POCT Blood Glucose.: 142 mg/dL (22 Feb 2025 21:02)  POCT Blood Glucose.: 149 mg/dL (22 Feb 2025 20:00)  POCT Blood Glucose.: 133 mg/dL (22 Feb 2025 18:54)  POCT Blood Glucose.: 139 mg/dL (22 Feb 2025 18:15)  POCT Blood Glucose.: 196 mg/dL (22 Feb 2025 17:19)  POCT Blood Glucose.: 243 mg/dL (22 Feb 2025 16:15)  POCT Blood Glucose.: 269 mg/dL (22 Feb 2025 15:19)      Imaging     Patient is a 59y old  Female who presents with a chief complaint of DKA (23 Feb 2025 07:46)    HPI:  59F with hx of DM, HTN, hypothyroidism, chronic anemia, HFpEF, CKD, recently diagnosed lupus/possible overlap w/ systemic sclerosis, recent admission 1/22-1/31 for progressive dyspnea and worsening renal failure found to have bilateral effusions s/p R pigtail placement and underwent renal biopsy consistent w/ either class VI LN changes vs diabetic nephropathy and was started on steroid taper was hospitalized for hyperglycemia Patient is on steroid taper prednisone now down to 10mg and was planned to f/u with rheumatology for further decrease.  She has not been taking her insulin as directed due to fear of having hypoglycemia.     Patient was found to be in DKA and she also has hypothyroidism Endocrinology was consulted for evaluation   I see the patient outpatient. History of frequent hospitalizations for hyperglycemia and DKA     PAST MEDICAL & SURGICAL HISTORY:  Diabetes    Hypertension    No significant past surgical history        Social History:      FAMILY HISTORY:  FH: non-Hodgkin's lymphoma (Mother)          Allergies    No Known Allergies    Intolerances        ROS as noted in the HPI    MEDICATIONS  (STANDING):  amLODIPine   Tablet 5 milliGRAM(s) Oral daily  chlorhexidine 2% Cloths 1 Application(s) Topical <User Schedule>  cyanocobalamin 1000 MICROGram(s) Oral daily  dextrose 5%. 1000 milliLiter(s) (100 mL/Hr) IV Continuous <Continuous>  dextrose 5%. 1000 milliLiter(s) (50 mL/Hr) IV Continuous <Continuous>  dextrose 50% Injectable 25 Gram(s) IV Push once  dextrose 50% Injectable 12.5 Gram(s) IV Push once  dextrose 50% Injectable 25 Gram(s) IV Push once  dextrose Oral Gel 15 Gram(s) Oral once  folic acid 1 milliGRAM(s) Oral daily  gabapentin 300 milliGRAM(s) Oral every 12 hours  glucagon  Injectable 1 milliGRAM(s) IntraMuscular once  heparin   Injectable 5000 Unit(s) SubCutaneous every 8 hours  hydroxychloroquine 200 milliGRAM(s) Oral every 12 hours  influenza   Vaccine 0.5 milliLiter(s) IntraMuscular once  insulin glargine Injectable (LANTUS) 20 Unit(s) SubCutaneous at bedtime  insulin lispro (ADMELOG) corrective regimen sliding scale   SubCutaneous three times a day before meals  insulin lispro (ADMELOG) corrective regimen sliding scale   SubCutaneous at bedtime  levothyroxine 50 MICROGram(s) Oral daily  losartan 25 milliGRAM(s) Oral daily  mycophenolate mofetil 500 milliGRAM(s) Oral two times a day  pantoprazole    Tablet 40 milliGRAM(s) Oral before breakfast  predniSONE   Tablet 10 milliGRAM(s) Oral daily  sodium bicarbonate 650 milliGRAM(s) Oral three times a day  trimethoprim   80 mG/sulfamethoxazole 400 mG 1 Tablet(s) Oral <User Schedule>    MEDICATIONS  (PRN):  acetaminophen     Tablet .. 650 milliGRAM(s) Oral every 6 hours PRN Mild Pain (1 - 3)      Vital Signs Last 24 Hrs  T(C): 36.9 (23 Feb 2025 12:06), Max: 36.9 (22 Feb 2025 16:13)  T(F): 98.5 (23 Feb 2025 12:06), Max: 98.5 (23 Feb 2025 12:06)  HR: 95 (23 Feb 2025 13:00) (79 - 97)  BP: 130/66 (23 Feb 2025 13:00) (108/56 - 150/56)  BP(mean): 86 (23 Feb 2025 13:00) (71 - 104)  RR: 21 (23 Feb 2025 13:00) (12 - 21)  SpO2: 100% (23 Feb 2025 13:00) (95% - 100%)    Parameters below as of 23 Feb 2025 12:00  Patient On (Oxygen Delivery Method): room air          Physical Exam:    Constitutional: NAD, well-developed  HEENT: EOMI, no exophalmos  Respiratory: CTAB, normal respirations  Cardiovascular: S1 and S2, RRR  Extremities: No peripheral edema  Neurological: AOx3, no focal deficits    LABS  02-23    132[L]  |  104  |  73.9[H]  ----------------------------<  54[LL]  5.3   |  15.0[L]  |  2.23[H]    Ca    8.3[L]      23 Feb 2025 07:55  Phos  2.9     02-23  Mg     1.6     02-23    TPro  5.2[L]  /  Alb  2.4[L]  /  TBili  <0.2[L]  /  DBili  x   /  AST  22  /  ALT  10  /  AlkPhos  84  02-23                          9.6    4.37  )-----------( 256      ( 23 Feb 2025 02:15 )             31.0             Ketone - Urine: Negative mg/dL (02-23 @ 02:30)  Ketone - Urine: Negative mg/dL (02-22 @ 05:20)    Albumin: 2.4 g/dL (02-23-25 @ 07:55)  Aspartate Aminotransferase (AST/SGOT): 22 U/L (02-23-25 @ 07:55)  Alanine Aminotransferase (ALT/SGPT): 10 U/L (02-23-25 @ 07:55)  Alkaline Phosphatase: 84 U/L (02-23-25 @ 07:55)  Albumin: 2.3 g/dL (02-23-25 @ 02:15)  Aspartate Aminotransferase (AST/SGOT): 7 U/L (02-23-25 @ 02:15)  Alanine Aminotransferase (ALT/SGPT): 9 U/L (02-23-25 @ 02:15)  Alkaline Phosphatase: 83 U/L (02-23-25 @ 02:15)  Albumin: 2.5 g/dL (02-22-25 @ 20:10)  Aspartate Aminotransferase (AST/SGOT): 11 U/L (02-22-25 @ 20:10)  Alanine Aminotransferase (ALT/SGPT): 9 U/L (02-22-25 @ 20:10)  Alkaline Phosphatase: 85 U/L (02-22-25 @ 20:10)  Alkaline Phosphatase: 98 U/L (02-22-25 @ 09:20)  Albumin: 2.8 g/dL (02-22-25 @ 09:20)  Aspartate Aminotransferase (AST/SGOT): 11 U/L (02-22-25 @ 09:20)  Alanine Aminotransferase (ALT/SGPT): 9 U/L (02-22-25 @ 09:20)  Alkaline Phosphatase: 100 U/L (02-22-25 @ 01:45)  Albumin: 2.8 g/dL (02-22-25 @ 01:45)  Aspartate Aminotransferase (AST/SGOT): 9 U/L (02-22-25 @ 01:45)  Alanine Aminotransferase (ALT/SGPT): 11 U/L (02-22-25 @ 01:45)    T4, Serum: 2.1 ug/dL (02-23-25 @ 07:55)  Thyroid Stimulating Hormone, Serum: 63.15 uIU/mL (02-22-25 @ 15:24)    Lipase: 28 U/L (02-22-25 @ 01:45)      CAPILLARY BLOOD GLUCOSE      POCT Blood Glucose.: 255 mg/dL (23 Feb 2025 11:30)  POCT Blood Glucose.: 189 mg/dL (23 Feb 2025 09:37)  POCT Blood Glucose.: 114 mg/dL (23 Feb 2025 08:25)  POCT Blood Glucose.: 52 mg/dL (23 Feb 2025 07:43)  POCT Blood Glucose.: 53 mg/dL (23 Feb 2025 07:42)  POCT Blood Glucose.: 202 mg/dL (22 Feb 2025 23:23)  POCT Blood Glucose.: 180 mg/dL (22 Feb 2025 21:57)  POCT Blood Glucose.: 142 mg/dL (22 Feb 2025 21:02)  POCT Blood Glucose.: 149 mg/dL (22 Feb 2025 20:00)  POCT Blood Glucose.: 133 mg/dL (22 Feb 2025 18:54)  POCT Blood Glucose.: 139 mg/dL (22 Feb 2025 18:15)  POCT Blood Glucose.: 196 mg/dL (22 Feb 2025 17:19)  POCT Blood Glucose.: 243 mg/dL (22 Feb 2025 16:15)  POCT Blood Glucose.: 269 mg/dL (22 Feb 2025 15:19)      Imaging

## 2025-02-23 NOTE — CHART NOTE - NSCHARTNOTEFT_GEN_A_CORE
BRIEF HOSPITAL COURSE: 59F with hx of DM, HTN, hypothyroidism, chronic anemia, HFpEF, CKD, recently diagnosed lupus/possible overlap w/ systemic sclerosis, recent admission 1/22-1/31 for progressive dyspnea and worsening renal failure found to have bilateral effusions s/p R pigtail placement and underwent renal biopsy consistent w/ either class VI LN changes vs diabetic nephropathy and was started on steroid taper and MMF presents to the ED 2/22 due to hyperglycemia noted at home, weakness and nausea. Per daughter, pt has still been tapering prednisone now down to 10mg and was planned to f/u with rheumatology for further decrease. She has not been taking her insulin as directed due to fear of having hypoglycemia. She denies any recent associated fevers/chills/sick contacts. Denies chest pain/shortness of breath. Denies vomiting/abdominal pain. Denies diarrhea/dysuria. She has had LE edema which has worsened since starting the prednisone.  In the ED, pt was afebrile, hemodynamically stable. Labs consistent with DKA w/ HCO3 16, AG 18, VBG 7.23/37 with Cr grossly stable.    Off insulin gtt since 2/22 @ 13:00. Restarted on ISS with lantus 20u.    Events last 24 hours: Hypoglycemic this AM (52) -> 115 --> 189 after juice and breakfast. AG closed (14). Tolerating breakfast and lunch. Has no complaints besides b/l lower extremity edema.    ICU Vital Signs Last 24 Hrs  T(C): 36.9 (23 Feb 2025 12:06), Max: 36.9 (22 Feb 2025 16:13)  T(F): 98.5 (23 Feb 2025 12:06), Max: 98.5 (23 Feb 2025 12:06)  HR: 91 (23 Feb 2025 09:00) (79 - 97)  BP: 135/80 (23 Feb 2025 09:00) (108/56 - 150/56)  BP(mean): 95 (23 Feb 2025 09:00) (71 - 104)  ABP: --  ABP(mean): --  RR: 15 (23 Feb 2025 09:00) (13 - 21)  SpO2: 100% (23 Feb 2025 09:00) (95% - 100%)    O2 Parameters below as of 23 Feb 2025 08:00  Patient On (Oxygen Delivery Method): room air      Physical Examination:    General: No acute distress, seated in bed eating breakfast.  HEENT: Pupils equal, reactive to light.  Symmetric.  PULM: Clear to auscultation bilaterally, no significant sputum production  NECK: Supple, no lymphadenopathy, trachea midline  CVS: Regular rate and rhythm, no murmurs, rubs, or gallops  GI: Soft, nondistended, nontender, normoactive bowel sounds, no masses  EXT: 1+ pitting edema to midcalves b/l, non-tender.  SKIN: Warm and well perfused, no rashes noted.  NEURO: Alert, oriented, interactive, nonfocal    Assessment & Plan: 59F with hx of DM, HTN, hypothyroidism, chronic anemia, HFpEF, CKD, recently diagnosed lupus/possible overlap w/ systemic sclerosis, recent admission 1/22-1/31 for progressive dyspnea and worsening renal failure found to have bilateral effusions s/p R pigtail placement and underwent renal biopsy consistent w/ either class VI LN changes vs diabetic nephropathy and was started on steroid taper and MMF presents to the ED 2/22 due to hyperglycemia and generalized malaise found to be in DKA.      ====================== NEUROLOGY=====================  acetaminophen     Tablet .. 650 milliGRAM(s) Oral every 6 hours PRN Mild Pain (1 - 3)  gabapentin 300 milliGRAM(s) Oral every 12 hours    - c/w gabapentin 300mg q12h      ==================== RESPIRATORY======================  - On RA    ====================CARDIOVASCULAR==================  amLODIPine   Tablet 5 milliGRAM(s) Oral daily  losartan 25 milliGRAM(s) Oral daily    - hemodynamically stable   - c/w amlodipine for now; unclear if LE edema may be related so may need to transition to alternate agent  - c/w losartan  - prior Echo 1/25 w LVEF 60-65%, normal RV  - no acute ischemic changes noted     ===================HEMATOLOGIC/ONC ===================  heparin   Injectable 5000 Unit(s) SubCutaneous every 8 hours  - DVT ppx with heparin 5000u SQ q8h    ===================== RENAL =========================  - Continue monitoring urine output  - Recently diagnosed with CKD stage 3 2/2 DM  - Cr 2.23 today  - Avoid Nephrotoxic agents   - Adjusting medications for renal  function   - Replacing electrolytes as needed with Goal K> 4, PO> 3, Mg> 2      ==================== GASTROINTESTINAL===================  cyanocobalamin 1000 MICROGram(s) Oral daily  dextrose 5% + lactated ringers. 1000 milliLiter(s) (100 mL/Hr) IV Continuous <Continuous>  dextrose 5%. 1000 milliLiter(s) (50 mL/Hr) IV Continuous <Continuous>  dextrose 5%. 1000 milliLiter(s) (100 mL/Hr) IV Continuous <Continuous>  folic acid 1 milliGRAM(s) Oral daily  magnesium sulfate  IVPB 1 Gram(s) IV Intermittent once  pantoprazole    Tablet 40 milliGRAM(s) Oral before breakfast  sodium bicarbonate 650 milliGRAM(s) Oral three times a day    Diet: Consistent carbs    =======================    ENDOCRINE  =====================  dextrose 50% Injectable 25 Gram(s) IV Push once  dextrose 50% Injectable 12.5 Gram(s) IV Push once  dextrose 50% Injectable 25 Gram(s) IV Push once  dextrose Oral Gel 15 Gram(s) Oral once  glucagon  Injectable 1 milliGRAM(s) IntraMuscular once  insulin glargine Injectable (LANTUS) 20 Unit(s) SubCutaneous every morning  insulin glargine Injectable (LANTUS) 10 Unit(s) SubCutaneous at bedtime  insulin lispro (ADMELOG) corrective regimen sliding scale   SubCutaneous at bedtime  insulin lispro (ADMELOG) corrective regimen sliding scale   SubCutaneous three times a day before meals  insulin lispro Injectable (ADMELOG) 5 Unit(s) SubCutaneous three times a day before meals  levothyroxine 50 MICROGram(s) Oral daily  predniSONE   Tablet 10 milliGRAM(s) Oral daily    - Blood sugar goal: 100-200  - DKA likely 2/2 poor compliance and recent prednisone use   - Off insulin gtt since 2/22 @ 13:00  - Continue lantus 20u and ISS  - AG wnl, tolerating PO  - Endocrine consulted     # Hypothyroidism  - Continue synthroid 50 mcg  - TSH 63.15  - Endocrine consulted    ========================INFECTIOUS DISEASE================  hydroxychloroquine 200 milliGRAM(s) Oral every 12 hours  trimethoprim   80 mG/sulfamethoxazole 400 mG 1 Tablet(s) Oral <User Schedule>    - UA neg   - BCx 2/22 NGT  - CXR wnl (2/22)  - c/w PCP ppx w/ bactrim     ==========Rheumatology=========  - Hx of SLE w/ possible flare recently   - C/w prednisone 10mg for now  - Hold mycophenolyte for now pending cx   - Rheumatology consulted  - Follows Dr. Kirkpatrick o/p  - C/w hydroxychloroquine 200mg PO q12h    Care plan discussed with ICU care team  Plan d/w Family BRIEF HOSPITAL COURSE: 59F with hx of DM, HTN, hypothyroidism, chronic anemia, HFpEF, CKD, recently diagnosed lupus/possible overlap w/ systemic sclerosis, recent admission 1/22-1/31 for progressive dyspnea and worsening renal failure found to have bilateral effusions s/p R pigtail placement and underwent renal biopsy consistent w/ either class VI LN changes vs diabetic nephropathy and was started on steroid taper and MMF presents to the ED 2/22 due to hyperglycemia noted at home, weakness and nausea. Per daughter, pt has still been tapering prednisone now down to 10mg and was planned to f/u with rheumatology for further decrease. She has not been taking her insulin as directed due to fear of having hypoglycemia. She denies any recent associated fevers/chills/sick contacts. Denies chest pain/shortness of breath. Denies vomiting/abdominal pain. Denies diarrhea/dysuria. She has had LE edema which has worsened since starting the prednisone.  In the ED, pt was afebrile, hemodynamically stable. Labs consistent with DKA w/ HCO3 16, AG 18, VBG 7.23/37 with Cr grossly stable.    Off insulin gtt since 2/22 @ 13:00. Restarted on ISS with lantus 20u.    Events last 24 hours: Hypoglycemic this AM (52) -> 115 --> 189 after juice and breakfast. AG closed (14). Tolerating breakfast and lunch. Has no complaints besides b/l lower extremity edema.    ICU Vital Signs Last 24 Hrs  T(C): 36.9 (23 Feb 2025 12:06), Max: 36.9 (22 Feb 2025 16:13)  T(F): 98.5 (23 Feb 2025 12:06), Max: 98.5 (23 Feb 2025 12:06)  HR: 91 (23 Feb 2025 09:00) (79 - 97)  BP: 135/80 (23 Feb 2025 09:00) (108/56 - 150/56)  BP(mean): 95 (23 Feb 2025 09:00) (71 - 104)  ABP: --  ABP(mean): --  RR: 15 (23 Feb 2025 09:00) (13 - 21)  SpO2: 100% (23 Feb 2025 09:00) (95% - 100%)    O2 Parameters below as of 23 Feb 2025 08:00  Patient On (Oxygen Delivery Method): room air      Physical Examination:    General: No acute distress, seated in bed eating breakfast.  HEENT: Pupils equal, reactive to light.  Symmetric.  PULM: Clear to auscultation bilaterally, no significant sputum production  NECK: Supple, no lymphadenopathy, trachea midline  CVS: Regular rate and rhythm, no murmurs, rubs, or gallops  GI: Soft, nondistended, nontender, normoactive bowel sounds, no masses  EXT: 1+ pitting edema to midcalves b/l, non-tender.  SKIN: Warm and well perfused, no rashes noted.  NEURO: Alert, oriented, interactive, nonfocal    Assessment & Plan: 59F with hx of DM, HTN, hypothyroidism, chronic anemia, HFpEF, CKD, recently diagnosed lupus/possible overlap w/ systemic sclerosis, recent admission 1/22-1/31 for progressive dyspnea and worsening renal failure found to have bilateral effusions s/p R pigtail placement and underwent renal biopsy consistent w/ either class VI LN changes vs diabetic nephropathy and was started on steroid taper and MMF presents to the ED 2/22 due to hyperglycemia and generalized malaise found to be in DKA.      ====================== NEUROLOGY=====================  acetaminophen     Tablet .. 650 milliGRAM(s) Oral every 6 hours PRN Mild Pain (1 - 3)  gabapentin 300 milliGRAM(s) Oral every 12 hours    - c/w gabapentin 300mg q12h      ==================== RESPIRATORY======================  - On RA    ====================CARDIOVASCULAR==================  amLODIPine   Tablet 5 milliGRAM(s) Oral daily  losartan 25 milliGRAM(s) Oral daily    - hemodynamically stable   - c/w amlodipine for now; unclear if LE edema may be related so may need to transition to alternate agent  - c/w losartan  - prior Echo 1/25 w LVEF 60-65%, normal RV  - no acute ischemic changes noted     ===================HEMATOLOGIC/ONC ===================  heparin   Injectable 5000 Unit(s) SubCutaneous every 8 hours  - DVT ppx with heparin 5000u SQ q8h    ===================== RENAL =========================  - Continue monitoring urine output  - Recently diagnosed with CKD stage 3 2/2 DM  - Cr 2.23 today  - Avoid Nephrotoxic agents   - Adjusting medications for renal  function   - Replacing electrolytes as needed with Goal K> 4, PO> 3, Mg> 2      ==================== GASTROINTESTINAL===================  cyanocobalamin 1000 MICROGram(s) Oral daily  dextrose 5% + lactated ringers. 1000 milliLiter(s) (100 mL/Hr) IV Continuous <Continuous>  dextrose 5%. 1000 milliLiter(s) (50 mL/Hr) IV Continuous <Continuous>  dextrose 5%. 1000 milliLiter(s) (100 mL/Hr) IV Continuous <Continuous>  folic acid 1 milliGRAM(s) Oral daily  magnesium sulfate  IVPB 1 Gram(s) IV Intermittent once  pantoprazole    Tablet 40 milliGRAM(s) Oral before breakfast  sodium bicarbonate 650 milliGRAM(s) Oral three times a day    Diet: Consistent carbs    =======================    ENDOCRINE  =====================  dextrose 50% Injectable 25 Gram(s) IV Push once  dextrose 50% Injectable 12.5 Gram(s) IV Push once  dextrose 50% Injectable 25 Gram(s) IV Push once  dextrose Oral Gel 15 Gram(s) Oral once  glucagon  Injectable 1 milliGRAM(s) IntraMuscular once  insulin glargine Injectable (LANTUS) 20 Unit(s) SubCutaneous every morning  insulin glargine Injectable (LANTUS) 10 Unit(s) SubCutaneous at bedtime  insulin lispro (ADMELOG) corrective regimen sliding scale   SubCutaneous at bedtime  insulin lispro (ADMELOG) corrective regimen sliding scale   SubCutaneous three times a day before meals  insulin lispro Injectable (ADMELOG) 5 Unit(s) SubCutaneous three times a day before meals  levothyroxine 50 MICROGram(s) Oral daily  predniSONE   Tablet 10 milliGRAM(s) Oral daily    - Blood sugar goal: 100-200  - DKA likely 2/2 poor compliance and recent prednisone use   - Off insulin gtt since 2/22 @ 13:00  - Continue lantus 20u and ISS  - AG wnl, tolerating PO  - Endocrine consulted     # Hypothyroidism  - Continue synthroid 50 mcg  - TSH 63.15  - Endocrine consulted    ========================INFECTIOUS DISEASE================  hydroxychloroquine 200 milliGRAM(s) Oral every 12 hours  trimethoprim   80 mG/sulfamethoxazole 400 mG 1 Tablet(s) Oral <User Schedule>    - UA neg   - BCx 2/22 NGT  - CXR wnl (2/22)  - c/w PCP ppx w/ bactrim     ==========Rheumatology=========  - Hx of SLE w/ possible flare recently   - C/w prednisone 10mg, risk of adrenal insufficiency if held  - Restart mycophenolate today; BCx neg and DKA unlikely to be 2/2 infection   - C/w hydroxychloroquine 200mg PO q12h as not immunosuppressing  - Follows Dr. Kirkpatrick o/p    Care plan discussed with ICU care team  Plan d/w Family

## 2025-02-23 NOTE — CONSULT NOTE ADULT - CONSULT REQUESTED BY NAME
Spoke to patient's spoke to patient and his wife.  Spoke to patienttSpoke to patient and his wife.   Patient feels like the spot is getting bigger. CT soft tissue neck denied by insurance, but will start with US instead. Not choking or having difficulty breathing. ER precautions given including fevers, chills or discharge from the area  New ENT referral placed to hopefully get him seen a little bit sooner.  I advised both of them the importance of seeing the ENT for further evaluation.  They both verbalized understanding and agreed to plan.  If they do not hear from the specialist or schedulers about scheduling an appointment they are to contact me and notify me.    They both verbalized understanding and agreed to plan.   Norm Blanco

## 2025-02-23 NOTE — PROGRESS NOTE ADULT - ASSESSMENT
Assessment & Plan:     ====================== NEUROLOGY=====================  acetaminophen     Tablet .. 650 milliGRAM(s) Oral every 6 hours PRN Mild Pain (1 - 3)  gabapentin 300 milliGRAM(s) Oral every 12 hours    ==================== RESPIRATORY======================  Mechanical Ventilation:      ====================CARDIOVASCULAR==================  amLODIPine   Tablet 5 milliGRAM(s) Oral daily  losartan 25 milliGRAM(s) Oral daily    ===================HEMATOLOGIC/ONC ===================  heparin   Injectable 5000 Unit(s) SubCutaneous every 8 hours    ===================== RENAL =========================  Continue monitoring urine output    ==================== GASTROINTESTINAL===================  cyanocobalamin 1000 MICROGram(s) Oral daily  dextrose 5% + lactated ringers. 1000 milliLiter(s) (100 mL/Hr) IV Continuous <Continuous>  dextrose 5%. 1000 milliLiter(s) (50 mL/Hr) IV Continuous <Continuous>  dextrose 5%. 1000 milliLiter(s) (100 mL/Hr) IV Continuous <Continuous>  folic acid 1 milliGRAM(s) Oral daily  magnesium sulfate  IVPB 1 Gram(s) IV Intermittent once  pantoprazole    Tablet 40 milliGRAM(s) Oral before breakfast  sodium bicarbonate 650 milliGRAM(s) Oral three times a day    Diet:   =======================    ENDOCRINE  =====================  dextrose 50% Injectable 25 Gram(s) IV Push once  dextrose 50% Injectable 12.5 Gram(s) IV Push once  dextrose 50% Injectable 25 Gram(s) IV Push once  dextrose Oral Gel 15 Gram(s) Oral once  glucagon  Injectable 1 milliGRAM(s) IntraMuscular once  insulin glargine Injectable (LANTUS) 20 Unit(s) SubCutaneous every morning  insulin glargine Injectable (LANTUS) 10 Unit(s) SubCutaneous at bedtime  insulin lispro (ADMELOG) corrective regimen sliding scale   SubCutaneous at bedtime  insulin lispro (ADMELOG) corrective regimen sliding scale   SubCutaneous three times a day before meals  insulin lispro Injectable (ADMELOG) 5 Unit(s) SubCutaneous three times a day before meals  levothyroxine 50 MICROGram(s) Oral daily  predniSONE   Tablet 10 milliGRAM(s) Oral daily    Blood sugar goal: 100-200  ========================INFECTIOUS DISEASE================  hydroxychloroquine 200 milliGRAM(s) Oral every 12 hours  trimethoprim   80 mG/sulfamethoxazole 400 mG 1 Tablet(s) Oral <User Schedule>    Cx:       Care plan discussed with ICU care team  Plan d/w Family     Assessment & Plan: 59F with hx of DM, HTN, hypothyroidism, chronic anemia, HFpEF, CKD, recently diagnosed lupus/possible overlap w/ systemic sclerosis, recent admission 1/22-1/31 for progressive dyspnea and worsening renal failure found to have bilateral effusions s/p R pigtail placement and underwent renal biopsy consistent w/ either class VI LN changes vs diabetic nephropathy and was started on steroid taper and MMF presents to the ED 2/22 due to hyperglycemia and generalized malaise found to be in DKA       ====================== NEUROLOGY=====================  acetaminophen     Tablet .. 650 milliGRAM(s) Oral every 6 hours PRN Mild Pain (1 - 3)  gabapentin 300 milliGRAM(s) Oral every 12 hours    c/w gabapentin   monitor neuro checks       ==================== RESPIRATORY======================  Mechanical Ventilation:      ====================CARDIOVASCULAR==================  amLODIPine   Tablet 5 milliGRAM(s) Oral daily  losartan 25 milliGRAM(s) Oral daily    hemodynamically stable   c/w amlodipine for now; unclear if LE edema may be related so may need to transition to alternate agent  c/w losartan  prior Echo 1/25 w/ normal EF, normal RV  no acute ischemic changes noted     ===================HEMATOLOGIC/ONC ===================  heparin   Injectable 5000 Unit(s) SubCutaneous every 8 hours    ===================== RENAL =========================  Continue monitoring urine output    ==================== GASTROINTESTINAL===================  cyanocobalamin 1000 MICROGram(s) Oral daily  dextrose 5% + lactated ringers. 1000 milliLiter(s) (100 mL/Hr) IV Continuous <Continuous>  dextrose 5%. 1000 milliLiter(s) (50 mL/Hr) IV Continuous <Continuous>  dextrose 5%. 1000 milliLiter(s) (100 mL/Hr) IV Continuous <Continuous>  folic acid 1 milliGRAM(s) Oral daily  magnesium sulfate  IVPB 1 Gram(s) IV Intermittent once  pantoprazole    Tablet 40 milliGRAM(s) Oral before breakfast  sodium bicarbonate 650 milliGRAM(s) Oral three times a day    Diet:   =======================    ENDOCRINE  =====================  dextrose 50% Injectable 25 Gram(s) IV Push once  dextrose 50% Injectable 12.5 Gram(s) IV Push once  dextrose 50% Injectable 25 Gram(s) IV Push once  dextrose Oral Gel 15 Gram(s) Oral once  glucagon  Injectable 1 milliGRAM(s) IntraMuscular once  insulin glargine Injectable (LANTUS) 20 Unit(s) SubCutaneous every morning  insulin glargine Injectable (LANTUS) 10 Unit(s) SubCutaneous at bedtime  insulin lispro (ADMELOG) corrective regimen sliding scale   SubCutaneous at bedtime  insulin lispro (ADMELOG) corrective regimen sliding scale   SubCutaneous three times a day before meals  insulin lispro Injectable (ADMELOG) 5 Unit(s) SubCutaneous three times a day before meals  levothyroxine 50 MICROGram(s) Oral daily  predniSONE   Tablet 10 milliGRAM(s) Oral daily    Blood sugar goal: 100-200  DKA  likely secondary to poor compliance and recent prednisone use   c/w insulin gtt   monitor q1h FS   check chem 8 q4-6h   replete electrolytes aggressively  c/w D5LR@100cc/hr      ========================INFECTIOUS DISEASE================  hydroxychloroquine 200 milliGRAM(s) Oral every 12 hours  trimethoprim   80 mG/sulfamethoxazole 400 mG 1 Tablet(s) Oral <User Schedule>    no obvious infectious trigger but given immunocompromise, will check cultures   f/u blood cx   UA neg   CXR without consolidation   c/w PCP ppx w/ bactrim     ==========Rheumatology=========  -Hx of SLE w/ possible flare recently   c/w prednisone 10mg for now; will consult w/ rheum regarding further taper   hold MMF for now pending cx   c/w HCQ    Care plan discussed with ICU care team  Plan d/w Family     Assessment & Plan: 59F with hx of DM, HTN, hypothyroidism, chronic anemia, HFpEF, CKD, recently diagnosed lupus/possible overlap w/ systemic sclerosis, recent admission 1/22-1/31 for progressive dyspnea and worsening renal failure found to have bilateral effusions s/p R pigtail placement and underwent renal biopsy consistent w/ either class VI LN changes vs diabetic nephropathy and was started on steroid taper and MMF presents to the ED 2/22 due to hyperglycemia and generalized malaise found to be in DKA.      ====================== NEUROLOGY=====================  acetaminophen     Tablet .. 650 milliGRAM(s) Oral every 6 hours PRN Mild Pain (1 - 3)  gabapentin 300 milliGRAM(s) Oral every 12 hours    c/w gabapentin   monitor neuro checks       ==================== RESPIRATORY======================  - On RA    ====================CARDIOVASCULAR==================  amLODIPine   Tablet 5 milliGRAM(s) Oral daily  losartan 25 milliGRAM(s) Oral daily    - hemodynamically stable   - c/w amlodipine for now; unclear if LE edema may be related so may need to transition to alternate agent  - c/w losartan  - prior Echo 1/25 w LVEF 60-65%, normal RV  - no acute ischemic changes noted     ===================HEMATOLOGIC/ONC ===================  heparin   Injectable 5000 Unit(s) SubCutaneous every 8 hours    ===================== RENAL =========================  - Continue monitoring urine output  - Recently diagnosed with CKD stage 3 2/2 DM  -     ==================== GASTROINTESTINAL===================  cyanocobalamin 1000 MICROGram(s) Oral daily  dextrose 5% + lactated ringers. 1000 milliLiter(s) (100 mL/Hr) IV Continuous <Continuous>  dextrose 5%. 1000 milliLiter(s) (50 mL/Hr) IV Continuous <Continuous>  dextrose 5%. 1000 milliLiter(s) (100 mL/Hr) IV Continuous <Continuous>  folic acid 1 milliGRAM(s) Oral daily  magnesium sulfate  IVPB 1 Gram(s) IV Intermittent once  pantoprazole    Tablet 40 milliGRAM(s) Oral before breakfast  sodium bicarbonate 650 milliGRAM(s) Oral three times a day    Diet: Consistent carbs    =======================    ENDOCRINE  =====================  dextrose 50% Injectable 25 Gram(s) IV Push once  dextrose 50% Injectable 12.5 Gram(s) IV Push once  dextrose 50% Injectable 25 Gram(s) IV Push once  dextrose Oral Gel 15 Gram(s) Oral once  glucagon  Injectable 1 milliGRAM(s) IntraMuscular once  insulin glargine Injectable (LANTUS) 20 Unit(s) SubCutaneous every morning  insulin glargine Injectable (LANTUS) 10 Unit(s) SubCutaneous at bedtime  insulin lispro (ADMELOG) corrective regimen sliding scale   SubCutaneous at bedtime  insulin lispro (ADMELOG) corrective regimen sliding scale   SubCutaneous three times a day before meals  insulin lispro Injectable (ADMELOG) 5 Unit(s) SubCutaneous three times a day before meals  levothyroxine 50 MICROGram(s) Oral daily  predniSONE   Tablet 10 milliGRAM(s) Oral daily    - Blood sugar goal: 100-200  - DKA likely 2/2 poor compliance and recent prednisone use   - Off insulin gtt since 2/22 @ 13:00  - Continue lantus 20u and ISS  - AG wnl, tolerating PO  - Endocrine consulted     # Hypothyroidism  - Continue synthroid 50 mcg  - TSH 65  - Endocrine consulted    ========================INFECTIOUS DISEASE================  hydroxychloroquine 200 milliGRAM(s) Oral every 12 hours  trimethoprim   80 mG/sulfamethoxazole 400 mG 1 Tablet(s) Oral <User Schedule>    no obvious infectious trigger but given immunocompromise, will check cultures   f/u blood cx   UA neg   - CXR wnl (2/22)  - c/w PCP ppx w/ bactrim     ==========Rheumatology=========  - Hx of SLE w/ possible flare recently   - C/w prednisone 10mg for now; will consult w/ rheum regarding further taper   - hold mycophenolyte for now pending cx   - C/w HCQ    Care plan discussed with ICU care team  Plan d/w Family     Assessment & Plan: 59F with hx of DM, HTN, hypothyroidism, chronic anemia, HFpEF, CKD, recently diagnosed lupus/possible overlap w/ systemic sclerosis, recent admission 1/22-1/31 for progressive dyspnea and worsening renal failure found to have bilateral effusions s/p R pigtail placement and underwent renal biopsy consistent w/ either class VI LN changes vs diabetic nephropathy and was started on steroid taper and MMF presents to the ED 2/22 due to hyperglycemia and generalized malaise found to be in DKA.      ====================== NEUROLOGY=====================  acetaminophen     Tablet .. 650 milliGRAM(s) Oral every 6 hours PRN Mild Pain (1 - 3)  gabapentin 300 milliGRAM(s) Oral every 12 hours    c/w gabapentin   monitor neuro checks       ==================== RESPIRATORY======================  - On RA    ====================CARDIOVASCULAR==================  amLODIPine   Tablet 5 milliGRAM(s) Oral daily  losartan 25 milliGRAM(s) Oral daily    - hemodynamically stable   - c/w amlodipine for now; unclear if LE edema may be related so may need to transition to alternate agent  - c/w losartan  - prior Echo 1/25 w LVEF 60-65%, normal RV  - no acute ischemic changes noted     ===================HEMATOLOGIC/ONC ===================  heparin   Injectable 5000 Unit(s) SubCutaneous every 8 hours    ===================== RENAL =========================  - Continue monitoring urine output  - Recently diagnosed with CKD stage 3 2/2 DM  -     ==================== GASTROINTESTINAL===================  cyanocobalamin 1000 MICROGram(s) Oral daily  dextrose 5% + lactated ringers. 1000 milliLiter(s) (100 mL/Hr) IV Continuous <Continuous>  dextrose 5%. 1000 milliLiter(s) (50 mL/Hr) IV Continuous <Continuous>  dextrose 5%. 1000 milliLiter(s) (100 mL/Hr) IV Continuous <Continuous>  folic acid 1 milliGRAM(s) Oral daily  magnesium sulfate  IVPB 1 Gram(s) IV Intermittent once  pantoprazole    Tablet 40 milliGRAM(s) Oral before breakfast  sodium bicarbonate 650 milliGRAM(s) Oral three times a day    Diet: Consistent carbs    =======================    ENDOCRINE  =====================  dextrose 50% Injectable 25 Gram(s) IV Push once  dextrose 50% Injectable 12.5 Gram(s) IV Push once  dextrose 50% Injectable 25 Gram(s) IV Push once  dextrose Oral Gel 15 Gram(s) Oral once  glucagon  Injectable 1 milliGRAM(s) IntraMuscular once  insulin glargine Injectable (LANTUS) 20 Unit(s) SubCutaneous every morning  insulin glargine Injectable (LANTUS) 10 Unit(s) SubCutaneous at bedtime  insulin lispro (ADMELOG) corrective regimen sliding scale   SubCutaneous at bedtime  insulin lispro (ADMELOG) corrective regimen sliding scale   SubCutaneous three times a day before meals  insulin lispro Injectable (ADMELOG) 5 Unit(s) SubCutaneous three times a day before meals  levothyroxine 50 MICROGram(s) Oral daily  predniSONE   Tablet 10 milliGRAM(s) Oral daily    - Blood sugar goal: 100-200  - DKA likely 2/2 poor compliance and recent prednisone use   - Off insulin gtt since 2/22 @ 13:00  - Continue lantus 20u and ISS  - AG wnl, tolerating PO  - Endocrine consulted     # Hypothyroidism  - Continue synthroid 50 mcg  - TSH 63.15  - Endocrine consulted  ========================INFECTIOUS DISEASE================  hydroxychloroquine 200 milliGRAM(s) Oral every 12 hours  trimethoprim   80 mG/sulfamethoxazole 400 mG 1 Tablet(s) Oral <User Schedule>    - UA neg   - BCx 2/22 NGT  - CXR wnl (2/22)  - c/w PCP ppx w/ bactrim     ==========Rheumatology=========  - Hx of SLE w/ possible flare recently   - C/w prednisone 10mg for now  - Hold mycophenolyte for now pending cx   - Rheumatology consulted  - Follows Dr. Kirkpatrick o/p  - C/w hydroxychloroquine 200mg PO q12h    Care plan discussed with ICU care team  Plan d/w Family

## 2025-02-23 NOTE — PROGRESS NOTE ADULT - SUBJECTIVE AND OBJECTIVE BOX
Patient is a 59y old  Female who presents with a chief complaint of DKA (2025 07:55)      BRIEF HOSPITAL COURSE: ***    Events last 24 hours: ***    PAST MEDICAL & SURGICAL HISTORY:  Diabetes      Hypertension      No significant past surgical history            Medications:  hydroxychloroquine 200 milliGRAM(s) Oral every 12 hours  trimethoprim   80 mG/sulfamethoxazole 400 mG 1 Tablet(s) Oral <User Schedule>    amLODIPine   Tablet 5 milliGRAM(s) Oral daily  losartan 25 milliGRAM(s) Oral daily      acetaminophen     Tablet .. 650 milliGRAM(s) Oral every 6 hours PRN  gabapentin 300 milliGRAM(s) Oral every 12 hours      heparin   Injectable 5000 Unit(s) SubCutaneous every 8 hours    pantoprazole    Tablet 40 milliGRAM(s) Oral before breakfast      dextrose 50% Injectable 25 Gram(s) IV Push once  dextrose 50% Injectable 12.5 Gram(s) IV Push once  dextrose 50% Injectable 25 Gram(s) IV Push once  dextrose Oral Gel 15 Gram(s) Oral once  glucagon  Injectable 1 milliGRAM(s) IntraMuscular once  insulin glargine Injectable (LANTUS) 20 Unit(s) SubCutaneous every morning  insulin glargine Injectable (LANTUS) 10 Unit(s) SubCutaneous at bedtime  insulin lispro (ADMELOG) corrective regimen sliding scale   SubCutaneous at bedtime  insulin lispro (ADMELOG) corrective regimen sliding scale   SubCutaneous three times a day before meals  insulin lispro Injectable (ADMELOG) 5 Unit(s) SubCutaneous three times a day before meals  levothyroxine 50 MICROGram(s) Oral daily  predniSONE   Tablet 10 milliGRAM(s) Oral daily    cyanocobalamin 1000 MICROGram(s) Oral daily  dextrose 5% + lactated ringers. 1000 milliLiter(s) IV Continuous <Continuous>  dextrose 5%. 1000 milliLiter(s) IV Continuous <Continuous>  dextrose 5%. 1000 milliLiter(s) IV Continuous <Continuous>  folic acid 1 milliGRAM(s) Oral daily  magnesium sulfate  IVPB 1 Gram(s) IV Intermittent once  sodium bicarbonate 650 milliGRAM(s) Oral three times a day    influenza   Vaccine 0.5 milliLiter(s) IntraMuscular once    chlorhexidine 2% Cloths 1 Application(s) Topical <User Schedule>            ICU Vital Signs Last 24 Hrs  T(C): 36.9 (2025 07:00), Max: 36.9 (2025 16:13)  T(F): 98.4 (2025 07:00), Max: 98.4 (2025 16:13)  HR: 82 (2025 07:00) (79 - 97)  BP: 133/57 (2025 06:00) (107/52 - 150/56)  BP(mean): 79 (2025 06:00) (68 - 104)  ABP: --  ABP(mean): --  RR: 15 (2025 07:00) (13 - 21)  SpO2: 97% (2025 07:00) (95% - 100%)    O2 Parameters below as of 2025 07:00  Patient On (Oxygen Delivery Method): room air                I&O's Detail    2025 07:01  -  2025 07:00  --------------------------------------------------------  IN:    dextrose 5% + lactated ringers: 600 mL    Insulin: 14 mL    Lactated Ringers Bolus: 1000 mL  Total IN: 1614 mL    OUT:    Voided (mL): 900 mL  Total OUT: 900 mL    Total NET: 714 mL            LABS:                        9.6    4.37  )-----------( 256      ( 2025 02:15 )             31.0         133[L]  |  104  |  76.9[H]  ----------------------------<  193[H]  4.2   |  15.0[L]  |  2.26[H]    Ca    8.3[L]      2025 02:15  Phos  2.9       Mg     1.6         TPro  4.7[L]  /  Alb  2.3[L]  /  TBili  <0.2[L]  /  DBili  x   /  AST  7   /  ALT  9   /  AlkPhos  83  23          CAPILLARY BLOOD GLUCOSE      POCT Blood Glucose.: 52 mg/dL (2025 07:43)      Urinalysis Basic - ( 2025 02:30 )    Color: Yellow / Appearance: Clear / S.014 / pH: x  Gluc: x / Ketone: Negative mg/dL  / Bili: Negative / Urobili: 0.2 mg/dL   Blood: x / Protein: 300 mg/dL / Nitrite: Negative   Leuk Esterase: Negative / RBC: 19 /HPF / WBC 2 /HPF   Sq Epi: x / Non Sq Epi: 0 /HPF / Bacteria: Negative /HPF      CULTURES:      Physical Examination:    General: No acute distress.      HEENT: Pupils equal, reactive to light.  Symmetric.    PULM: Clear to auscultation bilaterally, no significant sputum production    NECK: Supple, no lymphadenopathy, trachea midline    CVS: Regular rate and rhythm, no murmurs, rubs, or gallops    GI: Soft, nondistended, nontender, normoactive bowel sounds, no masses    EXT: No edema, nontender    SKIN: Warm and well perfused, no rashes noted.    NEURO: Alert, oriented, interactive, nonfocal    DEVICES:     RADIOLOGY: ***    CRITICAL CARE TIME SPENT: ***   Patient is a 59y old  Female who presents with a chief complaint of DKA (2025 07:55)      BRIEF HOSPITAL COURSE: 59F with hx of DM, HTN, hypothyroidism, chronic anemia, HFpEF, CKD, recently diagnosed lupus/possible overlap w/ systemic sclerosis, recent admission - for progressive dyspnea and worsening renal failure found to have bilateral effusions s/p R pigtail placement and underwent renal biopsy consistent w/ either class VI LN changes vs diabetic nephropathy and was started on steroid taper and MMF presents to the ED  due to hyperglycemia noted at home, weakness and nausea. Per daughter, pt has still been tapering prednisone now down to 10mg and was planned to f/u with rheumatology for further decrease. She has not been taking her insulin as directed due to fear of having hypoglycemia. She denies any recent associated fevers/chills/sick contacts. Denies chest pain/shortness of breath. Denies vomiting/abdominal pain. Denies diarrhea/dysuria. She has had LE edema which has worsened since starting the prednisone.  In the ED, pt was afebrile, hemodynamically stable. Labs consistent with DKA w/ HCO3 16, AG 18, VBG 7.23/37 with Cr grossly stable.           Events last 24 hours: Hypoglycemic this AM.     PAST MEDICAL & SURGICAL HISTORY:  Diabetes      Hypertension      No significant past surgical history            Medications:  hydroxychloroquine 200 milliGRAM(s) Oral every 12 hours  trimethoprim   80 mG/sulfamethoxazole 400 mG 1 Tablet(s) Oral <User Schedule>    amLODIPine   Tablet 5 milliGRAM(s) Oral daily  losartan 25 milliGRAM(s) Oral daily      acetaminophen     Tablet .. 650 milliGRAM(s) Oral every 6 hours PRN  gabapentin 300 milliGRAM(s) Oral every 12 hours      heparin   Injectable 5000 Unit(s) SubCutaneous every 8 hours    pantoprazole    Tablet 40 milliGRAM(s) Oral before breakfast      dextrose 50% Injectable 25 Gram(s) IV Push once  dextrose 50% Injectable 12.5 Gram(s) IV Push once  dextrose 50% Injectable 25 Gram(s) IV Push once  dextrose Oral Gel 15 Gram(s) Oral once  glucagon  Injectable 1 milliGRAM(s) IntraMuscular once  insulin glargine Injectable (LANTUS) 20 Unit(s) SubCutaneous every morning  insulin glargine Injectable (LANTUS) 10 Unit(s) SubCutaneous at bedtime  insulin lispro (ADMELOG) corrective regimen sliding scale   SubCutaneous at bedtime  insulin lispro (ADMELOG) corrective regimen sliding scale   SubCutaneous three times a day before meals  insulin lispro Injectable (ADMELOG) 5 Unit(s) SubCutaneous three times a day before meals  levothyroxine 50 MICROGram(s) Oral daily  predniSONE   Tablet 10 milliGRAM(s) Oral daily    cyanocobalamin 1000 MICROGram(s) Oral daily  dextrose 5% + lactated ringers. 1000 milliLiter(s) IV Continuous <Continuous>  dextrose 5%. 1000 milliLiter(s) IV Continuous <Continuous>  dextrose 5%. 1000 milliLiter(s) IV Continuous <Continuous>  folic acid 1 milliGRAM(s) Oral daily  magnesium sulfate  IVPB 1 Gram(s) IV Intermittent once  sodium bicarbonate 650 milliGRAM(s) Oral three times a day    influenza   Vaccine 0.5 milliLiter(s) IntraMuscular once    chlorhexidine 2% Cloths 1 Application(s) Topical <User Schedule>            ICU Vital Signs Last 24 Hrs  T(C): 36.9 (2025 07:00), Max: 36.9 (2025 16:13)  T(F): 98.4 (2025 07:00), Max: 98.4 (2025 16:13)  HR: 82 (2025 07:00) (79 - 97)  BP: 133/57 (2025 06:00) (107/52 - 150/56)  BP(mean): 79 (2025 06:00) (68 - 104)  ABP: --  ABP(mean): --  RR: 15 (2025 07:00) (13 - 21)  SpO2: 97% (2025 07:00) (95% - 100%)    O2 Parameters below as of 2025 07:00  Patient On (Oxygen Delivery Method): room air                I&O's Detail    2025 07:01  -  2025 07:00  --------------------------------------------------------  IN:    dextrose 5% + lactated ringers: 600 mL    Insulin: 14 mL    Lactated Ringers Bolus: 1000 mL  Total IN: 1614 mL    OUT:    Voided (mL): 900 mL  Total OUT: 900 mL    Total NET: 714 mL            LABS:                        9.6    4.37  )-----------( 256      ( 2025 02:15 )             31.0         133[L]  |  104  |  76.9[H]  ----------------------------<  193[H]  4.2   |  15.0[L]  |  2.26[H]    Ca    8.3[L]      2025 02:15  Phos  2.9       Mg     1.6         TPro  4.7[L]  /  Alb  2.3[L]  /  TBili  <0.2[L]  /  DBili  x   /  AST  7   /  ALT  9   /  AlkPhos  83            CAPILLARY BLOOD GLUCOSE      POCT Blood Glucose.: 52 mg/dL (2025 07:43)      Urinalysis Basic - ( 2025 02:30 )    Color: Yellow / Appearance: Clear / S.014 / pH: x  Gluc: x / Ketone: Negative mg/dL  / Bili: Negative / Urobili: 0.2 mg/dL   Blood: x / Protein: 300 mg/dL / Nitrite: Negative   Leuk Esterase: Negative / RBC: 19 /HPF / WBC 2 /HPF   Sq Epi: x / Non Sq Epi: 0 /HPF / Bacteria: Negative /HPF      CULTURES:      Physical Examination:    General: No acute distress.      HEENT: Pupils equal, reactive to light.  Symmetric.    PULM: Clear to auscultation bilaterally, no significant sputum production    NECK: Supple, no lymphadenopathy, trachea midline    CVS: Regular rate and rhythm, no murmurs, rubs, or gallops    GI: Soft, nondistended, nontender, normoactive bowel sounds, no masses    EXT: No edema, nontender    SKIN: Warm and well perfused, no rashes noted.    NEURO: Alert, oriented, interactive, nonfocal    DEVICES:     RADIOLOGY: ***    CRITICAL CARE TIME SPENT: ***   Patient is a 59y old  Female who presents with a chief complaint of DKA (2025 07:55)      BRIEF HOSPITAL COURSE: 59F with hx of DM, HTN, hypothyroidism, chronic anemia, HFpEF, CKD, recently diagnosed lupus/possible overlap w/ systemic sclerosis, recent admission - for progressive dyspnea and worsening renal failure found to have bilateral effusions s/p R pigtail placement and underwent renal biopsy consistent w/ either class VI LN changes vs diabetic nephropathy and was started on steroid taper and MMF presents to the ED  due to hyperglycemia noted at home, weakness and nausea. Per daughter, pt has still been tapering prednisone now down to 10mg and was planned to f/u with rheumatology for further decrease. She has not been taking her insulin as directed due to fear of having hypoglycemia. She denies any recent associated fevers/chills/sick contacts. Denies chest pain/shortness of breath. Denies vomiting/abdominal pain. Denies diarrhea/dysuria. She has had LE edema which has worsened since starting the prednisone.  In the ED, pt was afebrile, hemodynamically stable. Labs consistent with DKA w/ HCO3 16, AG 18, VBG 7.23/37 with Cr grossly stable.      Events last 24 hours: Hypoglycemic this AM (52) -> 115 --> 189 after juice and breakfast. AG closed (14). Has no complaints besides b/l lower extremity edema.    PAST MEDICAL & SURGICAL HISTORY:  Diabetes      Hypertension      No significant past surgical history            Medications:  hydroxychloroquine 200 milliGRAM(s) Oral every 12 hours  trimethoprim   80 mG/sulfamethoxazole 400 mG 1 Tablet(s) Oral <User Schedule>    amLODIPine   Tablet 5 milliGRAM(s) Oral daily  losartan 25 milliGRAM(s) Oral daily      acetaminophen     Tablet .. 650 milliGRAM(s) Oral every 6 hours PRN  gabapentin 300 milliGRAM(s) Oral every 12 hours      heparin   Injectable 5000 Unit(s) SubCutaneous every 8 hours    pantoprazole    Tablet 40 milliGRAM(s) Oral before breakfast      dextrose 50% Injectable 25 Gram(s) IV Push once  dextrose 50% Injectable 12.5 Gram(s) IV Push once  dextrose 50% Injectable 25 Gram(s) IV Push once  dextrose Oral Gel 15 Gram(s) Oral once  glucagon  Injectable 1 milliGRAM(s) IntraMuscular once  insulin glargine Injectable (LANTUS) 20 Unit(s) SubCutaneous every morning  insulin glargine Injectable (LANTUS) 10 Unit(s) SubCutaneous at bedtime  insulin lispro (ADMELOG) corrective regimen sliding scale   SubCutaneous at bedtime  insulin lispro (ADMELOG) corrective regimen sliding scale   SubCutaneous three times a day before meals  insulin lispro Injectable (ADMELOG) 5 Unit(s) SubCutaneous three times a day before meals  levothyroxine 50 MICROGram(s) Oral daily  predniSONE   Tablet 10 milliGRAM(s) Oral daily    cyanocobalamin 1000 MICROGram(s) Oral daily  dextrose 5% + lactated ringers. 1000 milliLiter(s) IV Continuous <Continuous>  dextrose 5%. 1000 milliLiter(s) IV Continuous <Continuous>  dextrose 5%. 1000 milliLiter(s) IV Continuous <Continuous>  folic acid 1 milliGRAM(s) Oral daily  magnesium sulfate  IVPB 1 Gram(s) IV Intermittent once  sodium bicarbonate 650 milliGRAM(s) Oral three times a day    influenza   Vaccine 0.5 milliLiter(s) IntraMuscular once    chlorhexidine 2% Cloths 1 Application(s) Topical <User Schedule>            ICU Vital Signs Last 24 Hrs  T(C): 36.9 (2025 07:00), Max: 36.9 (2025 16:13)  T(F): 98.4 (2025 07:00), Max: 98.4 (2025 16:13)  HR: 82 (2025 07:00) (79 - 97)  BP: 133/57 (2025 06:00) (107/52 - 150/56)  BP(mean): 79 (2025 06:00) (68 - 104)  ABP: --  ABP(mean): --  RR: 15 (2025 07:00) (13 - 21)  SpO2: 97% (2025 07:00) (95% - 100%)    O2 Parameters below as of 2025 07:00  Patient On (Oxygen Delivery Method): room air                I&O's Detail    2025 07:01  -  2025 07:00  --------------------------------------------------------  IN:    dextrose 5% + lactated ringers: 600 mL    Insulin: 14 mL    Lactated Ringers Bolus: 1000 mL  Total IN: 1614 mL    OUT:    Voided (mL): 900 mL  Total OUT: 900 mL    Total NET: 714 mL            LABS:                        9.6    4.37  )-----------( 256      ( 2025 02:15 )             31.0         133[L]  |  104  |  76.9[H]  ----------------------------<  193[H]  4.2   |  15.0[L]  |  2.26[H]    Ca    8.3[L]      2025 02:15  Phos  2.9       Mg     1.6         TPro  4.7[L]  /  Alb  2.3[L]  /  TBili  <0.2[L]  /  DBili  x   /  AST  7   /  ALT  9   /  AlkPhos  83            CAPILLARY BLOOD GLUCOSE      POCT Blood Glucose.: 52 mg/dL (2025 07:43)      Urinalysis Basic - ( 2025 02:30 )    Color: Yellow / Appearance: Clear / S.014 / pH: x  Gluc: x / Ketone: Negative mg/dL  / Bili: Negative / Urobili: 0.2 mg/dL   Blood: x / Protein: 300 mg/dL / Nitrite: Negative   Leuk Esterase: Negative / RBC: 19 /HPF / WBC 2 /HPF   Sq Epi: x / Non Sq Epi: 0 /HPF / Bacteria: Negative /HPF      CULTURES:      Physical Examination:    General: No acute distress.      HEENT: Pupils equal, reactive to light.  Symmetric.    PULM: Clear to auscultation bilaterally, no significant sputum production    NECK: Supple, no lymphadenopathy, trachea midline    CVS: Regular rate and rhythm, no murmurs, rubs, or gallops    GI: Soft, nondistended, nontender, normoactive bowel sounds, no masses    EXT: No edema, nontender    SKIN: Warm and well perfused, no rashes noted.    NEURO: Alert, oriented, interactive, nonfocal    DEVICES:     RADIOLOGY: ***    CRITICAL CARE TIME SPENT: ***   Patient is a 59y old  Female who presents with a chief complaint of DKA (2025 07:55)      BRIEF HOSPITAL COURSE: 59F with hx of DM, HTN, hypothyroidism, chronic anemia, HFpEF, CKD, recently diagnosed lupus/possible overlap w/ systemic sclerosis, recent admission - for progressive dyspnea and worsening renal failure found to have bilateral effusions s/p R pigtail placement and underwent renal biopsy consistent w/ either class VI LN changes vs diabetic nephropathy and was started on steroid taper and MMF presents to the ED  due to hyperglycemia noted at home, weakness and nausea. Per daughter, pt has still been tapering prednisone now down to 10mg and was planned to f/u with rheumatology for further decrease. She has not been taking her insulin as directed due to fear of having hypoglycemia. She denies any recent associated fevers/chills/sick contacts. Denies chest pain/shortness of breath. Denies vomiting/abdominal pain. Denies diarrhea/dysuria. She has had LE edema which has worsened since starting the prednisone.  In the ED, pt was afebrile, hemodynamically stable. Labs consistent with DKA w/ HCO3 16, AG 18, VBG 7.23/37 with Cr grossly stable.    Off insulin gtt since  @ 13:00.    Events last 24 hours: Hypoglycemic this AM (52) -> 115 --> 189 after juice and breakfast. AG closed (14). Has no complaints besides b/l lower extremity edema.    PAST MEDICAL & SURGICAL HISTORY:  Diabetes      Hypertension      No significant past surgical history            Medications:  hydroxychloroquine 200 milliGRAM(s) Oral every 12 hours  trimethoprim   80 mG/sulfamethoxazole 400 mG 1 Tablet(s) Oral <User Schedule>    amLODIPine   Tablet 5 milliGRAM(s) Oral daily  losartan 25 milliGRAM(s) Oral daily      acetaminophen     Tablet .. 650 milliGRAM(s) Oral every 6 hours PRN  gabapentin 300 milliGRAM(s) Oral every 12 hours      heparin   Injectable 5000 Unit(s) SubCutaneous every 8 hours    pantoprazole    Tablet 40 milliGRAM(s) Oral before breakfast      dextrose 50% Injectable 25 Gram(s) IV Push once  dextrose 50% Injectable 12.5 Gram(s) IV Push once  dextrose 50% Injectable 25 Gram(s) IV Push once  dextrose Oral Gel 15 Gram(s) Oral once  glucagon  Injectable 1 milliGRAM(s) IntraMuscular once  insulin glargine Injectable (LANTUS) 20 Unit(s) SubCutaneous every morning  insulin glargine Injectable (LANTUS) 10 Unit(s) SubCutaneous at bedtime  insulin lispro (ADMELOG) corrective regimen sliding scale   SubCutaneous at bedtime  insulin lispro (ADMELOG) corrective regimen sliding scale   SubCutaneous three times a day before meals  insulin lispro Injectable (ADMELOG) 5 Unit(s) SubCutaneous three times a day before meals  levothyroxine 50 MICROGram(s) Oral daily  predniSONE   Tablet 10 milliGRAM(s) Oral daily    cyanocobalamin 1000 MICROGram(s) Oral daily  dextrose 5% + lactated ringers. 1000 milliLiter(s) IV Continuous <Continuous>  dextrose 5%. 1000 milliLiter(s) IV Continuous <Continuous>  dextrose 5%. 1000 milliLiter(s) IV Continuous <Continuous>  folic acid 1 milliGRAM(s) Oral daily  magnesium sulfate  IVPB 1 Gram(s) IV Intermittent once  sodium bicarbonate 650 milliGRAM(s) Oral three times a day    influenza   Vaccine 0.5 milliLiter(s) IntraMuscular once    chlorhexidine 2% Cloths 1 Application(s) Topical <User Schedule>            ICU Vital Signs Last 24 Hrs  T(C): 36.9 (2025 07:00), Max: 36.9 (2025 16:13)  T(F): 98.4 (2025 07:00), Max: 98.4 (2025 16:13)  HR: 82 (2025 07:00) (79 - 97)  BP: 133/57 (2025 06:00) (107/52 - 150/56)  BP(mean): 79 (2025 06:00) (68 - 104)  ABP: --  ABP(mean): --  RR: 15 (2025 07:00) (13 - 21)  SpO2: 97% (2025 07:00) (95% - 100%)    O2 Parameters below as of 2025 07:00  Patient On (Oxygen Delivery Method): room air                I&O's Detail    2025 07:01  -  2025 07:00  --------------------------------------------------------  IN:    dextrose 5% + lactated ringers: 600 mL    Insulin: 14 mL    Lactated Ringers Bolus: 1000 mL  Total IN: 1614 mL    OUT:    Voided (mL): 900 mL  Total OUT: 900 mL    Total NET: 714 mL            LABS:                        9.6    4.37  )-----------( 256      ( 2025 02:15 )             31.0         133[L]  |  104  |  76.9[H]  ----------------------------<  193[H]  4.2   |  15.0[L]  |  2.26[H]    Ca    8.3[L]      2025 02:15  Phos  2.9       Mg     1.6         TPro  4.7[L]  /  Alb  2.3[L]  /  TBili  <0.2[L]  /  DBili  x   /  AST  7   /  ALT  9   /  AlkPhos  83            CAPILLARY BLOOD GLUCOSE      POCT Blood Glucose.: 52 mg/dL (2025 07:43)      Urinalysis Basic - ( 2025 02:30 )    Color: Yellow / Appearance: Clear / S.014 / pH: x  Gluc: x / Ketone: Negative mg/dL  / Bili: Negative / Urobili: 0.2 mg/dL   Blood: x / Protein: 300 mg/dL / Nitrite: Negative   Leuk Esterase: Negative / RBC: 19 /HPF / WBC 2 /HPF   Sq Epi: x / Non Sq Epi: 0 /HPF / Bacteria: Negative /HPF      CULTURES:      Physical Examination:    General: No acute distress, seated in bed eating breakfast.  HEENT: Pupils equal, reactive to light.  Symmetric.  PULM: Clear to auscultation bilaterally, no significant sputum production  NECK: Supple, no lymphadenopathy, trachea midline  CVS: Regular rate and rhythm, no murmurs, rubs, or gallops  GI: Soft, nondistended, nontender, normoactive bowel sounds, no masses  EXT: 1+ pitting edema to midcalves b/l, non-tender.  SKIN: Warm and well perfused, no rashes noted.  NEURO: Alert, oriented, interactive, nonfocal    DEVICES:     RADIOLOGY: < from: US Duplex Venous Lower Ext Ltd, Right (25 @ 04:08) >    IMPRESSION:  No evidence of right lower extremity deep venous thrombosis.      < end of copied text >  < from: Xray Chest 1 View- PORTABLE-Urgent (25 @ 01:12) >  INDICATION: Diabetic ketoacidosis.    IMPRESSION: The heart and lungs are normal. Degenerative changes of the   bones.    --- End of Report ---

## 2025-02-23 NOTE — PROGRESS NOTE ADULT - ASSESSMENT
ASSESSMENT:  59F with PMHX DM1, HTN, HLD, HFpEF, Hypothyroidism, Neuropathy, DM Nephropathy, SLE, Lupus Nephritis, CKD4 presented to ED with hyperglycemia and AGMA admitted to MICU for DKA 2/2 insulin noncompliance now transitioned to basal/bolus regimen and stable for DG to Telemetry with course complicated by SHARA on CKD4 and Hypothyroidism.     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 CXR clear. No source of infection  -DKA likely 2/2 insulin noncompliance  -Bactrim 80/400mg M/W/F for PPX  -Hydroxychloroquine 200mg BID  -Mycophenolate Mofetil 500mg TID  -Mycophelate level pending  -Prednisone 10mg q24   -Doses adjusted for renal function  -Cr improving 2.33 today down from 3  -Sodium Bicarbonate 650mg TID for NAGMA  -Rheum reccs appreciated  -Outpt Rheum F/u with Dr Kirkpatrick    HTN  -Amlodipine 5mg q24  -Losartan 25mg q24  -TTE normal LVEF  -Restart Bumex when able    Hypothyroidism  - with Low T3/FT4  -Levothyroxine 100mcg PO x1  -Increase Levothyroxine to 75mcg q24  -Endo Consulted  -Repeat TFTs in 4 weeks    DM Neuropathy  -Gabapentin 300mg BID    VTE PPX: SCD/SQH 5000q8  DISPO: DG from MICU to Tele. Pending Endo reccs for DKA and Hypothyroidism. Prepare for dc home 24-48 hours.

## 2025-02-23 NOTE — PROVIDER CONTACT NOTE (CRITICAL VALUE NOTIFICATION) - DATE AND TIME:
23-Feb-2025 08:52
22-Feb-2025 15:34
I agree with the resident's note above.    Patient is doing well. Pain is well controlled. Tolerating regular diet, ambulating, and voiding.  Vital signs stable. Pt desires discharge today.       Plan:  Reviewed discharge instructions, PP warning signs, and when to call   Reg diet  Ambulating  Pain control  f/u 6 weeks   Routine postpartum care    alfredito cerna
I agree with the resident's note above.    Patient is doing well. Pain is well controlled. Tolerating regular diet, ambulating, and voiding. Denies HA, visual changes, RUQ, blurred vision.   Vital signs stable. Pt desires discharge today.     ICU Vital Signs Last 24 Hrs  T(C): 36.3 (2022 13:00), Max: 36.9 (2022 17:01)  T(F): 97.3 (2022 13:00), Max: 98.42 (2022 17:01)  HR: 75 (2022 13:00) (75 - 155)  BP: 133/88 (2022 13:00) (113/73 - 181/93)  BP(mean): --  ABP: --  ABP(mean): --  RR: 18 (2022 13:00) (16 - 18)  SpO2: 97% (2022 13:00) (87% - 100%)    P3 s/p  c/b gHTN     Plan:  Reviewed discharge instructions, PP warning signs, and when to call   Reg diet  Ambulating  Pain control  f/u 6 weeks   Routine postpartum care    alfredito cerna

## 2025-02-23 NOTE — CHART NOTE - NSCHARTNOTEFT_GEN_A_CORE
The patient is a 59-year-old female with a PMH of HTN, type 1 diabetes (most recent HbA1c was 9.3%), HFpEF, hypothyroidism, peripheral neuropathy, diabetic nephropathy, SLE, lupus nephritis, and stage 4 CKD who initially presented to the ER with elevated blood sugars levels and was found to have high anion gap metabolic acidosis due to DKA. She was admitted to the ICU and received insulin infusion. Her anion gap decreased from 18 to 13 since admission. Of note, the patient was diagnosed with SLE during a recent hospitalization. She was found to have a positive MARGI, dsDNA, SS-A, SS-B, and centromere B antibodies. She also tested positive for anti-cN-1A. Her complement levels were low. She was suspected to have an overlap syndrome. She also underwent a kidney biopsy which reported evidence of immune complex-mediated glomerulonephritis, glomerulosclerosis, and diabetic nephropathy. She was started on prednisone, hydroxychloroquine, and mycophenolate. I discussed the patient's case with her primary team in regard to the management of these medications.    Recommend continuing prednisone at the current dose The patient is a 59-year-old female with a PMH of HTN, type 1 diabetes (most recent HbA1c was 9.3%), HFpEF, hypothyroidism, peripheral neuropathy, diabetic nephropathy, SLE, lupus nephritis, and stage 4 CKD who initially presented to the ER with elevated blood sugars levels and was found to have high anion gap metabolic acidosis due to DKA. She was admitted to the ICU and received insulin infusion. Her anion gap decreased from 18 to 13 since admission. Of note, the patient was diagnosed with SLE during a recent hospitalization. She was found to have a positive MARGI, dsDNA, SS-A, SS-B, and centromere B antibodies. She also tested positive for anti-cN-1A. Her complement levels were low. She was suspected to have an overlap syndrome. She also underwent a kidney biopsy which reported evidence of immune complex-mediated glomerulonephritis, glomerulosclerosis, and diabetic nephropathy. She was recently started on prednisone, hydroxychloroquine, and mycophenolate. I discussed the patient's case with her primary team in regard to the management of these medications.    Recommend continuing prednisone at the current dose of 10 mg daily due to the potential risk of adrenal insufficiency from the physiological stress from DKA  Continue hydroxychloroquine 200 mg twice daily for now  Resume mycophenolate if/when an infectious etiology is ruled-out  Obtain mycophenolic acid (MPA) level to help guide dose adjustments, if needed, considering the patient's severe renal impairment  Follow-up with her rheumatologist, Dr. Kirkpatrick, upon discharge for further management    Please contact us with any further questions or concerns.

## 2025-02-24 ENCOUNTER — TRANSCRIPTION ENCOUNTER (OUTPATIENT)
Age: 60
End: 2025-02-24

## 2025-02-24 DIAGNOSIS — M32.9 SYSTEMIC LUPUS ERYTHEMATOSUS, UNSPECIFIED: ICD-10-CM

## 2025-02-24 LAB
ALBUMIN SERPL ELPH-MCNC: 2.3 G/DL — LOW (ref 3.3–5.2)
ALP SERPL-CCNC: 85 U/L — SIGNIFICANT CHANGE UP (ref 40–120)
ALT FLD-CCNC: 8 U/L — SIGNIFICANT CHANGE UP
ANION GAP SERPL CALC-SCNC: 14 MMOL/L — SIGNIFICANT CHANGE UP (ref 5–17)
AST SERPL-CCNC: 8 U/L — SIGNIFICANT CHANGE UP
BASOPHILS # BLD AUTO: 0.02 K/UL — SIGNIFICANT CHANGE UP (ref 0–0.2)
BASOPHILS NFR BLD AUTO: 0.4 % — SIGNIFICANT CHANGE UP (ref 0–2)
BILIRUB SERPL-MCNC: <0.2 MG/DL — LOW (ref 0.4–2)
BUN SERPL-MCNC: 68.1 MG/DL — HIGH (ref 8–20)
C PEPTIDE SERPL-MCNC: <0.1 NG/ML — LOW (ref 1.1–4.4)
CALCIUM SERPL-MCNC: 8.2 MG/DL — LOW (ref 8.4–10.5)
CHLORIDE SERPL-SCNC: 107 MMOL/L — SIGNIFICANT CHANGE UP (ref 96–108)
CO2 SERPL-SCNC: 16 MMOL/L — LOW (ref 22–29)
CREAT SERPL-MCNC: 2.34 MG/DL — HIGH (ref 0.5–1.3)
EGFR: 23 ML/MIN/1.73M2 — LOW
EOSINOPHIL # BLD AUTO: 0.08 K/UL — SIGNIFICANT CHANGE UP (ref 0–0.5)
EOSINOPHIL NFR BLD AUTO: 1.8 % — SIGNIFICANT CHANGE UP (ref 0–6)
GLUCOSE BLDC GLUCOMTR-MCNC: 115 MG/DL — HIGH (ref 70–99)
GLUCOSE BLDC GLUCOMTR-MCNC: 199 MG/DL — HIGH (ref 70–99)
GLUCOSE BLDC GLUCOMTR-MCNC: 285 MG/DL — HIGH (ref 70–99)
GLUCOSE BLDC GLUCOMTR-MCNC: 70 MG/DL — SIGNIFICANT CHANGE UP (ref 70–99)
GLUCOSE BLDC GLUCOMTR-MCNC: 73 MG/DL — SIGNIFICANT CHANGE UP (ref 70–99)
GLUCOSE SERPL-MCNC: 88 MG/DL — SIGNIFICANT CHANGE UP (ref 70–99)
HCT VFR BLD CALC: 31.6 % — LOW (ref 34.5–45)
HGB BLD-MCNC: 9.8 G/DL — LOW (ref 11.5–15.5)
IMM GRANULOCYTES # BLD AUTO: 0.02 K/UL — SIGNIFICANT CHANGE UP (ref 0–0.07)
IMM GRANULOCYTES NFR BLD AUTO: 0.4 % — SIGNIFICANT CHANGE UP (ref 0–0.9)
LYMPHOCYTES # BLD AUTO: 0.58 K/UL — LOW (ref 1–3.3)
LYMPHOCYTES NFR BLD AUTO: 12.7 % — LOW (ref 13–44)
MAGNESIUM SERPL-MCNC: 1.9 MG/DL — SIGNIFICANT CHANGE UP (ref 1.6–2.6)
MCHC RBC-ENTMCNC: 27.5 PG — SIGNIFICANT CHANGE UP (ref 27–34)
MCHC RBC-ENTMCNC: 31 G/DL — LOW (ref 32–36)
MCV RBC AUTO: 88.5 FL — SIGNIFICANT CHANGE UP (ref 80–100)
MONOCYTES # BLD AUTO: 0.14 K/UL — SIGNIFICANT CHANGE UP (ref 0–0.9)
MONOCYTES NFR BLD AUTO: 3.1 % — SIGNIFICANT CHANGE UP (ref 2–14)
NEUTROPHILS # BLD AUTO: 3.72 K/UL — SIGNIFICANT CHANGE UP (ref 1.8–7.4)
NEUTROPHILS NFR BLD AUTO: 81.6 % — HIGH (ref 43–77)
NRBC # BLD AUTO: 0 K/UL — SIGNIFICANT CHANGE UP (ref 0–0)
NRBC # FLD: 0 K/UL — SIGNIFICANT CHANGE UP (ref 0–0)
NRBC BLD AUTO-RTO: 0 /100 WBCS — SIGNIFICANT CHANGE UP (ref 0–0)
PHOSPHATE SERPL-MCNC: 2.9 MG/DL — SIGNIFICANT CHANGE UP (ref 2.4–4.7)
PLATELET # BLD AUTO: 233 K/UL — SIGNIFICANT CHANGE UP (ref 150–400)
PMV BLD: 9.4 FL — SIGNIFICANT CHANGE UP (ref 7–13)
POTASSIUM SERPL-MCNC: 4.7 MMOL/L — SIGNIFICANT CHANGE UP (ref 3.5–5.3)
POTASSIUM SERPL-SCNC: 4.7 MMOL/L — SIGNIFICANT CHANGE UP (ref 3.5–5.3)
PROT SERPL-MCNC: 4.6 G/DL — LOW (ref 6.6–8.7)
RBC # BLD: 3.57 M/UL — LOW (ref 3.8–5.2)
RBC # FLD: 18.7 % — HIGH (ref 10.3–14.5)
SODIUM SERPL-SCNC: 137 MMOL/L — SIGNIFICANT CHANGE UP (ref 135–145)
WBC # BLD: 4.56 K/UL — SIGNIFICANT CHANGE UP (ref 3.8–10.5)
WBC # FLD AUTO: 4.56 K/UL — SIGNIFICANT CHANGE UP (ref 3.8–10.5)

## 2025-02-24 PROCEDURE — 99232 SBSQ HOSP IP/OBS MODERATE 35: CPT

## 2025-02-24 PROCEDURE — 99233 SBSQ HOSP IP/OBS HIGH 50: CPT

## 2025-02-24 RX ORDER — HYDROXYCHLOROQUINE SULFATE 200 MG/1
1 TABLET, FILM COATED ORAL
Qty: 0 | Refills: 0 | DISCHARGE
Start: 2025-02-24

## 2025-02-24 RX ORDER — OXYCODONE HYDROCHLORIDE 30 MG/1
5 TABLET ORAL ONCE
Refills: 0 | Status: DISCONTINUED | OUTPATIENT
Start: 2025-02-24 | End: 2025-02-24

## 2025-02-24 RX ORDER — INSULIN LISPRO 100 U/ML
4 INJECTION, SOLUTION INTRAVENOUS; SUBCUTANEOUS
Qty: 1 | Refills: 0
Start: 2025-02-24

## 2025-02-24 RX ORDER — INSULIN GLARGINE-YFGN 100 [IU]/ML
8 INJECTION, SOLUTION SUBCUTANEOUS
Qty: 1 | Refills: 0
Start: 2025-02-24

## 2025-02-24 RX ORDER — LEVOTHYROXINE SODIUM 300 MCG
1 TABLET ORAL
Qty: 30 | Refills: 0
Start: 2025-02-24 | End: 2025-03-25

## 2025-02-24 RX ORDER — LOPERAMIDE HCL 1 MG/7.5ML
2 SOLUTION ORAL DAILY
Refills: 0 | Status: DISCONTINUED | OUTPATIENT
Start: 2025-02-24 | End: 2025-02-25

## 2025-02-24 RX ADMIN — Medication 1 APPLICATION(S): at 06:31

## 2025-02-24 RX ADMIN — OXYCODONE HYDROCHLORIDE 5 MILLIGRAM(S): 30 TABLET ORAL at 11:55

## 2025-02-24 RX ADMIN — Medication 650 MILLIGRAM(S): at 01:41

## 2025-02-24 RX ADMIN — MYCOPHENOLATE MOFETIL 500 MILLIGRAM(S): 500 TABLET, FILM COATED ORAL at 06:24

## 2025-02-24 RX ADMIN — HYDROXYCHLOROQUINE SULFATE 200 MILLIGRAM(S): 200 TABLET, FILM COATED ORAL at 06:32

## 2025-02-24 RX ADMIN — GABAPENTIN 300 MILLIGRAM(S): 400 CAPSULE ORAL at 06:31

## 2025-02-24 RX ADMIN — Medication 650 MILLIGRAM(S): at 08:45

## 2025-02-24 RX ADMIN — HEPARIN SODIUM 5000 UNIT(S): 1000 INJECTION INTRAVENOUS; SUBCUTANEOUS at 14:05

## 2025-02-24 RX ADMIN — GABAPENTIN 300 MILLIGRAM(S): 400 CAPSULE ORAL at 17:40

## 2025-02-24 RX ADMIN — HYDROXYCHLOROQUINE SULFATE 200 MILLIGRAM(S): 200 TABLET, FILM COATED ORAL at 17:40

## 2025-02-24 RX ADMIN — Medication 650 MILLIGRAM(S): at 22:39

## 2025-02-24 RX ADMIN — OXYCODONE HYDROCHLORIDE 5 MILLIGRAM(S): 30 TABLET ORAL at 12:55

## 2025-02-24 RX ADMIN — Medication 75 MICROGRAM(S): at 06:25

## 2025-02-24 RX ADMIN — INSULIN LISPRO 1: 100 INJECTION, SOLUTION INTRAVENOUS; SUBCUTANEOUS at 17:45

## 2025-02-24 RX ADMIN — PREDNISONE 10 MILLIGRAM(S): 20 TABLET ORAL at 06:23

## 2025-02-24 RX ADMIN — Medication 40 MILLIGRAM(S): at 06:37

## 2025-02-24 RX ADMIN — HEPARIN SODIUM 5000 UNIT(S): 1000 INJECTION INTRAVENOUS; SUBCUTANEOUS at 22:42

## 2025-02-24 RX ADMIN — HEPARIN SODIUM 5000 UNIT(S): 1000 INJECTION INTRAVENOUS; SUBCUTANEOUS at 06:31

## 2025-02-24 RX ADMIN — CYANOCOBALAMIN 1000 MICROGRAM(S): 1000 INJECTION INTRAMUSCULAR; SUBCUTANEOUS at 14:00

## 2025-02-24 RX ADMIN — Medication 650 MILLIGRAM(S): at 06:22

## 2025-02-24 RX ADMIN — INSULIN GLARGINE-YFGN 8 UNIT(S): 100 INJECTION, SOLUTION SUBCUTANEOUS at 22:42

## 2025-02-24 RX ADMIN — INSULIN LISPRO 4 UNIT(S): 100 INJECTION, SOLUTION INTRAVENOUS; SUBCUTANEOUS at 17:45

## 2025-02-24 RX ADMIN — MYCOPHENOLATE MOFETIL 500 MILLIGRAM(S): 500 TABLET, FILM COATED ORAL at 17:40

## 2025-02-24 RX ADMIN — FOLIC ACID 1 MILLIGRAM(S): 1 TABLET ORAL at 14:00

## 2025-02-24 RX ADMIN — Medication 650 MILLIGRAM(S): at 09:45

## 2025-02-24 RX ADMIN — OXYCODONE HYDROCHLORIDE 5 MILLIGRAM(S): 30 TABLET ORAL at 23:34

## 2025-02-24 RX ADMIN — Medication 1 TABLET(S): at 08:45

## 2025-02-24 RX ADMIN — INSULIN LISPRO 4 UNIT(S): 100 INJECTION, SOLUTION INTRAVENOUS; SUBCUTANEOUS at 12:45

## 2025-02-24 RX ADMIN — Medication 650 MILLIGRAM(S): at 14:05

## 2025-02-24 RX ADMIN — INSULIN LISPRO 3: 100 INJECTION, SOLUTION INTRAVENOUS; SUBCUTANEOUS at 12:45

## 2025-02-24 NOTE — PROGRESS NOTE ADULT - ASSESSMENT
59F with PMHx DM, HTN, hypothyroidism, chronic anemia, HFpEF, CKD, recently diagnosed lupus/possible overlap w/ systemic sclerosis, recent admission 1/22-1/31 for progressive dyspnea and worsening renal failure found to have bilateral effusions s/p R pigtail placement and underwent renal biopsy consistent w/ either class VI LN changes vs diabetic nephropathy and was started on steroid taper was hospitalized for hyperglycemia. Patient is on steroid taper prednisone now down to 10mg and was planned to f/u with rheumatology for further decrease. She has not been taking her insulin as directed due to fear of having hypoglycemia. Found to be in DKA.    Consulted for diabetes management  Home regimen: Lantus 40 units at home, always low in the morning , Humalog ICR 1:15, but was very high with lunch an dinner   Current a1c: 9.3%  Outpatient Endocrinologist: Dr Fitch    1. Type 1 DM s/p DKA  - FS 73 this morning  - Continue lantus 8 units qhs and admelog 4 units TID  - Correction scale  - Discharge recommendations: Lantus 8 units qhs, intensify ICR at home to 1:10 with lunch and dinner, will continue ICR 1:15 with breakfast  - Plan to resume insulin pump therapy as outpatient  - Has follow up appt this week, daughter will call to reschedule if not discharged in time    2. Hypothyroidism  - TSH 98, T4 2.1  - S/p loading dose of levothyroxine 100 mcg po once  - Levothyroxine increased to 75mcg daily on 2/24  - Repeat TSH and FT4 in 4 weeks     3. Steroid dependence   - On chronic steroids with prednisone 10 mg

## 2025-02-24 NOTE — DISCHARGE NOTE PROVIDER - CARE PROVIDERS DIRECT ADDRESSES
[email or web address]

## 2025-02-24 NOTE — DISCHARGE NOTE PROVIDER - NSDCCPCAREPLAN_GEN_ALL_CORE_FT
PRINCIPAL DISCHARGE DIAGNOSIS  Diagnosis: Diabetic ketoacidosis  Assessment and Plan of Treatment: You came in with high blood sugar levels that required you to be placed on an insulin drip in the MICU. You were then transitioned to subcutaneous insulin and have been followed by the endocrine team who have been working to optimize your insulin regimen. Once you leave the hospital, you should follow up with Dr. Mcleod about your insulin pump. The endocrine team is recommending that you take 8 units Lantus at bedtime and 4 units Lispro with each meal. They are also suggesting a 1:10 insulin to carb ratio for lunch and a 1:15 insulin to carb ratio for breakfast.      SECONDARY DISCHARGE DIAGNOSES  Diagnosis: SLE (systemic lupus erythematosus)  Assessment and Plan of Treatment: You were recently diagnosed with Lupus. You should continue to take the following medications and follow up with Dr. Kirkpatrick when you leave the hospital:  hydroxychloroquine 200mg twice a day  Bactrim 90mg/40mg once a day   Cellcept 500mg twice a day   prednisone 10mg once a day       Diagnosis: Hypothyroidism  Assessment and Plan of Treatment: Your thyroid function is decreased. As a result you have to take thyroid hormone supplements. You were seen by the endocrine specialists who reccomend an increase in your synthroid medication to 75mcg every day. Make sure to take this medication early in the morning on an empty stomach.     PRINCIPAL DISCHARGE DIAGNOSIS  Diagnosis: Diabetic ketoacidosis  Assessment and Plan of Treatment: You came in with high blood sugar levels that required you to be placed on an insulin drip in the MICU. You were then transitioned to subcutaneous insulin and have been followed by the endocrine team who have been working to optimize your insulin regimen. Once you leave the hospital, you should follow up with Dr. Dotson about your insulin pump. The endocrine team is recommending that you take 8 units Lantus at bedtime and 4 units Lispro with each meal. They are also suggesting a 1:10 insulin to carb ratio for lunch and a 1:15 insulin to carb ratio for breakfast.      SECONDARY DISCHARGE DIAGNOSES  Diagnosis: SLE (systemic lupus erythematosus)  Assessment and Plan of Treatment: You were recently diagnosed with Lupus. You should continue to take the following medications and follow up with Dr. Kirkpatrick when you leave the hospital:  hydroxychloroquine 200mg twice a day  Bactrim 90mg/40mg once a day   Cellcept 500mg twice a day   prednisone 10mg once a day       Diagnosis: Hypothyroidism  Assessment and Plan of Treatment: Your thyroid function is decreased. As a result you have to take thyroid hormone supplements. You were seen by the endocrine specialists who reccomend an increase in your synthroid medication to 75mcg every day. Make sure to take this medication early in the morning on an empty stomach.

## 2025-02-24 NOTE — DISCHARGE NOTE PROVIDER - HOSPITAL COURSE
59y Female with PMHx SLE, ESRD, HTN, hypothyroidism, IDDM, HFpEF  admitted for [facility]HS. Pt was previously admitted 1/22-1/31 for dyspnea + worsening renal failure + bilateral pleural effusions s/p R PTC. Renal biopsy demonstrating LN or diabetic nephropathy. Pt presented to the ED on 2/22 with hyperglycemia with home glucose measured at 512, /84, RR 86, RR 18, O2 99%, T 36.4C in triage. Admitted to MICU with DKA and started on insulin gtt that was turned off on 2/22 at 1pm. 2/23, Endocrine consulted, rec loading dose of levothyroxine 100mcg with 75mcg maintenance to follow. Patient is medically stable for discharge with outpatient endocrine follow up within 1 week.         #HHS   #IDDM  -Lantus 8 U bedtime   -Lantus 4 U TID   -ISS   -gabapentin 300mg BID for neuropathy   -Endocrine following   -metabolic acidosis: sodium bicarb 650mg TID     #SLE   -hydroxychloroquine 200mg q12   -Bactrim 80mh/400mg   -mycophenolate mofetil 500mg BID   -prednisone 10mg daily   -oxycodone 5mg for R hand pain, maybe 2/2 to SLE flare     #Hypothyroidism   -levothyroxine 100mcg bolus   -levothyroxine 75mcg daily maintenance     #HTN   -amlodipine 5mg daily   -losartan 25mg daily     #Anemia   -cyanocobalamin 1000mcg daily   -folic acid 1mg daily       GI ppx: Pantoprazole 40mg daily   DVT ppx: Heparin 5000 U sq q8

## 2025-02-24 NOTE — PROGRESS NOTE ADULT - SUBJECTIVE AND OBJECTIVE BOX
INTERVAL EVENTS:  Follow up diabetes management, FS 73 this morning. Endorses good appetite, complains of right hand pain.     MEDICATIONS  (STANDING):  amLODIPine   Tablet 5 milliGRAM(s) Oral daily  chlorhexidine 2% Cloths 1 Application(s) Topical <User Schedule>  cyanocobalamin 1000 MICROGram(s) Oral daily  dextrose 5%. 1000 milliLiter(s) (50 mL/Hr) IV Continuous <Continuous>  dextrose 5%. 1000 milliLiter(s) (100 mL/Hr) IV Continuous <Continuous>  dextrose 50% Injectable 25 Gram(s) IV Push once  dextrose 50% Injectable 12.5 Gram(s) IV Push once  dextrose 50% Injectable 25 Gram(s) IV Push once  dextrose Oral Gel 15 Gram(s) Oral once  folic acid 1 milliGRAM(s) Oral daily  gabapentin 300 milliGRAM(s) Oral every 12 hours  glucagon  Injectable 1 milliGRAM(s) IntraMuscular once  heparin   Injectable 5000 Unit(s) SubCutaneous every 8 hours  hydroxychloroquine 200 milliGRAM(s) Oral every 12 hours  influenza   Vaccine 0.5 milliLiter(s) IntraMuscular once  insulin glargine Injectable (LANTUS) 8 Unit(s) SubCutaneous at bedtime  insulin lispro (ADMELOG) corrective regimen sliding scale   SubCutaneous three times a day before meals  insulin lispro Injectable (ADMELOG) 4 Unit(s) SubCutaneous three times a day before meals  levothyroxine 75 MICROGram(s) Oral daily  levothyroxine 100 MICROGram(s) Oral once  losartan 25 milliGRAM(s) Oral daily  mycophenolate mofetil 500 milliGRAM(s) Oral two times a day  pantoprazole    Tablet 40 milliGRAM(s) Oral before breakfast  predniSONE   Tablet 10 milliGRAM(s) Oral daily  sodium bicarbonate 650 milliGRAM(s) Oral three times a day  trimethoprim   80 mG/sulfamethoxazole 400 mG 1 Tablet(s) Oral <User Schedule>    MEDICATIONS  (PRN):  acetaminophen     Tablet .. 650 milliGRAM(s) Oral every 6 hours PRN Mild Pain (1 - 3)    Allergies  No Known Allergies    Vital Signs Last 24 Hrs  T(C): 36.9 (24 Feb 2025 09:04), Max: 36.9 (23 Feb 2025 12:06)  T(F): 98.4 (24 Feb 2025 09:04), Max: 98.5 (23 Feb 2025 12:06)  HR: 86 (24 Feb 2025 09:04) (83 - 95)  BP: 133/75 (24 Feb 2025 09:04) (92/56 - 159/78)  BP(mean): 80 (23 Feb 2025 14:00) (80 - 92)  RR: 18 (24 Feb 2025 09:04) (12 - 21)  SpO2: 96% (24 Feb 2025 09:04) (95% - 100%)    Parameters below as of 24 Feb 2025 09:04  Patient On (Oxygen Delivery Method): room air    PHYSICAL EXAM:  General: No apparent distress  Respiratory: Lungs clear bilaterally  Cardiac: +S1, S2, no m/r/g  GI: +BS, soft, non tender, non distended  Extremities: No peripheral edema  Neuro: A+O X3    LABS:                        9.8    4.56  )-----------( 233      ( 24 Feb 2025 05:46 )             31.6     02-24    137  |  107  |  68.1[H]  ----------------------------<  88  4.7   |  16.0[L]  |  2.34[H]    Ca    8.2[L]      24 Feb 2025 05:46  Phos  2.9     02-24  Mg     1.9     02-24    TPro  4.6[L]  /  Alb  2.3[L]  /  TBili  <0.2[L]  /  DBili  x   /  AST  8   /  ALT  8   /  AlkPhos  85  02-24    Urinalysis Basic - ( 24 Feb 2025 05:46 )    Color: x / Appearance: x / SG: x / pH: x  Gluc: 88 mg/dL / Ketone: x  / Bili: x / Urobili: x   Blood: x / Protein: x / Nitrite: x   Leuk Esterase: x / RBC: x / WBC x   Sq Epi: x / Non Sq Epi: x / Bacteria: x    POCT Blood Glucose.: 73 mg/dL (02-24-25 @ 08:43)  POCT Blood Glucose.: 70 mg/dL (02-24-25 @ 04:07)  POCT Blood Glucose.: 192 mg/dL (02-23-25 @ 21:37)  POCT Blood Glucose.: 274 mg/dL (02-23-25 @ 17:13)  POCT Blood Glucose.: 255 mg/dL (02-23-25 @ 11:30)    Triiodothyronine, Total (T3 Total): <40 ng/dL (02-23-25 @ 07:55)  Thyroid Stimulating Hormone, Serum: 98.70 uIU/mL (02-23-25 @ 07:55)  Thyroid Stimulating Hormone, Serum: 63.15 uIU/mL (02-22-25 @ 15:24)  Thyroid Stimulating Hormone, Serum: 5.83 uIU/mL (01-27-25 @ 04:30)

## 2025-02-24 NOTE — PROGRESS NOTE ADULT - ATTENDING COMMENTS
59F with PMHX DM1, HTN, HLD, HFpEF, Hypothyroidism, Neuropathy, DM Nephropathy, SLE, Lupus Nephritis, CKD4     A/w DKA, s/p MICU, insulin gtt, gap closed  Followed by Endo, now euglycemic  Complains of hand pain- suspect lupus flare vs tensosynovitis  C/w Steroids, current regimen  Appreciate Endo recs. Outpt follow up   Stable for discharge

## 2025-02-24 NOTE — PROGRESS NOTE ADULT - TIME BILLING
Labs/Imaging/Notes reviewed. Orders placed. Discussed with MICU. Rheum and Endo reccs appreciated.
Time spent reviewing the chart documentation, reviewing labs and imaging studies, evaluating the patient, discussing the plan of care with the consultants & medical team, and documenting.
reviewing labs, notes, orders, radiographic studies, as well as counseling and coordinating care with the relevant multidisciplinary team, including with the primary and consulting providers.

## 2025-02-24 NOTE — DISCHARGE NOTE PROVIDER - NSDCMRMEDTOKEN_GEN_ALL_CORE_FT
amLODIPine 5 mg oral tablet: 1 tab(s) orally once a day  bumetanide 1 mg oral tablet: 1 tab(s) orally 2 times a day  cyanocobalamin 1000 mcg oral tablet: 1 tab(s) orally once a day  folic acid 1 mg oral tablet: 1 tab(s) orally once a day  gabapentin 300 mg oral capsule: 1 cap(s) orally 2 times a day  insulin glargine 100 units/mL subcutaneous solution: 40 unit(s) subcutaneous once a day  insulin lispro 100 units/mL injectable solution: 10 unit(s) injectable 3 times a day  levothyroxine 50 mcg (0.05 mg) oral capsule: 1 cap(s) orally once a day  losartan 25 mg oral tablet: 1 tab(s) orally once a day  mycophenolate mofetil 250 mg oral capsule: 2 cap(s) orally 2 times a day Continue mycophenolate 500mg twice daily for three days  Then continue mycophenolate 1000mg twice daily for 1 week  oxyCODONE 5 mg oral tablet: 1 tab(s) orally every 6 hours as needed for  severe pain MDD: 20  pantoprazole 40 mg oral delayed release tablet: 1 tab(s) orally once a day (before a meal)  predniSONE 10 mg oral tablet: 1 tab(s) orally once a day taper prednisone 60mg daily x 3 days, 40mg x 3 days, 30mg x 3 days, 20mg x 5 days, 10mg x 5 days, then remain on prednisone 5mg daily  sulfamethoxazole-trimethoprim 800 mg-160 mg oral tablet: 1 tab(s) orally once a day   amLODIPine 5 mg oral tablet: 1 tab(s) orally once a day  bumetanide 1 mg oral tablet: 1 tab(s) orally 2 times a day  cyanocobalamin 1000 mcg oral tablet: 1 tab(s) orally once a day  folic acid 1 mg oral tablet: 1 tab(s) orally once a day  gabapentin 300 mg oral capsule: 1 cap(s) orally 2 times a day  hydroxychloroquine 200 mg oral tablet: 1 tab(s) orally every 12 hours  insulin glargine 100 units/mL subcutaneous solution: 8 unit(s) subcutaneous once a day  insulin lispro 100 units/mL injectable solution: 4 unit(s) injectable 3 times a day (with meals)  levothyroxine 75 mcg (0.075 mg) oral tablet: 1 tab(s) orally once a day  losartan 25 mg oral tablet: 1 tab(s) orally once a day  mycophenolate mofetil 250 mg oral capsule: 2 cap(s) orally 2 times a day Continue mycophenolate 500mg twice daily for three days  Then continue mycophenolate 1000mg twice daily for 1 week  oxyCODONE 5 mg oral tablet: 1 tab(s) orally every 6 hours as needed for  severe pain MDD: 20  pantoprazole 40 mg oral delayed release tablet: 1 tab(s) orally once a day (before a meal)  predniSONE 10 mg oral tablet: 1 tab(s) orally once a day taper prednisone 60mg daily x 3 days, 40mg x 3 days, 30mg x 3 days, 20mg x 5 days, 10mg x 5 days, then remain on prednisone 5mg daily  sulfamethoxazole-trimethoprim 800 mg-160 mg oral tablet: 1 tab(s) orally once a day   amLODIPine 5 mg oral tablet: 1 tab(s) orally once a day  bumetanide 1 mg oral tablet: 1 tab(s) orally 2 times a day  cyanocobalamin 1000 mcg oral tablet: 1 tab(s) orally once a day  folic acid 1 mg oral tablet: 1 tab(s) orally once a day  gabapentin 400 mg oral capsule: 1 cap(s) orally 2 times a day  gabapentin 600 mg oral tablet: 1 tab(s) orally once a day (at bedtime)  hydroxychloroquine 200 mg oral tablet: 1 tab(s) orally every 12 hours  insulin glargine 100 units/mL subcutaneous solution: 8 unit(s) subcutaneous once a day  insulin lispro 100 units/mL injectable solution: 4 unit(s) injectable 3 times a day (with meals)  levothyroxine 75 mcg (0.075 mg) oral tablet: 1 tab(s) orally once a day  losartan 25 mg oral tablet: 1 tab(s) orally once a day  mycophenolate mofetil 250 mg oral capsule: 2 cap(s) orally 2 times a day Continue mycophenolate 500mg twice daily for three days  Then continue mycophenolate 1000mg twice daily for 1 week  oxyCODONE 5 mg oral tablet: 1 tab(s) orally every 6 hours as needed for  severe pain MDD: 20  pantoprazole 40 mg oral delayed release tablet: 1 tab(s) orally once a day (before a meal)  predniSONE 10 mg oral tablet: 1 tab(s) orally once a day taper prednisone 60mg daily x 3 days, 40mg x 3 days, 30mg x 3 days, 20mg x 5 days, 10mg x 5 days, then remain on prednisone 5mg daily  sulfamethoxazole-trimethoprim 800 mg-160 mg oral tablet: 1 tab(s) orally once a day

## 2025-02-24 NOTE — PROGRESS NOTE ADULT - ASSESSMENT
Dispo: Chavo f/u with Dr. Kirkpatrick. DC likely 1-2 days.   Patient is a 59y Female with PMHx SLE, ESRD, HTN, hypothyroidism, IDDM, HFpEF  admitted for [facility].    #HHS  -    #SLE   -hydroxychloroquine 200mg q12   -Bactrim 80mh/400mg       DVT ppx: Heparin 5000 U sq q8   Dispo: Rhemo f/u with Dr. Kirkpatrick. DC likely 1-2 days.   Patient is a 59y Female with PMHx SLE, ESRD, HTN, hypothyroidism, IDDM, HFpEF  admitted for [facility].    #HHS   #IDDM  -Lantus 8 U bedtime   -Lantus 4 U TID   -ISS   -gabapentin 300mg BID for neuropathy   -Endocrine following   -metabolic acidosis: sodium bicarb 650mg TID     #SLE   -hydroxychloroquine 200mg q12   -Bactrim 80mh/400mg   -mycophenolate mofetil 500mg BID   -prednisone 10mg daily   -oxycodone 5mg for R hand pain, maybe 2/2 to SLE flare     #Hypothyroidism   -levothyroxine 100mcg bolus   -levothyroxine 75mcg daily maintenance     #HTN   -amlodipine 5mg daily   -losartan 25mg daily     #Anemia   -cyanocobalamin 1000mcg daily   -folic acid 1mg daily       GI ppx: Pantoprazole 40mg daily   DVT ppx: Heparin 5000 U sq q8   Dispo: Rheum f/u with Dr. Kirkpatrick. DC likely 1-2 days.

## 2025-02-24 NOTE — DISCHARGE NOTE PROVIDER - CARE PROVIDER_API CALL
Jasmin Kirkpatrick  [address] [address]  Phone: [phone]  Fax: [phone]  Follow Up Time:    Jasmin Kirkpatrick  [facility]  [address] [place] [address]  Phone: [phone]  Fax: [phone]  Follow Up Time:     Andrew Kay  [facility]  [address]  [place] [address]  Phone: [phone]  Fax: [phone]  Follow Up Time:

## 2025-02-24 NOTE — PROGRESS NOTE ADULT - SUBJECTIVE AND OBJECTIVE BOX
HPI  Patient is a 59y Female with PMHx SLE, ESRD, HTN, hypothyroidism, IDDM, HFpEF  admitted for HHS. Pt was previously admitted 1/22-1/31 for dyspnea + worsening renal failure + bilateral pleural effusions s/p R PTC. Renal biopsy demonstrating LN or diabetic nephropathy.     INTERVAL EVENTS    HOSPITAL COURSE  Pt presented to the ED on 2/22 with hyperglycemia with home glucose measured at 512, /84, RR 86, RR 18, O2 99%, T 36.4C in triage. Admitted to MICU with DKA and started on insulin gtt that was turned off on 2/22 at 1pm. 2/23, Endocrine consulted, rec loading dose of levothyroxine 100mcg with 75mcg maintenance to follow.     REVIEW OF SYSTEMS   General: No fatigue, decreased apatite, weight loss  HEENT: No cough, throat pain, rhinorrhea hemoptysis    Chest: No shortness of breath, chest pain  Abdomen: No abdominal pain, hematemesis   Genitourinary: No dysuria, No hematuria   Extremities: No joint pain, no change in range of motion  Skin: No rashes, ecchymoses purpura, nodules       MEDICATIONS  MEDICATIONS  (STANDING):  amLODIPine   Tablet 5 milliGRAM(s) Oral daily  chlorhexidine 2% Cloths 1 Application(s) Topical <User Schedule>  cyanocobalamin 1000 MICROGram(s) Oral daily  dextrose 5%. 1000 milliLiter(s) (50 mL/Hr) IV Continuous <Continuous>  dextrose 5%. 1000 milliLiter(s) (100 mL/Hr) IV Continuous <Continuous>  dextrose 50% Injectable 25 Gram(s) IV Push once  dextrose 50% Injectable 12.5 Gram(s) IV Push once  dextrose 50% Injectable 25 Gram(s) IV Push once  dextrose Oral Gel 15 Gram(s) Oral once  folic acid 1 milliGRAM(s) Oral daily  gabapentin 300 milliGRAM(s) Oral every 12 hours  glucagon  Injectable 1 milliGRAM(s) IntraMuscular once  heparin   Injectable 5000 Unit(s) SubCutaneous every 8 hours  hydroxychloroquine 200 milliGRAM(s) Oral every 12 hours  influenza   Vaccine 0.5 milliLiter(s) IntraMuscular once  insulin glargine Injectable (LANTUS) 8 Unit(s) SubCutaneous at bedtime  insulin lispro (ADMELOG) corrective regimen sliding scale   SubCutaneous three times a day before meals  insulin lispro Injectable (ADMELOG) 4 Unit(s) SubCutaneous three times a day before meals  levothyroxine 75 MICROGram(s) Oral daily  levothyroxine 100 MICROGram(s) Oral once  losartan 25 milliGRAM(s) Oral daily  mycophenolate mofetil 500 milliGRAM(s) Oral two times a day  pantoprazole    Tablet 40 milliGRAM(s) Oral before breakfast  predniSONE   Tablet 10 milliGRAM(s) Oral daily  sodium bicarbonate 650 milliGRAM(s) Oral three times a day  trimethoprim   80 mG/sulfamethoxazole 400 mG 1 Tablet(s) Oral <User Schedule>    MEDICATIONS  (PRN):  acetaminophen     Tablet .. 650 milliGRAM(s) Oral every 6 hours PRN Mild Pain (1 - 3)      ALLERGIES  Allergies    No Known Allergies    Intolerances        PHYSICAL EXAM   Vital Signs Last 24 Hrs  T(C): 36.4 (24 Feb 2025 04:39), Max: 36.9 (23 Feb 2025 07:00)  T(F): 97.5 (24 Feb 2025 04:39), Max: 98.5 (23 Feb 2025 12:06)  HR: 83 (24 Feb 2025 06:15) (82 - 95)  BP: 92/56 (24 Feb 2025 06:15) (92/56 - 159/78)  BP(mean): 80 (23 Feb 2025 14:00) (80 - 95)  RR: 18 (24 Feb 2025 04:39) (12 - 21)  SpO2: 97% (24 Feb 2025 04:39) (95% - 100%)    Parameters below as of 24 Feb 2025 04:39  Patient On (Oxygen Delivery Method): room air        T(C): 36.4 (02-24-25 @ 04:39), Max: 36.9 (02-23-25 @ 07:00)  HR: 83 (02-24-25 @ 06:15) (82 - 95)  BP: 92/56 (02-24-25 @ 06:15) (92/56 - 159/78)  RR: 18 (02-24-25 @ 04:39) (12 - 21)  SpO2: 97% (02-24-25 @ 04:39) (95% - 100%)    CONSTITUTIONAL: Well groomed, no apparent distress  EYES: PERRLA and symmetric, EOMI, No conjunctival or scleral injection, non-icteric  ENMT: Oral mucosa with moist membranes. Normal dentition; no pharyngeal injection or exudates             NECK: Supple, symmetric and without tracheal deviation   RESP: No respiratory distress, no use of accessory muscles; CTA b/l, no WRR  CV: RRR, +S1S2, no MRG; no JVD; no peripheral edema  GI: Soft, NT, ND, no rebound, no guarding; no palpable masses; no hepatosplenomegaly; no hernia palpated  LYMPH: No cervical LAD or tenderness; no axillary LAD or tenderness; no inguinal LAD or tenderness  MSK: Normal gait; No digital clubbing or cyanosis; examination of the (head/neck/spine/ribs/pelvis, RUE, LUE, RLE, LLE) without misalignment,            Normal ROM without pain, no spinal tenderness, normal muscle strength/tone  SKIN: No rashes or ulcers noted; no subcutaneous nodules or induration palpable  NEURO: CN II-XII intact; normal reflexes in upper and lower extremities, sensation intact in upper and lower extremities b/l to light touch   PSYCH: Appropriate insight/judgment; A+O x 3, mood and affect appropriate, recent/remote memory intact      LABS                        9.8    4.56  )-----------( 233      ( 24 Feb 2025 05:46 )             31.6       CBC Full  -  ( 24 Feb 2025 05:46 )  WBC Count : 4.56 K/uL  RBC Count : 3.57 M/uL  Hemoglobin : 9.8 g/dL  Hematocrit : 31.6 %  Platelet Count - Automated : 233 K/uL  Mean Cell Volume : 88.5 fl  Mean Cell Hemoglobin : 27.5 pg  Mean Cell Hemoglobin Concentration : 31.0 g/dL  Auto Neutrophil # : 3.72 K/uL  Auto Lymphocyte # : 0.58 K/uL  Auto Monocyte # : 0.14 K/uL  Auto Eosinophil # : 0.08 K/uL  Auto Basophil # : 0.02 K/uL  Auto Neutrophil % : 81.6 %  Auto Lymphocyte % : 12.7 %  Auto Monocyte % : 3.1 %  Auto Eosinophil % : 1.8 %  Auto Basophil % : 0.4 %      02-24    137  |  107  |  68.1[H]  ----------------------------<  88  4.7   |  16.0[L]  |  2.34[H]    Ca    8.2[L]      24 Feb 2025 05:46  Phos  2.9     02-24  Mg     1.9     02-24    TPro  4.6[L]  /  Alb  2.3[L]  /  TBili  <0.2[L]  /  DBili  x   /  AST  8   /  ALT  8   /  AlkPhos  85  02-24      CAPILLARY BLOOD GLUCOSE      POCT Blood Glucose.: 70 mg/dL (24 Feb 2025 04:07)  POCT Blood Glucose.: 192 mg/dL (23 Feb 2025 21:37)  POCT Blood Glucose.: 274 mg/dL (23 Feb 2025 17:13)  POCT Blood Glucose.: 255 mg/dL (23 Feb 2025 11:30)  POCT Blood Glucose.: 189 mg/dL (23 Feb 2025 09:37)  POCT Blood Glucose.: 114 mg/dL (23 Feb 2025 08:25)  POCT Blood Glucose.: 52 mg/dL (23 Feb 2025 07:43)  POCT Blood Glucose.: 53 mg/dL (23 Feb 2025 07:42)          Urinalysis Basic - ( 24 Feb 2025 05:46 )    Color: x / Appearance: x / SG: x / pH: x  Gluc: 88 mg/dL / Ketone: x  / Bili: x / Urobili: x   Blood: x / Protein: x / Nitrite: x   Leuk Esterase: x / RBC: x / WBC x   Sq Epi: x / Non Sq Epi: x / Bacteria: x        IMAGING          HPI  Patient is a 59y Female with PMHx SLE, ESRD, HTN, hypothyroidism, IDDM, HFpEF  admitted for HHS. Pt was previously admitted 1/22-1/31 for dyspnea + worsening renal failure + bilateral pleural effusions s/p R PTC. Renal biopsy demonstrating LN or diabetic nephropathy.     INTERVAL EVENTS    HOSPITAL COURSE  Pt presented to the ED on 2/22 with hyperglycemia with home glucose measured at 512, /84, RR 86, RR 18, O2 99%, T 36.4C in triage. Admitted to MICU with DKA and started on insulin gtt that was turned off on 2/22 at 1pm. 2/23, Endocrine consulted, rec loading dose of levothyroxine 100mcg with 75mcg maintenance to follow.     REVIEW OF SYSTEMS Endorsing right hand pain  General: No fatigue, decreased apatite, weight loss  HEENT: No cough, throat pain, rhinorrhea hemoptysis    Chest: No shortness of breath, chest pain  Abdomen: No abdominal pain, hematemesis   Genitourinary: No dysuria, No hematuria   Extremities: No joint pain, no change in range of motion  Skin: No rashes, ecchymoses purpura, nodules       MEDICATIONS  MEDICATIONS  (STANDING):  amLODIPine   Tablet 5 milliGRAM(s) Oral daily  chlorhexidine 2% Cloths 1 Application(s) Topical <User Schedule>  cyanocobalamin 1000 MICROGram(s) Oral daily  dextrose 5%. 1000 milliLiter(s) (50 mL/Hr) IV Continuous <Continuous>  dextrose 5%. 1000 milliLiter(s) (100 mL/Hr) IV Continuous <Continuous>  dextrose 50% Injectable 25 Gram(s) IV Push once  dextrose 50% Injectable 12.5 Gram(s) IV Push once  dextrose 50% Injectable 25 Gram(s) IV Push once  dextrose Oral Gel 15 Gram(s) Oral once  folic acid 1 milliGRAM(s) Oral daily  gabapentin 300 milliGRAM(s) Oral every 12 hours  glucagon  Injectable 1 milliGRAM(s) IntraMuscular once  heparin   Injectable 5000 Unit(s) SubCutaneous every 8 hours  hydroxychloroquine 200 milliGRAM(s) Oral every 12 hours  influenza   Vaccine 0.5 milliLiter(s) IntraMuscular once  insulin glargine Injectable (LANTUS) 8 Unit(s) SubCutaneous at bedtime  insulin lispro (ADMELOG) corrective regimen sliding scale   SubCutaneous three times a day before meals  insulin lispro Injectable (ADMELOG) 4 Unit(s) SubCutaneous three times a day before meals  levothyroxine 75 MICROGram(s) Oral daily  levothyroxine 100 MICROGram(s) Oral once  losartan 25 milliGRAM(s) Oral daily  mycophenolate mofetil 500 milliGRAM(s) Oral two times a day  pantoprazole    Tablet 40 milliGRAM(s) Oral before breakfast  predniSONE   Tablet 10 milliGRAM(s) Oral daily  sodium bicarbonate 650 milliGRAM(s) Oral three times a day  trimethoprim   80 mG/sulfamethoxazole 400 mG 1 Tablet(s) Oral <User Schedule>    MEDICATIONS  (PRN):  acetaminophen     Tablet .. 650 milliGRAM(s) Oral every 6 hours PRN Mild Pain (1 - 3)      ALLERGIES  Allergies    No Known Allergies    Intolerances        PHYSICAL EXAM   Vital Signs Last 24 Hrs  T(C): 36.4 (24 Feb 2025 04:39), Max: 36.9 (23 Feb 2025 07:00)  T(F): 97.5 (24 Feb 2025 04:39), Max: 98.5 (23 Feb 2025 12:06)  HR: 83 (24 Feb 2025 06:15) (82 - 95)  BP: 92/56 (24 Feb 2025 06:15) (92/56 - 159/78)  BP(mean): 80 (23 Feb 2025 14:00) (80 - 95)  RR: 18 (24 Feb 2025 04:39) (12 - 21)  SpO2: 97% (24 Feb 2025 04:39) (95% - 100%)    Parameters below as of 24 Feb 2025 04:39  Patient On (Oxygen Delivery Method): room air        T(C): 36.4 (02-24-25 @ 04:39), Max: 36.9 (02-23-25 @ 07:00)  HR: 83 (02-24-25 @ 06:15) (82 - 95)  BP: 92/56 (02-24-25 @ 06:15) (92/56 - 159/78)  RR: 18 (02-24-25 @ 04:39) (12 - 21)  SpO2: 97% (02-24-25 @ 04:39) (95% - 100%)    CONSTITUTIONAL: Well groomed, no apparent distress, sitting in bed comfortably   EYES: symmetric, EOMI, No conjunctival or scleral injection, non-icteric  ENMT: Oral mucosa with moist membranes. Normal dentition  NECK:  symmetric and without tracheal deviation   RESP: No respiratory distress, no use of accessory muscles; CTA b/l, no WRR  CV: RRR, +S1S2, no MRG; no JVD; no peripheral edema  GI: Soft, NT, ND, no rebound, no guarding  MSK: No digital clubbing or cyanosis. Pos phalen sign.    SKIN: No rashes or ulcers noted  NEURO:  sensation intact in upper and lower extremities b/l to light touch   PSYCH: Appropriate insight/judgment; A+O x 4, mood and affect appropriate, recent/remote memory intact      LABS                        9.8    4.56  )-----------( 233      ( 24 Feb 2025 05:46 )             31.6       CBC Full  -  ( 24 Feb 2025 05:46 )  WBC Count : 4.56 K/uL  RBC Count : 3.57 M/uL  Hemoglobin : 9.8 g/dL  Hematocrit : 31.6 %  Platelet Count - Automated : 233 K/uL  Mean Cell Volume : 88.5 fl  Mean Cell Hemoglobin : 27.5 pg  Mean Cell Hemoglobin Concentration : 31.0 g/dL  Auto Neutrophil # : 3.72 K/uL  Auto Lymphocyte # : 0.58 K/uL  Auto Monocyte # : 0.14 K/uL  Auto Eosinophil # : 0.08 K/uL  Auto Basophil # : 0.02 K/uL  Auto Neutrophil % : 81.6 %  Auto Lymphocyte % : 12.7 %  Auto Monocyte % : 3.1 %  Auto Eosinophil % : 1.8 %  Auto Basophil % : 0.4 %      02-24    137  |  107  |  68.1[H]  ----------------------------<  88  4.7   |  16.0[L]  |  2.34[H]    Ca    8.2[L]      24 Feb 2025 05:46  Phos  2.9     02-24  Mg     1.9     02-24    TPro  4.6[L]  /  Alb  2.3[L]  /  TBili  <0.2[L]  /  DBili  x   /  AST  8   /  ALT  8   /  AlkPhos  85  02-24      CAPILLARY BLOOD GLUCOSE      POCT Blood Glucose.: 70 mg/dL (24 Feb 2025 04:07)  POCT Blood Glucose.: 192 mg/dL (23 Feb 2025 21:37)  POCT Blood Glucose.: 274 mg/dL (23 Feb 2025 17:13)  POCT Blood Glucose.: 255 mg/dL (23 Feb 2025 11:30)  POCT Blood Glucose.: 189 mg/dL (23 Feb 2025 09:37)  POCT Blood Glucose.: 114 mg/dL (23 Feb 2025 08:25)  POCT Blood Glucose.: 52 mg/dL (23 Feb 2025 07:43)  POCT Blood Glucose.: 53 mg/dL (23 Feb 2025 07:42)          Urinalysis Basic - ( 24 Feb 2025 05:46 )    Color: x / Appearance: x / SG: x / pH: x  Gluc: 88 mg/dL / Ketone: x  / Bili: x / Urobili: x   Blood: x / Protein: x / Nitrite: x   Leuk Esterase: x / RBC: x / WBC x   Sq Epi: x / Non Sq Epi: x / Bacteria: x        IMAGING          HPI  Patient is a 59y Female with PMHx SLE, ESRD, HTN, hypothyroidism, IDDM, HFpEF  admitted for HHS. Pt was previously admitted 1/22-1/31 for dyspnea + worsening renal failure + bilateral pleural effusions s/p R PTC. Renal biopsy demonstrating LN or diabetic nephropathy.     INTERVAL EVENTS  Dispo pending insulin optimization, per Endo.     HOSPITAL COURSE  Pt presented to the ED on 2/22 with hyperglycemia with home glucose measured at 512, /84, RR 86, RR 18, O2 99%, T 36.4C in triage. Admitted to MICU with DKA and started on insulin gtt that was turned off on 2/22 at 1pm. 2/23, Endocrine consulted, rec loading dose of levothyroxine 100mcg with 75mcg maintenance to follow.     REVIEW OF SYSTEMS Endorsing right hand pain  General: No fatigue, decreased apatite, weight loss  HEENT: No cough, throat pain, rhinorrhea hemoptysis    Chest: No shortness of breath, chest pain  Abdomen: No abdominal pain, hematemesis   Genitourinary: No dysuria, No hematuria   Extremities: No joint pain, no change in range of motion  Skin: No rashes, ecchymoses purpura, nodules       MEDICATIONS  MEDICATIONS  (STANDING):  amLODIPine   Tablet 5 milliGRAM(s) Oral daily  chlorhexidine 2% Cloths 1 Application(s) Topical <User Schedule>  cyanocobalamin 1000 MICROGram(s) Oral daily  dextrose 5%. 1000 milliLiter(s) (50 mL/Hr) IV Continuous <Continuous>  dextrose 5%. 1000 milliLiter(s) (100 mL/Hr) IV Continuous <Continuous>  dextrose 50% Injectable 25 Gram(s) IV Push once  dextrose 50% Injectable 12.5 Gram(s) IV Push once  dextrose 50% Injectable 25 Gram(s) IV Push once  dextrose Oral Gel 15 Gram(s) Oral once  folic acid 1 milliGRAM(s) Oral daily  gabapentin 300 milliGRAM(s) Oral every 12 hours  glucagon  Injectable 1 milliGRAM(s) IntraMuscular once  heparin   Injectable 5000 Unit(s) SubCutaneous every 8 hours  hydroxychloroquine 200 milliGRAM(s) Oral every 12 hours  influenza   Vaccine 0.5 milliLiter(s) IntraMuscular once  insulin glargine Injectable (LANTUS) 8 Unit(s) SubCutaneous at bedtime  insulin lispro (ADMELOG) corrective regimen sliding scale   SubCutaneous three times a day before meals  insulin lispro Injectable (ADMELOG) 4 Unit(s) SubCutaneous three times a day before meals  levothyroxine 75 MICROGram(s) Oral daily  levothyroxine 100 MICROGram(s) Oral once  losartan 25 milliGRAM(s) Oral daily  mycophenolate mofetil 500 milliGRAM(s) Oral two times a day  pantoprazole    Tablet 40 milliGRAM(s) Oral before breakfast  predniSONE   Tablet 10 milliGRAM(s) Oral daily  sodium bicarbonate 650 milliGRAM(s) Oral three times a day  trimethoprim   80 mG/sulfamethoxazole 400 mG 1 Tablet(s) Oral <User Schedule>    MEDICATIONS  (PRN):  acetaminophen     Tablet .. 650 milliGRAM(s) Oral every 6 hours PRN Mild Pain (1 - 3)      ALLERGIES  Allergies    No Known Allergies    Intolerances        PHYSICAL EXAM   Vital Signs Last 24 Hrs  T(C): 36.4 (24 Feb 2025 04:39), Max: 36.9 (23 Feb 2025 07:00)  T(F): 97.5 (24 Feb 2025 04:39), Max: 98.5 (23 Feb 2025 12:06)  HR: 83 (24 Feb 2025 06:15) (82 - 95)  BP: 92/56 (24 Feb 2025 06:15) (92/56 - 159/78)  BP(mean): 80 (23 Feb 2025 14:00) (80 - 95)  RR: 18 (24 Feb 2025 04:39) (12 - 21)  SpO2: 97% (24 Feb 2025 04:39) (95% - 100%)    Parameters below as of 24 Feb 2025 04:39  Patient On (Oxygen Delivery Method): room air        T(C): 36.4 (02-24-25 @ 04:39), Max: 36.9 (02-23-25 @ 07:00)  HR: 83 (02-24-25 @ 06:15) (82 - 95)  BP: 92/56 (02-24-25 @ 06:15) (92/56 - 159/78)  RR: 18 (02-24-25 @ 04:39) (12 - 21)  SpO2: 97% (02-24-25 @ 04:39) (95% - 100%)    CONSTITUTIONAL: Well groomed, no apparent distress, sitting in bed comfortably   EYES: symmetric, EOMI, No conjunctival or scleral injection, non-icteric  ENMT: Oral mucosa with moist membranes. Normal dentition  NECK:  symmetric and without tracheal deviation   RESP: No respiratory distress, no use of accessory muscles; CTA b/l, no WRR  CV: RRR, +S1S2, no MRG; no JVD; no peripheral edema  GI: Soft, NT, ND, no rebound, no guarding  MSK: No digital clubbing or cyanosis. Pos phalen sign.    SKIN: No rashes or ulcers noted  NEURO:  sensation intact in upper and lower extremities b/l to light touch   PSYCH: Appropriate insight/judgment; A+O x 4, mood and affect appropriate, recent/remote memory intact      LABS                        9.8    4.56  )-----------( 233      ( 24 Feb 2025 05:46 )             31.6       CBC Full  -  ( 24 Feb 2025 05:46 )  WBC Count : 4.56 K/uL  RBC Count : 3.57 M/uL  Hemoglobin : 9.8 g/dL  Hematocrit : 31.6 %  Platelet Count - Automated : 233 K/uL  Mean Cell Volume : 88.5 fl  Mean Cell Hemoglobin : 27.5 pg  Mean Cell Hemoglobin Concentration : 31.0 g/dL  Auto Neutrophil # : 3.72 K/uL  Auto Lymphocyte # : 0.58 K/uL  Auto Monocyte # : 0.14 K/uL  Auto Eosinophil # : 0.08 K/uL  Auto Basophil # : 0.02 K/uL  Auto Neutrophil % : 81.6 %  Auto Lymphocyte % : 12.7 %  Auto Monocyte % : 3.1 %  Auto Eosinophil % : 1.8 %  Auto Basophil % : 0.4 %      02-24    137  |  107  |  68.1[H]  ----------------------------<  88  4.7   |  16.0[L]  |  2.34[H]    Ca    8.2[L]      24 Feb 2025 05:46  Phos  2.9     02-24  Mg     1.9     02-24    TPro  4.6[L]  /  Alb  2.3[L]  /  TBili  <0.2[L]  /  DBili  x   /  AST  8   /  ALT  8   /  AlkPhos  85  02-24      CAPILLARY BLOOD GLUCOSE      POCT Blood Glucose.: 70 mg/dL (24 Feb 2025 04:07)  POCT Blood Glucose.: 192 mg/dL (23 Feb 2025 21:37)  POCT Blood Glucose.: 274 mg/dL (23 Feb 2025 17:13)  POCT Blood Glucose.: 255 mg/dL (23 Feb 2025 11:30)  POCT Blood Glucose.: 189 mg/dL (23 Feb 2025 09:37)  POCT Blood Glucose.: 114 mg/dL (23 Feb 2025 08:25)  POCT Blood Glucose.: 52 mg/dL (23 Feb 2025 07:43)  POCT Blood Glucose.: 53 mg/dL (23 Feb 2025 07:42)          Urinalysis Basic - ( 24 Feb 2025 05:46 )    Color: x / Appearance: x / SG: x / pH: x  Gluc: 88 mg/dL / Ketone: x  / Bili: x / Urobili: x   Blood: x / Protein: x / Nitrite: x   Leuk Esterase: x / RBC: x / WBC x   Sq Epi: x / Non Sq Epi: x / Bacteria: x        IMAGING

## 2025-02-24 NOTE — DISCHARGE NOTE PROVIDER - NSDCFUSCHEDAPPT_GEN_ALL_CORE_FT
Pratik Caceres  [facility]  NEPHRO [address]  Scheduled Appointment: 02/28/2025    Bre Kaufamn  [facility]  PULMMED [address]  Scheduled Appointment: 03/03/2025    [facility]  ENDOCRIN [address]  Scheduled Appointment: 03/04/2025    Magdalena Espinoza  [facility]  ENDOCRIN [address]  Scheduled Appointment: 03/05/2025    Jasmin Kirkpatrick  [facility]  RHEUM [address]  Scheduled Appointment: 03/12/2025    Alcides Fitch  [facility]  ENDOCRIN [address]  Scheduled Appointment: 04/29/2025

## 2025-02-24 NOTE — DISCHARGE NOTE PROVIDER - PROVIDER TOKENS
no PROVIDER:[TOKEN:[[id number]]] PROVIDER:[TOKEN:[[id number]]],PROVIDER:[TOKEN:[[id number]]]

## 2025-02-25 ENCOUNTER — TRANSCRIPTION ENCOUNTER (OUTPATIENT)
Age: 60
End: 2025-02-25

## 2025-02-25 ENCOUNTER — NON-APPOINTMENT (OUTPATIENT)
Age: 60
End: 2025-02-25

## 2025-02-25 VITALS
DIASTOLIC BLOOD PRESSURE: 73 MMHG | RESPIRATION RATE: 18 BRPM | SYSTOLIC BLOOD PRESSURE: 140 MMHG | OXYGEN SATURATION: 97 % | TEMPERATURE: 98 F | HEART RATE: 89 BPM

## 2025-02-25 LAB
ALBUMIN SERPL ELPH-MCNC: 2.6 G/DL — LOW (ref 3.3–5.2)
ALP SERPL-CCNC: 96 U/L — SIGNIFICANT CHANGE UP (ref 40–120)
ALT FLD-CCNC: 9 U/L — SIGNIFICANT CHANGE UP
ANION GAP SERPL CALC-SCNC: 14 MMOL/L — SIGNIFICANT CHANGE UP (ref 5–17)
ANISOCYTOSIS BLD QL: SLIGHT — SIGNIFICANT CHANGE UP
AST SERPL-CCNC: 11 U/L — SIGNIFICANT CHANGE UP
BASOPHILS # BLD AUTO: 0.01 K/UL — SIGNIFICANT CHANGE UP (ref 0–0.2)
BASOPHILS # BLD MANUAL: 0.05 K/UL — SIGNIFICANT CHANGE UP (ref 0–0.2)
BASOPHILS NFR BLD AUTO: 0.2 % — SIGNIFICANT CHANGE UP (ref 0–2)
BASOPHILS NFR BLD MANUAL: 0.9 % — SIGNIFICANT CHANGE UP (ref 0–2)
BILIRUB SERPL-MCNC: <0.2 MG/DL — LOW (ref 0.4–2)
BUN SERPL-MCNC: 57.4 MG/DL — HIGH (ref 8–20)
CALCIUM SERPL-MCNC: 8.5 MG/DL — SIGNIFICANT CHANGE UP (ref 8.4–10.5)
CHLORIDE SERPL-SCNC: 105 MMOL/L — SIGNIFICANT CHANGE UP (ref 96–108)
CO2 SERPL-SCNC: 18 MMOL/L — LOW (ref 22–29)
CREAT SERPL-MCNC: 2.21 MG/DL — HIGH (ref 0.5–1.3)
EGFR: 25 ML/MIN/1.73M2 — LOW
EOSINOPHIL # BLD AUTO: 0.03 K/UL — SIGNIFICANT CHANGE UP (ref 0–0.5)
EOSINOPHIL # BLD MANUAL: 0 K/UL — SIGNIFICANT CHANGE UP (ref 0–0.5)
EOSINOPHIL NFR BLD AUTO: 0.5 % — SIGNIFICANT CHANGE UP (ref 0–6)
EOSINOPHIL NFR BLD MANUAL: 0 % — SIGNIFICANT CHANGE UP (ref 0–6)
GIANT PLATELETS BLD QL SMEAR: PRESENT
GLUCOSE BLDC GLUCOMTR-MCNC: 203 MG/DL — HIGH (ref 70–99)
GLUCOSE BLDC GLUCOMTR-MCNC: 80 MG/DL — SIGNIFICANT CHANGE UP (ref 70–99)
GLUCOSE BLDC GLUCOMTR-MCNC: 85 MG/DL — SIGNIFICANT CHANGE UP (ref 70–99)
GLUCOSE SERPL-MCNC: 117 MG/DL — HIGH (ref 70–99)
HCT VFR BLD CALC: 32.2 % — LOW (ref 34.5–45)
HGB BLD-MCNC: 9.9 G/DL — LOW (ref 11.5–15.5)
HYPOCHROMIA BLD QL: SLIGHT — SIGNIFICANT CHANGE UP
IMM GRANULOCYTES # BLD AUTO: 0.04 K/UL — SIGNIFICANT CHANGE UP (ref 0–0.07)
IMM GRANULOCYTES NFR BLD AUTO: 0.7 % — SIGNIFICANT CHANGE UP (ref 0–0.9)
LYMPHOCYTES # BLD AUTO: 0.41 K/UL — LOW (ref 1–3.3)
LYMPHOCYTES # BLD MANUAL: 0.24 K/UL — LOW (ref 1–3.3)
LYMPHOCYTES NFR BLD AUTO: 7.3 % — LOW (ref 13–44)
LYMPHOCYTES NFR BLD MANUAL: 4.3 % — LOW (ref 13–44)
MAGNESIUM SERPL-MCNC: 1.8 MG/DL — SIGNIFICANT CHANGE UP (ref 1.6–2.6)
MCHC RBC-ENTMCNC: 27.2 PG — SIGNIFICANT CHANGE UP (ref 27–34)
MCHC RBC-ENTMCNC: 30.7 G/DL — LOW (ref 32–36)
MCV RBC AUTO: 88.5 FL — SIGNIFICANT CHANGE UP (ref 80–100)
MONOCYTES # BLD AUTO: 0.17 K/UL — SIGNIFICANT CHANGE UP (ref 0–0.9)
MONOCYTES # BLD MANUAL: 0.24 K/UL — SIGNIFICANT CHANGE UP (ref 0–0.9)
MONOCYTES NFR BLD AUTO: 3 % — SIGNIFICANT CHANGE UP (ref 2–14)
MONOCYTES NFR BLD MANUAL: 4.3 % — SIGNIFICANT CHANGE UP (ref 2–14)
NEUTROPHILS # BLD AUTO: 4.99 K/UL — SIGNIFICANT CHANGE UP (ref 1.8–7.4)
NEUTROPHILS # BLD MANUAL: 5.11 K/UL — SIGNIFICANT CHANGE UP (ref 1.8–7.4)
NEUTROPHILS NFR BLD AUTO: 88.3 % — HIGH (ref 43–77)
NEUTROPHILS NFR BLD MANUAL: 90.5 % — HIGH (ref 43–77)
NRBC # BLD AUTO: 0 K/UL — SIGNIFICANT CHANGE UP (ref 0–0)
NRBC # FLD: 0 K/UL — SIGNIFICANT CHANGE UP (ref 0–0)
NRBC BLD AUTO-RTO: 0 /100 WBCS — SIGNIFICANT CHANGE UP (ref 0–0)
PLAT MORPH BLD: ABNORMAL
PLATELET # BLD AUTO: 221 K/UL — SIGNIFICANT CHANGE UP (ref 150–400)
PMV BLD: 9.2 FL — SIGNIFICANT CHANGE UP (ref 7–13)
POLYCHROMASIA BLD QL SMEAR: SLIGHT — SIGNIFICANT CHANGE UP
POTASSIUM SERPL-MCNC: 5.2 MMOL/L — SIGNIFICANT CHANGE UP (ref 3.5–5.3)
POTASSIUM SERPL-SCNC: 5.2 MMOL/L — SIGNIFICANT CHANGE UP (ref 3.5–5.3)
PROT SERPL-MCNC: 5.1 G/DL — LOW (ref 6.6–8.7)
RBC # BLD: 3.64 M/UL — LOW (ref 3.8–5.2)
RBC # FLD: 18.7 % — HIGH (ref 10.3–14.5)
RBC BLD AUTO: ABNORMAL
SODIUM SERPL-SCNC: 137 MMOL/L — SIGNIFICANT CHANGE UP (ref 135–145)
WBC # BLD: 5.65 K/UL — SIGNIFICANT CHANGE UP (ref 3.8–10.5)
WBC # FLD AUTO: 5.65 K/UL — SIGNIFICANT CHANGE UP (ref 3.8–10.5)

## 2025-02-25 PROCEDURE — 82962 GLUCOSE BLOOD TEST: CPT

## 2025-02-25 PROCEDURE — 87641 MR-STAPH DNA AMP PROBE: CPT

## 2025-02-25 PROCEDURE — 81001 URINALYSIS AUTO W/SCOPE: CPT

## 2025-02-25 PROCEDURE — 84436 ASSAY OF TOTAL THYROXINE: CPT

## 2025-02-25 PROCEDURE — 82330 ASSAY OF CALCIUM: CPT

## 2025-02-25 PROCEDURE — 82009 KETONE BODYS QUAL: CPT

## 2025-02-25 PROCEDURE — 99232 SBSQ HOSP IP/OBS MODERATE 35: CPT

## 2025-02-25 PROCEDURE — G0378: CPT

## 2025-02-25 PROCEDURE — 83690 ASSAY OF LIPASE: CPT

## 2025-02-25 PROCEDURE — 99239 HOSP IP/OBS DSCHRG MGMT >30: CPT

## 2025-02-25 PROCEDURE — 71045 X-RAY EXAM CHEST 1 VIEW: CPT

## 2025-02-25 PROCEDURE — 84443 ASSAY THYROID STIM HORMONE: CPT

## 2025-02-25 PROCEDURE — 82803 BLOOD GASES ANY COMBINATION: CPT

## 2025-02-25 PROCEDURE — 84100 ASSAY OF PHOSPHORUS: CPT

## 2025-02-25 PROCEDURE — 83735 ASSAY OF MAGNESIUM: CPT

## 2025-02-25 PROCEDURE — 84480 ASSAY TRIIODOTHYRONINE (T3): CPT

## 2025-02-25 PROCEDURE — 93005 ELECTROCARDIOGRAM TRACING: CPT

## 2025-02-25 PROCEDURE — 83605 ASSAY OF LACTIC ACID: CPT

## 2025-02-25 PROCEDURE — 80180 DRUG SCRN QUAN MYCOPHENOLATE: CPT

## 2025-02-25 PROCEDURE — 84295 ASSAY OF SERUM SODIUM: CPT

## 2025-02-25 PROCEDURE — 80048 BASIC METABOLIC PNL TOTAL CA: CPT

## 2025-02-25 PROCEDURE — 84681 ASSAY OF C-PEPTIDE: CPT

## 2025-02-25 PROCEDURE — 96361 HYDRATE IV INFUSION ADD-ON: CPT

## 2025-02-25 PROCEDURE — 85027 COMPLETE CBC AUTOMATED: CPT

## 2025-02-25 PROCEDURE — 82947 ASSAY GLUCOSE BLOOD QUANT: CPT

## 2025-02-25 PROCEDURE — 87040 BLOOD CULTURE FOR BACTERIA: CPT

## 2025-02-25 PROCEDURE — 36415 COLL VENOUS BLD VENIPUNCTURE: CPT

## 2025-02-25 PROCEDURE — 84145 PROCALCITONIN (PCT): CPT

## 2025-02-25 PROCEDURE — 99285 EMERGENCY DEPT VISIT HI MDM: CPT

## 2025-02-25 PROCEDURE — 84132 ASSAY OF SERUM POTASSIUM: CPT

## 2025-02-25 PROCEDURE — 93971 EXTREMITY STUDY: CPT

## 2025-02-25 PROCEDURE — 80053 COMPREHEN METABOLIC PANEL: CPT

## 2025-02-25 PROCEDURE — 82435 ASSAY OF BLOOD CHLORIDE: CPT

## 2025-02-25 PROCEDURE — 85025 COMPLETE CBC W/AUTO DIFF WBC: CPT

## 2025-02-25 PROCEDURE — 83930 ASSAY OF BLOOD OSMOLALITY: CPT

## 2025-02-25 PROCEDURE — 87086 URINE CULTURE/COLONY COUNT: CPT

## 2025-02-25 PROCEDURE — 96374 THER/PROPH/DIAG INJ IV PUSH: CPT

## 2025-02-25 PROCEDURE — 87640 STAPH A DNA AMP PROBE: CPT

## 2025-02-25 PROCEDURE — 85018 HEMOGLOBIN: CPT

## 2025-02-25 PROCEDURE — 85014 HEMATOCRIT: CPT

## 2025-02-25 PROCEDURE — 96375 TX/PRO/DX INJ NEW DRUG ADDON: CPT

## 2025-02-25 RX ORDER — GABAPENTIN 400 MG/1
1 CAPSULE ORAL
Qty: 30 | Refills: 0
Start: 2025-02-25 | End: 2025-03-26

## 2025-02-25 RX ORDER — GABAPENTIN 400 MG/1
1 CAPSULE ORAL
Refills: 0 | DISCHARGE

## 2025-02-25 RX ORDER — GABAPENTIN 400 MG/1
1 CAPSULE ORAL
Qty: 60 | Refills: 0
Start: 2025-02-25 | End: 2025-03-26

## 2025-02-25 RX ORDER — ACETAMINOPHEN 500 MG/5ML
1000 LIQUID (ML) ORAL ONCE
Refills: 0 | Status: COMPLETED | OUTPATIENT
Start: 2025-02-25 | End: 2025-02-25

## 2025-02-25 RX ORDER — SODIUM ZIRCONIUM CYCLOSILICATE 5 G/5G
5 POWDER, FOR SUSPENSION ORAL ONCE
Refills: 0 | Status: DISCONTINUED | OUTPATIENT
Start: 2025-02-25 | End: 2025-02-25

## 2025-02-25 RX ADMIN — AMLODIPINE BESYLATE 5 MILLIGRAM(S): 10 TABLET ORAL at 05:24

## 2025-02-25 RX ADMIN — LOSARTAN POTASSIUM 25 MILLIGRAM(S): 100 TABLET, FILM COATED ORAL at 05:24

## 2025-02-25 RX ADMIN — Medication 650 MILLIGRAM(S): at 13:13

## 2025-02-25 RX ADMIN — HYDROXYCHLOROQUINE SULFATE 200 MILLIGRAM(S): 200 TABLET, FILM COATED ORAL at 05:24

## 2025-02-25 RX ADMIN — GABAPENTIN 300 MILLIGRAM(S): 400 CAPSULE ORAL at 05:23

## 2025-02-25 RX ADMIN — INSULIN LISPRO 4 UNIT(S): 100 INJECTION, SOLUTION INTRAVENOUS; SUBCUTANEOUS at 11:47

## 2025-02-25 RX ADMIN — Medication 650 MILLIGRAM(S): at 05:24

## 2025-02-25 RX ADMIN — FOLIC ACID 1 MILLIGRAM(S): 1 TABLET ORAL at 13:12

## 2025-02-25 RX ADMIN — Medication 400 MILLIGRAM(S): at 09:07

## 2025-02-25 RX ADMIN — Medication 1 APPLICATION(S): at 05:29

## 2025-02-25 RX ADMIN — PREDNISONE 10 MILLIGRAM(S): 20 TABLET ORAL at 05:24

## 2025-02-25 RX ADMIN — OXYCODONE HYDROCHLORIDE 5 MILLIGRAM(S): 30 TABLET ORAL at 00:34

## 2025-02-25 RX ADMIN — CYANOCOBALAMIN 1000 MICROGRAM(S): 1000 INJECTION INTRAMUSCULAR; SUBCUTANEOUS at 13:12

## 2025-02-25 RX ADMIN — INSULIN LISPRO 2: 100 INJECTION, SOLUTION INTRAVENOUS; SUBCUTANEOUS at 11:47

## 2025-02-25 RX ADMIN — MYCOPHENOLATE MOFETIL 500 MILLIGRAM(S): 500 TABLET, FILM COATED ORAL at 05:23

## 2025-02-25 RX ADMIN — HEPARIN SODIUM 5000 UNIT(S): 1000 INJECTION INTRAVENOUS; SUBCUTANEOUS at 05:23

## 2025-02-25 RX ADMIN — HEPARIN SODIUM 5000 UNIT(S): 1000 INJECTION INTRAVENOUS; SUBCUTANEOUS at 13:13

## 2025-02-25 RX ADMIN — Medication 40 MILLIGRAM(S): at 05:26

## 2025-02-25 RX ADMIN — Medication 75 MICROGRAM(S): at 05:24

## 2025-02-25 NOTE — DISCHARGE NOTE NURSING/CASE MANAGEMENT/SOCIAL WORK - PATIENT PORTAL LINK FT
You can access the FollowMyHealth Patient Portal offered by [facility] by registering at the following website: [email or web address]. By joining [facility]’s FollowMyHealth portal, you will also be able to view your health information using other applications (apps) compatible with our system.

## 2025-02-25 NOTE — PROGRESS NOTE ADULT - SUBJECTIVE AND OBJECTIVE BOX
HPI  Patient is a 59y Female with PMHx SLE, ESRD, HTN, hypothyroidism, IDDM, HFpEF  admitted for [facility]. Pt was previously admitted 1/22-1/31 for dyspnea + worsening renal failure + bilateral pleural effusions s/p R PTC. Renal biopsy demonstrating LN or diabetic nephropathy.     INTERVAL EVENTS      HOSPITAL COURSE  Pt presented to the ED on 2/22 with hyperglycemia with home glucose measured at 512, /84, RR 86, RR 18, O2 99%, T 36.4C in triage. Admitted to MICU with DKA and started on insulin gtt that was turned off on 2/22 at 1pm. 2/23, Endocrine consulted, rec loading dose of levothyroxine 100mcg with 75mcg maintenance to follow.         REVIEW OF SYSTEMS   General: No fatigue, decreased apatite, weight loss  HEENT: No cough, throat pain, rhinorrhea hemoptysis    Chest: No shortness of breath, chest pain  Abdomen: No abdominal pain, hematemesis   Genitourinary: No dysuria, No hematuria   Extremities: No joint pain, no change in range of motion  Skin: No rashes, ecchymoses purpura, nodules       MEDICATIONS  MEDICATIONS  (STANDING):  amLODIPine   Tablet 5 milliGRAM(s) Oral daily  chlorhexidine 2% Cloths 1 Application(s) Topical <User Schedule>  cyanocobalamin 1000 MICROGram(s) Oral daily  dextrose 5%. 1000 milliLiter(s) (50 mL/Hr) IV Continuous <Continuous>  dextrose 5%. 1000 milliLiter(s) (100 mL/Hr) IV Continuous <Continuous>  dextrose 50% Injectable 25 Gram(s) IV Push once  dextrose 50% Injectable 12.5 Gram(s) IV Push once  dextrose 50% Injectable 25 Gram(s) IV Push once  dextrose Oral Gel 15 Gram(s) Oral once  folic acid 1 milliGRAM(s) Oral daily  gabapentin 300 milliGRAM(s) Oral every 12 hours  glucagon  Injectable 1 milliGRAM(s) IntraMuscular once  heparin   Injectable 5000 Unit(s) SubCutaneous every 8 hours  hydroxychloroquine 200 milliGRAM(s) Oral every 12 hours  influenza   Vaccine 0.5 milliLiter(s) IntraMuscular once  insulin glargine Injectable (LANTUS) 8 Unit(s) SubCutaneous at bedtime  insulin lispro (ADMELOG) corrective regimen sliding scale   SubCutaneous three times a day before meals  insulin lispro Injectable (ADMELOG) 4 Unit(s) SubCutaneous three times a day before meals  levothyroxine 75 MICROGram(s) Oral daily  levothyroxine 100 MICROGram(s) Oral once  losartan 25 milliGRAM(s) Oral daily  mycophenolate mofetil 500 milliGRAM(s) Oral two times a day  pantoprazole    Tablet 40 milliGRAM(s) Oral before breakfast  predniSONE   Tablet 10 milliGRAM(s) Oral daily  sodium bicarbonate 650 milliGRAM(s) Oral three times a day  trimethoprim   80 mG/sulfamethoxazole 400 mG 1 Tablet(s) Oral <User Schedule>    MEDICATIONS  (PRN):  acetaminophen     Tablet .. 650 milliGRAM(s) Oral every 6 hours PRN Mild Pain (1 - 3)  loperamide 2 milliGRAM(s) Oral daily PRN Diarrhea      ALLERGIES  Allergies    No Known Allergies    Intolerances        PHYSICAL EXAM   Vital Signs Last 24 Hrs  T(C): 36.8 (25 Feb 2025 04:44), Max: 36.9 (24 Feb 2025 09:04)  T(F): 98.3 (25 Feb 2025 04:44), Max: 98.5 (24 Feb 2025 17:13)  HR: 98 (25 Feb 2025 04:44) (86 - 98)  BP: 147/77 (25 Feb 2025 04:44) (123/79 - 147/77)  BP(mean): --  RR: 17 (25 Feb 2025 04:44) (17 - 18)  SpO2: 95% (25 Feb 2025 04:44) (95% - 100%)    Parameters below as of 25 Feb 2025 04:44  Patient On (Oxygen Delivery Method): room air        T(C): 36.8 (02-25-25 @ 04:44), Max: 36.9 (02-24-25 @ 09:04)  HR: 98 (02-25-25 @ 04:44) (86 - 98)  BP: 147/77 (02-25-25 @ 04:44) (123/79 - 147/77)  RR: 17 (02-25-25 @ 04:44) (17 - 18)  SpO2: 95% (02-25-25 @ 04:44) (95% - 100%)    CONSTITUTIONAL: Well groomed, no apparent distress, sitting in bed comfortably   EYES: symmetric, EOMI, No conjunctival or scleral injection, non-icteric  ENMT: Oral mucosa with moist membranes. Normal dentition  NECK:  symmetric and without tracheal deviation   RESP: No respiratory distress, no use of accessory muscles; CTA b/l, no WRR  CV: RRR, +S1S2, no MRG; no JVD; no peripheral edema  GI: Soft, NT, ND, no rebound, no guarding  MSK: No digital clubbing or cyanosis. Pos phalen sign.    SKIN: No rashes or ulcers noted  NEURO:  sensation intact in upper and lower extremities b/l to light touch   PSYCH: Appropriate insight/judgment; A+O x 4, mood and affect appropriate, recent/remote memory intact        LABS                        9.9    5.65  )-----------( 221      ( 25 Feb 2025 06:11 )             32.2       CBC Full  -  ( 25 Feb 2025 06:11 )  WBC Count : 5.65 K/uL  RBC Count : 3.64 M/uL  Hemoglobin : 9.9 g/dL  Hematocrit : 32.2 %  Platelet Count - Automated : 221 K/uL  Mean Cell Volume : 88.5 fl  Mean Cell Hemoglobin : 27.2 pg  Mean Cell Hemoglobin Concentration : 30.7 g/dL  Auto Neutrophil # : x  Auto Lymphocyte # : x  Auto Monocyte # : x  Auto Eosinophil # : x  Auto Basophil # : x  Auto Neutrophil % : x  Auto Lymphocyte % : x  Auto Monocyte % : x  Auto Eosinophil % : x  Auto Basophil % : x      02-25    137  |  105  |  57.4[H]  ----------------------------<  117[H]  5.2   |  18.0[L]  |  2.21[H]    Ca    8.5      25 Feb 2025 06:11  Phos  2.9     02-24  Mg     1.8     02-25    TPro  5.1[L]  /  Alb  2.6[L]  /  TBili  <0.2[L]  /  DBili  x   /  AST  11  /  ALT  9   /  AlkPhos  96  02-25      CAPILLARY BLOOD GLUCOSE      POCT Blood Glucose.: 80 mg/dL (25 Feb 2025 04:26)  POCT Blood Glucose.: 115 mg/dL (24 Feb 2025 22:19)  POCT Blood Glucose.: 199 mg/dL (24 Feb 2025 17:41)  POCT Blood Glucose.: 285 mg/dL (24 Feb 2025 12:39)  POCT Blood Glucose.: 73 mg/dL (24 Feb 2025 08:43)          Urinalysis Basic - ( 25 Feb 2025 06:11 )    Color: x / Appearance: x / SG: x / pH: x  Gluc: 117 mg/dL / Ketone: x  / Bili: x / Urobili: x   Blood: x / Protein: x / Nitrite: x   Leuk Esterase: x / RBC: x / WBC x   Sq Epi: x / Non Sq Epi: x / Bacteria: x        IMAGING          HPI  Patient is a 59y Female with PMHx SLE, ESRD, HTN, hypothyroidism, IDDM, HFpEF  admitted for [facility]. Pt was previously admitted 1/22-1/31 for dyspnea + worsening renal failure + bilateral pleural effusions s/p R PTC. Renal biopsy demonstrating LN or diabetic nephropathy.     INTERVAL EVENTS    HOSPITAL COURSE  Pt presented to the ED on 2/22 with hyperglycemia with home glucose measured at 512, /84, RR 86, RR 18, O2 99%, T 36.4C in triage. Admitted to MICU with DKA and started on insulin gtt that was turned off on 2/22 at 1pm. 2/23, Endocrine consulted, rec loading dose of levothyroxine 100mcg with 75mcg maintenance to follow.         REVIEW OF SYSTEMS Endorsing right hand pain + RLE edema   General: No fatigue, decreased apatite, weight loss  HEENT: No cough, throat pain, rhinorrhea hemoptysis    Chest: No shortness of breath, chest pain  Abdomen: No abdominal pain, hematemesis   Genitourinary: No dysuria, No hematuria   Extremities: R. hand pain + swelling with reduced adduction ROM   Skin: No rashes, ecchymoses purpura, nodules       MEDICATIONS  MEDICATIONS  (STANDING):  amLODIPine   Tablet 5 milliGRAM(s) Oral daily  chlorhexidine 2% Cloths 1 Application(s) Topical <User Schedule>  cyanocobalamin 1000 MICROGram(s) Oral daily  dextrose 5%. 1000 milliLiter(s) (50 mL/Hr) IV Continuous <Continuous>  dextrose 5%. 1000 milliLiter(s) (100 mL/Hr) IV Continuous <Continuous>  dextrose 50% Injectable 25 Gram(s) IV Push once  dextrose 50% Injectable 12.5 Gram(s) IV Push once  dextrose 50% Injectable 25 Gram(s) IV Push once  dextrose Oral Gel 15 Gram(s) Oral once  folic acid 1 milliGRAM(s) Oral daily  gabapentin 300 milliGRAM(s) Oral every 12 hours  glucagon  Injectable 1 milliGRAM(s) IntraMuscular once  heparin   Injectable 5000 Unit(s) SubCutaneous every 8 hours  hydroxychloroquine 200 milliGRAM(s) Oral every 12 hours  influenza   Vaccine 0.5 milliLiter(s) IntraMuscular once  insulin glargine Injectable (LANTUS) 8 Unit(s) SubCutaneous at bedtime  insulin lispro (ADMELOG) corrective regimen sliding scale   SubCutaneous three times a day before meals  insulin lispro Injectable (ADMELOG) 4 Unit(s) SubCutaneous three times a day before meals  levothyroxine 75 MICROGram(s) Oral daily  levothyroxine 100 MICROGram(s) Oral once  losartan 25 milliGRAM(s) Oral daily  mycophenolate mofetil 500 milliGRAM(s) Oral two times a day  pantoprazole    Tablet 40 milliGRAM(s) Oral before breakfast  predniSONE   Tablet 10 milliGRAM(s) Oral daily  sodium bicarbonate 650 milliGRAM(s) Oral three times a day  trimethoprim   80 mG/sulfamethoxazole 400 mG 1 Tablet(s) Oral <User Schedule>    MEDICATIONS  (PRN):  acetaminophen     Tablet .. 650 milliGRAM(s) Oral every 6 hours PRN Mild Pain (1 - 3)  loperamide 2 milliGRAM(s) Oral daily PRN Diarrhea      ALLERGIES  Allergies    No Known Allergies    Intolerances        PHYSICAL EXAM   Vital Signs Last 24 Hrs  T(C): 36.8 (25 Feb 2025 04:44), Max: 36.9 (24 Feb 2025 09:04)  T(F): 98.3 (25 Feb 2025 04:44), Max: 98.5 (24 Feb 2025 17:13)  HR: 98 (25 Feb 2025 04:44) (86 - 98)  BP: 147/77 (25 Feb 2025 04:44) (123/79 - 147/77)  BP(mean): --  RR: 17 (25 Feb 2025 04:44) (17 - 18)  SpO2: 95% (25 Feb 2025 04:44) (95% - 100%)    Parameters below as of 25 Feb 2025 04:44  Patient On (Oxygen Delivery Method): room air        T(C): 36.8 (02-25-25 @ 04:44), Max: 36.9 (02-24-25 @ 09:04)  HR: 98 (02-25-25 @ 04:44) (86 - 98)  BP: 147/77 (02-25-25 @ 04:44) (123/79 - 147/77)  RR: 17 (02-25-25 @ 04:44) (17 - 18)  SpO2: 95% (02-25-25 @ 04:44) (95% - 100%)    CONSTITUTIONAL: Well groomed, no apparent distress, sitting in bed comfortably   EYES: symmetric, EOMI, No conjunctival or scleral injection, non-icteric  ENMT: Oral mucosa with moist membranes. Normal dentition  NECK:  symmetric and without tracheal deviation   RESP: No respiratory distress, no use of accessory muscles; CTA b/l, no WRR  CV: RRR, +S1S2, no MRG; no JVD; no peripheral edema  GI: Soft, NT, ND, no rebound, no guarding  MSK: No digital clubbing or cyanosis. RLE 3+ pitting edema. Pos phalen sign.    SKIN: No rashes or ulcers noted  NEURO:  sensation intact in upper and lower extremities b/l to light touch   PSYCH: Appropriate insight/judgment; A+O x 4, mood and affect appropriate, recent/remote memory intact        LABS                        9.9    5.65  )-----------( 221      ( 25 Feb 2025 06:11 )             32.2       CBC Full  -  ( 25 Feb 2025 06:11 )  WBC Count : 5.65 K/uL  RBC Count : 3.64 M/uL  Hemoglobin : 9.9 g/dL  Hematocrit : 32.2 %  Platelet Count - Automated : 221 K/uL  Mean Cell Volume : 88.5 fl  Mean Cell Hemoglobin : 27.2 pg  Mean Cell Hemoglobin Concentration : 30.7 g/dL  Auto Neutrophil # : x  Auto Lymphocyte # : x  Auto Monocyte # : x  Auto Eosinophil # : x  Auto Basophil # : x  Auto Neutrophil % : x  Auto Lymphocyte % : x  Auto Monocyte % : x  Auto Eosinophil % : x  Auto Basophil % : x      02-25    137  |  105  |  57.4[H]  ----------------------------<  117[H]  5.2   |  18.0[L]  |  2.21[H]    Ca    8.5      25 Feb 2025 06:11  Phos  2.9     02-24  Mg     1.8     02-25    TPro  5.1[L]  /  Alb  2.6[L]  /  TBili  <0.2[L]  /  DBili  x   /  AST  11  /  ALT  9   /  AlkPhos  96  02-25      CAPILLARY BLOOD GLUCOSE      POCT Blood Glucose.: 80 mg/dL (25 Feb 2025 04:26)  POCT Blood Glucose.: 115 mg/dL (24 Feb 2025 22:19)  POCT Blood Glucose.: 199 mg/dL (24 Feb 2025 17:41)  POCT Blood Glucose.: 285 mg/dL (24 Feb 2025 12:39)  POCT Blood Glucose.: 73 mg/dL (24 Feb 2025 08:43)          Urinalysis Basic - ( 25 Feb 2025 06:11 )    Color: x / Appearance: x / SG: x / pH: x  Gluc: 117 mg/dL / Ketone: x  / Bili: x / Urobili: x   Blood: x / Protein: x / Nitrite: x   Leuk Esterase: x / RBC: x / WBC x   Sq Epi: x / Non Sq Epi: x / Bacteria: x        IMAGING

## 2025-02-25 NOTE — PROGRESS NOTE ADULT - SUBJECTIVE AND OBJECTIVE BOX
INTERVAL EVENTS:  Follow up diabetes management. Pt has no acute complaints at time of exam.    MEDICATIONS  (STANDING):  amLODIPine   Tablet 5 milliGRAM(s) Oral daily  chlorhexidine 2% Cloths 1 Application(s) Topical <User Schedule>  cyanocobalamin 1000 MICROGram(s) Oral daily  dextrose 5%. 1000 milliLiter(s) (50 mL/Hr) IV Continuous <Continuous>  dextrose 5%. 1000 milliLiter(s) (100 mL/Hr) IV Continuous <Continuous>  dextrose 50% Injectable 25 Gram(s) IV Push once  dextrose 50% Injectable 12.5 Gram(s) IV Push once  dextrose 50% Injectable 25 Gram(s) IV Push once  dextrose Oral Gel 15 Gram(s) Oral once  folic acid 1 milliGRAM(s) Oral daily  gabapentin 300 milliGRAM(s) Oral every 12 hours  glucagon  Injectable 1 milliGRAM(s) IntraMuscular once  heparin   Injectable 5000 Unit(s) SubCutaneous every 8 hours  hydroxychloroquine 200 milliGRAM(s) Oral every 12 hours  influenza   Vaccine 0.5 milliLiter(s) IntraMuscular once  insulin glargine Injectable (LANTUS) 8 Unit(s) SubCutaneous at bedtime  insulin lispro (ADMELOG) corrective regimen sliding scale   SubCutaneous three times a day before meals  insulin lispro Injectable (ADMELOG) 4 Unit(s) SubCutaneous three times a day before meals  levothyroxine 75 MICROGram(s) Oral daily  levothyroxine 100 MICROGram(s) Oral once  losartan 25 milliGRAM(s) Oral daily  mycophenolate mofetil 500 milliGRAM(s) Oral two times a day  pantoprazole    Tablet 40 milliGRAM(s) Oral before breakfast  predniSONE   Tablet 10 milliGRAM(s) Oral daily  sodium bicarbonate 650 milliGRAM(s) Oral three times a day  sodium zirconium cyclosilicate 5 Gram(s) Oral once  trimethoprim   80 mG/sulfamethoxazole 400 mG 1 Tablet(s) Oral <User Schedule>    MEDICATIONS  (PRN):  acetaminophen     Tablet .. 650 milliGRAM(s) Oral every 6 hours PRN Mild Pain (1 - 3)  loperamide 2 milliGRAM(s) Oral daily PRN Diarrhea    Allergies  No Known Allergies    Vital Signs Last 24 Hrs  T(C): 36.8 (25 Feb 2025 08:55), Max: 36.9 (24 Feb 2025 17:13)  T(F): 98.3 (25 Feb 2025 08:55), Max: 98.5 (24 Feb 2025 17:13)  HR: 98 (25 Feb 2025 08:55) (91 - 98)  BP: 148/81 (25 Feb 2025 08:55) (123/79 - 148/81)  BP(mean): --  RR: 18 (25 Feb 2025 08:55) (17 - 18)  SpO2: 98% (25 Feb 2025 08:55) (95% - 100%)    Parameters below as of 25 Feb 2025 08:55  Patient On (Oxygen Delivery Method): room air    PHYSICAL EXAM:  General: No apparent distress  Respiratory: Lungs clear bilaterally  Cardiac: +S1, S2, no m/r/g  GI: +BS, soft, non tender, non distended  Extremities: No peripheral edema  Neuro: A+O X3    LABS:                        9.9    5.65  )-----------( 221      ( 25 Feb 2025 06:11 )             32.2     02-25    137  |  105  |  57.4[H]  ----------------------------<  117[H]  5.2   |  18.0[L]  |  2.21[H]    Ca    8.5      25 Feb 2025 06:11  Phos  2.9     02-24  Mg     1.8     02-25    TPro  5.1[L]  /  Alb  2.6[L]  /  TBili  <0.2[L]  /  DBili  x   /  AST  11  /  ALT  9   /  AlkPhos  96  02-25    Urinalysis Basic - ( 25 Feb 2025 06:11 )    Color: x / Appearance: x / SG: x / pH: x  Gluc: 117 mg/dL / Ketone: x  / Bili: x / Urobili: x   Blood: x / Protein: x / Nitrite: x   Leuk Esterase: x / RBC: x / WBC x   Sq Epi: x / Non Sq Epi: x / Bacteria: x    POCT Blood Glucose.: 85 mg/dL (02-25-25 @ 08:55)  POCT Blood Glucose.: 80 mg/dL (02-25-25 @ 04:26)  POCT Blood Glucose.: 115 mg/dL (02-24-25 @ 22:19)  POCT Blood Glucose.: 199 mg/dL (02-24-25 @ 17:41)  POCT Blood Glucose.: 285 mg/dL (02-24-25 @ 12:39)    Triiodothyronine, Total (T3 Total): <40 ng/dL (02-23-25 @ 07:55)  Thyroid Stimulating Hormone, Serum: 98.70 uIU/mL (02-23-25 @ 07:55)  Thyroid Stimulating Hormone, Serum: 63.15 uIU/mL (02-22-25 @ 15:24)  Thyroid Stimulating Hormone, Serum: 5.83 uIU/mL (01-27-25 @ 04:30)

## 2025-02-25 NOTE — PROGRESS NOTE ADULT - ASSESSMENT
Patient is a 59y Female with PMHx SLE, ESRD, HTN, hypothyroidism, IDDM, HFpEF  admitted for [facility].    #HHS   #IDDM  -Lantus 8 U bedtime   -Lantus 4 U TID   -ISS   -gabapentin 300mg BID for neuropathy   -Endocrine following   -metabolic acidosis: sodium bicarb 650mg TID     #SLE   -hydroxychloroquine 200mg q12   -Bactrim 80mh/400mg   -mycophenolate mofetil 500mg BID   -prednisone 10mg daily   -oxycodone 5mg for R hand pain, maybe 2/2 to SLE flare     #Hypothyroidism   -levothyroxine 100mcg bolus   -levothyroxine 75mcg daily maintenance     #HTN   -amlodipine 5mg daily   -losartan 25mg daily     #Anemia   -cyanocobalamin 1000mcg daily   -folic acid 1mg daily       GI ppx: Pantoprazole 40mg daily   DVT ppx: Heparin 5000 U sq q8   Dispo: Rheum f/u with Dr. Kirkpatrick. DC likely 1-2 days.       Patient is a 59y Female with PMHx SLE, ESRD, HTN, hypothyroidism, IDDM, HFpEF  admitted for [facility].    #HHS   #IDDM  -Lantus 8 U bedtime   -Lantus 4 U TID   -ISS   -gabapentin 300mg BID for neuropathy, increased to 400mg morning + afternoon + 600mg at night  -Endocrine following   -metabolic acidosis: sodium bicarb 650mg TID     #SLE   -hydroxychloroquine 200mg q12   -Bactrim 80mh/400mg   -mycophenolate mofetil 500mg BID   -prednisone 10mg daily   -oxycodone 5mg for R hand pain, maybe 2/2 to SLE flare or tenosynovitis       #Hypothyroidism   -levothyroxine 100mcg bolus   -levothyroxine 75mcg daily maintenance     #HTN   -amlodipine 5mg daily   -losartan 25mg daily     #Anemia   -cyanocobalamin 1000mcg daily   -folic acid 1mg daily       GI ppx: Pantoprazole 40mg daily   DVT ppx: Heparin 5000 U sq q8   Dispo: Rheum f/u with Dr. Kirkpatrick. Endo f/u with Dr. Fitch. DC likely 2/25

## 2025-02-25 NOTE — PROGRESS NOTE ADULT - NS ATTEND AMEND GEN_ALL_CORE FT
I have seen and examined patient with NP, agree with above assessment and plan.
I have seen and examined patient with NP, agree with above assessment and plan

## 2025-02-25 NOTE — PROGRESS NOTE ADULT - ASSESSMENT
59F with PMHx DM, HTN, hypothyroidism, chronic anemia, HFpEF, CKD, recently diagnosed lupus/possible overlap w/ systemic sclerosis, recent admission 1/22-1/31 for progressive dyspnea and worsening renal failure found to have bilateral effusions s/p R pigtail placement and underwent renal biopsy consistent w/ either class VI LN changes vs diabetic nephropathy and was started on steroid taper was hospitalized for hyperglycemia. Patient is on steroid taper prednisone now down to 10mg and was planned to f/u with rheumatology for further decrease. She has not been taking her insulin as directed due to fear of having hypoglycemia. Found to be in DKA.    Consulted for diabetes management  Home regimen: Lantus 40 units at home, always low in the morning , Humalog ICR 1:15, but was very high with lunch an dinner   Current a1c: 9.3%  Outpatient Endocrinologist: Dr Fitch    1. Type 1 DM s/p DKA  - Continue lantus 8 units qhs and admelog 4 units TID  - Correction scale  - Discharge recommendations: Lantus 8 units qhs, intensify ICR at home to 1:10 with lunch and dinner, will continue ICR 1:15 with breakfast  - Plan to resume insulin pump therapy as outpatient  - Has follow up appt this week, daughter will call to reschedule if not discharged in time    2. Hypothyroidism  - TSH 98, T4 2.1  - S/p loading dose of levothyroxine 100 mcg po once  - Levothyroxine increased to 75mcg daily on 2/24  - Repeat TSH and FT4 in 4 weeks     3. Steroid dependence   - On chronic steroids with prednisone 10 mg

## 2025-02-25 NOTE — DISCHARGE NOTE NURSING/CASE MANAGEMENT/SOCIAL WORK - FINANCIAL ASSISTANCE
[facility] provides services at a reduced cost to those who are determined to be eligible through [facility]’s financial assistance program. Information regarding [facility]’s financial assistance program can be found by going to [email or web address] or by calling [phone].

## 2025-02-25 NOTE — DISCHARGE NOTE NURSING/CASE MANAGEMENT/SOCIAL WORK - NSDCPEFALRISK_GEN_ALL_CORE
For information on Fall & Injury Prevention, visit: [email or web address] OR  [email or web address] OR  https://www.cdc.gov/steadi/patient.html

## 2025-02-27 LAB
CULTURE RESULTS: SIGNIFICANT CHANGE UP
MYCOPHENOLATE SERPL-MCNC: 2.8 UG/ML — SIGNIFICANT CHANGE UP (ref 1–3.5)
MYCOPHENOLIC ACID GLUCURONIDE: 164 UG/ML — HIGH (ref 15–125)
SPECIMEN SOURCE: SIGNIFICANT CHANGE UP

## 2025-02-28 ENCOUNTER — NON-APPOINTMENT (OUTPATIENT)
Age: 60
End: 2025-02-28

## 2025-02-28 ENCOUNTER — APPOINTMENT (OUTPATIENT)
Dept: NEPHROLOGY | Facility: CLINIC | Age: 60
End: 2025-02-28
Payer: COMMERCIAL

## 2025-02-28 VITALS
OXYGEN SATURATION: 99 % | DIASTOLIC BLOOD PRESSURE: 64 MMHG | WEIGHT: 136 LBS | HEART RATE: 56 BPM | SYSTOLIC BLOOD PRESSURE: 160 MMHG | BODY MASS INDEX: 26.56 KG/M2

## 2025-02-28 DIAGNOSIS — N18.4 CHRONIC KIDNEY DISEASE, STAGE 4 (SEVERE): ICD-10-CM

## 2025-02-28 PROCEDURE — 99215 OFFICE O/P EST HI 40 MIN: CPT

## 2025-02-28 RX ORDER — SODIUM BICARBONATE 650 MG/1
650 TABLET ORAL TWICE DAILY
Qty: 60 | Refills: 2 | Status: ACTIVE | COMMUNITY
Start: 2025-02-28 | End: 1900-01-01

## 2025-03-02 ENCOUNTER — NON-APPOINTMENT (OUTPATIENT)
Age: 60
End: 2025-03-02

## 2025-03-03 ENCOUNTER — APPOINTMENT (OUTPATIENT)
Dept: ENDOCRINOLOGY | Facility: CLINIC | Age: 60
End: 2025-03-03
Payer: COMMERCIAL

## 2025-03-03 ENCOUNTER — NON-APPOINTMENT (OUTPATIENT)
Age: 60
End: 2025-03-03

## 2025-03-03 ENCOUNTER — APPOINTMENT (OUTPATIENT)
Dept: ENDOCRINOLOGY | Facility: CLINIC | Age: 60
End: 2025-03-03

## 2025-03-03 ENCOUNTER — APPOINTMENT (OUTPATIENT)
Dept: PULMONOLOGY | Facility: CLINIC | Age: 60
End: 2025-03-03

## 2025-03-03 VITALS
DIASTOLIC BLOOD PRESSURE: 60 MMHG | SYSTOLIC BLOOD PRESSURE: 132 MMHG | WEIGHT: 135 LBS | BODY MASS INDEX: 26.5 KG/M2 | HEART RATE: 88 BPM | OXYGEN SATURATION: 96 % | HEIGHT: 60 IN

## 2025-03-03 DIAGNOSIS — E10.69 TYPE 1 DIABETES MELLITUS WITH OTHER SPECIFIED COMPLICATION: ICD-10-CM

## 2025-03-03 LAB — GLUCOSE BLDC GLUCOMTR-MCNC: 158

## 2025-03-03 PROCEDURE — 99214 OFFICE O/P EST MOD 30 MIN: CPT

## 2025-03-03 PROCEDURE — 82962 GLUCOSE BLOOD TEST: CPT

## 2025-03-03 PROCEDURE — G2211 COMPLEX E/M VISIT ADD ON: CPT | Mod: NC

## 2025-03-04 ENCOUNTER — APPOINTMENT (OUTPATIENT)
Dept: ENDOCRINOLOGY | Facility: CLINIC | Age: 60
End: 2025-03-04
Payer: COMMERCIAL

## 2025-03-04 ENCOUNTER — EMERGENCY (EMERGENCY)
Facility: HOSPITAL | Age: 60
LOS: 1 days | Discharge: HOME | End: 2025-03-04
Attending: STUDENT IN AN ORGANIZED HEALTH CARE EDUCATION/TRAINING PROGRAM
Payer: COMMERCIAL

## 2025-03-04 VITALS
TEMPERATURE: 98 F | WEIGHT: 134.92 LBS | HEART RATE: 97 BPM | HEIGHT: 60 IN | OXYGEN SATURATION: 98 % | RESPIRATION RATE: 18 BRPM | DIASTOLIC BLOOD PRESSURE: 78 MMHG | SYSTOLIC BLOOD PRESSURE: 137 MMHG

## 2025-03-04 LAB
ALBUMIN SERPL ELPH-MCNC: 2.9 G/DL — LOW (ref 3.3–5.2)
ALP SERPL-CCNC: 99 U/L — SIGNIFICANT CHANGE UP (ref 40–120)
ALT FLD-CCNC: 8 U/L — SIGNIFICANT CHANGE UP
ANION GAP SERPL CALC-SCNC: 17 MMOL/L — SIGNIFICANT CHANGE UP (ref 5–17)
ANISOCYTOSIS BLD QL: SLIGHT — SIGNIFICANT CHANGE UP
AST SERPL-CCNC: 11 U/L — SIGNIFICANT CHANGE UP
BASOPHILS # BLD AUTO: 0.01 K/UL — SIGNIFICANT CHANGE UP (ref 0–0.2)
BASOPHILS # BLD MANUAL: 0 K/UL — SIGNIFICANT CHANGE UP (ref 0–0.2)
BASOPHILS NFR BLD AUTO: 0.2 % — SIGNIFICANT CHANGE UP (ref 0–2)
BASOPHILS NFR BLD MANUAL: 0 % — SIGNIFICANT CHANGE UP (ref 0–2)
BILIRUB SERPL-MCNC: <0.2 MG/DL — LOW (ref 0.4–2)
BUN SERPL-MCNC: 65 MG/DL — HIGH (ref 8–20)
CALCIUM SERPL-MCNC: 8.5 MG/DL — SIGNIFICANT CHANGE UP (ref 8.4–10.5)
CHLORIDE SERPL-SCNC: 96 MMOL/L — SIGNIFICANT CHANGE UP (ref 96–108)
CO2 SERPL-SCNC: 15 MMOL/L — LOW (ref 22–29)
CREAT SERPL-MCNC: 2.41 MG/DL — HIGH (ref 0.5–1.3)
EGFR: 22 ML/MIN/1.73M2 — LOW
EOSINOPHIL # BLD AUTO: 0 K/UL — SIGNIFICANT CHANGE UP (ref 0–0.5)
EOSINOPHIL # BLD MANUAL: 0 K/UL — SIGNIFICANT CHANGE UP (ref 0–0.5)
EOSINOPHIL NFR BLD AUTO: 0 % — SIGNIFICANT CHANGE UP (ref 0–6)
EOSINOPHIL NFR BLD MANUAL: 0 % — SIGNIFICANT CHANGE UP (ref 0–6)
FLUAV AG NPH QL: SIGNIFICANT CHANGE UP
FLUBV AG NPH QL: SIGNIFICANT CHANGE UP
GIANT PLATELETS BLD QL SMEAR: PRESENT
GLUCOSE SERPL-MCNC: 162 MG/DL — HIGH (ref 70–99)
HCT VFR BLD CALC: 28.3 % — LOW (ref 34.5–45)
HGB BLD-MCNC: 9.5 G/DL — LOW (ref 11.5–15.5)
IMM GRANULOCYTES # BLD AUTO: 0.03 K/UL — SIGNIFICANT CHANGE UP (ref 0–0.07)
IMM GRANULOCYTES NFR BLD AUTO: 0.7 % — SIGNIFICANT CHANGE UP (ref 0–0.9)
LYMPHOCYTES # BLD AUTO: 0.32 K/UL — LOW (ref 1–3.3)
LYMPHOCYTES # BLD MANUAL: 0.19 K/UL — LOW (ref 1–3.3)
LYMPHOCYTES NFR BLD AUTO: 7.2 % — LOW (ref 13–44)
LYMPHOCYTES NFR BLD MANUAL: 4.3 % — LOW (ref 13–44)
MANUAL METAMYELOCYTE #: 0.04 K/UL — HIGH (ref 0–0)
MCHC RBC-ENTMCNC: 27.6 PG — SIGNIFICANT CHANGE UP (ref 27–34)
MCHC RBC-ENTMCNC: 33.6 G/DL — SIGNIFICANT CHANGE UP (ref 32–36)
MCV RBC AUTO: 82.3 FL — SIGNIFICANT CHANGE UP (ref 80–100)
METAMYELOCYTES # FLD: 0.9 % — HIGH (ref 0–0)
METAMYELOCYTES NFR BLD: 0.9 % — HIGH (ref 0–0)
MICROCYTES BLD QL: SLIGHT — SIGNIFICANT CHANGE UP
MONOCYTES # BLD AUTO: 0.24 K/UL — SIGNIFICANT CHANGE UP (ref 0–0.9)
MONOCYTES # BLD MANUAL: 0.19 K/UL — SIGNIFICANT CHANGE UP (ref 0–0.9)
MONOCYTES NFR BLD AUTO: 5.4 % — SIGNIFICANT CHANGE UP (ref 2–14)
MONOCYTES NFR BLD MANUAL: 4.3 % — SIGNIFICANT CHANGE UP (ref 2–14)
NEUTROPHILS # BLD AUTO: 3.84 K/UL — SIGNIFICANT CHANGE UP (ref 1.8–7.4)
NEUTROPHILS # BLD MANUAL: 4.02 K/UL — SIGNIFICANT CHANGE UP (ref 1.8–7.4)
NEUTROPHILS NFR BLD AUTO: 86.5 % — HIGH (ref 43–77)
NEUTROPHILS NFR BLD MANUAL: 90.5 % — HIGH (ref 43–77)
NRBC # BLD AUTO: 0 K/UL — SIGNIFICANT CHANGE UP (ref 0–0)
NRBC # FLD: 0 K/UL — SIGNIFICANT CHANGE UP (ref 0–0)
NRBC BLD AUTO-RTO: 0 /100 WBCS — SIGNIFICANT CHANGE UP (ref 0–0)
PLAT MORPH BLD: NORMAL — SIGNIFICANT CHANGE UP
PLATELET # BLD AUTO: 267 K/UL — SIGNIFICANT CHANGE UP (ref 150–400)
PMV BLD: 9.4 FL — SIGNIFICANT CHANGE UP (ref 7–13)
POLYCHROMASIA BLD QL SMEAR: SLIGHT — SIGNIFICANT CHANGE UP
POTASSIUM SERPL-MCNC: 3.9 MMOL/L — SIGNIFICANT CHANGE UP (ref 3.5–5.3)
POTASSIUM SERPL-SCNC: 3.9 MMOL/L — SIGNIFICANT CHANGE UP (ref 3.5–5.3)
PROT SERPL-MCNC: 5.6 G/DL — LOW (ref 6.6–8.7)
RBC # BLD: 3.44 M/UL — LOW (ref 3.8–5.2)
RBC # FLD: 17.7 % — HIGH (ref 10.3–14.5)
RBC BLD AUTO: ABNORMAL
RSV RNA NPH QL NAA+NON-PROBE: SIGNIFICANT CHANGE UP
SARS-COV-2 RNA SPEC QL NAA+PROBE: SIGNIFICANT CHANGE UP
SODIUM SERPL-SCNC: 128 MMOL/L — LOW (ref 135–145)
WBC # BLD: 4.44 K/UL — SIGNIFICANT CHANGE UP (ref 3.8–10.5)
WBC # FLD AUTO: 4.44 K/UL — SIGNIFICANT CHANGE UP (ref 3.8–10.5)

## 2025-03-04 PROCEDURE — 99284 EMERGENCY DEPT VISIT MOD MDM: CPT

## 2025-03-04 PROCEDURE — G0108 DIAB MANAGE TRN  PER INDIV: CPT

## 2025-03-04 RX ORDER — INSULIN GLARGINE-YFGN 100 [IU]/ML
14 INJECTION, SOLUTION SUBCUTANEOUS ONCE
Refills: 0 | Status: COMPLETED | OUTPATIENT
Start: 2025-03-04 | End: 2025-03-04

## 2025-03-04 RX ADMIN — INSULIN GLARGINE-YFGN 14 UNIT(S): 100 INJECTION, SOLUTION SUBCUTANEOUS at 23:21

## 2025-03-04 RX ADMIN — Medication 500 MILLILITER(S): at 22:35

## 2025-03-04 NOTE — ED PROVIDER NOTE - OBJECTIVE STATEMENT
60yo F PMHx Lupus, type 1 diabetes, hypothyroid, CKD IV, HFpEF presents to the ED for diarrhea for 8 days. Patient describes the stool as loose, seldomly only watery, no blood. Patient states she has 4-5 bowel movements in a day. She was started on TMP-SMX on Feb1st for PPX treatment because she is on immunosuppressants. No fever. no cough. no palpitations. no lightheaded.

## 2025-03-04 NOTE — ED PROVIDER NOTE - PHYSICAL EXAMINATION
Gen: well appearing, no acute distress  Head: normocephalic, atraumatic  EENT: EOMI, moist mucous membranes, no scleral icterus  Lung: no increased work of breathing, clear to auscultation bilaterally, no wheezing, rales, rhonchi, speaking in full sentences  CV: regular rate, regular rhythm, normal s1/s2, 2+ radial pulses bilaterally  Abd: soft, non-tender, non-distended,    MSK: +pitting edema on lower extremities, no visible deformities, full range of motion in all 4 extremities  Neuro: Awake, alert, no focal neurologic deficits  Skin: No obvious rash, no jaundice  Psych: normal affect, normal speech

## 2025-03-04 NOTE — ED ADULT NURSE NOTE - OBJECTIVE STATEMENT
assumed pt care at this time pt states has been having diarrhea x8 days without abd pain n/v, states has also been taking sulfa abx po prophylaxis 3xa week for her lupus. Pt denies dp sob or other complaints at this time bedside a lupus flare up in left hand and known BLLE edema that has not changed recently. Pt states can walk short distances but has mobility issues dt lupus, does not use assistive devices at this time. RR even unlabored NAD noted fall and safety precautions in place

## 2025-03-04 NOTE — ED PROVIDER NOTE - NSFOLLOWUPINSTRUCTIONS_ED_ALL_ED_FT
Diarrhea is frequent loose and sometimes watery bowel movements. Diarrhea can make you feel weak and cause you to become dehydrated. Dehydration is a condition in which there is not enough water or other fluids in the body. Dehydration can make you tired and thirsty, cause you to have a dry mouth, and decrease how often you urinate.    Diarrhea typically lasts 2–3 days. However, it can last longer if it is a sign of something more serious. It is important to treat your diarrhea as told by your health care provider.    Follow these instructions at home:  Eating and drinking    A bottle of clear fruit juice and glass of water.   Bread, rice, and cereal from the grain group.  Follow these recommendations as told by your health care provider:  Take an oral rehydration solution (ORS). This is an over-the-counter medicine that helps return your body to its normal balance of nutrients and water. It is found at pharmacies and retail stores.  Drink enough fluid to keep your urine pale yellow.  Drink fluids such as water, diluted fruit juice, and low-calorie sports drinks. You can drink milk also, if desired. Sucking on ice chips is another way to get fluids.  Avoid drinking fluids that contain a lot of sugar or caffeine, such as soda, energy drinks, and regular sports drinks.  Avoid alcohol.  Eat bland, easy-to-digest foods in small amounts as you are able. These foods include bananas, applesauce, rice, lean meats, toast, and crackers.  Avoid spicy or fatty foods.  Medicines    Take over-the-counter and prescription medicines only as told by your health care provider.  If you were prescribed antibiotics, take them as told by your health care provider. Do not stop using the antibiotic even if you start to feel better.  General instructions    Washing hands with soap and water.  Wash your hands often using soap and water for at least 20 seconds. If soap and water are not available, use hand . Others in the household should wash their hands as well. Hands should be washed:  After using the toilet or changing a diaper.  Before preparing, cooking, or serving food.  While caring for a sick person or while visiting someone in a hospital.  Rest at home while you recover.  Take a warm bath to relieve any burning or pain from frequent diarrhea episodes.  Watch your condition for any changes.  Contact a health care provider if:  You have a fever.  Your diarrhea gets worse.  You have new symptoms.  You vomit every time you eat or drink.  You feel light-headed, dizzy, or have a headache.  You have muscle cramps.  You have signs of dehydration, such as:  Dark urine, very little urine, or no urine.  Cracked lips.  Dry mouth.  Sunken eyes.  Sleepiness.  Weakness.  You have bloody or black stools or stools that look like tar.  You have severe pain, cramping, or bloating in your abdomen.  Your skin feels cold and clammy.  You feel confused.  Get help right away if:  You have chest pain or your heart is beating very quickly.  You have trouble breathing or you are breathing very quickly.  You feel extremely weak or you faint.  These symptoms may be an emergency. Get help right away. Call 911.  Do not wait to see if the symptoms will go away.  Do not drive yourself to the hospital.

## 2025-03-04 NOTE — ED ADULT NURSE NOTE - NSFALLUNIVINTERV_ED_ALL_ED
Bed/Stretcher in lowest position, wheels locked, appropriate side rails in place/Call bell, personal items and telephone in reach/Instruct patient to call for assistance before getting out of bed/chair/stretcher/Non-slip footwear applied when patient is off stretcher/[place] to call system/Physically safe environment - no spills, clutter or unnecessary equipment/Purposeful proactive rounding/Room/bathroom lighting operational, light cord in reach

## 2025-03-04 NOTE — ED PROVIDER NOTE - PROGRESS NOTE DETAILS
MD Ashok: Patient is able to tolerate PO. Decline any nausea medication. Labs reviewed with patient. Mild hyponatremia is likely a pseudohyponatremia due to the elevated glucose of 295. Patient is resting comfortably in bed. Discussed with patient return precautions. and need for hydration while having diarrhea. Discussed Pedialyte.

## 2025-03-04 NOTE — ED PROVIDER NOTE - CLINICAL SUMMARY MEDICAL DECISION MAKING FREE TEXT BOX
60yo F PMHx Lupus, type 1 diabetes, hypothyroid, CKD IV, HFpEF presents to the ED for diarrhea for 8 days who is hemodynamically stable, nontoxic appearing, saturating well on room air. Concern for hypovolemia vs infectious colitis. CBC, CMP, GI Panel. IVF. Will reassess.

## 2025-03-04 NOTE — ED PROVIDER NOTE - ATTENDING CONTRIBUTION TO CARE
I, Sly Terry MD, personally saw the patient with the resident, and completed the key components of the history and physical exam. I then discussed the management plan with the resident.  Patient with a past medical history of lupus, DM, CKD not on dialysis is presenting with complaints of diarrhea.  Symptoms have been present for the past 8 days.  Having 3-4 episodes of diarrhea a day.  Described as loose and foul odor.  No fevers.  No abdominal pain.  Had 1 episode of vomiting during this time.  She is on weekly antibiotics.  She did have recent hospitalization.  Came to ED given persistent symptoms.  No history of abdominal surgeries.  No history of C. difficile.  On exam, patient is afebrile, not tachycardic.  Patient appears fatigued and dry.  She has no abdominal tenderness.  Concern for possible C. difficile though she states since coming to the hospital her diarrhea symptoms have felt improved.  No longer has the urge to have a bowel movement.  Without abdominal tenderness, do not suspect diverticulitis or other intra-abdominal infection.  Will check lab work for electrolyte derangements.  Likely dehydration.  Will check for C. difficile if patient able to provide a stool sample.  Plan on labs and reassessment.    Patient found to have hyponatremia.  Likely in the setting of her diarrhea.  Patient given fluids, will recheck chemistry prior to disposition.  If patient persistently hyponatremic, will likely need to be admitted.

## 2025-03-04 NOTE — ED PROVIDER NOTE - PATIENT PORTAL LINK FT
You can access the FollowMyHealth Patient Portal offered by [facility] by registering at the following website: [email or web address]. By joining [email or web address]’s FollowMyHealth portal, you will also be able to view your health information using other applications (apps) compatible with our system.

## 2025-03-04 NOTE — ED ADULT NURSE NOTE - ED CARDIAC HEART SOUNDS
normal S1, S2 heard *-*-*    Note contains history of violence and/or admission due to dangerousness to others    *-*-*

## 2025-03-04 NOTE — ED ADULT TRIAGE NOTE - CHIEF COMPLAINT QUOTE
patient reports diarrhea without abdominal pain x 8 days, one episode of vomiting, patient also complaining of left wrist, left hand pain reports lupus flare.  Patient awake and alert,

## 2025-03-05 ENCOUNTER — APPOINTMENT (OUTPATIENT)
Dept: ENDOCRINOLOGY | Facility: CLINIC | Age: 60
End: 2025-03-05

## 2025-03-05 DIAGNOSIS — E10.65 TYPE 1 DIABETES MELLITUS WITH HYPERGLYCEMIA: ICD-10-CM

## 2025-03-05 PROBLEM — E10.69 TYPE 1 DIABETES MELLITUS WITH OTHER SPECIFIED COMPLICATION: Status: ACTIVE | Noted: 2025-03-05

## 2025-03-05 LAB
ANION GAP SERPL CALC-SCNC: 17 MMOL/L — SIGNIFICANT CHANGE UP (ref 5–17)
BUN SERPL-MCNC: 65 MG/DL — HIGH (ref 8–20)
CALCIUM SERPL-MCNC: 8.1 MG/DL — LOW (ref 8.4–10.5)
CHLORIDE SERPL-SCNC: 97 MMOL/L — SIGNIFICANT CHANGE UP (ref 96–108)
CO2 SERPL-SCNC: 14 MMOL/L — LOW (ref 22–29)
CREAT SERPL-MCNC: 2.36 MG/DL — HIGH (ref 0.5–1.3)
EGFR: 23 ML/MIN/1.73M2 — LOW
GLUCOSE SERPL-MCNC: 295 MG/DL — HIGH (ref 70–99)
POTASSIUM SERPL-MCNC: 3.6 MMOL/L — SIGNIFICANT CHANGE UP (ref 3.5–5.3)
POTASSIUM SERPL-SCNC: 3.6 MMOL/L — SIGNIFICANT CHANGE UP (ref 3.5–5.3)
SODIUM SERPL-SCNC: 128 MMOL/L — LOW (ref 135–145)

## 2025-03-05 PROCEDURE — 85025 COMPLETE CBC W/AUTO DIFF WBC: CPT

## 2025-03-05 PROCEDURE — 82962 GLUCOSE BLOOD TEST: CPT

## 2025-03-05 PROCEDURE — 96372 THER/PROPH/DIAG INJ SC/IM: CPT

## 2025-03-05 PROCEDURE — 87637 SARSCOV2&INF A&B&RSV AMP PRB: CPT

## 2025-03-05 PROCEDURE — 36415 COLL VENOUS BLD VENIPUNCTURE: CPT

## 2025-03-05 PROCEDURE — 99283 EMERGENCY DEPT VISIT LOW MDM: CPT

## 2025-03-05 PROCEDURE — 80048 BASIC METABOLIC PNL TOTAL CA: CPT

## 2025-03-05 PROCEDURE — 80053 COMPREHEN METABOLIC PANEL: CPT

## 2025-03-05 RX ORDER — ONDANSETRON HCL/PF 4 MG/2 ML
1 VIAL (ML) INJECTION
Qty: 3 | Refills: 0
Start: 2025-03-05 | End: 2025-03-05

## 2025-03-12 ENCOUNTER — APPOINTMENT (OUTPATIENT)
Dept: RHEUMATOLOGY | Facility: CLINIC | Age: 60
End: 2025-03-12
Payer: COMMERCIAL

## 2025-03-12 ENCOUNTER — NON-APPOINTMENT (OUTPATIENT)
Age: 60
End: 2025-03-12

## 2025-03-12 VITALS
HEART RATE: 90 BPM | TEMPERATURE: 98.2 F | OXYGEN SATURATION: 99 % | SYSTOLIC BLOOD PRESSURE: 134 MMHG | HEIGHT: 60 IN | DIASTOLIC BLOOD PRESSURE: 82 MMHG

## 2025-03-12 DIAGNOSIS — E11.9 TYPE 2 DIABETES MELLITUS W/OUT COMPLICATIONS: ICD-10-CM

## 2025-03-12 DIAGNOSIS — N18.4 CHRONIC KIDNEY DISEASE, STAGE 4 (SEVERE): ICD-10-CM

## 2025-03-12 DIAGNOSIS — M79.10 MYALGIA, UNSPECIFIED SITE: ICD-10-CM

## 2025-03-12 DIAGNOSIS — M47.816 SPONDYLOSIS W/OUT MYELOPATHY OR RADICULOPATHY, LUMBAR REGION: ICD-10-CM

## 2025-03-12 DIAGNOSIS — M32.19 OTHER ORGAN OR SYSTEM INVOLVEMENT IN SYSTEMIC LUPUS ERYTHEMATOSUS: ICD-10-CM

## 2025-03-12 DIAGNOSIS — Z79.899 OTHER LONG TERM (CURRENT) DRUG THERAPY: ICD-10-CM

## 2025-03-12 PROCEDURE — 99215 OFFICE O/P EST HI 40 MIN: CPT

## 2025-03-12 PROCEDURE — G2211 COMPLEX E/M VISIT ADD ON: CPT | Mod: NC

## 2025-03-12 PROCEDURE — 95251 CONT GLUC MNTR ANALYSIS I&R: CPT

## 2025-03-19 ENCOUNTER — APPOINTMENT (OUTPATIENT)
Dept: NEPHROLOGY | Facility: CLINIC | Age: 60
End: 2025-03-19

## 2025-03-20 ENCOUNTER — NON-APPOINTMENT (OUTPATIENT)
Age: 60
End: 2025-03-20

## 2025-03-20 DIAGNOSIS — E10.65 TYPE 1 DIABETES MELLITUS WITH HYPERGLYCEMIA: ICD-10-CM

## 2025-03-20 DIAGNOSIS — E11.9 TYPE 2 DIABETES MELLITUS W/OUT COMPLICATIONS: ICD-10-CM

## 2025-03-20 PROCEDURE — 95251 CONT GLUC MNTR ANALYSIS I&R: CPT

## 2025-04-23 ENCOUNTER — APPOINTMENT (OUTPATIENT)
Dept: RHEUMATOLOGY | Facility: CLINIC | Age: 60
End: 2025-04-23
Payer: COMMERCIAL

## 2025-04-23 ENCOUNTER — LABORATORY RESULT (OUTPATIENT)
Age: 60
End: 2025-04-23

## 2025-04-23 VITALS
DIASTOLIC BLOOD PRESSURE: 64 MMHG | BODY MASS INDEX: 25.52 KG/M2 | TEMPERATURE: 98 F | SYSTOLIC BLOOD PRESSURE: 140 MMHG | HEIGHT: 60 IN | OXYGEN SATURATION: 97 % | WEIGHT: 130 LBS | RESPIRATION RATE: 17 BRPM | HEART RATE: 92 BPM

## 2025-04-23 DIAGNOSIS — M47.816 SPONDYLOSIS W/OUT MYELOPATHY OR RADICULOPATHY, LUMBAR REGION: ICD-10-CM

## 2025-04-23 DIAGNOSIS — R76.8 OTHER SPECIFIED ABNORMAL IMMUNOLOGICAL FINDINGS IN SERUM: ICD-10-CM

## 2025-04-23 DIAGNOSIS — R59.0 LOCALIZED ENLARGED LYMPH NODES: ICD-10-CM

## 2025-04-23 DIAGNOSIS — Z79.899 OTHER LONG TERM (CURRENT) DRUG THERAPY: ICD-10-CM

## 2025-04-23 DIAGNOSIS — M32.19 OTHER ORGAN OR SYSTEM INVOLVEMENT IN SYSTEMIC LUPUS ERYTHEMATOSUS: ICD-10-CM

## 2025-04-23 PROCEDURE — 99215 OFFICE O/P EST HI 40 MIN: CPT

## 2025-04-23 PROCEDURE — G2211 COMPLEX E/M VISIT ADD ON: CPT | Mod: NC

## 2025-04-23 RX ORDER — PREDNISONE 1 MG/1
1 TABLET ORAL DAILY
Qty: 30 | Refills: 2 | Status: ACTIVE | COMMUNITY
Start: 2025-04-23 | End: 1900-01-01

## 2025-04-23 RX ORDER — PREDNISONE 5 MG/1
5 TABLET ORAL
Qty: 30 | Refills: 3 | Status: ACTIVE | COMMUNITY
Start: 2025-04-23 | End: 1900-01-01

## 2025-04-23 RX ORDER — DULOXETINE HYDROCHLORIDE 20 MG/1
20 CAPSULE, DELAYED RELEASE PELLETS ORAL
Qty: 30 | Refills: 3 | Status: ACTIVE | COMMUNITY
Start: 2025-04-23 | End: 1900-01-01

## 2025-04-24 LAB
ALBUMIN SERPL ELPH-MCNC: 3 G/DL
ALP BLD-CCNC: 86 U/L
ALT SERPL-CCNC: 10 U/L
ANION GAP SERPL CALC-SCNC: 22 MMOL/L
APPEARANCE: ABNORMAL
AST SERPL-CCNC: 13 U/L
BASOPHILS # BLD AUTO: 0.04 K/UL
BASOPHILS NFR BLD AUTO: 0.3 %
BILIRUB SERPL-MCNC: <0.2 MG/DL
BILIRUBIN URINE: NEGATIVE
BLOOD URINE: ABNORMAL
BUN SERPL-MCNC: 45 MG/DL
C3 SERPL-MCNC: 108 MG/DL
C4 SERPL-MCNC: 15 MG/DL
CALCIUM SERPL-MCNC: 8.7 MG/DL
CHLORIDE SERPL-SCNC: 106 MMOL/L
CK SERPL-CCNC: 32 U/L
CO2 SERPL-SCNC: 17 MMOL/L
COLOR: YELLOW
CREAT SERPL-MCNC: 1.99 MG/DL
CREAT SPEC-SCNC: 28 MG/DL
CREAT/PROT UR: 7.4 RATIO
CRP SERPL-MCNC: 12 MG/L
EGFRCR SERPLBLD CKD-EPI 2021: 28 ML/MIN/1.73M2
EOSINOPHIL # BLD AUTO: 0.01 K/UL
EOSINOPHIL NFR BLD AUTO: 0.1 %
ERYTHROCYTE [SEDIMENTATION RATE] IN BLOOD BY WESTERGREN METHOD: 90 MM/HR
GLUCOSE QUALITATIVE U: NEGATIVE MG/DL
GLUCOSE SERPL-MCNC: 102 MG/DL
HCT VFR BLD CALC: 28.8 %
HGB BLD-MCNC: 8.4 G/DL
IMM GRANULOCYTES NFR BLD AUTO: 4.5 %
KETONES URINE: NEGATIVE MG/DL
LEUKOCYTE ESTERASE URINE: ABNORMAL
LYMPHOCYTES # BLD AUTO: 1.18 K/UL
LYMPHOCYTES NFR BLD AUTO: 8.6 %
MAN DIFF?: NORMAL
MCHC RBC-ENTMCNC: 27.6 PG
MCHC RBC-ENTMCNC: 29.2 G/DL
MCV RBC AUTO: 94.7 FL
MONOCYTES # BLD AUTO: 0.36 K/UL
MONOCYTES NFR BLD AUTO: 2.6 %
NEUTROPHILS # BLD AUTO: 11.56 K/UL
NEUTROPHILS NFR BLD AUTO: 83.9 %
NITRITE URINE: POSITIVE
PH URINE: 6.5
PLATELET # BLD AUTO: 484 K/UL
POTASSIUM SERPL-SCNC: 3.8 MMOL/L
PROT SERPL-MCNC: 5.8 G/DL
PROT UR-MCNC: 211 MG/DL
PROTEIN URINE: 300 MG/DL
RBC # BLD: 3.04 M/UL
RBC # FLD: 16.9 %
SODIUM SERPL-SCNC: 146 MMOL/L
SPECIFIC GRAVITY URINE: 1.01
TSH SERPL-ACNC: 25.7 UIU/ML
UROBILINOGEN URINE: 0.2 MG/DL
WBC # FLD AUTO: 13.77 K/UL

## 2025-04-28 LAB
HBA1C MFR BLD HPLC: 9.5
TSH SERPL-ACNC: 16

## 2025-04-29 ENCOUNTER — APPOINTMENT (OUTPATIENT)
Dept: ENDOCRINOLOGY | Facility: CLINIC | Age: 60
End: 2025-04-29
Payer: COMMERCIAL

## 2025-04-29 VITALS
OXYGEN SATURATION: 97 % | HEART RATE: 87 BPM | HEIGHT: 60 IN | BODY MASS INDEX: 25.91 KG/M2 | DIASTOLIC BLOOD PRESSURE: 75 MMHG | WEIGHT: 132 LBS | SYSTOLIC BLOOD PRESSURE: 167 MMHG

## 2025-04-29 DIAGNOSIS — E10.69 TYPE 1 DIABETES MELLITUS WITH OTHER SPECIFIED COMPLICATION: ICD-10-CM

## 2025-04-29 DIAGNOSIS — E11.9 TYPE 2 DIABETES MELLITUS W/OUT COMPLICATIONS: ICD-10-CM

## 2025-04-29 DIAGNOSIS — E04.1 NONTOXIC SINGLE THYROID NODULE: ICD-10-CM

## 2025-04-29 DIAGNOSIS — E10.65 TYPE 1 DIABETES MELLITUS WITH HYPERGLYCEMIA: ICD-10-CM

## 2025-04-29 DIAGNOSIS — N18.4 CHRONIC KIDNEY DISEASE, STAGE 4 (SEVERE): ICD-10-CM

## 2025-04-29 PROCEDURE — G2211 COMPLEX E/M VISIT ADD ON: CPT | Mod: NC

## 2025-04-29 PROCEDURE — 99214 OFFICE O/P EST MOD 30 MIN: CPT

## 2025-05-20 ENCOUNTER — APPOINTMENT (OUTPATIENT)
Dept: ENDOCRINOLOGY | Facility: CLINIC | Age: 60
End: 2025-05-20
Payer: COMMERCIAL

## 2025-05-20 DIAGNOSIS — E10.65 TYPE 1 DIABETES MELLITUS WITH HYPERGLYCEMIA: ICD-10-CM

## 2025-05-20 PROCEDURE — P0019: CPT

## 2025-05-20 PROCEDURE — G0108 DIAB MANAGE TRN  PER INDIV: CPT

## 2025-05-20 RX ORDER — BLOOD-GLUCOSE TRANSMITTER
EACH MISCELLANEOUS
Qty: 1 | Refills: 1 | Status: ACTIVE | COMMUNITY
Start: 2025-05-20 | End: 1900-01-01

## 2025-05-20 RX ORDER — BLOOD-GLUCOSE SENSOR
EACH MISCELLANEOUS
Qty: 9 | Refills: 1 | Status: ACTIVE | COMMUNITY
Start: 2025-05-20 | End: 1900-01-01

## 2025-05-20 RX ORDER — INSULIN ASPART 100 [IU]/ML
100 INJECTION, SOLUTION INTRAVENOUS; SUBCUTANEOUS
Qty: 8 | Refills: 1 | Status: ACTIVE | COMMUNITY
Start: 2025-05-20 | End: 1900-01-01

## 2025-05-21 ENCOUNTER — APPOINTMENT (OUTPATIENT)
Dept: NEPHROLOGY | Facility: CLINIC | Age: 60
End: 2025-05-21

## 2025-05-21 ENCOUNTER — APPOINTMENT (OUTPATIENT)
Dept: TRANSPLANT | Facility: CLINIC | Age: 60
End: 2025-05-21

## 2025-05-28 ENCOUNTER — APPOINTMENT (OUTPATIENT)
Dept: RHEUMATOLOGY | Facility: CLINIC | Age: 60
End: 2025-05-28
Payer: COMMERCIAL

## 2025-05-28 VITALS
OXYGEN SATURATION: 98 % | SYSTOLIC BLOOD PRESSURE: 142 MMHG | RESPIRATION RATE: 17 BRPM | HEART RATE: 101 BPM | HEIGHT: 60 IN | DIASTOLIC BLOOD PRESSURE: 60 MMHG

## 2025-05-28 DIAGNOSIS — R59.0 LOCALIZED ENLARGED LYMPH NODES: ICD-10-CM

## 2025-05-28 DIAGNOSIS — R76.8 OTHER SPECIFIED ABNORMAL IMMUNOLOGICAL FINDINGS IN SERUM: ICD-10-CM

## 2025-05-28 DIAGNOSIS — M47.816 SPONDYLOSIS W/OUT MYELOPATHY OR RADICULOPATHY, LUMBAR REGION: ICD-10-CM

## 2025-05-28 DIAGNOSIS — R76.0 RAISED ANTIBODY TITER: ICD-10-CM

## 2025-05-28 DIAGNOSIS — R19.7 DIARRHEA, UNSPECIFIED: ICD-10-CM

## 2025-05-28 DIAGNOSIS — M32.19 OTHER ORGAN OR SYSTEM INVOLVEMENT IN SYSTEMIC LUPUS ERYTHEMATOSUS: ICD-10-CM

## 2025-05-28 DIAGNOSIS — M79.10 MYALGIA, UNSPECIFIED SITE: ICD-10-CM

## 2025-05-28 DIAGNOSIS — Z79.899 OTHER LONG TERM (CURRENT) DRUG THERAPY: ICD-10-CM

## 2025-05-28 PROCEDURE — 99215 OFFICE O/P EST HI 40 MIN: CPT

## 2025-05-28 PROCEDURE — G2211 COMPLEX E/M VISIT ADD ON: CPT | Mod: NC

## 2025-05-28 RX ORDER — BLOOD-GLUCOSE SENSOR
EACH MISCELLANEOUS
Qty: 6 | Refills: 1 | Status: DISCONTINUED | COMMUNITY
Start: 2025-05-19 | End: 2025-05-28

## 2025-06-13 ENCOUNTER — RX RENEWAL (OUTPATIENT)
Age: 60
End: 2025-06-13

## 2025-06-17 ENCOUNTER — APPOINTMENT (OUTPATIENT)
Dept: ENDOCRINOLOGY | Facility: CLINIC | Age: 60
End: 2025-06-17

## 2025-06-19 ENCOUNTER — APPOINTMENT (OUTPATIENT)
Dept: ENDOCRINOLOGY | Facility: CLINIC | Age: 60
End: 2025-06-19
Payer: COMMERCIAL

## 2025-06-19 PROCEDURE — P0019: CPT

## 2025-06-20 RX ORDER — GLUCAGON INJECTION, SOLUTION 1 MG/.2ML
1 INJECTION, SOLUTION SUBCUTANEOUS
Qty: 1 | Refills: 3 | Status: ACTIVE | COMMUNITY
Start: 2025-06-19 | End: 1900-01-01

## 2025-06-20 RX ORDER — URINE ACETONE TEST STRIPS
STRIP MISCELLANEOUS
Qty: 1 | Refills: 1 | Status: ACTIVE | COMMUNITY
Start: 2025-06-19 | End: 1900-01-01

## 2025-06-20 RX ORDER — BLOOD SUGAR DIAGNOSTIC
STRIP MISCELLANEOUS
Qty: 100 | Refills: 3 | Status: ACTIVE | COMMUNITY
Start: 2025-06-19 | End: 1900-01-01

## 2025-06-23 ENCOUNTER — APPOINTMENT (OUTPATIENT)
Dept: RHEUMATOLOGY | Facility: CLINIC | Age: 60
End: 2025-06-23

## 2025-07-01 ENCOUNTER — APPOINTMENT (OUTPATIENT)
Dept: ENDOCRINOLOGY | Facility: CLINIC | Age: 60
End: 2025-07-01

## 2025-07-15 ENCOUNTER — TRANSCRIPTION ENCOUNTER (OUTPATIENT)
Age: 60
End: 2025-07-15

## 2025-07-30 ENCOUNTER — APPOINTMENT (OUTPATIENT)
Dept: RHEUMATOLOGY | Facility: CLINIC | Age: 60
End: 2025-07-30
Payer: COMMERCIAL

## 2025-07-30 VITALS
DIASTOLIC BLOOD PRESSURE: 70 MMHG | OXYGEN SATURATION: 97 % | HEIGHT: 60 IN | SYSTOLIC BLOOD PRESSURE: 132 MMHG | HEART RATE: 99 BPM

## 2025-07-30 DIAGNOSIS — R76.8 OTHER SPECIFIED ABNORMAL IMMUNOLOGICAL FINDINGS IN SERUM: ICD-10-CM

## 2025-07-30 DIAGNOSIS — M47.816 SPONDYLOSIS W/OUT MYELOPATHY OR RADICULOPATHY, LUMBAR REGION: ICD-10-CM

## 2025-07-30 DIAGNOSIS — R59.0 LOCALIZED ENLARGED LYMPH NODES: ICD-10-CM

## 2025-07-30 DIAGNOSIS — Z79.899 OTHER LONG TERM (CURRENT) DRUG THERAPY: ICD-10-CM

## 2025-07-30 DIAGNOSIS — M32.19 OTHER ORGAN OR SYSTEM INVOLVEMENT IN SYSTEMIC LUPUS ERYTHEMATOSUS: ICD-10-CM

## 2025-07-30 DIAGNOSIS — M79.10 MYALGIA, UNSPECIFIED SITE: ICD-10-CM

## 2025-07-30 DIAGNOSIS — R76.0 RAISED ANTIBODY TITER: ICD-10-CM

## 2025-07-30 PROCEDURE — G2211 COMPLEX E/M VISIT ADD ON: CPT | Mod: NC

## 2025-07-30 PROCEDURE — 99215 OFFICE O/P EST HI 40 MIN: CPT

## 2025-07-30 RX ORDER — PREDNISONE 1 MG/1
1 TABLET ORAL
Qty: 120 | Refills: 4 | Status: ACTIVE | COMMUNITY
Start: 2025-07-30 | End: 1900-01-01

## 2025-07-31 ENCOUNTER — NON-APPOINTMENT (OUTPATIENT)
Age: 60
End: 2025-07-31

## 2025-08-04 ENCOUNTER — NON-APPOINTMENT (OUTPATIENT)
Age: 60
End: 2025-08-04

## 2025-08-07 ENCOUNTER — APPOINTMENT (OUTPATIENT)
Dept: ENDOCRINOLOGY | Facility: CLINIC | Age: 60
End: 2025-08-07
Payer: COMMERCIAL

## 2025-08-07 DIAGNOSIS — E10.65 TYPE 1 DIABETES MELLITUS WITH HYPERGLYCEMIA: ICD-10-CM

## 2025-08-07 PROCEDURE — G0108 DIAB MANAGE TRN  PER INDIV: CPT

## 2025-08-28 LAB
HBA1C MFR BLD HPLC: 6.9
LDLC SERPL DIRECT ASSAY-MCNC: 202
MICROALBUMIN/CREAT 24H UR-RTO: 5093
TSH SERPL-ACNC: 8.51

## 2025-08-29 ENCOUNTER — APPOINTMENT (OUTPATIENT)
Dept: ENDOCRINOLOGY | Facility: CLINIC | Age: 60
End: 2025-08-29
Payer: COMMERCIAL

## 2025-08-29 VITALS
HEIGHT: 60 IN | DIASTOLIC BLOOD PRESSURE: 60 MMHG | WEIGHT: 128 LBS | BODY MASS INDEX: 25.13 KG/M2 | SYSTOLIC BLOOD PRESSURE: 124 MMHG | HEART RATE: 99 BPM | OXYGEN SATURATION: 95 %

## 2025-08-29 DIAGNOSIS — E10.65 TYPE 1 DIABETES MELLITUS WITH HYPERGLYCEMIA: ICD-10-CM

## 2025-08-29 DIAGNOSIS — E10.69 TYPE 1 DIABETES MELLITUS WITH OTHER SPECIFIED COMPLICATION: ICD-10-CM

## 2025-08-29 DIAGNOSIS — E03.9 HYPOTHYROIDISM, UNSPECIFIED: ICD-10-CM

## 2025-08-29 LAB — GLUCOSE BLDC GLUCOMTR-MCNC: 127

## 2025-08-29 PROCEDURE — 99214 OFFICE O/P EST MOD 30 MIN: CPT

## 2025-08-29 PROCEDURE — 95251 CONT GLUC MNTR ANALYSIS I&R: CPT

## 2025-08-29 PROCEDURE — G2211 COMPLEX E/M VISIT ADD ON: CPT | Mod: NC

## 2025-08-29 PROCEDURE — 82962 GLUCOSE BLOOD TEST: CPT

## 2025-08-29 RX ORDER — GLUCAGON INJECTION, SOLUTION 1 MG/.2ML
1 INJECTION, SOLUTION SUBCUTANEOUS
Qty: 0.4 | Refills: 1 | Status: ACTIVE | COMMUNITY
Start: 2025-08-29 | End: 1900-01-01

## 2025-08-29 RX ORDER — LEVOTHYROXINE SODIUM 0.12 MG/1
125 TABLET ORAL
Refills: 0 | Status: ACTIVE | COMMUNITY